# Patient Record
Sex: MALE | Race: WHITE | NOT HISPANIC OR LATINO
[De-identification: names, ages, dates, MRNs, and addresses within clinical notes are randomized per-mention and may not be internally consistent; named-entity substitution may affect disease eponyms.]

---

## 2020-01-01 ENCOUNTER — RESULT REVIEW (OUTPATIENT)
Age: 85
End: 2020-01-01

## 2020-01-01 ENCOUNTER — TRANSCRIPTION ENCOUNTER (OUTPATIENT)
Age: 85
End: 2020-01-01

## 2020-01-01 ENCOUNTER — APPOINTMENT (OUTPATIENT)
Dept: HEMATOLOGY ONCOLOGY | Facility: CLINIC | Age: 85
End: 2020-01-01

## 2020-01-01 ENCOUNTER — EMERGENCY (EMERGENCY)
Facility: HOSPITAL | Age: 85
LOS: 1 days | Discharge: HOME | End: 2020-01-01
Attending: EMERGENCY MEDICINE
Payer: OTHER GOVERNMENT

## 2020-01-01 ENCOUNTER — OUTPATIENT (OUTPATIENT)
Dept: OUTPATIENT SERVICES | Facility: HOSPITAL | Age: 85
LOS: 1 days | Discharge: HOME | End: 2020-01-01

## 2020-01-01 ENCOUNTER — INPATIENT (INPATIENT)
Facility: HOSPITAL | Age: 85
LOS: 9 days | DRG: 871 | End: 2020-11-27
Attending: INTERNAL MEDICINE | Admitting: STUDENT IN AN ORGANIZED HEALTH CARE EDUCATION/TRAINING PROGRAM
Payer: OTHER GOVERNMENT

## 2020-01-01 ENCOUNTER — INPATIENT (INPATIENT)
Facility: HOSPITAL | Age: 85
LOS: 19 days | Discharge: HOME | DRG: 840 | End: 2020-11-06
Attending: HOSPITALIST | Admitting: HOSPITALIST
Payer: OTHER GOVERNMENT

## 2020-01-01 VITALS — HEART RATE: 87 BPM | DIASTOLIC BLOOD PRESSURE: 57 MMHG | RESPIRATION RATE: 20 BRPM | SYSTOLIC BLOOD PRESSURE: 81 MMHG

## 2020-01-01 VITALS
RESPIRATION RATE: 18 BRPM | HEART RATE: 100 BPM | OXYGEN SATURATION: 98 % | DIASTOLIC BLOOD PRESSURE: 68 MMHG | SYSTOLIC BLOOD PRESSURE: 143 MMHG | TEMPERATURE: 98 F

## 2020-01-01 VITALS
TEMPERATURE: 98 F | DIASTOLIC BLOOD PRESSURE: 76 MMHG | OXYGEN SATURATION: 98 % | WEIGHT: 169.98 LBS | HEART RATE: 52 BPM | RESPIRATION RATE: 18 BRPM | SYSTOLIC BLOOD PRESSURE: 118 MMHG

## 2020-01-01 VITALS
HEART RATE: 96 BPM | SYSTOLIC BLOOD PRESSURE: 107 MMHG | RESPIRATION RATE: 20 BRPM | WEIGHT: 164.91 LBS | HEIGHT: 69 IN | OXYGEN SATURATION: 96 % | DIASTOLIC BLOOD PRESSURE: 56 MMHG | TEMPERATURE: 97 F

## 2020-01-01 VITALS
RESPIRATION RATE: 20 BRPM | HEART RATE: 112 BPM | OXYGEN SATURATION: 98 % | DIASTOLIC BLOOD PRESSURE: 83 MMHG | SYSTOLIC BLOOD PRESSURE: 130 MMHG | TEMPERATURE: 98 F | WEIGHT: 144.4 LBS

## 2020-01-01 DIAGNOSIS — E88.3 TUMOR LYSIS SYNDROME: ICD-10-CM

## 2020-01-01 DIAGNOSIS — Z87.81 PERSONAL HISTORY OF (HEALED) TRAUMATIC FRACTURE: Chronic | ICD-10-CM

## 2020-01-01 DIAGNOSIS — N40.0 BENIGN PROSTATIC HYPERPLASIA WITHOUT LOWER URINARY TRACT SYMPTOMS: ICD-10-CM

## 2020-01-01 DIAGNOSIS — L03.90 CELLULITIS, UNSPECIFIED: ICD-10-CM

## 2020-01-01 DIAGNOSIS — N17.9 ACUTE KIDNEY FAILURE, UNSPECIFIED: ICD-10-CM

## 2020-01-01 DIAGNOSIS — D70.9 NEUTROPENIA, UNSPECIFIED: ICD-10-CM

## 2020-01-01 DIAGNOSIS — M86.9 OSTEOMYELITIS, UNSPECIFIED: ICD-10-CM

## 2020-01-01 DIAGNOSIS — R53.2 FUNCTIONAL QUADRIPLEGIA: ICD-10-CM

## 2020-01-01 DIAGNOSIS — Z71.89 OTHER SPECIFIED COUNSELING: ICD-10-CM

## 2020-01-01 DIAGNOSIS — M25.512 PAIN IN LEFT SHOULDER: ICD-10-CM

## 2020-01-01 DIAGNOSIS — E87.5 HYPERKALEMIA: ICD-10-CM

## 2020-01-01 DIAGNOSIS — Z51.5 ENCOUNTER FOR PALLIATIVE CARE: ICD-10-CM

## 2020-01-01 DIAGNOSIS — R06.00 DYSPNEA, UNSPECIFIED: ICD-10-CM

## 2020-01-01 DIAGNOSIS — D64.9 ANEMIA, UNSPECIFIED: ICD-10-CM

## 2020-01-01 DIAGNOSIS — C85.10 UNSPECIFIED B-CELL LYMPHOMA, UNSPECIFIED SITE: ICD-10-CM

## 2020-01-01 DIAGNOSIS — C91.00 ACUTE LYMPHOBLASTIC LEUKEMIA NOT HAVING ACHIEVED REMISSION: ICD-10-CM

## 2020-01-01 DIAGNOSIS — C83.30 DIFFUSE LARGE B-CELL LYMPHOMA, UNSPECIFIED SITE: ICD-10-CM

## 2020-01-01 DIAGNOSIS — M86.9 OSTEOMYELITIS, UNSPECIFIED: Chronic | ICD-10-CM

## 2020-01-01 DIAGNOSIS — I82.629 ACUTE EMBOLISM AND THROMBOSIS OF DEEP VEINS OF UNSPECIFIED UPPER EXTREMITY: ICD-10-CM

## 2020-01-01 DIAGNOSIS — Z29.9 ENCOUNTER FOR PROPHYLACTIC MEASURES, UNSPECIFIED: ICD-10-CM

## 2020-01-01 DIAGNOSIS — I10 ESSENTIAL (PRIMARY) HYPERTENSION: ICD-10-CM

## 2020-01-01 DIAGNOSIS — E87.1 HYPO-OSMOLALITY AND HYPONATREMIA: ICD-10-CM

## 2020-01-01 DIAGNOSIS — R41.89 OTHER SYMPTOMS AND SIGNS INVOLVING COGNITIVE FUNCTIONS AND AWARENESS: ICD-10-CM

## 2020-01-01 DIAGNOSIS — E78.5 HYPERLIPIDEMIA, UNSPECIFIED: ICD-10-CM

## 2020-01-01 DIAGNOSIS — H04.123 DRY EYE SYNDROME OF BILATERAL LACRIMAL GLANDS: ICD-10-CM

## 2020-01-01 DIAGNOSIS — N18.9 CHRONIC KIDNEY DISEASE, UNSPECIFIED: ICD-10-CM

## 2020-01-01 DIAGNOSIS — F41.9 ANXIETY DISORDER, UNSPECIFIED: ICD-10-CM

## 2020-01-01 DIAGNOSIS — F32.9 MAJOR DEPRESSIVE DISORDER, SINGLE EPISODE, UNSPECIFIED: ICD-10-CM

## 2020-01-01 DIAGNOSIS — R63.0 ANOREXIA: ICD-10-CM

## 2020-01-01 LAB
% ALBUMIN: 39.4 % — SIGNIFICANT CHANGE UP
% ALPHA 1: 13.3 % — SIGNIFICANT CHANGE UP
% ALPHA 2: 22.5 % — SIGNIFICANT CHANGE UP
% BETA: 11.6 % — SIGNIFICANT CHANGE UP
% GAMMA: 13.2 % — SIGNIFICANT CHANGE UP
-  AMIKACIN: SIGNIFICANT CHANGE UP
-  AZTREONAM: SIGNIFICANT CHANGE UP
-  CEFEPIME: SIGNIFICANT CHANGE UP
-  CEFTAZIDIME: SIGNIFICANT CHANGE UP
-  CIPROFLOXACIN: SIGNIFICANT CHANGE UP
-  GENTAMICIN: SIGNIFICANT CHANGE UP
-  IMIPENEM: SIGNIFICANT CHANGE UP
-  LEVOFLOXACIN: SIGNIFICANT CHANGE UP
-  MEROPENEM: SIGNIFICANT CHANGE UP
-  PIPERACILLIN/TAZOBACTAM: SIGNIFICANT CHANGE UP
-  TOBRAMYCIN: SIGNIFICANT CHANGE UP
ALBUMIN SERPL ELPH-MCNC: 1.9 G/DL — LOW (ref 3.3–5)
ALBUMIN SERPL ELPH-MCNC: 1.9 G/DL — LOW (ref 3.6–5.5)
ALBUMIN SERPL ELPH-MCNC: 2.3 G/DL — LOW (ref 3.3–5)
ALBUMIN SERPL ELPH-MCNC: 2.4 G/DL — LOW (ref 3.3–5)
ALBUMIN SERPL ELPH-MCNC: 2.5 G/DL — LOW (ref 3.3–5)
ALBUMIN SERPL ELPH-MCNC: 2.6 G/DL — LOW (ref 3.3–5)
ALBUMIN SERPL ELPH-MCNC: 2.6 G/DL — LOW (ref 3.3–5)
ALBUMIN SERPL ELPH-MCNC: 2.8 G/DL — LOW (ref 3.3–5)
ALBUMIN SERPL ELPH-MCNC: 3.6 G/DL — SIGNIFICANT CHANGE UP (ref 3.3–5)
ALBUMIN/GLOB SERPL ELPH: 0.7 RATIO — SIGNIFICANT CHANGE UP
ALP SERPL-CCNC: 116 U/L — SIGNIFICANT CHANGE UP (ref 40–120)
ALP SERPL-CCNC: 67 U/L — SIGNIFICANT CHANGE UP (ref 40–120)
ALP SERPL-CCNC: 76 U/L — SIGNIFICANT CHANGE UP (ref 40–120)
ALP SERPL-CCNC: 78 U/L — SIGNIFICANT CHANGE UP (ref 40–120)
ALP SERPL-CCNC: 79 U/L — SIGNIFICANT CHANGE UP (ref 40–120)
ALP SERPL-CCNC: 81 U/L — SIGNIFICANT CHANGE UP (ref 40–120)
ALP SERPL-CCNC: 83 U/L — SIGNIFICANT CHANGE UP (ref 40–120)
ALP SERPL-CCNC: 85 U/L — SIGNIFICANT CHANGE UP (ref 40–120)
ALP SERPL-CCNC: 86 U/L — SIGNIFICANT CHANGE UP (ref 40–120)
ALP SERPL-CCNC: 87 U/L — SIGNIFICANT CHANGE UP (ref 40–120)
ALP SERPL-CCNC: 91 U/L — SIGNIFICANT CHANGE UP (ref 40–120)
ALP SERPL-CCNC: 91 U/L — SIGNIFICANT CHANGE UP (ref 40–120)
ALPHA1 GLOB SERPL ELPH-MCNC: 0.6 G/DL — HIGH (ref 0.1–0.4)
ALPHA2 GLOB SERPL ELPH-MCNC: 1.1 G/DL — HIGH (ref 0.5–1)
ALT FLD-CCNC: 10 U/L — SIGNIFICANT CHANGE UP (ref 10–45)
ALT FLD-CCNC: 10 U/L — SIGNIFICANT CHANGE UP (ref 10–45)
ALT FLD-CCNC: 11 U/L — SIGNIFICANT CHANGE UP (ref 10–45)
ALT FLD-CCNC: 12 U/L — SIGNIFICANT CHANGE UP (ref 10–45)
ALT FLD-CCNC: 15 U/L — SIGNIFICANT CHANGE UP (ref 10–45)
ALT FLD-CCNC: 30 U/L — SIGNIFICANT CHANGE UP (ref 10–45)
ALT FLD-CCNC: 32 U/L — SIGNIFICANT CHANGE UP (ref 10–45)
ALT FLD-CCNC: 40 U/L — SIGNIFICANT CHANGE UP (ref 10–45)
ALT FLD-CCNC: 44 U/L — SIGNIFICANT CHANGE UP (ref 10–45)
ALT FLD-CCNC: 47 U/L — HIGH (ref 10–45)
ALT FLD-CCNC: 8 U/L — LOW (ref 10–45)
ALT FLD-CCNC: 9 U/L — LOW (ref 10–45)
ANION GAP SERPL CALC-SCNC: 10 MMOL/L — SIGNIFICANT CHANGE UP (ref 5–17)
ANION GAP SERPL CALC-SCNC: 11 MMOL/L — SIGNIFICANT CHANGE UP (ref 5–17)
ANION GAP SERPL CALC-SCNC: 12 MMOL/L — SIGNIFICANT CHANGE UP (ref 5–17)
ANION GAP SERPL CALC-SCNC: 13 MMOL/L — SIGNIFICANT CHANGE UP (ref 5–17)
ANION GAP SERPL CALC-SCNC: 14 MMOL/L — SIGNIFICANT CHANGE UP (ref 5–17)
ANION GAP SERPL CALC-SCNC: 15 MMOL/L — SIGNIFICANT CHANGE UP (ref 5–17)
ANION GAP SERPL CALC-SCNC: 16 MMOL/L — SIGNIFICANT CHANGE UP (ref 5–17)
ANION GAP SERPL CALC-SCNC: 17 MMOL/L — SIGNIFICANT CHANGE UP (ref 5–17)
ANION GAP SERPL CALC-SCNC: 17 MMOL/L — SIGNIFICANT CHANGE UP (ref 5–17)
ANION GAP SERPL CALC-SCNC: 18 MMOL/L — HIGH (ref 5–17)
ANION GAP SERPL CALC-SCNC: 18 MMOL/L — HIGH (ref 5–17)
ANION GAP SERPL CALC-SCNC: 24 MMOL/L — HIGH (ref 5–17)
ANISOCYTOSIS BLD QL: SLIGHT — SIGNIFICANT CHANGE UP
APPEARANCE UR: ABNORMAL
APPEARANCE UR: ABNORMAL
APTT BLD: 105.8 SEC — HIGH (ref 27.5–35.5)
APTT BLD: 108.5 SEC — HIGH (ref 27.5–35.5)
APTT BLD: 127.2 SEC — CRITICAL HIGH (ref 27.5–35.5)
APTT BLD: 182.5 SEC — CRITICAL HIGH (ref 27.5–35.5)
APTT BLD: 27.7 SEC — SIGNIFICANT CHANGE UP (ref 27.5–36.3)
APTT BLD: 32 SEC — SIGNIFICANT CHANGE UP (ref 27.5–35.5)
APTT BLD: 33.2 SEC — SIGNIFICANT CHANGE UP (ref 27.5–35.5)
APTT BLD: 47.5 SEC — HIGH (ref 27.5–35.5)
APTT BLD: 48.8 SEC — HIGH (ref 27.5–35.5)
APTT BLD: 49.9 SEC — HIGH (ref 27.5–35.5)
APTT BLD: 51.6 SEC — HIGH (ref 27.5–35.5)
APTT BLD: 61.1 SEC — HIGH (ref 27.5–35.5)
APTT BLD: 61.4 SEC — HIGH (ref 27.5–35.5)
APTT BLD: 62.3 SEC — HIGH (ref 27.5–35.5)
APTT BLD: 63 SEC — HIGH (ref 27.5–35.5)
APTT BLD: 64.2 SEC — HIGH (ref 27.5–35.5)
APTT BLD: 67.1 SEC — HIGH (ref 27.5–35.5)
APTT BLD: 69.7 SEC — HIGH (ref 27.5–35.5)
APTT BLD: 69.9 SEC — HIGH (ref 27.5–35.5)
APTT BLD: 70.6 SEC — HIGH (ref 27.5–35.5)
APTT BLD: 71.9 SEC — HIGH (ref 27.5–35.5)
APTT BLD: 72.9 SEC — HIGH (ref 27.5–35.5)
APTT BLD: 79.7 SEC — HIGH (ref 27.5–35.5)
APTT BLD: 91.6 SEC — HIGH (ref 27.5–35.5)
APTT BLD: >200 SEC (ref 27.5–35.5)
AST SERPL-CCNC: 22 U/L — SIGNIFICANT CHANGE UP (ref 10–40)
AST SERPL-CCNC: 22 U/L — SIGNIFICANT CHANGE UP (ref 10–40)
AST SERPL-CCNC: 23 U/L — SIGNIFICANT CHANGE UP (ref 10–40)
AST SERPL-CCNC: 28 U/L — SIGNIFICANT CHANGE UP (ref 10–40)
AST SERPL-CCNC: 32 U/L — SIGNIFICANT CHANGE UP (ref 10–40)
AST SERPL-CCNC: 34 U/L — SIGNIFICANT CHANGE UP (ref 10–40)
AST SERPL-CCNC: 41 U/L — HIGH (ref 10–40)
AST SERPL-CCNC: 46 U/L — HIGH (ref 10–40)
AST SERPL-CCNC: 46 U/L — HIGH (ref 10–40)
AST SERPL-CCNC: 56 U/L — HIGH (ref 10–40)
AST SERPL-CCNC: 65 U/L — HIGH (ref 10–40)
AST SERPL-CCNC: 72 U/L — HIGH (ref 10–40)
B-GLOBULIN SERPL ELPH-MCNC: 0.5 G/DL — SIGNIFICANT CHANGE UP (ref 0.5–1)
BACTERIA # UR AUTO: ABNORMAL
BACTERIA # UR AUTO: NEGATIVE — SIGNIFICANT CHANGE UP
BASE EXCESS BLDV CALC-SCNC: -0.6 MMOL/L — SIGNIFICANT CHANGE UP (ref -2–2)
BASE EXCESS BLDV CALC-SCNC: -2.1 MMOL/L — LOW (ref -2–2)
BASE EXCESS BLDV CALC-SCNC: -2.1 MMOL/L — LOW (ref -2–2)
BASE EXCESS BLDV CALC-SCNC: -2.7 MMOL/L — LOW (ref -2–2)
BASE EXCESS BLDV CALC-SCNC: -2.7 MMOL/L — LOW (ref -2–2)
BASE EXCESS BLDV CALC-SCNC: -3.5 MMOL/L — LOW (ref -2–2)
BASOPHILS # BLD AUTO: 0 K/UL — SIGNIFICANT CHANGE UP (ref 0–0.2)
BASOPHILS # BLD AUTO: 0 K/UL — SIGNIFICANT CHANGE UP (ref 0–0.2)
BASOPHILS # BLD AUTO: 0.01 K/UL — SIGNIFICANT CHANGE UP (ref 0–0.2)
BASOPHILS # BLD AUTO: 0.02 K/UL — SIGNIFICANT CHANGE UP (ref 0–0.2)
BASOPHILS # BLD AUTO: 0.02 K/UL — SIGNIFICANT CHANGE UP (ref 0–0.2)
BASOPHILS # BLD AUTO: 0.03 K/UL — SIGNIFICANT CHANGE UP (ref 0–0.2)
BASOPHILS # BLD AUTO: 0.05 K/UL — SIGNIFICANT CHANGE UP (ref 0–0.2)
BASOPHILS # BLD AUTO: 0.06 K/UL — SIGNIFICANT CHANGE UP (ref 0–0.2)
BASOPHILS # BLD AUTO: 0.06 K/UL — SIGNIFICANT CHANGE UP (ref 0–0.2)
BASOPHILS NFR BLD AUTO: 0 % — SIGNIFICANT CHANGE UP (ref 0–2)
BASOPHILS NFR BLD AUTO: 0 % — SIGNIFICANT CHANGE UP (ref 0–2)
BASOPHILS NFR BLD AUTO: 0.1 % — SIGNIFICANT CHANGE UP (ref 0–2)
BASOPHILS NFR BLD AUTO: 0.2 % — SIGNIFICANT CHANGE UP (ref 0–2)
BASOPHILS NFR BLD AUTO: 0.2 % — SIGNIFICANT CHANGE UP (ref 0–2)
BASOPHILS NFR BLD AUTO: 0.3 % — SIGNIFICANT CHANGE UP (ref 0–2)
BASOPHILS NFR BLD AUTO: 0.3 % — SIGNIFICANT CHANGE UP (ref 0–2)
BASOPHILS NFR BLD AUTO: 0.4 % — SIGNIFICANT CHANGE UP (ref 0–2)
BASOPHILS NFR BLD AUTO: 0.5 % — SIGNIFICANT CHANGE UP (ref 0–2)
BASOPHILS NFR BLD AUTO: 0.6 % — SIGNIFICANT CHANGE UP (ref 0–2)
BASOPHILS NFR BLD AUTO: 0.9 % — SIGNIFICANT CHANGE UP (ref 0–2)
BASOPHILS NFR BLD AUTO: 0.9 % — SIGNIFICANT CHANGE UP (ref 0–2)
BILIRUB DIRECT SERPL-MCNC: 0.1 MG/DL — SIGNIFICANT CHANGE UP (ref 0–0.2)
BILIRUB DIRECT SERPL-MCNC: 0.2 MG/DL — SIGNIFICANT CHANGE UP (ref 0–0.2)
BILIRUB INDIRECT FLD-MCNC: 0.2 MG/DL — SIGNIFICANT CHANGE UP (ref 0.2–1)
BILIRUB INDIRECT FLD-MCNC: 0.2 MG/DL — SIGNIFICANT CHANGE UP (ref 0.2–1)
BILIRUB SERPL-MCNC: 0.2 MG/DL — SIGNIFICANT CHANGE UP (ref 0.2–1.2)
BILIRUB SERPL-MCNC: 0.3 MG/DL — SIGNIFICANT CHANGE UP (ref 0.2–1.2)
BILIRUB SERPL-MCNC: 0.4 MG/DL — SIGNIFICANT CHANGE UP (ref 0.2–1.2)
BILIRUB SERPL-MCNC: 0.5 MG/DL — SIGNIFICANT CHANGE UP (ref 0.2–1.2)
BILIRUB SERPL-MCNC: 0.5 MG/DL — SIGNIFICANT CHANGE UP (ref 0.2–1.2)
BILIRUB SERPL-MCNC: 0.6 MG/DL — SIGNIFICANT CHANGE UP (ref 0.2–1.2)
BILIRUB SERPL-MCNC: 0.6 MG/DL — SIGNIFICANT CHANGE UP (ref 0.2–1.2)
BILIRUB SERPL-MCNC: 0.7 MG/DL — SIGNIFICANT CHANGE UP (ref 0.2–1.2)
BILIRUB UR-MCNC: NEGATIVE — SIGNIFICANT CHANGE UP
BILIRUB UR-MCNC: NEGATIVE — SIGNIFICANT CHANGE UP
BLD GP AB SCN SERPL QL: NEGATIVE — SIGNIFICANT CHANGE UP
BUN SERPL-MCNC: 21 MG/DL — SIGNIFICANT CHANGE UP (ref 7–23)
BUN SERPL-MCNC: 25 MG/DL — HIGH (ref 7–23)
BUN SERPL-MCNC: 27 MG/DL — HIGH (ref 7–23)
BUN SERPL-MCNC: 28 MG/DL — HIGH (ref 7–23)
BUN SERPL-MCNC: 32 MG/DL — HIGH (ref 7–23)
BUN SERPL-MCNC: 32 MG/DL — HIGH (ref 7–23)
BUN SERPL-MCNC: 37 MG/DL — HIGH (ref 7–23)
BUN SERPL-MCNC: 39 MG/DL — HIGH (ref 7–23)
BUN SERPL-MCNC: 39 MG/DL — HIGH (ref 7–23)
BUN SERPL-MCNC: 43 MG/DL — HIGH (ref 7–23)
BUN SERPL-MCNC: 47 MG/DL — HIGH (ref 7–23)
BUN SERPL-MCNC: 47 MG/DL — HIGH (ref 7–23)
BUN SERPL-MCNC: 50 MG/DL — HIGH (ref 7–23)
BUN SERPL-MCNC: 56 MG/DL — HIGH (ref 7–23)
BUN SERPL-MCNC: 57 MG/DL — HIGH (ref 7–23)
BUN SERPL-MCNC: 59 MG/DL — HIGH (ref 7–23)
BUN SERPL-MCNC: 60 MG/DL — HIGH (ref 7–23)
BUN SERPL-MCNC: 63 MG/DL — HIGH (ref 7–23)
BUN SERPL-MCNC: 65 MG/DL — HIGH (ref 7–23)
BUN SERPL-MCNC: 67 MG/DL — HIGH (ref 7–23)
BUN SERPL-MCNC: 71 MG/DL — HIGH (ref 7–23)
BUN SERPL-MCNC: 73 MG/DL — HIGH (ref 7–23)
BUN SERPL-MCNC: 78 MG/DL — HIGH (ref 7–23)
BUN SERPL-MCNC: 80 MG/DL — HIGH (ref 7–23)
BUN SERPL-MCNC: 82 MG/DL — HIGH (ref 7–23)
BUN SERPL-MCNC: 84 MG/DL — HIGH (ref 7–23)
BUN SERPL-MCNC: 85 MG/DL — HIGH (ref 7–23)
BUN SERPL-MCNC: 86 MG/DL — HIGH (ref 7–23)
BUN SERPL-MCNC: 88 MG/DL — HIGH (ref 7–23)
BUN SERPL-MCNC: 90 MG/DL — HIGH (ref 7–23)
BUN SERPL-MCNC: 90 MG/DL — HIGH (ref 7–23)
BUN SERPL-MCNC: 91 MG/DL — HIGH (ref 7–23)
CA-I SERPL-SCNC: 1.02 MMOL/L — LOW (ref 1.12–1.3)
CA-I SERPL-SCNC: 1.06 MMOL/L — LOW (ref 1.12–1.3)
CA-I SERPL-SCNC: 1.12 MMOL/L — SIGNIFICANT CHANGE UP (ref 1.12–1.3)
CA-I SERPL-SCNC: 1.13 MMOL/L — SIGNIFICANT CHANGE UP (ref 1.12–1.3)
CA-I SERPL-SCNC: 1.15 MMOL/L — SIGNIFICANT CHANGE UP (ref 1.12–1.3)
CA-I SERPL-SCNC: 1.17 MMOL/L — SIGNIFICANT CHANGE UP (ref 1.12–1.3)
CALCIUM SERPL-MCNC: 8 MG/DL — LOW (ref 8.4–10.5)
CALCIUM SERPL-MCNC: 8.1 MG/DL — LOW (ref 8.4–10.5)
CALCIUM SERPL-MCNC: 8.2 MG/DL — LOW (ref 8.4–10.5)
CALCIUM SERPL-MCNC: 8.3 MG/DL — LOW (ref 8.4–10.5)
CALCIUM SERPL-MCNC: 8.4 MG/DL — SIGNIFICANT CHANGE UP (ref 8.4–10.5)
CALCIUM SERPL-MCNC: 8.5 MG/DL — SIGNIFICANT CHANGE UP (ref 8.4–10.5)
CALCIUM SERPL-MCNC: 8.6 MG/DL — SIGNIFICANT CHANGE UP (ref 8.4–10.5)
CALCIUM SERPL-MCNC: 8.7 MG/DL — SIGNIFICANT CHANGE UP (ref 8.4–10.5)
CALCIUM SERPL-MCNC: 8.8 MG/DL — SIGNIFICANT CHANGE UP (ref 8.4–10.5)
CALCIUM SERPL-MCNC: 8.8 MG/DL — SIGNIFICANT CHANGE UP (ref 8.4–10.5)
CALCIUM SERPL-MCNC: 8.9 MG/DL — SIGNIFICANT CHANGE UP (ref 8.4–10.5)
CALCIUM SERPL-MCNC: 9.3 MG/DL — SIGNIFICANT CHANGE UP (ref 8.4–10.5)
CD3-/CD16+CD56+(%): 12 % — SIGNIFICANT CHANGE UP (ref 4–25)
CD3-CD16+CD56+(ABS): 104 CELLS/UL — SIGNIFICANT CHANGE UP (ref 70–760)
CHLORIDE BLDV-SCNC: 100 MMOL/L — SIGNIFICANT CHANGE UP (ref 96–108)
CHLORIDE BLDV-SCNC: 104 MMOL/L — SIGNIFICANT CHANGE UP (ref 96–108)
CHLORIDE BLDV-SCNC: 104 MMOL/L — SIGNIFICANT CHANGE UP (ref 96–108)
CHLORIDE BLDV-SCNC: 107 MMOL/L — SIGNIFICANT CHANGE UP (ref 96–108)
CHLORIDE SERPL-SCNC: 100 MMOL/L — SIGNIFICANT CHANGE UP (ref 96–108)
CHLORIDE SERPL-SCNC: 101 MMOL/L — SIGNIFICANT CHANGE UP (ref 96–108)
CHLORIDE SERPL-SCNC: 102 MMOL/L — SIGNIFICANT CHANGE UP (ref 96–108)
CHLORIDE SERPL-SCNC: 102 MMOL/L — SIGNIFICANT CHANGE UP (ref 96–108)
CHLORIDE SERPL-SCNC: 103 MMOL/L — SIGNIFICANT CHANGE UP (ref 96–108)
CHLORIDE SERPL-SCNC: 104 MMOL/L — SIGNIFICANT CHANGE UP (ref 96–108)
CHLORIDE SERPL-SCNC: 105 MMOL/L — SIGNIFICANT CHANGE UP (ref 96–108)
CHLORIDE SERPL-SCNC: 106 MMOL/L — SIGNIFICANT CHANGE UP (ref 96–108)
CHLORIDE SERPL-SCNC: 107 MMOL/L — SIGNIFICANT CHANGE UP (ref 96–108)
CHLORIDE SERPL-SCNC: 107 MMOL/L — SIGNIFICANT CHANGE UP (ref 96–108)
CHLORIDE SERPL-SCNC: 108 MMOL/L — SIGNIFICANT CHANGE UP (ref 96–108)
CHLORIDE SERPL-SCNC: 108 MMOL/L — SIGNIFICANT CHANGE UP (ref 96–108)
CHLORIDE SERPL-SCNC: 92 MMOL/L — LOW (ref 96–108)
CHLORIDE SERPL-SCNC: 92 MMOL/L — LOW (ref 96–108)
CHLORIDE SERPL-SCNC: 94 MMOL/L — LOW (ref 96–108)
CHLORIDE SERPL-SCNC: 95 MMOL/L — LOW (ref 96–108)
CHLORIDE SERPL-SCNC: 95 MMOL/L — LOW (ref 96–108)
CHLORIDE SERPL-SCNC: 96 MMOL/L — SIGNIFICANT CHANGE UP (ref 96–108)
CHLORIDE SERPL-SCNC: 97 MMOL/L — SIGNIFICANT CHANGE UP (ref 96–108)
CHLORIDE SERPL-SCNC: 98 MMOL/L — SIGNIFICANT CHANGE UP (ref 96–108)
CHLORIDE SERPL-SCNC: 99 MMOL/L — SIGNIFICANT CHANGE UP (ref 96–108)
CHLORIDE UR-SCNC: <35 MMOL/L — SIGNIFICANT CHANGE UP
CHROM ANALY OVERALL INTERP SPEC-IMP: SIGNIFICANT CHANGE UP
CK MB CFR SERPL CALC: 2.1 NG/ML — SIGNIFICANT CHANGE UP (ref 0–6.7)
CO2 BLDV-SCNC: 22 MMOL/L — SIGNIFICANT CHANGE UP (ref 22–30)
CO2 BLDV-SCNC: 23 MMOL/L — SIGNIFICANT CHANGE UP (ref 22–30)
CO2 BLDV-SCNC: 24 MMOL/L — SIGNIFICANT CHANGE UP (ref 22–30)
CO2 SERPL-SCNC: 18 MMOL/L — LOW (ref 22–31)
CO2 SERPL-SCNC: 18 MMOL/L — LOW (ref 22–31)
CO2 SERPL-SCNC: 19 MMOL/L — LOW (ref 22–31)
CO2 SERPL-SCNC: 20 MMOL/L — LOW (ref 22–31)
CO2 SERPL-SCNC: 21 MMOL/L — LOW (ref 22–31)
CO2 SERPL-SCNC: 22 MMOL/L — SIGNIFICANT CHANGE UP (ref 22–31)
CO2 SERPL-SCNC: 23 MMOL/L — SIGNIFICANT CHANGE UP (ref 22–31)
CO2 SERPL-SCNC: 24 MMOL/L — SIGNIFICANT CHANGE UP (ref 22–31)
CO2 SERPL-SCNC: 25 MMOL/L — SIGNIFICANT CHANGE UP (ref 22–31)
CO2 SERPL-SCNC: 26 MMOL/L — SIGNIFICANT CHANGE UP (ref 22–31)
CO2 SERPL-SCNC: 26 MMOL/L — SIGNIFICANT CHANGE UP (ref 22–31)
COLOR SPEC: YELLOW — SIGNIFICANT CHANGE UP
COLOR SPEC: YELLOW — SIGNIFICANT CHANGE UP
CREAT ?TM UR-MCNC: 43 MG/DL — SIGNIFICANT CHANGE UP
CREAT SERPL-MCNC: 1.52 MG/DL — HIGH (ref 0.5–1.3)
CREAT SERPL-MCNC: 1.52 MG/DL — HIGH (ref 0.5–1.3)
CREAT SERPL-MCNC: 1.53 MG/DL — HIGH (ref 0.5–1.3)
CREAT SERPL-MCNC: 1.54 MG/DL — HIGH (ref 0.5–1.3)
CREAT SERPL-MCNC: 1.57 MG/DL — HIGH (ref 0.5–1.3)
CREAT SERPL-MCNC: 1.58 MG/DL — HIGH (ref 0.5–1.3)
CREAT SERPL-MCNC: 1.61 MG/DL — HIGH (ref 0.5–1.3)
CREAT SERPL-MCNC: 1.63 MG/DL — HIGH (ref 0.5–1.3)
CREAT SERPL-MCNC: 1.64 MG/DL — HIGH (ref 0.5–1.3)
CREAT SERPL-MCNC: 1.66 MG/DL — HIGH (ref 0.5–1.3)
CREAT SERPL-MCNC: 1.71 MG/DL — HIGH (ref 0.5–1.3)
CREAT SERPL-MCNC: 1.73 MG/DL — HIGH (ref 0.5–1.3)
CREAT SERPL-MCNC: 1.73 MG/DL — HIGH (ref 0.5–1.3)
CREAT SERPL-MCNC: 1.74 MG/DL — HIGH (ref 0.5–1.3)
CREAT SERPL-MCNC: 1.75 MG/DL — HIGH (ref 0.5–1.3)
CREAT SERPL-MCNC: 1.76 MG/DL — HIGH (ref 0.5–1.3)
CREAT SERPL-MCNC: 1.78 MG/DL — HIGH (ref 0.5–1.3)
CREAT SERPL-MCNC: 1.8 MG/DL — HIGH (ref 0.5–1.3)
CREAT SERPL-MCNC: 1.94 MG/DL — HIGH (ref 0.5–1.3)
CREAT SERPL-MCNC: 1.95 MG/DL — HIGH (ref 0.5–1.3)
CREAT SERPL-MCNC: 2.03 MG/DL — HIGH (ref 0.5–1.3)
CREAT SERPL-MCNC: 2.1 MG/DL — HIGH (ref 0.5–1.3)
CREAT SERPL-MCNC: 2.19 MG/DL — HIGH (ref 0.5–1.3)
CREAT SERPL-MCNC: 2.26 MG/DL — HIGH (ref 0.5–1.3)
CREAT SERPL-MCNC: 2.3 MG/DL — HIGH (ref 0.5–1.3)
CREAT SERPL-MCNC: 2.33 MG/DL — HIGH (ref 0.5–1.3)
CREAT SERPL-MCNC: 2.33 MG/DL — HIGH (ref 0.5–1.3)
CREAT SERPL-MCNC: 2.42 MG/DL — HIGH (ref 0.5–1.3)
CREAT SERPL-MCNC: 2.48 MG/DL — HIGH (ref 0.5–1.3)
CREAT SERPL-MCNC: 2.5 MG/DL — HIGH (ref 0.5–1.3)
CREAT SERPL-MCNC: 2.53 MG/DL — HIGH (ref 0.5–1.3)
CREAT SERPL-MCNC: 2.58 MG/DL — HIGH (ref 0.5–1.3)
CREAT SERPL-MCNC: 2.6 MG/DL — HIGH (ref 0.5–1.3)
CREAT SERPL-MCNC: 2.68 MG/DL — HIGH (ref 0.5–1.3)
CREAT SERPL-MCNC: 2.69 MG/DL — HIGH (ref 0.5–1.3)
CREAT SERPL-MCNC: 2.76 MG/DL — HIGH (ref 0.5–1.3)
CRP SERPL-MCNC: 28.06 MG/DL — HIGH (ref 0–0.4)
CRP SERPL-MCNC: 30.39 MG/DL — HIGH (ref 0–0.4)
CULTURE RESULTS: SIGNIFICANT CHANGE UP
DIFF PNL FLD: ABNORMAL
DIFF PNL FLD: ABNORMAL
EOSINOPHIL # BLD AUTO: 0 K/UL — SIGNIFICANT CHANGE UP (ref 0–0.5)
EOSINOPHIL # BLD AUTO: 0 K/UL — SIGNIFICANT CHANGE UP (ref 0–0.5)
EOSINOPHIL # BLD AUTO: 0.02 K/UL — SIGNIFICANT CHANGE UP (ref 0–0.5)
EOSINOPHIL # BLD AUTO: 0.04 K/UL — SIGNIFICANT CHANGE UP (ref 0–0.5)
EOSINOPHIL # BLD AUTO: 0.05 K/UL — SIGNIFICANT CHANGE UP (ref 0–0.5)
EOSINOPHIL # BLD AUTO: 0.05 K/UL — SIGNIFICANT CHANGE UP (ref 0–0.5)
EOSINOPHIL # BLD AUTO: 0.09 K/UL — SIGNIFICANT CHANGE UP (ref 0–0.5)
EOSINOPHIL # BLD AUTO: 0.11 K/UL — SIGNIFICANT CHANGE UP (ref 0–0.5)
EOSINOPHIL # BLD AUTO: 0.11 K/UL — SIGNIFICANT CHANGE UP (ref 0–0.5)
EOSINOPHIL # BLD AUTO: 0.12 K/UL — SIGNIFICANT CHANGE UP (ref 0–0.5)
EOSINOPHIL # BLD AUTO: 0.13 K/UL — SIGNIFICANT CHANGE UP (ref 0–0.5)
EOSINOPHIL # BLD AUTO: 0.13 K/UL — SIGNIFICANT CHANGE UP (ref 0–0.5)
EOSINOPHIL # BLD AUTO: 0.14 K/UL — SIGNIFICANT CHANGE UP (ref 0–0.5)
EOSINOPHIL # BLD AUTO: 0.18 K/UL — SIGNIFICANT CHANGE UP (ref 0–0.5)
EOSINOPHIL # BLD AUTO: 0.19 K/UL — SIGNIFICANT CHANGE UP (ref 0–0.5)
EOSINOPHIL NFR BLD AUTO: 0 % — SIGNIFICANT CHANGE UP (ref 0–6)
EOSINOPHIL NFR BLD AUTO: 0 % — SIGNIFICANT CHANGE UP (ref 0–6)
EOSINOPHIL NFR BLD AUTO: 0.3 % — SIGNIFICANT CHANGE UP (ref 0–6)
EOSINOPHIL NFR BLD AUTO: 0.6 % — SIGNIFICANT CHANGE UP (ref 0–6)
EOSINOPHIL NFR BLD AUTO: 0.9 % — SIGNIFICANT CHANGE UP (ref 0–6)
EOSINOPHIL NFR BLD AUTO: 1 % — SIGNIFICANT CHANGE UP (ref 0–6)
EOSINOPHIL NFR BLD AUTO: 1.2 % — SIGNIFICANT CHANGE UP (ref 0–6)
EOSINOPHIL NFR BLD AUTO: 1.2 % — SIGNIFICANT CHANGE UP (ref 0–6)
EOSINOPHIL NFR BLD AUTO: 1.3 % — SIGNIFICANT CHANGE UP (ref 0–6)
EOSINOPHIL NFR BLD AUTO: 1.7 % — SIGNIFICANT CHANGE UP (ref 0–6)
EOSINOPHIL NFR BLD AUTO: 1.9 % — SIGNIFICANT CHANGE UP (ref 0–6)
EOSINOPHIL NFR BLD AUTO: 13.9 % — HIGH (ref 0–6)
EOSINOPHIL NFR BLD AUTO: 3.6 % — SIGNIFICANT CHANGE UP (ref 0–6)
EPI CELLS # UR: 1 /HPF — SIGNIFICANT CHANGE UP
EPI CELLS # UR: 2 — SIGNIFICANT CHANGE UP
ERYTHROCYTE [SEDIMENTATION RATE] IN BLOOD: 120 MM/HR — HIGH (ref 0–20)
FERRITIN SERPL-MCNC: 9219 NG/ML — HIGH (ref 30–400)
FERRITIN SERPL-MCNC: HIGH NG/ML (ref 30–400)
FIBRINOGEN AG PPP IA-MCNC: 430 MG/DL — HIGH
FIBRINOGEN PPP-MCNC: 1190 MG/DL — HIGH (ref 290–520)
GAMMA GLOBULIN: 0.6 G/DL — SIGNIFICANT CHANGE UP (ref 0.6–1.6)
GAS PNL BLDV: 130 MMOL/L — LOW (ref 135–145)
GAS PNL BLDV: 130 MMOL/L — LOW (ref 135–145)
GAS PNL BLDV: 134 MMOL/L — LOW (ref 135–145)
GAS PNL BLDV: 134 MMOL/L — LOW (ref 135–145)
GAS PNL BLDV: 139 MMOL/L — SIGNIFICANT CHANGE UP (ref 135–145)
GAS PNL BLDV: 141 MMOL/L — SIGNIFICANT CHANGE UP (ref 135–145)
GAS PNL BLDV: SIGNIFICANT CHANGE UP
GLUCOSE BLDC GLUCOMTR-MCNC: 101 MG/DL — HIGH (ref 70–99)
GLUCOSE BLDC GLUCOMTR-MCNC: 165 MG/DL — HIGH (ref 70–99)
GLUCOSE BLDC GLUCOMTR-MCNC: 208 MG/DL — HIGH (ref 70–99)
GLUCOSE BLDC GLUCOMTR-MCNC: 243 MG/DL — HIGH (ref 70–99)
GLUCOSE BLDV-MCNC: 124 MG/DL — HIGH (ref 70–99)
GLUCOSE BLDV-MCNC: 134 MG/DL — HIGH (ref 70–99)
GLUCOSE BLDV-MCNC: 135 MG/DL — HIGH (ref 70–99)
GLUCOSE BLDV-MCNC: 137 MG/DL — HIGH (ref 70–99)
GLUCOSE BLDV-MCNC: 162 MG/DL — HIGH (ref 70–99)
GLUCOSE BLDV-MCNC: 75 MG/DL — SIGNIFICANT CHANGE UP (ref 70–99)
GLUCOSE SERPL-MCNC: 100 MG/DL — HIGH (ref 70–99)
GLUCOSE SERPL-MCNC: 100 MG/DL — HIGH (ref 70–99)
GLUCOSE SERPL-MCNC: 101 MG/DL — HIGH (ref 70–99)
GLUCOSE SERPL-MCNC: 102 MG/DL — HIGH (ref 70–99)
GLUCOSE SERPL-MCNC: 106 MG/DL — HIGH (ref 70–99)
GLUCOSE SERPL-MCNC: 112 MG/DL — HIGH (ref 70–99)
GLUCOSE SERPL-MCNC: 115 MG/DL — HIGH (ref 70–99)
GLUCOSE SERPL-MCNC: 115 MG/DL — HIGH (ref 70–99)
GLUCOSE SERPL-MCNC: 117 MG/DL — HIGH (ref 70–99)
GLUCOSE SERPL-MCNC: 118 MG/DL — HIGH (ref 70–99)
GLUCOSE SERPL-MCNC: 121 MG/DL — HIGH (ref 70–99)
GLUCOSE SERPL-MCNC: 124 MG/DL — HIGH (ref 70–99)
GLUCOSE SERPL-MCNC: 127 MG/DL — HIGH (ref 70–99)
GLUCOSE SERPL-MCNC: 128 MG/DL — HIGH (ref 70–99)
GLUCOSE SERPL-MCNC: 128 MG/DL — HIGH (ref 70–99)
GLUCOSE SERPL-MCNC: 134 MG/DL — HIGH (ref 70–99)
GLUCOSE SERPL-MCNC: 137 MG/DL — HIGH (ref 70–99)
GLUCOSE SERPL-MCNC: 154 MG/DL — HIGH (ref 70–99)
GLUCOSE SERPL-MCNC: 170 MG/DL — HIGH (ref 70–99)
GLUCOSE SERPL-MCNC: 177 MG/DL — HIGH (ref 70–99)
GLUCOSE SERPL-MCNC: 179 MG/DL — HIGH (ref 70–99)
GLUCOSE SERPL-MCNC: 183 MG/DL — HIGH (ref 70–99)
GLUCOSE SERPL-MCNC: 193 MG/DL — HIGH (ref 70–99)
GLUCOSE SERPL-MCNC: 198 MG/DL — HIGH (ref 70–99)
GLUCOSE SERPL-MCNC: 230 MG/DL — HIGH (ref 70–99)
GLUCOSE SERPL-MCNC: 82 MG/DL — SIGNIFICANT CHANGE UP (ref 70–99)
GLUCOSE SERPL-MCNC: 82 MG/DL — SIGNIFICANT CHANGE UP (ref 70–99)
GLUCOSE SERPL-MCNC: 83 MG/DL — SIGNIFICANT CHANGE UP (ref 70–99)
GLUCOSE SERPL-MCNC: 86 MG/DL — SIGNIFICANT CHANGE UP (ref 70–99)
GLUCOSE SERPL-MCNC: 88 MG/DL — SIGNIFICANT CHANGE UP (ref 70–99)
GLUCOSE SERPL-MCNC: 90 MG/DL — SIGNIFICANT CHANGE UP (ref 70–99)
GLUCOSE SERPL-MCNC: 90 MG/DL — SIGNIFICANT CHANGE UP (ref 70–99)
GLUCOSE SERPL-MCNC: 92 MG/DL — SIGNIFICANT CHANGE UP (ref 70–99)
GLUCOSE SERPL-MCNC: 93 MG/DL — SIGNIFICANT CHANGE UP (ref 70–99)
GLUCOSE SERPL-MCNC: 94 MG/DL — SIGNIFICANT CHANGE UP (ref 70–99)
GLUCOSE SERPL-MCNC: 99 MG/DL — SIGNIFICANT CHANGE UP (ref 70–99)
GLUCOSE UR QL: ABNORMAL
GLUCOSE UR QL: NEGATIVE — SIGNIFICANT CHANGE UP
GRAM STN FLD: SIGNIFICANT CHANGE UP
HAPTOGLOB SERPL-MCNC: 277 MG/DL — HIGH (ref 34–200)
HAPTOGLOB SERPL-MCNC: 364 MG/DL — HIGH (ref 34–200)
HAV IGM SER-ACNC: SIGNIFICANT CHANGE UP
HBV CORE AB SER-ACNC: SIGNIFICANT CHANGE UP
HBV CORE IGM SER-ACNC: SIGNIFICANT CHANGE UP
HBV SURFACE AG SER-ACNC: SIGNIFICANT CHANGE UP
HCO3 BLDV-SCNC: 21 MMOL/L — SIGNIFICANT CHANGE UP (ref 21–29)
HCO3 BLDV-SCNC: 22 MMOL/L — SIGNIFICANT CHANGE UP (ref 21–29)
HCO3 BLDV-SCNC: 23 MMOL/L — SIGNIFICANT CHANGE UP (ref 21–29)
HCT VFR BLD CALC: 20.4 % — CRITICAL LOW (ref 39–50)
HCT VFR BLD CALC: 21.7 % — LOW (ref 39–50)
HCT VFR BLD CALC: 21.9 % — LOW (ref 39–50)
HCT VFR BLD CALC: 22 % — LOW (ref 39–50)
HCT VFR BLD CALC: 22.3 % — LOW (ref 39–50)
HCT VFR BLD CALC: 22.4 % — LOW (ref 39–50)
HCT VFR BLD CALC: 22.6 % — LOW (ref 39–50)
HCT VFR BLD CALC: 22.9 % — LOW (ref 39–50)
HCT VFR BLD CALC: 23.3 % — LOW (ref 39–50)
HCT VFR BLD CALC: 23.4 % — LOW (ref 39–50)
HCT VFR BLD CALC: 23.6 % — LOW (ref 39–50)
HCT VFR BLD CALC: 23.7 % — LOW (ref 39–50)
HCT VFR BLD CALC: 23.8 % — LOW (ref 39–50)
HCT VFR BLD CALC: 23.9 % — LOW (ref 39–50)
HCT VFR BLD CALC: 24.1 % — LOW (ref 39–50)
HCT VFR BLD CALC: 24.2 % — LOW (ref 39–50)
HCT VFR BLD CALC: 24.3 % — LOW (ref 39–50)
HCT VFR BLD CALC: 24.3 % — LOW (ref 39–50)
HCT VFR BLD CALC: 24.5 % — LOW (ref 39–50)
HCT VFR BLD CALC: 24.9 % — LOW (ref 39–50)
HCT VFR BLD CALC: 25.2 % — LOW (ref 39–50)
HCT VFR BLD CALC: 25.3 % — LOW (ref 39–50)
HCT VFR BLD CALC: 25.7 % — LOW (ref 39–50)
HCT VFR BLD CALC: 26.3 % — LOW (ref 39–50)
HCT VFR BLD CALC: 27.8 % — LOW (ref 39–50)
HCT VFR BLD CALC: 28 % — LOW (ref 39–50)
HCT VFR BLD CALC: 28.2 % — LOW (ref 39–50)
HCT VFR BLD CALC: 28.2 % — LOW (ref 39–50)
HCT VFR BLD CALC: 28.4 % — LOW (ref 39–50)
HCT VFR BLD CALC: 28.8 % — LOW (ref 39–50)
HCT VFR BLD CALC: 28.9 % — LOW (ref 39–50)
HCT VFR BLD CALC: 29.1 % — LOW (ref 39–50)
HCT VFR BLD CALC: 29.4 % — LOW (ref 39–50)
HCT VFR BLD CALC: 40.7 % — SIGNIFICANT CHANGE UP (ref 39–50)
HCT VFR BLDA CALC: 20 % — CRITICAL LOW (ref 39–50)
HCT VFR BLDA CALC: 21 % — CRITICAL LOW (ref 39–50)
HCT VFR BLDA CALC: 23 % — LOW (ref 39–50)
HCT VFR BLDA CALC: 23 % — LOW (ref 39–50)
HCT VFR BLDA CALC: 25 % — LOW (ref 39–50)
HCT VFR BLDA CALC: 25 % — LOW (ref 39–50)
HCV AB S/CO SERPL IA: 0.09 S/CO — SIGNIFICANT CHANGE UP (ref 0–0.99)
HCV AB SERPL-IMP: SIGNIFICANT CHANGE UP
HEMATOPATHOLOGY REPORT: SIGNIFICANT CHANGE UP
HGB BLD CALC-MCNC: 6.2 G/DL — CRITICAL LOW (ref 13–17)
HGB BLD CALC-MCNC: 6.7 G/DL — CRITICAL LOW (ref 13–17)
HGB BLD CALC-MCNC: 7.4 G/DL — LOW (ref 13–17)
HGB BLD CALC-MCNC: 7.5 G/DL — LOW (ref 13–17)
HGB BLD CALC-MCNC: 8 G/DL — LOW (ref 13–17)
HGB BLD CALC-MCNC: 8 G/DL — LOW (ref 13–17)
HGB BLD-MCNC: 13.3 G/DL — SIGNIFICANT CHANGE UP (ref 13–17)
HGB BLD-MCNC: 6.4 G/DL — CRITICAL LOW (ref 13–17)
HGB BLD-MCNC: 6.9 G/DL — CRITICAL LOW (ref 13–17)
HGB BLD-MCNC: 7 G/DL — CRITICAL LOW (ref 13–17)
HGB BLD-MCNC: 7 G/DL — CRITICAL LOW (ref 13–17)
HGB BLD-MCNC: 7.1 G/DL — LOW (ref 13–17)
HGB BLD-MCNC: 7.3 G/DL — LOW (ref 13–17)
HGB BLD-MCNC: 7.3 G/DL — LOW (ref 13–17)
HGB BLD-MCNC: 7.5 G/DL — LOW (ref 13–17)
HGB BLD-MCNC: 7.6 G/DL — LOW (ref 13–17)
HGB BLD-MCNC: 7.7 G/DL — LOW (ref 13–17)
HGB BLD-MCNC: 7.8 G/DL — LOW (ref 13–17)
HGB BLD-MCNC: 7.8 G/DL — LOW (ref 13–17)
HGB BLD-MCNC: 7.9 G/DL — LOW (ref 13–17)
HGB BLD-MCNC: 7.9 G/DL — LOW (ref 13–17)
HGB BLD-MCNC: 8 G/DL — LOW (ref 13–17)
HGB BLD-MCNC: 8 G/DL — LOW (ref 13–17)
HGB BLD-MCNC: 8.3 G/DL — LOW (ref 13–17)
HGB BLD-MCNC: 9 G/DL — LOW (ref 13–17)
HGB BLD-MCNC: 9.1 G/DL — LOW (ref 13–17)
HGB BLD-MCNC: 9.1 G/DL — LOW (ref 13–17)
HGB BLD-MCNC: 9.2 G/DL — LOW (ref 13–17)
HGB BLD-MCNC: 9.2 G/DL — LOW (ref 13–17)
HGB BLD-MCNC: 9.3 G/DL — LOW (ref 13–17)
HGB BLD-MCNC: 9.6 G/DL — LOW (ref 13–17)
HGB BLD-MCNC: 9.7 G/DL — LOW (ref 13–17)
HGB BLD-MCNC: 9.7 G/DL — LOW (ref 13–17)
HIV 1+2 AB+HIV1 P24 AG SERPL QL IA: SIGNIFICANT CHANGE UP
HYALINE CASTS # UR AUTO: 0 /LPF — SIGNIFICANT CHANGE UP (ref 0–7)
HYALINE CASTS # UR AUTO: 1 /LPF — SIGNIFICANT CHANGE UP (ref 0–2)
IL2 SERPL-MCNC: 5032.2 PG/ML — HIGH (ref 175.3–858.2)
IMM GRANULOCYTES NFR BLD AUTO: 0.3 % — SIGNIFICANT CHANGE UP (ref 0–1.5)
IMM GRANULOCYTES NFR BLD AUTO: 0.6 % — SIGNIFICANT CHANGE UP (ref 0–1.5)
IMM GRANULOCYTES NFR BLD AUTO: 0.7 % — SIGNIFICANT CHANGE UP (ref 0–1.5)
IMM GRANULOCYTES NFR BLD AUTO: 0.8 % — SIGNIFICANT CHANGE UP (ref 0–1.5)
IMM GRANULOCYTES NFR BLD AUTO: 1.1 % — SIGNIFICANT CHANGE UP (ref 0–1.5)
IMM GRANULOCYTES NFR BLD AUTO: 1.1 % — SIGNIFICANT CHANGE UP (ref 0–1.5)
IMM GRANULOCYTES NFR BLD AUTO: 1.2 % — SIGNIFICANT CHANGE UP (ref 0–1.5)
IMM GRANULOCYTES NFR BLD AUTO: 1.3 % — SIGNIFICANT CHANGE UP (ref 0–1.5)
IMM GRANULOCYTES NFR BLD AUTO: 1.3 % — SIGNIFICANT CHANGE UP (ref 0–1.5)
IMM GRANULOCYTES NFR BLD AUTO: 1.8 % — HIGH (ref 0–1.5)
IMM GRANULOCYTES NFR BLD AUTO: 2.4 % — HIGH (ref 0–1.5)
IMM GRANULOCYTES NFR BLD AUTO: 2.6 % — HIGH (ref 0–1.5)
IMM GRANULOCYTES NFR BLD AUTO: 2.9 % — HIGH (ref 0–1.5)
IMM GRANULOCYTES NFR BLD AUTO: 3.6 % — HIGH (ref 0–1.5)
INR BLD: 0.93 RATIO — SIGNIFICANT CHANGE UP (ref 0.88–1.16)
INR BLD: 1.16 RATIO — SIGNIFICANT CHANGE UP (ref 0.88–1.16)
INR BLD: 1.17 RATIO — HIGH (ref 0.88–1.16)
INR BLD: 1.32 RATIO — HIGH (ref 0.88–1.16)
INTERPRETATION SERPL IFE-IMP: SIGNIFICANT CHANGE UP
KETONES UR-MCNC: NEGATIVE — SIGNIFICANT CHANGE UP
KETONES UR-MCNC: NEGATIVE — SIGNIFICANT CHANGE UP
LACTATE BLDV-MCNC: 3.2 MMOL/L — HIGH (ref 0.7–2)
LACTATE BLDV-MCNC: 3.3 MMOL/L — HIGH (ref 0.7–2)
LACTATE BLDV-MCNC: 3.3 MMOL/L — HIGH (ref 0.7–2)
LACTATE BLDV-MCNC: 4 MMOL/L — CRITICAL HIGH (ref 0.7–2)
LACTATE BLDV-MCNC: 6.4 MMOL/L — CRITICAL HIGH (ref 0.7–2)
LACTATE BLDV-MCNC: 7.9 MMOL/L — CRITICAL HIGH (ref 0.7–2)
LACTATE SERPL-SCNC: 2.2 MMOL/L — HIGH (ref 0.7–2)
LACTATE SERPL-SCNC: 3.6 MMOL/L — HIGH (ref 0.7–2)
LDH SERPL L TO P-CCNC: 1061 U/L — HIGH (ref 50–242)
LDH SERPL L TO P-CCNC: 1069 U/L — HIGH (ref 50–242)
LDH SERPL L TO P-CCNC: 1083 U/L — HIGH (ref 50–242)
LDH SERPL L TO P-CCNC: 1125 U/L — HIGH (ref 50–242)
LDH SERPL L TO P-CCNC: 1138 U/L — HIGH (ref 50–242)
LDH SERPL L TO P-CCNC: 1222 U/L — HIGH (ref 50–242)
LDH SERPL L TO P-CCNC: 1285 U/L — HIGH (ref 50–242)
LDH SERPL L TO P-CCNC: 1324 U/L — HIGH (ref 50–242)
LDH SERPL L TO P-CCNC: 1345 U/L — HIGH (ref 50–242)
LDH SERPL L TO P-CCNC: 1439 U/L — HIGH (ref 50–242)
LDH SERPL L TO P-CCNC: 1455 U/L — HIGH (ref 50–242)
LDH SERPL L TO P-CCNC: 1475 U/L — HIGH (ref 50–242)
LDH SERPL L TO P-CCNC: 1628 U/L — HIGH (ref 50–242)
LDH SERPL L TO P-CCNC: 1662 U/L — HIGH (ref 50–242)
LDH SERPL L TO P-CCNC: 1687 U/L — HIGH (ref 50–242)
LDH SERPL L TO P-CCNC: 1687 U/L — HIGH (ref 50–242)
LDH SERPL L TO P-CCNC: 1725 U/L — HIGH (ref 50–242)
LDH SERPL L TO P-CCNC: 1774 U/L — HIGH (ref 50–242)
LDH SERPL L TO P-CCNC: 2014 U/L — HIGH (ref 50–242)
LDH SERPL L TO P-CCNC: 2627 U/L — HIGH (ref 50–242)
LDH SERPL L TO P-CCNC: 4911 U/L — HIGH (ref 50–242)
LDH SERPL L TO P-CCNC: 805 U/L — HIGH (ref 50–242)
LDH SERPL L TO P-CCNC: 817 U/L — HIGH (ref 50–242)
LDH SERPL L TO P-CCNC: 824 U/L — HIGH (ref 50–242)
LDH SERPL L TO P-CCNC: 846 U/L — HIGH (ref 50–242)
LDH SERPL L TO P-CCNC: 863 U/L — HIGH (ref 50–242)
LDH SERPL L TO P-CCNC: 887 U/L — HIGH (ref 50–242)
LDH SERPL L TO P-CCNC: 893 U/L — HIGH (ref 50–242)
LDH SERPL L TO P-CCNC: 910 U/L — HIGH (ref 50–242)
LDH SERPL L TO P-CCNC: 946 U/L — HIGH (ref 50–242)
LDH SERPL L TO P-CCNC: 991 U/L — HIGH (ref 50–242)
LEUKOCYTE ESTERASE UR-ACNC: ABNORMAL
LEUKOCYTE ESTERASE UR-ACNC: NEGATIVE — SIGNIFICANT CHANGE UP
LYMPHOCYTES # BLD AUTO: 0.17 K/UL — LOW (ref 1–3.3)
LYMPHOCYTES # BLD AUTO: 0.19 K/UL — LOW (ref 1–3.3)
LYMPHOCYTES # BLD AUTO: 0.2 K/UL — LOW (ref 1–3.3)
LYMPHOCYTES # BLD AUTO: 0.21 K/UL — LOW (ref 1–3.3)
LYMPHOCYTES # BLD AUTO: 0.22 K/UL — LOW (ref 1–3.3)
LYMPHOCYTES # BLD AUTO: 0.24 K/UL — LOW (ref 1–3.3)
LYMPHOCYTES # BLD AUTO: 0.25 K/UL — LOW (ref 1–3.3)
LYMPHOCYTES # BLD AUTO: 0.3 K/UL — LOW (ref 1–3.3)
LYMPHOCYTES # BLD AUTO: 0.3 K/UL — LOW (ref 1–3.3)
LYMPHOCYTES # BLD AUTO: 0.49 K/UL — LOW (ref 1–3.3)
LYMPHOCYTES # BLD AUTO: 0.73 K/UL — LOW (ref 1–3.3)
LYMPHOCYTES # BLD AUTO: 0.85 K/UL — LOW (ref 1–3.3)
LYMPHOCYTES # BLD AUTO: 0.92 K/UL — LOW (ref 1–3.3)
LYMPHOCYTES # BLD AUTO: 1.03 K/UL — SIGNIFICANT CHANGE UP (ref 1–3.3)
LYMPHOCYTES # BLD AUTO: 1.1 K/UL — SIGNIFICANT CHANGE UP (ref 1–3.3)
LYMPHOCYTES # BLD AUTO: 1.13 K/UL — SIGNIFICANT CHANGE UP (ref 1–3.3)
LYMPHOCYTES # BLD AUTO: 1.25 K/UL — SIGNIFICANT CHANGE UP (ref 1–3.3)
LYMPHOCYTES # BLD AUTO: 1.3 % — LOW (ref 13–44)
LYMPHOCYTES # BLD AUTO: 1.3 % — LOW (ref 13–44)
LYMPHOCYTES # BLD AUTO: 1.7 % — LOW (ref 13–44)
LYMPHOCYTES # BLD AUTO: 1.9 % — LOW (ref 13–44)
LYMPHOCYTES # BLD AUTO: 11.4 % — LOW (ref 13–44)
LYMPHOCYTES # BLD AUTO: 17.3 % — SIGNIFICANT CHANGE UP (ref 13–44)
LYMPHOCYTES # BLD AUTO: 19.8 % — SIGNIFICANT CHANGE UP (ref 13–44)
LYMPHOCYTES # BLD AUTO: 2 % — LOW (ref 13–44)
LYMPHOCYTES # BLD AUTO: 2.4 % — LOW (ref 13–44)
LYMPHOCYTES # BLD AUTO: 2.5 % — LOW (ref 13–44)
LYMPHOCYTES # BLD AUTO: 2.7 % — LOW (ref 13–44)
LYMPHOCYTES # BLD AUTO: 23.5 % — SIGNIFICANT CHANGE UP (ref 13–44)
LYMPHOCYTES # BLD AUTO: 6 % — LOW (ref 13–44)
LYMPHOCYTES # BLD AUTO: 6.8 % — LOW (ref 13–44)
LYMPHOCYTES # BLD AUTO: 8.1 % — LOW (ref 13–44)
LYMPHOCYTES # BLD AUTO: 8.7 % — LOW (ref 13–44)
LYMPHOCYTES # BLD AUTO: 9.6 % — LOW (ref 13–44)
LYMPHOCYTES, ABSOLUTE: 843 CELLS/UL — LOW (ref 850–3900)
MAGNESIUM SERPL-MCNC: 2 MG/DL — SIGNIFICANT CHANGE UP (ref 1.6–2.6)
MAGNESIUM SERPL-MCNC: 2 MG/DL — SIGNIFICANT CHANGE UP (ref 1.6–2.6)
MAGNESIUM SERPL-MCNC: 2.1 MG/DL — SIGNIFICANT CHANGE UP (ref 1.6–2.6)
MAGNESIUM SERPL-MCNC: 2.2 MG/DL — SIGNIFICANT CHANGE UP (ref 1.6–2.6)
MAGNESIUM SERPL-MCNC: 2.3 MG/DL — SIGNIFICANT CHANGE UP (ref 1.6–2.6)
MAGNESIUM SERPL-MCNC: 2.3 MG/DL — SIGNIFICANT CHANGE UP (ref 1.6–2.6)
MAGNESIUM UR-MCNC: 3.1 MG/DL — SIGNIFICANT CHANGE UP
MANUAL SMEAR VERIFICATION: SIGNIFICANT CHANGE UP
MANUAL SMEAR VERIFICATION: SIGNIFICANT CHANGE UP
MCHC RBC-ENTMCNC: 26.9 PG — LOW (ref 27–34)
MCHC RBC-ENTMCNC: 27 PG — SIGNIFICANT CHANGE UP (ref 27–34)
MCHC RBC-ENTMCNC: 27.1 PG — SIGNIFICANT CHANGE UP (ref 27–34)
MCHC RBC-ENTMCNC: 27.1 PG — SIGNIFICANT CHANGE UP (ref 27–34)
MCHC RBC-ENTMCNC: 27.2 PG — SIGNIFICANT CHANGE UP (ref 27–34)
MCHC RBC-ENTMCNC: 27.3 PG — SIGNIFICANT CHANGE UP (ref 27–34)
MCHC RBC-ENTMCNC: 27.4 PG — SIGNIFICANT CHANGE UP (ref 27–34)
MCHC RBC-ENTMCNC: 27.5 PG — SIGNIFICANT CHANGE UP (ref 27–34)
MCHC RBC-ENTMCNC: 27.6 PG — SIGNIFICANT CHANGE UP (ref 27–34)
MCHC RBC-ENTMCNC: 27.7 PG — SIGNIFICANT CHANGE UP (ref 27–34)
MCHC RBC-ENTMCNC: 27.8 PG — SIGNIFICANT CHANGE UP (ref 27–34)
MCHC RBC-ENTMCNC: 27.9 PG — SIGNIFICANT CHANGE UP (ref 27–34)
MCHC RBC-ENTMCNC: 28 PG — SIGNIFICANT CHANGE UP (ref 27–34)
MCHC RBC-ENTMCNC: 28 PG — SIGNIFICANT CHANGE UP (ref 27–34)
MCHC RBC-ENTMCNC: 28.1 PG — SIGNIFICANT CHANGE UP (ref 27–34)
MCHC RBC-ENTMCNC: 28.2 PG — SIGNIFICANT CHANGE UP (ref 27–34)
MCHC RBC-ENTMCNC: 28.3 PG — SIGNIFICANT CHANGE UP (ref 27–34)
MCHC RBC-ENTMCNC: 28.9 PG — SIGNIFICANT CHANGE UP (ref 27–34)
MCHC RBC-ENTMCNC: 31.2 GM/DL — LOW (ref 32–36)
MCHC RBC-ENTMCNC: 31.3 GM/DL — LOW (ref 32–36)
MCHC RBC-ENTMCNC: 31.3 GM/DL — LOW (ref 32–36)
MCHC RBC-ENTMCNC: 31.4 GM/DL — LOW (ref 32–36)
MCHC RBC-ENTMCNC: 31.4 GM/DL — LOW (ref 32–36)
MCHC RBC-ENTMCNC: 31.6 GM/DL — LOW (ref 32–36)
MCHC RBC-ENTMCNC: 31.7 GM/DL — LOW (ref 32–36)
MCHC RBC-ENTMCNC: 31.7 GM/DL — LOW (ref 32–36)
MCHC RBC-ENTMCNC: 31.8 GM/DL — LOW (ref 32–36)
MCHC RBC-ENTMCNC: 32 GM/DL — SIGNIFICANT CHANGE UP (ref 32–36)
MCHC RBC-ENTMCNC: 32.1 GM/DL — SIGNIFICANT CHANGE UP (ref 32–36)
MCHC RBC-ENTMCNC: 32.1 GM/DL — SIGNIFICANT CHANGE UP (ref 32–36)
MCHC RBC-ENTMCNC: 32.2 GM/DL — SIGNIFICANT CHANGE UP (ref 32–36)
MCHC RBC-ENTMCNC: 32.3 GM/DL — SIGNIFICANT CHANGE UP (ref 32–36)
MCHC RBC-ENTMCNC: 32.3 GM/DL — SIGNIFICANT CHANGE UP (ref 32–36)
MCHC RBC-ENTMCNC: 32.4 GM/DL — SIGNIFICANT CHANGE UP (ref 32–36)
MCHC RBC-ENTMCNC: 32.5 GM/DL — SIGNIFICANT CHANGE UP (ref 32–36)
MCHC RBC-ENTMCNC: 32.6 GM/DL — SIGNIFICANT CHANGE UP (ref 32–36)
MCHC RBC-ENTMCNC: 32.6 GM/DL — SIGNIFICANT CHANGE UP (ref 32–36)
MCHC RBC-ENTMCNC: 32.7 GM/DL — SIGNIFICANT CHANGE UP (ref 32–36)
MCHC RBC-ENTMCNC: 32.8 GM/DL — SIGNIFICANT CHANGE UP (ref 32–36)
MCHC RBC-ENTMCNC: 32.9 GM/DL — SIGNIFICANT CHANGE UP (ref 32–36)
MCHC RBC-ENTMCNC: 32.9 GM/DL — SIGNIFICANT CHANGE UP (ref 32–36)
MCHC RBC-ENTMCNC: 33 GM/DL — SIGNIFICANT CHANGE UP (ref 32–36)
MCHC RBC-ENTMCNC: 33.2 GM/DL — SIGNIFICANT CHANGE UP (ref 32–36)
MCHC RBC-ENTMCNC: 33.2 GM/DL — SIGNIFICANT CHANGE UP (ref 32–36)
MCHC RBC-ENTMCNC: 33.3 GM/DL — SIGNIFICANT CHANGE UP (ref 32–36)
MCV RBC AUTO: 83.4 FL — SIGNIFICANT CHANGE UP (ref 80–100)
MCV RBC AUTO: 83.5 FL — SIGNIFICANT CHANGE UP (ref 80–100)
MCV RBC AUTO: 84 FL — SIGNIFICANT CHANGE UP (ref 80–100)
MCV RBC AUTO: 84 FL — SIGNIFICANT CHANGE UP (ref 80–100)
MCV RBC AUTO: 84.3 FL — SIGNIFICANT CHANGE UP (ref 80–100)
MCV RBC AUTO: 84.5 FL — SIGNIFICANT CHANGE UP (ref 80–100)
MCV RBC AUTO: 84.7 FL — SIGNIFICANT CHANGE UP (ref 80–100)
MCV RBC AUTO: 84.7 FL — SIGNIFICANT CHANGE UP (ref 80–100)
MCV RBC AUTO: 84.8 FL — SIGNIFICANT CHANGE UP (ref 80–100)
MCV RBC AUTO: 84.9 FL — SIGNIFICANT CHANGE UP (ref 80–100)
MCV RBC AUTO: 85.3 FL — SIGNIFICANT CHANGE UP (ref 80–100)
MCV RBC AUTO: 85.4 FL — SIGNIFICANT CHANGE UP (ref 80–100)
MCV RBC AUTO: 85.4 FL — SIGNIFICANT CHANGE UP (ref 80–100)
MCV RBC AUTO: 85.5 FL — SIGNIFICANT CHANGE UP (ref 80–100)
MCV RBC AUTO: 85.8 FL — SIGNIFICANT CHANGE UP (ref 80–100)
MCV RBC AUTO: 86.2 FL — SIGNIFICANT CHANGE UP (ref 80–100)
MCV RBC AUTO: 86.3 FL — SIGNIFICANT CHANGE UP (ref 80–100)
MCV RBC AUTO: 86.6 FL — SIGNIFICANT CHANGE UP (ref 80–100)
MCV RBC AUTO: 86.6 FL — SIGNIFICANT CHANGE UP (ref 80–100)
MCV RBC AUTO: 86.8 FL — SIGNIFICANT CHANGE UP (ref 80–100)
MCV RBC AUTO: 86.9 FL — SIGNIFICANT CHANGE UP (ref 80–100)
MCV RBC AUTO: 86.9 FL — SIGNIFICANT CHANGE UP (ref 80–100)
MCV RBC AUTO: 87.2 FL — SIGNIFICANT CHANGE UP (ref 80–100)
MCV RBC AUTO: 87.3 FL — SIGNIFICANT CHANGE UP (ref 80–100)
MCV RBC AUTO: 87.7 FL — SIGNIFICANT CHANGE UP (ref 80–100)
MCV RBC AUTO: 87.8 FL — SIGNIFICANT CHANGE UP (ref 80–100)
MCV RBC AUTO: 88 FL — SIGNIFICANT CHANGE UP (ref 80–100)
MCV RBC AUTO: 88.4 FL — SIGNIFICANT CHANGE UP (ref 80–100)
MCV RBC AUTO: 88.5 FL — SIGNIFICANT CHANGE UP (ref 80–100)
MCV RBC AUTO: 88.8 FL — SIGNIFICANT CHANGE UP (ref 80–100)
METHOD TYPE: SIGNIFICANT CHANGE UP
MISCELLANEOUS TEST NAME: SIGNIFICANT CHANGE UP
MONOCYTES # BLD AUTO: 0.2 K/UL — SIGNIFICANT CHANGE UP (ref 0–0.9)
MONOCYTES # BLD AUTO: 0.36 K/UL — SIGNIFICANT CHANGE UP (ref 0–0.9)
MONOCYTES # BLD AUTO: 0.59 K/UL — SIGNIFICANT CHANGE UP (ref 0–0.9)
MONOCYTES # BLD AUTO: 0.67 K/UL — SIGNIFICANT CHANGE UP (ref 0–0.9)
MONOCYTES # BLD AUTO: 0.72 K/UL — SIGNIFICANT CHANGE UP (ref 0–0.9)
MONOCYTES # BLD AUTO: 0.8 K/UL — SIGNIFICANT CHANGE UP (ref 0–0.9)
MONOCYTES # BLD AUTO: 0.81 K/UL — SIGNIFICANT CHANGE UP (ref 0–0.9)
MONOCYTES # BLD AUTO: 0.82 K/UL — SIGNIFICANT CHANGE UP (ref 0–0.9)
MONOCYTES # BLD AUTO: 0.86 K/UL — SIGNIFICANT CHANGE UP (ref 0–0.9)
MONOCYTES # BLD AUTO: 0.93 K/UL — HIGH (ref 0–0.9)
MONOCYTES # BLD AUTO: 1.13 K/UL — HIGH (ref 0–0.9)
MONOCYTES # BLD AUTO: 1.14 K/UL — HIGH (ref 0–0.9)
MONOCYTES # BLD AUTO: 1.2 K/UL — HIGH (ref 0–0.9)
MONOCYTES # BLD AUTO: 1.28 K/UL — HIGH (ref 0–0.9)
MONOCYTES # BLD AUTO: 1.4 K/UL — HIGH (ref 0–0.9)
MONOCYTES # BLD AUTO: 1.58 K/UL — HIGH (ref 0–0.9)
MONOCYTES # BLD AUTO: 2.08 K/UL — HIGH (ref 0–0.9)
MONOCYTES NFR BLD AUTO: 10.9 % — SIGNIFICANT CHANGE UP (ref 2–14)
MONOCYTES NFR BLD AUTO: 11 % — SIGNIFICANT CHANGE UP (ref 2–14)
MONOCYTES NFR BLD AUTO: 11.3 % — SIGNIFICANT CHANGE UP (ref 2–14)
MONOCYTES NFR BLD AUTO: 12.6 % — SIGNIFICANT CHANGE UP (ref 2–14)
MONOCYTES NFR BLD AUTO: 13.1 % — SIGNIFICANT CHANGE UP (ref 2–14)
MONOCYTES NFR BLD AUTO: 17.5 % — HIGH (ref 2–14)
MONOCYTES NFR BLD AUTO: 20.9 % — HIGH (ref 2–14)
MONOCYTES NFR BLD AUTO: 24.3 % — HIGH (ref 2–14)
MONOCYTES NFR BLD AUTO: 4.5 % — SIGNIFICANT CHANGE UP (ref 2–14)
MONOCYTES NFR BLD AUTO: 4.5 % — SIGNIFICANT CHANGE UP (ref 2–14)
MONOCYTES NFR BLD AUTO: 5.7 % — SIGNIFICANT CHANGE UP (ref 2–14)
MONOCYTES NFR BLD AUTO: 5.8 % — SIGNIFICANT CHANGE UP (ref 2–14)
MONOCYTES NFR BLD AUTO: 6.5 % — SIGNIFICANT CHANGE UP (ref 2–14)
MONOCYTES NFR BLD AUTO: 8.3 % — SIGNIFICANT CHANGE UP (ref 2–14)
MONOCYTES NFR BLD AUTO: 8.5 % — SIGNIFICANT CHANGE UP (ref 2–14)
MONOCYTES NFR BLD AUTO: 8.7 % — SIGNIFICANT CHANGE UP (ref 2–14)
MONOCYTES NFR BLD AUTO: 9.2 % — SIGNIFICANT CHANGE UP (ref 2–14)
MRSA PCR RESULT.: SIGNIFICANT CHANGE UP
MYELOCYTES NFR BLD: 0.9 % — HIGH (ref 0–0)
NEUTROPHILS # BLD AUTO: 0.39 K/UL — LOW (ref 1.8–7.4)
NEUTROPHILS # BLD AUTO: 0.54 K/UL — LOW (ref 1.8–7.4)
NEUTROPHILS # BLD AUTO: 10.6 K/UL — HIGH (ref 1.8–7.4)
NEUTROPHILS # BLD AUTO: 11.16 K/UL — HIGH (ref 1.8–7.4)
NEUTROPHILS # BLD AUTO: 11.95 K/UL — HIGH (ref 1.8–7.4)
NEUTROPHILS # BLD AUTO: 13.8 K/UL — HIGH (ref 1.8–7.4)
NEUTROPHILS # BLD AUTO: 14.54 K/UL — HIGH (ref 1.8–7.4)
NEUTROPHILS # BLD AUTO: 14.92 K/UL — HIGH (ref 1.8–7.4)
NEUTROPHILS # BLD AUTO: 17.66 K/UL — HIGH (ref 1.8–7.4)
NEUTROPHILS # BLD AUTO: 5.09 K/UL — SIGNIFICANT CHANGE UP (ref 1.8–7.4)
NEUTROPHILS # BLD AUTO: 6.28 K/UL — SIGNIFICANT CHANGE UP (ref 1.8–7.4)
NEUTROPHILS # BLD AUTO: 7.2 K/UL — SIGNIFICANT CHANGE UP (ref 1.8–7.4)
NEUTROPHILS # BLD AUTO: 7.98 K/UL — HIGH (ref 1.8–7.4)
NEUTROPHILS # BLD AUTO: 8.11 K/UL — HIGH (ref 1.8–7.4)
NEUTROPHILS # BLD AUTO: 8.15 K/UL — HIGH (ref 1.8–7.4)
NEUTROPHILS # BLD AUTO: 8.27 K/UL — HIGH (ref 1.8–7.4)
NEUTROPHILS # BLD AUTO: 8.34 K/UL — HIGH (ref 1.8–7.4)
NEUTROPHILS NFR BLD AUTO: 29.8 % — LOW (ref 43–77)
NEUTROPHILS NFR BLD AUTO: 39.1 % — LOW (ref 43–77)
NEUTROPHILS NFR BLD AUTO: 69.4 % — SIGNIFICANT CHANGE UP (ref 43–77)
NEUTROPHILS NFR BLD AUTO: 70.3 % — SIGNIFICANT CHANGE UP (ref 43–77)
NEUTROPHILS NFR BLD AUTO: 75.4 % — SIGNIFICANT CHANGE UP (ref 43–77)
NEUTROPHILS NFR BLD AUTO: 77.6 % — HIGH (ref 43–77)
NEUTROPHILS NFR BLD AUTO: 77.7 % — HIGH (ref 43–77)
NEUTROPHILS NFR BLD AUTO: 80.8 % — HIGH (ref 43–77)
NEUTROPHILS NFR BLD AUTO: 81.7 % — HIGH (ref 43–77)
NEUTROPHILS NFR BLD AUTO: 83.7 % — HIGH (ref 43–77)
NEUTROPHILS NFR BLD AUTO: 85 % — HIGH (ref 43–77)
NEUTROPHILS NFR BLD AUTO: 85.7 % — HIGH (ref 43–77)
NEUTROPHILS NFR BLD AUTO: 88.7 % — HIGH (ref 43–77)
NEUTROPHILS NFR BLD AUTO: 89.7 % — HIGH (ref 43–77)
NEUTROPHILS NFR BLD AUTO: 90.9 % — HIGH (ref 43–77)
NEUTROPHILS NFR BLD AUTO: 91.2 % — HIGH (ref 43–77)
NEUTROPHILS NFR BLD AUTO: 92.3 % — HIGH (ref 43–77)
NEUTS BAND # BLD: 1.7 % — SIGNIFICANT CHANGE UP (ref 0–8)
NEUTS BAND # BLD: 6.3 % — SIGNIFICANT CHANGE UP (ref 0–8)
NITRITE UR-MCNC: NEGATIVE — SIGNIFICANT CHANGE UP
NITRITE UR-MCNC: NEGATIVE — SIGNIFICANT CHANGE UP
NRBC # BLD: 0 /100 WBCS — SIGNIFICANT CHANGE UP (ref 0–0)
NRBC # BLD: 2 /100 WBCS — HIGH (ref 0–0)
NRBC # BLD: 2 /100 — HIGH (ref 0–0)
NRBC # BLD: 5 /100 — HIGH (ref 0–0)
ORGANISM # SPEC MICROSCOPIC CNT: SIGNIFICANT CHANGE UP
ORGANISM # SPEC MICROSCOPIC CNT: SIGNIFICANT CHANGE UP
OSMOLALITY UR: 395 MOSM/KG — SIGNIFICANT CHANGE UP (ref 50–1200)
OTHER CELLS CSF MANUAL: 2 ML/DL — LOW (ref 18–22)
OTHER CELLS CSF MANUAL: 2 ML/DL — LOW (ref 18–22)
OTHER CELLS CSF MANUAL: 6 ML/DL — LOW (ref 18–22)
OTHER CELLS CSF MANUAL: 7 ML/DL — LOW (ref 18–22)
OTHER CELLS CSF MANUAL: 7 ML/DL — LOW (ref 18–22)
OTHER CELLS CSF MANUAL: 9 ML/DL — LOW (ref 18–22)
PCO2 BLDV: 35 MMHG — SIGNIFICANT CHANGE UP (ref 35–50)
PCO2 BLDV: 38 MMHG — SIGNIFICANT CHANGE UP (ref 35–50)
PCO2 BLDV: 38 MMHG — SIGNIFICANT CHANGE UP (ref 35–50)
PCO2 BLDV: 39 MMHG — SIGNIFICANT CHANGE UP (ref 35–50)
PCO2 BLDV: 41 MMHG — SIGNIFICANT CHANGE UP (ref 35–50)
PCO2 BLDV: 41 MMHG — SIGNIFICANT CHANGE UP (ref 35–50)
PH BLDV: 7.35 — SIGNIFICANT CHANGE UP (ref 7.35–7.45)
PH BLDV: 7.35 — SIGNIFICANT CHANGE UP (ref 7.35–7.45)
PH BLDV: 7.36 — SIGNIFICANT CHANGE UP (ref 7.35–7.45)
PH BLDV: 7.38 — SIGNIFICANT CHANGE UP (ref 7.35–7.45)
PH BLDV: 7.39 — SIGNIFICANT CHANGE UP (ref 7.35–7.45)
PH BLDV: 7.43 — SIGNIFICANT CHANGE UP (ref 7.35–7.45)
PH UR: 6 — SIGNIFICANT CHANGE UP (ref 5–8)
PH UR: 6 — SIGNIFICANT CHANGE UP (ref 5–8)
PHOSPHATE SERPL-MCNC: 1.7 MG/DL — LOW (ref 2.5–4.5)
PHOSPHATE SERPL-MCNC: 2.5 MG/DL — SIGNIFICANT CHANGE UP (ref 2.5–4.5)
PHOSPHATE SERPL-MCNC: 2.5 MG/DL — SIGNIFICANT CHANGE UP (ref 2.5–4.5)
PHOSPHATE SERPL-MCNC: 2.6 MG/DL — SIGNIFICANT CHANGE UP (ref 2.5–4.5)
PHOSPHATE SERPL-MCNC: 2.7 MG/DL — SIGNIFICANT CHANGE UP (ref 2.5–4.5)
PHOSPHATE SERPL-MCNC: 2.7 MG/DL — SIGNIFICANT CHANGE UP (ref 2.5–4.5)
PHOSPHATE SERPL-MCNC: 2.8 MG/DL — SIGNIFICANT CHANGE UP (ref 2.5–4.5)
PHOSPHATE SERPL-MCNC: 3 MG/DL — SIGNIFICANT CHANGE UP (ref 2.5–4.5)
PHOSPHATE SERPL-MCNC: 3 MG/DL — SIGNIFICANT CHANGE UP (ref 2.5–4.5)
PHOSPHATE SERPL-MCNC: 3.1 MG/DL — SIGNIFICANT CHANGE UP (ref 2.5–4.5)
PHOSPHATE SERPL-MCNC: 3.3 MG/DL — SIGNIFICANT CHANGE UP (ref 2.5–4.5)
PHOSPHATE SERPL-MCNC: 3.4 MG/DL — SIGNIFICANT CHANGE UP (ref 2.5–4.5)
PHOSPHATE SERPL-MCNC: 3.4 MG/DL — SIGNIFICANT CHANGE UP (ref 2.5–4.5)
PHOSPHATE SERPL-MCNC: 3.5 MG/DL — SIGNIFICANT CHANGE UP (ref 2.5–4.5)
PHOSPHATE SERPL-MCNC: 3.5 MG/DL — SIGNIFICANT CHANGE UP (ref 2.5–4.5)
PHOSPHATE SERPL-MCNC: 3.7 MG/DL — SIGNIFICANT CHANGE UP (ref 2.5–4.5)
PHOSPHATE SERPL-MCNC: 3.9 MG/DL — SIGNIFICANT CHANGE UP (ref 2.5–4.5)
PHOSPHATE SERPL-MCNC: 4.3 MG/DL — SIGNIFICANT CHANGE UP (ref 2.5–4.5)
PHOSPHATE SERPL-MCNC: 4.4 MG/DL — SIGNIFICANT CHANGE UP (ref 2.5–4.5)
PHOSPHATE SERPL-MCNC: 4.4 MG/DL — SIGNIFICANT CHANGE UP (ref 2.5–4.5)
PHOSPHATE SERPL-MCNC: 4.5 MG/DL — SIGNIFICANT CHANGE UP (ref 2.5–4.5)
PHOSPHATE SERPL-MCNC: 4.5 MG/DL — SIGNIFICANT CHANGE UP (ref 2.5–4.5)
PHOSPHATE SERPL-MCNC: 4.6 MG/DL — HIGH (ref 2.5–4.5)
PHOSPHATE SERPL-MCNC: 4.9 MG/DL — HIGH (ref 2.5–4.5)
PLAT MORPH BLD: NORMAL — SIGNIFICANT CHANGE UP
PLAT MORPH BLD: NORMAL — SIGNIFICANT CHANGE UP
PLATELET # BLD AUTO: 114 K/UL — LOW (ref 150–400)
PLATELET # BLD AUTO: 124 K/UL — LOW (ref 150–400)
PLATELET # BLD AUTO: 173 K/UL — SIGNIFICANT CHANGE UP (ref 150–400)
PLATELET # BLD AUTO: 193 K/UL — SIGNIFICANT CHANGE UP (ref 150–400)
PLATELET # BLD AUTO: 242 K/UL — SIGNIFICANT CHANGE UP (ref 150–400)
PLATELET # BLD AUTO: 251 K/UL — SIGNIFICANT CHANGE UP (ref 150–400)
PLATELET # BLD AUTO: 253 K/UL — SIGNIFICANT CHANGE UP (ref 150–400)
PLATELET # BLD AUTO: 260 K/UL — SIGNIFICANT CHANGE UP (ref 150–400)
PLATELET # BLD AUTO: 270 K/UL — SIGNIFICANT CHANGE UP (ref 150–400)
PLATELET # BLD AUTO: 270 K/UL — SIGNIFICANT CHANGE UP (ref 150–400)
PLATELET # BLD AUTO: 277 K/UL — SIGNIFICANT CHANGE UP (ref 150–400)
PLATELET # BLD AUTO: 293 K/UL — SIGNIFICANT CHANGE UP (ref 150–400)
PLATELET # BLD AUTO: 298 K/UL — SIGNIFICANT CHANGE UP (ref 150–400)
PLATELET # BLD AUTO: 303 K/UL — SIGNIFICANT CHANGE UP (ref 150–400)
PLATELET # BLD AUTO: 306 K/UL — SIGNIFICANT CHANGE UP (ref 150–400)
PLATELET # BLD AUTO: 310 K/UL — SIGNIFICANT CHANGE UP (ref 150–400)
PLATELET # BLD AUTO: 325 K/UL — SIGNIFICANT CHANGE UP (ref 150–400)
PLATELET # BLD AUTO: 329 K/UL — SIGNIFICANT CHANGE UP (ref 150–400)
PLATELET # BLD AUTO: 334 K/UL — SIGNIFICANT CHANGE UP (ref 150–400)
PLATELET # BLD AUTO: 335 K/UL — SIGNIFICANT CHANGE UP (ref 150–400)
PLATELET # BLD AUTO: 337 K/UL — SIGNIFICANT CHANGE UP (ref 150–400)
PLATELET # BLD AUTO: 339 K/UL — SIGNIFICANT CHANGE UP (ref 150–400)
PLATELET # BLD AUTO: 343 K/UL — SIGNIFICANT CHANGE UP (ref 150–400)
PLATELET # BLD AUTO: 363 K/UL — SIGNIFICANT CHANGE UP (ref 150–400)
PLATELET # BLD AUTO: 366 K/UL — SIGNIFICANT CHANGE UP (ref 150–400)
PLATELET # BLD AUTO: 374 K/UL — SIGNIFICANT CHANGE UP (ref 150–400)
PLATELET # BLD AUTO: 376 K/UL — SIGNIFICANT CHANGE UP (ref 150–400)
PLATELET # BLD AUTO: 377 K/UL — SIGNIFICANT CHANGE UP (ref 150–400)
PLATELET # BLD AUTO: 381 K/UL — SIGNIFICANT CHANGE UP (ref 150–400)
PLATELET # BLD AUTO: 384 K/UL — SIGNIFICANT CHANGE UP (ref 150–400)
PLATELET # BLD AUTO: 391 K/UL — SIGNIFICANT CHANGE UP (ref 150–400)
PLATELET # BLD AUTO: 392 K/UL — SIGNIFICANT CHANGE UP (ref 150–400)
PLATELET # BLD AUTO: 392 K/UL — SIGNIFICANT CHANGE UP (ref 150–400)
PLATELET # BLD AUTO: 395 K/UL — SIGNIFICANT CHANGE UP (ref 150–400)
PLATELET # BLD AUTO: 407 K/UL — HIGH (ref 150–400)
PLATELET # BLD AUTO: 427 K/UL — HIGH (ref 150–400)
PO2 BLDV: 22 MMHG — LOW (ref 25–45)
PO2 BLDV: 36 MMHG — SIGNIFICANT CHANGE UP (ref 25–45)
PO2 BLDV: 37 MMHG — SIGNIFICANT CHANGE UP (ref 25–45)
PO2 BLDV: 40 MMHG — SIGNIFICANT CHANGE UP (ref 25–45)
PO2 BLDV: 74 MMHG — HIGH (ref 25–45)
PO2 BLDV: <20 MMHG — LOW (ref 25–45)
POIKILOCYTOSIS BLD QL AUTO: SLIGHT — SIGNIFICANT CHANGE UP
POTASSIUM BLDV-SCNC: 3.9 MMOL/L — SIGNIFICANT CHANGE UP (ref 3.5–5.3)
POTASSIUM BLDV-SCNC: 4.7 MMOL/L — SIGNIFICANT CHANGE UP (ref 3.5–5.3)
POTASSIUM BLDV-SCNC: 4.9 MMOL/L — SIGNIFICANT CHANGE UP (ref 3.5–5.3)
POTASSIUM BLDV-SCNC: 5.1 MMOL/L — SIGNIFICANT CHANGE UP (ref 3.5–5.3)
POTASSIUM BLDV-SCNC: 5.6 MMOL/L — HIGH (ref 3.5–5.3)
POTASSIUM BLDV-SCNC: 5.8 MMOL/L — HIGH (ref 3.5–5.3)
POTASSIUM SERPL-MCNC: 3.6 MMOL/L — SIGNIFICANT CHANGE UP (ref 3.5–5.3)
POTASSIUM SERPL-MCNC: 3.6 MMOL/L — SIGNIFICANT CHANGE UP (ref 3.5–5.3)
POTASSIUM SERPL-MCNC: 3.7 MMOL/L — SIGNIFICANT CHANGE UP (ref 3.5–5.3)
POTASSIUM SERPL-MCNC: 3.7 MMOL/L — SIGNIFICANT CHANGE UP (ref 3.5–5.3)
POTASSIUM SERPL-MCNC: 3.8 MMOL/L — SIGNIFICANT CHANGE UP (ref 3.5–5.3)
POTASSIUM SERPL-MCNC: 3.9 MMOL/L — SIGNIFICANT CHANGE UP (ref 3.5–5.3)
POTASSIUM SERPL-MCNC: 3.9 MMOL/L — SIGNIFICANT CHANGE UP (ref 3.5–5.3)
POTASSIUM SERPL-MCNC: 4 MMOL/L — SIGNIFICANT CHANGE UP (ref 3.5–5.3)
POTASSIUM SERPL-MCNC: 4.1 MMOL/L — SIGNIFICANT CHANGE UP (ref 3.5–5.3)
POTASSIUM SERPL-MCNC: 4.2 MMOL/L — SIGNIFICANT CHANGE UP (ref 3.5–5.3)
POTASSIUM SERPL-MCNC: 4.2 MMOL/L — SIGNIFICANT CHANGE UP (ref 3.5–5.3)
POTASSIUM SERPL-MCNC: 4.3 MMOL/L — SIGNIFICANT CHANGE UP (ref 3.5–5.3)
POTASSIUM SERPL-MCNC: 4.4 MMOL/L — SIGNIFICANT CHANGE UP (ref 3.5–5.3)
POTASSIUM SERPL-MCNC: 4.5 MMOL/L — SIGNIFICANT CHANGE UP (ref 3.5–5.3)
POTASSIUM SERPL-MCNC: 4.9 MMOL/L — SIGNIFICANT CHANGE UP (ref 3.5–5.3)
POTASSIUM SERPL-MCNC: 4.9 MMOL/L — SIGNIFICANT CHANGE UP (ref 3.5–5.3)
POTASSIUM SERPL-MCNC: 5 MMOL/L — SIGNIFICANT CHANGE UP (ref 3.5–5.3)
POTASSIUM SERPL-MCNC: 5 MMOL/L — SIGNIFICANT CHANGE UP (ref 3.5–5.3)
POTASSIUM SERPL-MCNC: 5.1 MMOL/L — SIGNIFICANT CHANGE UP (ref 3.5–5.3)
POTASSIUM SERPL-MCNC: 5.5 MMOL/L — HIGH (ref 3.5–5.3)
POTASSIUM SERPL-MCNC: 5.7 MMOL/L — HIGH (ref 3.5–5.3)
POTASSIUM SERPL-MCNC: 5.8 MMOL/L — HIGH (ref 3.5–5.3)
POTASSIUM SERPL-MCNC: 5.9 MMOL/L — HIGH (ref 3.5–5.3)
POTASSIUM SERPL-MCNC: 5.9 MMOL/L — HIGH (ref 3.5–5.3)
POTASSIUM SERPL-MCNC: 6 MMOL/L — HIGH (ref 3.5–5.3)
POTASSIUM SERPL-MCNC: 6 MMOL/L — HIGH (ref 3.5–5.3)
POTASSIUM SERPL-MCNC: 6.2 MMOL/L — CRITICAL HIGH (ref 3.5–5.3)
POTASSIUM SERPL-MCNC: 6.4 MMOL/L — CRITICAL HIGH (ref 3.5–5.3)
POTASSIUM SERPL-SCNC: 3.6 MMOL/L — SIGNIFICANT CHANGE UP (ref 3.5–5.3)
POTASSIUM SERPL-SCNC: 3.6 MMOL/L — SIGNIFICANT CHANGE UP (ref 3.5–5.3)
POTASSIUM SERPL-SCNC: 3.7 MMOL/L — SIGNIFICANT CHANGE UP (ref 3.5–5.3)
POTASSIUM SERPL-SCNC: 3.7 MMOL/L — SIGNIFICANT CHANGE UP (ref 3.5–5.3)
POTASSIUM SERPL-SCNC: 3.8 MMOL/L — SIGNIFICANT CHANGE UP (ref 3.5–5.3)
POTASSIUM SERPL-SCNC: 3.9 MMOL/L — SIGNIFICANT CHANGE UP (ref 3.5–5.3)
POTASSIUM SERPL-SCNC: 3.9 MMOL/L — SIGNIFICANT CHANGE UP (ref 3.5–5.3)
POTASSIUM SERPL-SCNC: 4 MMOL/L — SIGNIFICANT CHANGE UP (ref 3.5–5.3)
POTASSIUM SERPL-SCNC: 4.1 MMOL/L — SIGNIFICANT CHANGE UP (ref 3.5–5.3)
POTASSIUM SERPL-SCNC: 4.2 MMOL/L — SIGNIFICANT CHANGE UP (ref 3.5–5.3)
POTASSIUM SERPL-SCNC: 4.2 MMOL/L — SIGNIFICANT CHANGE UP (ref 3.5–5.3)
POTASSIUM SERPL-SCNC: 4.3 MMOL/L — SIGNIFICANT CHANGE UP (ref 3.5–5.3)
POTASSIUM SERPL-SCNC: 4.4 MMOL/L — SIGNIFICANT CHANGE UP (ref 3.5–5.3)
POTASSIUM SERPL-SCNC: 4.5 MMOL/L — SIGNIFICANT CHANGE UP (ref 3.5–5.3)
POTASSIUM SERPL-SCNC: 4.9 MMOL/L — SIGNIFICANT CHANGE UP (ref 3.5–5.3)
POTASSIUM SERPL-SCNC: 4.9 MMOL/L — SIGNIFICANT CHANGE UP (ref 3.5–5.3)
POTASSIUM SERPL-SCNC: 5 MMOL/L — SIGNIFICANT CHANGE UP (ref 3.5–5.3)
POTASSIUM SERPL-SCNC: 5 MMOL/L — SIGNIFICANT CHANGE UP (ref 3.5–5.3)
POTASSIUM SERPL-SCNC: 5.1 MMOL/L — SIGNIFICANT CHANGE UP (ref 3.5–5.3)
POTASSIUM SERPL-SCNC: 5.5 MMOL/L — HIGH (ref 3.5–5.3)
POTASSIUM SERPL-SCNC: 5.7 MMOL/L — HIGH (ref 3.5–5.3)
POTASSIUM SERPL-SCNC: 5.8 MMOL/L — HIGH (ref 3.5–5.3)
POTASSIUM SERPL-SCNC: 5.9 MMOL/L — HIGH (ref 3.5–5.3)
POTASSIUM SERPL-SCNC: 5.9 MMOL/L — HIGH (ref 3.5–5.3)
POTASSIUM SERPL-SCNC: 6 MMOL/L — HIGH (ref 3.5–5.3)
POTASSIUM SERPL-SCNC: 6 MMOL/L — HIGH (ref 3.5–5.3)
POTASSIUM SERPL-SCNC: 6.2 MMOL/L — CRITICAL HIGH (ref 3.5–5.3)
POTASSIUM SERPL-SCNC: 6.4 MMOL/L — CRITICAL HIGH (ref 3.5–5.3)
POTASSIUM UR-SCNC: 63 MMOL/L — SIGNIFICANT CHANGE UP
PROT PATTERN SERPL ELPH-IMP: SIGNIFICANT CHANGE UP
PROT SERPL-MCNC: 4.7 G/DL — LOW (ref 6–8.3)
PROT SERPL-MCNC: 4.7 G/DL — LOW (ref 6–8.3)
PROT SERPL-MCNC: 4.9 G/DL — LOW (ref 6–8.3)
PROT SERPL-MCNC: 5 G/DL — LOW (ref 6–8.3)
PROT SERPL-MCNC: 5.5 G/DL — LOW (ref 6–8.3)
PROT SERPL-MCNC: 5.8 G/DL — LOW (ref 6–8.3)
PROT SERPL-MCNC: 5.9 G/DL — LOW (ref 6–8.3)
PROT SERPL-MCNC: 6 G/DL — SIGNIFICANT CHANGE UP (ref 6–8.3)
PROT SERPL-MCNC: 6.1 G/DL — SIGNIFICANT CHANGE UP (ref 6–8.3)
PROT SERPL-MCNC: 6.1 G/DL — SIGNIFICANT CHANGE UP (ref 6–8.3)
PROT SERPL-MCNC: 6.3 G/DL — SIGNIFICANT CHANGE UP (ref 6–8.3)
PROT SERPL-MCNC: 7.3 G/DL — SIGNIFICANT CHANGE UP (ref 6–8.3)
PROT UR-MCNC: ABNORMAL
PROT UR-MCNC: ABNORMAL
PROTHROM AB SERPL-ACNC: 11.2 SEC — SIGNIFICANT CHANGE UP (ref 10.6–13.6)
PROTHROM AB SERPL-ACNC: 13.5 SEC — HIGH (ref 10–12.9)
PROTHROM AB SERPL-ACNC: 13.7 SEC — HIGH (ref 10.6–13.6)
PROTHROM AB SERPL-ACNC: 15.4 SEC — HIGH (ref 10.6–13.6)
RAPID RVP RESULT: SIGNIFICANT CHANGE UP
RBC # BLD: 2.34 M/UL — LOW (ref 4.2–5.8)
RBC # BLD: 2.54 M/UL — LOW (ref 4.2–5.8)
RBC # BLD: 2.54 M/UL — LOW (ref 4.2–5.8)
RBC # BLD: 2.58 M/UL — LOW (ref 4.2–5.8)
RBC # BLD: 2.58 M/UL — LOW (ref 4.2–5.8)
RBC # BLD: 2.65 M/UL — LOW (ref 4.2–5.8)
RBC # BLD: 2.66 M/UL — LOW (ref 4.2–5.8)
RBC # BLD: 2.67 M/UL — LOW (ref 4.2–5.8)
RBC # BLD: 2.69 M/UL — LOW (ref 4.2–5.8)
RBC # BLD: 2.7 M/UL — LOW (ref 4.2–5.8)
RBC # BLD: 2.7 M/UL — LOW (ref 4.2–5.8)
RBC # BLD: 2.75 M/UL — LOW (ref 4.2–5.8)
RBC # BLD: 2.76 M/UL — LOW (ref 4.2–5.8)
RBC # BLD: 2.76 M/UL — LOW (ref 4.2–5.8)
RBC # BLD: 2.82 M/UL — LOW (ref 4.2–5.8)
RBC # BLD: 2.82 M/UL — LOW (ref 4.2–5.8)
RBC # BLD: 2.84 M/UL — LOW (ref 4.2–5.8)
RBC # BLD: 2.85 M/UL — LOW (ref 4.2–5.8)
RBC # BLD: 2.87 M/UL — LOW (ref 4.2–5.8)
RBC # BLD: 2.93 M/UL — LOW (ref 4.2–5.8)
RBC # BLD: 2.94 M/UL — LOW (ref 4.2–5.8)
RBC # BLD: 2.99 M/UL — LOW (ref 4.2–5.8)
RBC # BLD: 3.03 M/UL — LOW (ref 4.2–5.8)
RBC # BLD: 3.08 M/UL — LOW (ref 4.2–5.8)
RBC # BLD: 3.27 M/UL — LOW (ref 4.2–5.8)
RBC # BLD: 3.3 M/UL — LOW (ref 4.2–5.8)
RBC # BLD: 3.31 M/UL — LOW (ref 4.2–5.8)
RBC # BLD: 3.32 M/UL — LOW (ref 4.2–5.8)
RBC # BLD: 3.32 M/UL — LOW (ref 4.2–5.8)
RBC # BLD: 3.37 M/UL — LOW (ref 4.2–5.8)
RBC # BLD: 3.41 M/UL — LOW (ref 4.2–5.8)
RBC # BLD: 3.49 M/UL — LOW (ref 4.2–5.8)
RBC # BLD: 3.5 M/UL — LOW (ref 4.2–5.8)
RBC # BLD: 4.6 M/UL — SIGNIFICANT CHANGE UP (ref 4.2–5.8)
RBC # FLD: 13.8 % — SIGNIFICANT CHANGE UP (ref 10.3–14.5)
RBC # FLD: 15.8 % — HIGH (ref 10.3–14.5)
RBC # FLD: 15.9 % — HIGH (ref 10.3–14.5)
RBC # FLD: 16 % — HIGH (ref 10.3–14.5)
RBC # FLD: 16 % — HIGH (ref 10.3–14.5)
RBC # FLD: 16.3 % — HIGH (ref 10.3–14.5)
RBC # FLD: 16.4 % — HIGH (ref 10.3–14.5)
RBC # FLD: 16.6 % — HIGH (ref 10.3–14.5)
RBC # FLD: 16.6 % — HIGH (ref 10.3–14.5)
RBC # FLD: 16.7 % — HIGH (ref 10.3–14.5)
RBC # FLD: 16.8 % — HIGH (ref 10.3–14.5)
RBC # FLD: 16.9 % — HIGH (ref 10.3–14.5)
RBC # FLD: 16.9 % — HIGH (ref 10.3–14.5)
RBC # FLD: 17 % — HIGH (ref 10.3–14.5)
RBC # FLD: 17.1 % — HIGH (ref 10.3–14.5)
RBC # FLD: 17.2 % — HIGH (ref 10.3–14.5)
RBC # FLD: 17.4 % — HIGH (ref 10.3–14.5)
RBC # FLD: 17.4 % — HIGH (ref 10.3–14.5)
RBC # FLD: 17.8 % — HIGH (ref 10.3–14.5)
RBC # FLD: 18.1 % — HIGH (ref 10.3–14.5)
RBC BLD AUTO: ABNORMAL
RBC BLD AUTO: SIGNIFICANT CHANGE UP
RBC CASTS # UR COMP ASSIST: 1 /HPF — SIGNIFICANT CHANGE UP (ref 0–4)
RBC CASTS # UR COMP ASSIST: 5 /HPF — HIGH (ref 0–4)
RH IG SCN BLD-IMP: POSITIVE — SIGNIFICANT CHANGE UP
S AUREUS DNA NOSE QL NAA+PROBE: SIGNIFICANT CHANGE UP
SAO2 % BLDV: 21 % — LOW (ref 67–88)
SAO2 % BLDV: 26 % — LOW (ref 67–88)
SAO2 % BLDV: 59 % — LOW (ref 67–88)
SAO2 % BLDV: 62 % — LOW (ref 67–88)
SAO2 % BLDV: 65 % — LOW (ref 67–88)
SAO2 % BLDV: 94 % — HIGH (ref 67–88)
SARS-COV-2 IGG SERPL QL IA: NEGATIVE — SIGNIFICANT CHANGE UP
SARS-COV-2 IGM SERPL IA-ACNC: 0.09 INDEX — SIGNIFICANT CHANGE UP
SARS-COV-2 RNA SPEC QL NAA+PROBE: SIGNIFICANT CHANGE UP
SODIUM SERPL-SCNC: 127 MMOL/L — LOW (ref 135–145)
SODIUM SERPL-SCNC: 128 MMOL/L — LOW (ref 135–145)
SODIUM SERPL-SCNC: 129 MMOL/L — LOW (ref 135–145)
SODIUM SERPL-SCNC: 130 MMOL/L — LOW (ref 135–145)
SODIUM SERPL-SCNC: 131 MMOL/L — LOW (ref 135–145)
SODIUM SERPL-SCNC: 132 MMOL/L — LOW (ref 135–145)
SODIUM SERPL-SCNC: 133 MMOL/L — LOW (ref 135–145)
SODIUM SERPL-SCNC: 134 MMOL/L — LOW (ref 135–145)
SODIUM SERPL-SCNC: 136 MMOL/L — SIGNIFICANT CHANGE UP (ref 135–145)
SODIUM SERPL-SCNC: 137 MMOL/L — SIGNIFICANT CHANGE UP (ref 135–145)
SODIUM SERPL-SCNC: 137 MMOL/L — SIGNIFICANT CHANGE UP (ref 135–145)
SODIUM SERPL-SCNC: 139 MMOL/L — SIGNIFICANT CHANGE UP (ref 135–145)
SODIUM SERPL-SCNC: 140 MMOL/L — SIGNIFICANT CHANGE UP (ref 135–145)
SODIUM SERPL-SCNC: 141 MMOL/L — SIGNIFICANT CHANGE UP (ref 135–145)
SODIUM SERPL-SCNC: 143 MMOL/L — SIGNIFICANT CHANGE UP (ref 135–145)
SODIUM SERPL-SCNC: 144 MMOL/L — SIGNIFICANT CHANGE UP (ref 135–145)
SODIUM SERPL-SCNC: 144 MMOL/L — SIGNIFICANT CHANGE UP (ref 135–145)
SODIUM SERPL-SCNC: 145 MMOL/L — SIGNIFICANT CHANGE UP (ref 135–145)
SODIUM SERPL-SCNC: 146 MMOL/L — HIGH (ref 135–145)
SODIUM SERPL-SCNC: 147 MMOL/L — HIGH (ref 135–145)
SODIUM UR-SCNC: <35 MMOL/L — SIGNIFICANT CHANGE UP
SP GR SPEC: 1.02 — SIGNIFICANT CHANGE UP (ref 1.01–1.02)
SP GR SPEC: 1.03 — HIGH (ref 1.01–1.02)
SPECIMEN SOURCE: SIGNIFICANT CHANGE UP
TARGETS BLD QL SMEAR: SLIGHT — SIGNIFICANT CHANGE UP
TM INTERPRETATION: SIGNIFICANT CHANGE UP
TRIGL SERPL-MCNC: 169 MG/DL — HIGH
TROPONIN T, HIGH SENSITIVITY RESULT: 119 NG/L — HIGH (ref 0–51)
TROPONIN T, HIGH SENSITIVITY RESULT: 31 NG/L — SIGNIFICANT CHANGE UP (ref 0–51)
TROPONIN T, HIGH SENSITIVITY RESULT: 33 NG/L — SIGNIFICANT CHANGE UP (ref 0–51)
URATE SERPL-MCNC: 4.1 MG/DL — SIGNIFICANT CHANGE UP (ref 3.4–8.8)
URATE SERPL-MCNC: 4.5 MG/DL — SIGNIFICANT CHANGE UP (ref 3.4–8.8)
URATE SERPL-MCNC: 4.8 MG/DL — SIGNIFICANT CHANGE UP (ref 3.4–8.8)
URATE SERPL-MCNC: 4.8 MG/DL — SIGNIFICANT CHANGE UP (ref 3.4–8.8)
URATE SERPL-MCNC: 5.1 MG/DL — SIGNIFICANT CHANGE UP (ref 3.4–8.8)
URATE SERPL-MCNC: 5.4 MG/DL — SIGNIFICANT CHANGE UP (ref 3.4–8.8)
URATE SERPL-MCNC: 5.5 MG/DL — SIGNIFICANT CHANGE UP (ref 3.4–8.8)
URATE SERPL-MCNC: 5.5 MG/DL — SIGNIFICANT CHANGE UP (ref 3.4–8.8)
URATE SERPL-MCNC: 5.6 MG/DL — SIGNIFICANT CHANGE UP (ref 3.4–8.8)
URATE SERPL-MCNC: 5.6 MG/DL — SIGNIFICANT CHANGE UP (ref 3.4–8.8)
URATE SERPL-MCNC: 5.7 MG/DL — SIGNIFICANT CHANGE UP (ref 3.4–8.8)
URATE SERPL-MCNC: 5.8 MG/DL — SIGNIFICANT CHANGE UP (ref 3.4–8.8)
URATE SERPL-MCNC: 5.9 MG/DL — SIGNIFICANT CHANGE UP (ref 3.4–8.8)
URATE SERPL-MCNC: 5.9 MG/DL — SIGNIFICANT CHANGE UP (ref 3.4–8.8)
URATE SERPL-MCNC: 6 MG/DL — SIGNIFICANT CHANGE UP (ref 3.4–8.8)
URATE SERPL-MCNC: 6 MG/DL — SIGNIFICANT CHANGE UP (ref 3.4–8.8)
URATE SERPL-MCNC: 6.1 MG/DL — SIGNIFICANT CHANGE UP (ref 3.4–8.8)
URATE SERPL-MCNC: 6.5 MG/DL — SIGNIFICANT CHANGE UP (ref 3.4–8.8)
URATE SERPL-MCNC: 6.6 MG/DL — SIGNIFICANT CHANGE UP (ref 3.4–8.8)
URATE SERPL-MCNC: 6.6 MG/DL — SIGNIFICANT CHANGE UP (ref 3.4–8.8)
URATE SERPL-MCNC: 6.8 MG/DL — SIGNIFICANT CHANGE UP (ref 3.4–8.8)
UROBILINOGEN FLD QL: NEGATIVE — SIGNIFICANT CHANGE UP
UROBILINOGEN FLD QL: NEGATIVE — SIGNIFICANT CHANGE UP
VANCOMYCIN TROUGH SERPL-MCNC: 12.1 UG/ML — SIGNIFICANT CHANGE UP (ref 10–20)
VANCOMYCIN TROUGH SERPL-MCNC: 15.5 UG/ML — SIGNIFICANT CHANGE UP (ref 10–20)
VANCOMYCIN TROUGH SERPL-MCNC: 7.7 UG/ML — LOW (ref 10–20)
VANCOMYCIN TROUGH SERPL-MCNC: 8.2 UG/ML — LOW (ref 10–20)
VANCOMYCIN TROUGH SERPL-MCNC: 8.3 UG/ML — LOW (ref 10–20)
VARIANT LYMPHS # BLD: 14.4 % — HIGH (ref 0–6)
VIT B12 SERPL-MCNC: 877 PG/ML — SIGNIFICANT CHANGE UP (ref 232–1245)
WBC # BLD: 0.95 K/UL — CRITICAL LOW (ref 3.8–10.5)
WBC # BLD: 1.49 K/UL — LOW (ref 3.8–10.5)
WBC # BLD: 1.84 K/UL — LOW (ref 3.8–10.5)
WBC # BLD: 10.02 K/UL — SIGNIFICANT CHANGE UP (ref 3.8–10.5)
WBC # BLD: 10.45 K/UL — SIGNIFICANT CHANGE UP (ref 3.8–10.5)
WBC # BLD: 10.53 K/UL — HIGH (ref 3.8–10.5)
WBC # BLD: 10.72 K/UL — HIGH (ref 3.8–10.5)
WBC # BLD: 11.33 K/UL — HIGH (ref 3.8–10.5)
WBC # BLD: 11.81 K/UL — HIGH (ref 3.8–10.5)
WBC # BLD: 12.3 K/UL — HIGH (ref 3.8–10.5)
WBC # BLD: 12.34 K/UL — HIGH (ref 3.8–10.5)
WBC # BLD: 12.59 K/UL — HIGH (ref 3.8–10.5)
WBC # BLD: 12.79 K/UL — HIGH (ref 3.8–10.5)
WBC # BLD: 12.98 K/UL — HIGH (ref 3.8–10.5)
WBC # BLD: 13.1 K/UL — HIGH (ref 3.8–10.5)
WBC # BLD: 13.16 K/UL — HIGH (ref 3.8–10.5)
WBC # BLD: 14.85 K/UL — HIGH (ref 3.8–10.5)
WBC # BLD: 14.95 K/UL — HIGH (ref 3.8–10.5)
WBC # BLD: 15.24 K/UL — HIGH (ref 3.8–10.5)
WBC # BLD: 15.55 K/UL — HIGH (ref 3.8–10.5)
WBC # BLD: 15.82 K/UL — HIGH (ref 3.8–10.5)
WBC # BLD: 15.99 K/UL — HIGH (ref 3.8–10.5)
WBC # BLD: 16.24 K/UL — HIGH (ref 3.8–10.5)
WBC # BLD: 17.54 K/UL — HIGH (ref 3.8–10.5)
WBC # BLD: 17.94 K/UL — HIGH (ref 3.8–10.5)
WBC # BLD: 18.45 K/UL — HIGH (ref 3.8–10.5)
WBC # BLD: 18.6 K/UL — HIGH (ref 3.8–10.5)
WBC # BLD: 19.68 K/UL — HIGH (ref 3.8–10.5)
WBC # BLD: 7.24 K/UL — SIGNIFICANT CHANGE UP (ref 3.8–10.5)
WBC # BLD: 8.59 K/UL — SIGNIFICANT CHANGE UP (ref 3.8–10.5)
WBC # BLD: 9.05 K/UL — SIGNIFICANT CHANGE UP (ref 3.8–10.5)
WBC # BLD: 9.38 K/UL — SIGNIFICANT CHANGE UP (ref 3.8–10.5)
WBC # BLD: 9.51 K/UL — SIGNIFICANT CHANGE UP (ref 3.8–10.5)
WBC # BLD: 9.55 K/UL — SIGNIFICANT CHANGE UP (ref 3.8–10.5)
WBC # BLD: 9.65 K/UL — SIGNIFICANT CHANGE UP (ref 3.8–10.5)
WBC # BLD: 9.89 K/UL — SIGNIFICANT CHANGE UP (ref 3.8–10.5)
WBC # FLD AUTO: 0.95 K/UL — CRITICAL LOW (ref 3.8–10.5)
WBC # FLD AUTO: 1.49 K/UL — LOW (ref 3.8–10.5)
WBC # FLD AUTO: 1.84 K/UL — LOW (ref 3.8–10.5)
WBC # FLD AUTO: 10.02 K/UL — SIGNIFICANT CHANGE UP (ref 3.8–10.5)
WBC # FLD AUTO: 10.45 K/UL — SIGNIFICANT CHANGE UP (ref 3.8–10.5)
WBC # FLD AUTO: 10.53 K/UL — HIGH (ref 3.8–10.5)
WBC # FLD AUTO: 10.72 K/UL — HIGH (ref 3.8–10.5)
WBC # FLD AUTO: 11.33 K/UL — HIGH (ref 3.8–10.5)
WBC # FLD AUTO: 11.81 K/UL — HIGH (ref 3.8–10.5)
WBC # FLD AUTO: 12.3 K/UL — HIGH (ref 3.8–10.5)
WBC # FLD AUTO: 12.34 K/UL — HIGH (ref 3.8–10.5)
WBC # FLD AUTO: 12.59 K/UL — HIGH (ref 3.8–10.5)
WBC # FLD AUTO: 12.79 K/UL — HIGH (ref 3.8–10.5)
WBC # FLD AUTO: 12.98 K/UL — HIGH (ref 3.8–10.5)
WBC # FLD AUTO: 13.1 K/UL — HIGH (ref 3.8–10.5)
WBC # FLD AUTO: 13.16 K/UL — HIGH (ref 3.8–10.5)
WBC # FLD AUTO: 14.85 K/UL — HIGH (ref 3.8–10.5)
WBC # FLD AUTO: 14.95 K/UL — HIGH (ref 3.8–10.5)
WBC # FLD AUTO: 15.24 K/UL — HIGH (ref 3.8–10.5)
WBC # FLD AUTO: 15.55 K/UL — HIGH (ref 3.8–10.5)
WBC # FLD AUTO: 15.82 K/UL — HIGH (ref 3.8–10.5)
WBC # FLD AUTO: 15.99 K/UL — HIGH (ref 3.8–10.5)
WBC # FLD AUTO: 16.24 K/UL — HIGH (ref 3.8–10.5)
WBC # FLD AUTO: 17.54 K/UL — HIGH (ref 3.8–10.5)
WBC # FLD AUTO: 17.94 K/UL — HIGH (ref 3.8–10.5)
WBC # FLD AUTO: 18.45 K/UL — HIGH (ref 3.8–10.5)
WBC # FLD AUTO: 18.6 K/UL — HIGH (ref 3.8–10.5)
WBC # FLD AUTO: 19.68 K/UL — HIGH (ref 3.8–10.5)
WBC # FLD AUTO: 7.24 K/UL — SIGNIFICANT CHANGE UP (ref 3.8–10.5)
WBC # FLD AUTO: 8.59 K/UL — SIGNIFICANT CHANGE UP (ref 3.8–10.5)
WBC # FLD AUTO: 9.05 K/UL — SIGNIFICANT CHANGE UP (ref 3.8–10.5)
WBC # FLD AUTO: 9.38 K/UL — SIGNIFICANT CHANGE UP (ref 3.8–10.5)
WBC # FLD AUTO: 9.51 K/UL — SIGNIFICANT CHANGE UP (ref 3.8–10.5)
WBC # FLD AUTO: 9.55 K/UL — SIGNIFICANT CHANGE UP (ref 3.8–10.5)
WBC # FLD AUTO: 9.65 K/UL — SIGNIFICANT CHANGE UP (ref 3.8–10.5)
WBC # FLD AUTO: 9.89 K/UL — SIGNIFICANT CHANGE UP (ref 3.8–10.5)
WBC UR QL: 3 /HPF — SIGNIFICANT CHANGE UP (ref 0–5)
WBC UR QL: 8 /HPF — HIGH (ref 0–5)

## 2020-01-01 PROCEDURE — 99284 EMERGENCY DEPT VISIT MOD MDM: CPT | Mod: 25

## 2020-01-01 PROCEDURE — 99232 SBSQ HOSP IP/OBS MODERATE 35: CPT

## 2020-01-01 PROCEDURE — 82607 VITAMIN B-12: CPT

## 2020-01-01 PROCEDURE — 99233 SBSQ HOSP IP/OBS HIGH 50: CPT

## 2020-01-01 PROCEDURE — 99223 1ST HOSP IP/OBS HIGH 75: CPT | Mod: GC

## 2020-01-01 PROCEDURE — 93010 ELECTROCARDIOGRAM REPORT: CPT

## 2020-01-01 PROCEDURE — 99232 SBSQ HOSP IP/OBS MODERATE 35: CPT | Mod: GC

## 2020-01-01 PROCEDURE — 85730 THROMBOPLASTIN TIME PARTIAL: CPT

## 2020-01-01 PROCEDURE — 86850 RBC ANTIBODY SCREEN: CPT

## 2020-01-01 PROCEDURE — 99223 1ST HOSP IP/OBS HIGH 75: CPT

## 2020-01-01 PROCEDURE — 78472 GATED HEART PLANAR SINGLE: CPT | Mod: 26

## 2020-01-01 PROCEDURE — 88264 CHROMOSOME ANALYSIS 20-25: CPT

## 2020-01-01 PROCEDURE — 99233 SBSQ HOSP IP/OBS HIGH 50: CPT | Mod: GC

## 2020-01-01 PROCEDURE — 71045 X-RAY EXAM CHEST 1 VIEW: CPT | Mod: 26,77

## 2020-01-01 PROCEDURE — 82803 BLOOD GASES ANY COMBINATION: CPT

## 2020-01-01 PROCEDURE — 76770 US EXAM ABDO BACK WALL COMP: CPT | Mod: 26

## 2020-01-01 PROCEDURE — 87070 CULTURE OTHR SPECIMN AEROBIC: CPT

## 2020-01-01 PROCEDURE — 82728 ASSAY OF FERRITIN: CPT

## 2020-01-01 PROCEDURE — 76942 ECHO GUIDE FOR BIOPSY: CPT | Mod: 26

## 2020-01-01 PROCEDURE — 84100 ASSAY OF PHOSPHORUS: CPT

## 2020-01-01 PROCEDURE — 99254 IP/OBS CNSLTJ NEW/EST MOD 60: CPT | Mod: GC

## 2020-01-01 PROCEDURE — 82436 ASSAY OF URINE CHLORIDE: CPT

## 2020-01-01 PROCEDURE — 71045 X-RAY EXAM CHEST 1 VIEW: CPT | Mod: 26

## 2020-01-01 PROCEDURE — 99223 1ST HOSP IP/OBS HIGH 75: CPT | Mod: AI,GC

## 2020-01-01 PROCEDURE — 88341 IMHCHEM/IMCYTCHM EA ADD ANTB: CPT | Mod: 26,59

## 2020-01-01 PROCEDURE — 85014 HEMATOCRIT: CPT

## 2020-01-01 PROCEDURE — 73201 CT UPPER EXTREMITY W/DYE: CPT

## 2020-01-01 PROCEDURE — 88237 TISSUE CULTURE BONE MARROW: CPT

## 2020-01-01 PROCEDURE — 96375 TX/PRO/DX INJ NEW DRUG ADDON: CPT | Mod: XU

## 2020-01-01 PROCEDURE — 82947 ASSAY GLUCOSE BLOOD QUANT: CPT

## 2020-01-01 PROCEDURE — 84550 ASSAY OF BLOOD/URIC ACID: CPT

## 2020-01-01 PROCEDURE — 88342 IMHCHEM/IMCYTCHM 1ST ANTB: CPT | Mod: 26,59

## 2020-01-01 PROCEDURE — 0225U NFCT DS DNA&RNA 21 SARSCOV2: CPT

## 2020-01-01 PROCEDURE — 88305 TISSUE EXAM BY PATHOLOGIST: CPT | Mod: 26

## 2020-01-01 PROCEDURE — A9560: CPT

## 2020-01-01 PROCEDURE — 70553 MRI BRAIN STEM W/O & W/DYE: CPT

## 2020-01-01 PROCEDURE — 87641 MR-STAPH DNA AMP PROBE: CPT

## 2020-01-01 PROCEDURE — 73030 X-RAY EXAM OF SHOULDER: CPT | Mod: 26,LT

## 2020-01-01 PROCEDURE — 85097 BONE MARROW INTERPRETATION: CPT

## 2020-01-01 PROCEDURE — 86704 HEP B CORE ANTIBODY TOTAL: CPT

## 2020-01-01 PROCEDURE — 83615 LACTATE (LD) (LDH) ENZYME: CPT

## 2020-01-01 PROCEDURE — 85610 PROTHROMBIN TIME: CPT

## 2020-01-01 PROCEDURE — U0003: CPT

## 2020-01-01 PROCEDURE — 86140 C-REACTIVE PROTEIN: CPT

## 2020-01-01 PROCEDURE — 85027 COMPLETE CBC AUTOMATED: CPT

## 2020-01-01 PROCEDURE — 88341 IMHCHEM/IMCYTCHM EA ADD ANTB: CPT

## 2020-01-01 PROCEDURE — 73200 CT UPPER EXTREMITY W/O DYE: CPT

## 2020-01-01 PROCEDURE — 93010 ELECTROCARDIOGRAM REPORT: CPT | Mod: 76

## 2020-01-01 PROCEDURE — 88189 FLOWCYTOMETRY/READ 16 & >: CPT

## 2020-01-01 PROCEDURE — 93971 EXTREMITY STUDY: CPT | Mod: 26

## 2020-01-01 PROCEDURE — 80053 COMPREHEN METABOLIC PANEL: CPT

## 2020-01-01 PROCEDURE — 97165 OT EVAL LOW COMPLEX 30 MIN: CPT

## 2020-01-01 PROCEDURE — 74176 CT ABD & PELVIS W/O CONTRAST: CPT

## 2020-01-01 PROCEDURE — 78472 GATED HEART PLANAR SINGLE: CPT

## 2020-01-01 PROCEDURE — 76942 ECHO GUIDE FOR BIOPSY: CPT

## 2020-01-01 PROCEDURE — 88184 FLOWCYTOMETRY/ TC 1 MARKER: CPT

## 2020-01-01 PROCEDURE — 86923 COMPATIBILITY TEST ELECTRIC: CPT

## 2020-01-01 PROCEDURE — 20206 BIOPSY MUSCLE PERQ NEEDLE: CPT

## 2020-01-01 PROCEDURE — 87640 STAPH A DNA AMP PROBE: CPT

## 2020-01-01 PROCEDURE — 88342 IMHCHEM/IMCYTCHM 1ST ANTB: CPT | Mod: 26

## 2020-01-01 PROCEDURE — 83605 ASSAY OF LACTIC ACID: CPT

## 2020-01-01 PROCEDURE — C1769: CPT

## 2020-01-01 PROCEDURE — 82435 ASSAY OF BLOOD CHLORIDE: CPT

## 2020-01-01 PROCEDURE — 71275 CT ANGIOGRAPHY CHEST: CPT

## 2020-01-01 PROCEDURE — 93005 ELECTROCARDIOGRAM TRACING: CPT

## 2020-01-01 PROCEDURE — 73060 X-RAY EXAM OF HUMERUS: CPT | Mod: 26,LT

## 2020-01-01 PROCEDURE — 80076 HEPATIC FUNCTION PANEL: CPT

## 2020-01-01 PROCEDURE — 87040 BLOOD CULTURE FOR BACTERIA: CPT

## 2020-01-01 PROCEDURE — A9585: CPT

## 2020-01-01 PROCEDURE — 82330 ASSAY OF CALCIUM: CPT

## 2020-01-01 PROCEDURE — 86901 BLOOD TYPING SEROLOGIC RH(D): CPT

## 2020-01-01 PROCEDURE — 87075 CULTR BACTERIA EXCEPT BLOOD: CPT

## 2020-01-01 PROCEDURE — 71275 CT ANGIOGRAPHY CHEST: CPT | Mod: 26

## 2020-01-01 PROCEDURE — 73060 X-RAY EXAM OF HUMERUS: CPT

## 2020-01-01 PROCEDURE — 96367 TX/PROPH/DG ADDL SEQ IV INF: CPT | Mod: XU

## 2020-01-01 PROCEDURE — 85652 RBC SED RATE AUTOMATED: CPT

## 2020-01-01 PROCEDURE — 85018 HEMOGLOBIN: CPT

## 2020-01-01 PROCEDURE — 97535 SELF CARE MNGMENT TRAINING: CPT

## 2020-01-01 PROCEDURE — 83735 ASSAY OF MAGNESIUM: CPT

## 2020-01-01 PROCEDURE — 96365 THER/PROPH/DIAG IV INF INIT: CPT | Mod: XU

## 2020-01-01 PROCEDURE — P9040: CPT

## 2020-01-01 PROCEDURE — 99255 IP/OBS CONSLTJ NEW/EST HI 80: CPT

## 2020-01-01 PROCEDURE — 80202 ASSAY OF VANCOMYCIN: CPT

## 2020-01-01 PROCEDURE — 36430 TRANSFUSION BLD/BLD COMPNT: CPT

## 2020-01-01 PROCEDURE — 88342 IMHCHEM/IMCYTCHM 1ST ANTB: CPT

## 2020-01-01 PROCEDURE — A9561: CPT

## 2020-01-01 PROCEDURE — 84295 ASSAY OF SERUM SODIUM: CPT

## 2020-01-01 PROCEDURE — 36569 INSJ PICC 5 YR+ W/O IMAGING: CPT

## 2020-01-01 PROCEDURE — 82962 GLUCOSE BLOOD TEST: CPT

## 2020-01-01 PROCEDURE — 85025 COMPLETE CBC W/AUTO DIFF WBC: CPT

## 2020-01-01 PROCEDURE — 74177 CT ABD & PELVIS W/CONTRAST: CPT

## 2020-01-01 PROCEDURE — 84300 ASSAY OF URINE SODIUM: CPT

## 2020-01-01 PROCEDURE — 84484 ASSAY OF TROPONIN QUANT: CPT

## 2020-01-01 PROCEDURE — 88313 SPECIAL STAINS GROUP 2: CPT

## 2020-01-01 PROCEDURE — 74177 CT ABD & PELVIS W/CONTRAST: CPT | Mod: 26

## 2020-01-01 PROCEDURE — 97530 THERAPEUTIC ACTIVITIES: CPT

## 2020-01-01 PROCEDURE — 84133 ASSAY OF URINE POTASSIUM: CPT

## 2020-01-01 PROCEDURE — 85384 FIBRINOGEN ACTIVITY: CPT

## 2020-01-01 PROCEDURE — C1751: CPT

## 2020-01-01 PROCEDURE — 76770 US EXAM ABDO BACK WALL COMP: CPT

## 2020-01-01 PROCEDURE — 86769 SARS-COV-2 COVID-19 ANTIBODY: CPT

## 2020-01-01 PROCEDURE — 73200 CT UPPER EXTREMITY W/O DYE: CPT | Mod: 26,LT

## 2020-01-01 PROCEDURE — 97110 THERAPEUTIC EXERCISES: CPT

## 2020-01-01 PROCEDURE — 99291 CRITICAL CARE FIRST HOUR: CPT

## 2020-01-01 PROCEDURE — 87205 SMEAR GRAM STAIN: CPT

## 2020-01-01 PROCEDURE — 36584 COMPL RPLCMT PICC RS&I: CPT

## 2020-01-01 PROCEDURE — 73030 X-RAY EXAM OF SHOULDER: CPT

## 2020-01-01 PROCEDURE — 87389 HIV-1 AG W/HIV-1&-2 AB AG IA: CPT

## 2020-01-01 PROCEDURE — 88185 FLOWCYTOMETRY/TC ADD-ON: CPT

## 2020-01-01 PROCEDURE — 84478 ASSAY OF TRIGLYCERIDES: CPT

## 2020-01-01 PROCEDURE — 99497 ADVNCD CARE PLAN 30 MIN: CPT | Mod: 25

## 2020-01-01 PROCEDURE — 99285 EMERGENCY DEPT VISIT HI MDM: CPT | Mod: 25

## 2020-01-01 PROCEDURE — 83520 IMMUNOASSAY QUANT NOS NONAB: CPT

## 2020-01-01 PROCEDURE — 93971 EXTREMITY STUDY: CPT

## 2020-01-01 PROCEDURE — 87086 URINE CULTURE/COLONY COUNT: CPT

## 2020-01-01 PROCEDURE — 88313 SPECIAL STAINS GROUP 2: CPT | Mod: 26

## 2020-01-01 PROCEDURE — 88280 CHROMOSOME KARYOTYPE STUDY: CPT

## 2020-01-01 PROCEDURE — 99285 EMERGENCY DEPT VISIT HI MDM: CPT

## 2020-01-01 PROCEDURE — 97116 GAIT TRAINING THERAPY: CPT

## 2020-01-01 PROCEDURE — 82570 ASSAY OF URINE CREATININE: CPT

## 2020-01-01 PROCEDURE — 80074 ACUTE HEPATITIS PANEL: CPT

## 2020-01-01 PROCEDURE — 83935 ASSAY OF URINE OSMOLALITY: CPT

## 2020-01-01 PROCEDURE — 74176 CT ABD & PELVIS W/O CONTRAST: CPT | Mod: 26

## 2020-01-01 PROCEDURE — 71045 X-RAY EXAM CHEST 1 VIEW: CPT

## 2020-01-01 PROCEDURE — 83010 ASSAY OF HAPTOGLOBIN QUANT: CPT

## 2020-01-01 PROCEDURE — 84165 PROTEIN E-PHORESIS SERUM: CPT

## 2020-01-01 PROCEDURE — 78306 BONE IMAGING WHOLE BODY: CPT

## 2020-01-01 PROCEDURE — 81001 URINALYSIS AUTO W/SCOPE: CPT

## 2020-01-01 PROCEDURE — 70553 MRI BRAIN STEM W/O & W/DYE: CPT | Mod: 26

## 2020-01-01 PROCEDURE — 94640 AIRWAY INHALATION TREATMENT: CPT

## 2020-01-01 PROCEDURE — 83690 ASSAY OF LIPASE: CPT

## 2020-01-01 PROCEDURE — 99221 1ST HOSP IP/OBS SF/LOW 40: CPT

## 2020-01-01 PROCEDURE — 88305 TISSUE EXAM BY PATHOLOGIST: CPT

## 2020-01-01 PROCEDURE — 78306 BONE IMAGING WHOLE BODY: CPT | Mod: 26

## 2020-01-01 PROCEDURE — 73223 MRI JOINT UPR EXTR W/O&W/DYE: CPT | Mod: 26,LT

## 2020-01-01 PROCEDURE — 87186 SC STD MICRODIL/AGAR DIL: CPT

## 2020-01-01 PROCEDURE — 84155 ASSAY OF PROTEIN SERUM: CPT

## 2020-01-01 PROCEDURE — 73223 MRI JOINT UPR EXTR W/O&W/DYE: CPT

## 2020-01-01 PROCEDURE — 86334 IMMUNOFIX E-PHORESIS SERUM: CPT

## 2020-01-01 PROCEDURE — 86900 BLOOD TYPING SEROLOGIC ABO: CPT

## 2020-01-01 PROCEDURE — 97162 PT EVAL MOD COMPLEX 30 MIN: CPT

## 2020-01-01 PROCEDURE — 73201 CT UPPER EXTREMITY W/DYE: CPT | Mod: 26,LT

## 2020-01-01 PROCEDURE — 83880 ASSAY OF NATRIURETIC PEPTIDE: CPT

## 2020-01-01 PROCEDURE — 88341 IMHCHEM/IMCYTCHM EA ADD ANTB: CPT | Mod: 26

## 2020-01-01 PROCEDURE — 85385 FIBRINOGEN ANTIGEN: CPT

## 2020-01-01 PROCEDURE — 71260 CT THORAX DX C+: CPT | Mod: 26

## 2020-01-01 PROCEDURE — 80048 BASIC METABOLIC PNL TOTAL CA: CPT

## 2020-01-01 PROCEDURE — 71260 CT THORAX DX C+: CPT

## 2020-01-01 PROCEDURE — 86357 NK CELLS TOTAL COUNT: CPT

## 2020-01-01 PROCEDURE — 82553 CREATINE MB FRACTION: CPT

## 2020-01-01 PROCEDURE — 84132 ASSAY OF SERUM POTASSIUM: CPT

## 2020-01-01 PROCEDURE — 99358 PROLONG SERVICE W/O CONTACT: CPT

## 2020-01-01 RX ORDER — SODIUM CHLORIDE 9 MG/ML
1000 INJECTION INTRAMUSCULAR; INTRAVENOUS; SUBCUTANEOUS
Refills: 0 | Status: COMPLETED | OUTPATIENT
Start: 2020-01-01 | End: 2020-01-01

## 2020-01-01 RX ORDER — HEPARIN SODIUM 5000 [USP'U]/ML
5000 INJECTION INTRAVENOUS; SUBCUTANEOUS ONCE
Refills: 0 | Status: COMPLETED | OUTPATIENT
Start: 2020-01-01 | End: 2020-01-01

## 2020-01-01 RX ORDER — NYSTATIN 500MM UNIT
500000 POWDER (EA) MISCELLANEOUS EVERY 6 HOURS
Refills: 0 | Status: DISCONTINUED | OUTPATIENT
Start: 2020-01-01 | End: 2020-01-01

## 2020-01-01 RX ORDER — ALLOPURINOL 300 MG
100 TABLET ORAL DAILY
Refills: 0 | Status: DISCONTINUED | OUTPATIENT
Start: 2020-01-01 | End: 2020-01-01

## 2020-01-01 RX ORDER — HYDROMORPHONE HYDROCHLORIDE 2 MG/ML
0.5 INJECTION INTRAMUSCULAR; INTRAVENOUS; SUBCUTANEOUS EVERY 6 HOURS
Refills: 0 | Status: DISCONTINUED | OUTPATIENT
Start: 2020-01-01 | End: 2020-01-01

## 2020-01-01 RX ORDER — TIMOLOL 0.5 %
1 DROPS OPHTHALMIC (EYE)
Refills: 0 | Status: DISCONTINUED | OUTPATIENT
Start: 2020-01-01 | End: 2020-01-01

## 2020-01-01 RX ORDER — MORPHINE SULFATE 50 MG/1
2 CAPSULE, EXTENDED RELEASE ORAL EVERY 4 HOURS
Refills: 0 | Status: DISCONTINUED | OUTPATIENT
Start: 2020-01-01 | End: 2020-01-01

## 2020-01-01 RX ORDER — RITUXIMAB 10 MG/ML
686 INJECTION, SOLUTION INTRAVENOUS ONCE
Refills: 0 | Status: COMPLETED | OUTPATIENT
Start: 2020-01-01 | End: 2020-01-01

## 2020-01-01 RX ORDER — HEPARIN SODIUM 5000 [USP'U]/ML
INJECTION INTRAVENOUS; SUBCUTANEOUS
Qty: 25000 | Refills: 0 | Status: DISCONTINUED | OUTPATIENT
Start: 2020-01-01 | End: 2020-01-01

## 2020-01-01 RX ORDER — DRONABINOL 2.5 MG
1 CAPSULE ORAL
Qty: 0 | Refills: 0 | DISCHARGE
Start: 2020-01-01

## 2020-01-01 RX ORDER — HYDROMORPHONE HYDROCHLORIDE 2 MG/ML
0.2 INJECTION INTRAMUSCULAR; INTRAVENOUS; SUBCUTANEOUS ONCE
Refills: 0 | Status: DISCONTINUED | OUTPATIENT
Start: 2020-01-01 | End: 2020-01-01

## 2020-01-01 RX ORDER — TRAMADOL HYDROCHLORIDE 50 MG/1
1 TABLET ORAL
Qty: 0 | Refills: 0 | DISCHARGE

## 2020-01-01 RX ORDER — SODIUM ZIRCONIUM CYCLOSILICATE 10 G/10G
10 POWDER, FOR SUSPENSION ORAL EVERY OTHER DAY
Refills: 0 | Status: DISCONTINUED | OUTPATIENT
Start: 2020-01-01 | End: 2020-01-01

## 2020-01-01 RX ORDER — ACETAMINOPHEN 500 MG
650 TABLET ORAL ONCE
Refills: 0 | Status: COMPLETED | OUTPATIENT
Start: 2020-01-01 | End: 2020-01-01

## 2020-01-01 RX ORDER — ACETAMINOPHEN 500 MG
2 TABLET ORAL
Qty: 0 | Refills: 0 | DISCHARGE
Start: 2020-01-01

## 2020-01-01 RX ORDER — DEXTROSE 50 % IN WATER 50 %
50 SYRINGE (ML) INTRAVENOUS ONCE
Refills: 0 | Status: COMPLETED | OUTPATIENT
Start: 2020-01-01 | End: 2020-01-01

## 2020-01-01 RX ORDER — BIMATOPROST 0.3 MG/ML
1 SOLUTION/ DROPS OPHTHALMIC
Qty: 0 | Refills: 0 | DISCHARGE

## 2020-01-01 RX ORDER — ZINC SULFATE TAB 220 MG (50 MG ZINC EQUIVALENT) 220 (50 ZN) MG
1 TAB ORAL
Qty: 0 | Refills: 0 | DISCHARGE

## 2020-01-01 RX ORDER — NYSTATIN CREAM 100000 [USP'U]/G
1 CREAM TOPICAL
Qty: 0 | Refills: 0 | DISCHARGE
Start: 2020-01-01

## 2020-01-01 RX ORDER — LACTOBACILLUS ACIDOPHILUS 100MM CELL
1 CAPSULE ORAL DAILY
Refills: 0 | Status: DISCONTINUED | OUTPATIENT
Start: 2020-01-01 | End: 2020-01-01

## 2020-01-01 RX ORDER — TAMSULOSIN HYDROCHLORIDE 0.4 MG/1
0.4 CAPSULE ORAL AT BEDTIME
Refills: 0 | Status: DISCONTINUED | OUTPATIENT
Start: 2020-01-01 | End: 2020-01-01

## 2020-01-01 RX ORDER — LIDOCAINE HCL 20 MG/ML
20 VIAL (ML) INJECTION ONCE
Refills: 0 | Status: COMPLETED | OUTPATIENT
Start: 2020-01-01 | End: 2020-01-01

## 2020-01-01 RX ORDER — ONDANSETRON 8 MG/1
16 TABLET, FILM COATED ORAL EVERY 24 HOURS
Refills: 0 | Status: COMPLETED | OUTPATIENT
Start: 2020-01-01 | End: 2020-01-01

## 2020-01-01 RX ORDER — ACETAMINOPHEN 500 MG
2 TABLET ORAL
Qty: 0 | Refills: 0 | DISCHARGE

## 2020-01-01 RX ORDER — ATORVASTATIN CALCIUM 80 MG/1
1 TABLET, FILM COATED ORAL
Qty: 0 | Refills: 0 | DISCHARGE

## 2020-01-01 RX ORDER — MIRTAZAPINE 45 MG/1
15 TABLET, ORALLY DISINTEGRATING ORAL AT BEDTIME
Refills: 0 | Status: DISCONTINUED | OUTPATIENT
Start: 2020-01-01 | End: 2020-01-01

## 2020-01-01 RX ORDER — HEPARIN SODIUM 5000 [USP'U]/ML
5000 INJECTION INTRAVENOUS; SUBCUTANEOUS EVERY 6 HOURS
Refills: 0 | Status: DISCONTINUED | OUTPATIENT
Start: 2020-01-01 | End: 2020-01-01

## 2020-01-01 RX ORDER — HYDROMORPHONE HYDROCHLORIDE 2 MG/ML
0.4 INJECTION INTRAMUSCULAR; INTRAVENOUS; SUBCUTANEOUS EVERY 6 HOURS
Refills: 0 | Status: DISCONTINUED | OUTPATIENT
Start: 2020-01-01 | End: 2020-01-01

## 2020-01-01 RX ORDER — ALPRAZOLAM 0.25 MG
0.5 TABLET ORAL
Refills: 0 | Status: DISCONTINUED | OUTPATIENT
Start: 2020-01-01 | End: 2020-01-01

## 2020-01-01 RX ORDER — AMLODIPINE BESYLATE 2.5 MG/1
1 TABLET ORAL
Qty: 0 | Refills: 0 | DISCHARGE

## 2020-01-01 RX ORDER — VANCOMYCIN HCL 1 G
500 VIAL (EA) INTRAVENOUS DAILY
Refills: 0 | Status: DISCONTINUED | OUTPATIENT
Start: 2020-01-01 | End: 2020-01-01

## 2020-01-01 RX ORDER — ENOXAPARIN SODIUM 100 MG/ML
70 INJECTION SUBCUTANEOUS DAILY
Refills: 0 | Status: DISCONTINUED | OUTPATIENT
Start: 2020-01-01 | End: 2020-01-01

## 2020-01-01 RX ORDER — LATANOPROST 0.05 MG/ML
1 SOLUTION/ DROPS OPHTHALMIC; TOPICAL AT BEDTIME
Refills: 0 | Status: DISCONTINUED | OUTPATIENT
Start: 2020-01-01 | End: 2020-01-01

## 2020-01-01 RX ORDER — SODIUM BICARBONATE 1 MEQ/ML
650 SYRINGE (ML) INTRAVENOUS
Refills: 0 | Status: DISCONTINUED | OUTPATIENT
Start: 2020-01-01 | End: 2020-01-01

## 2020-01-01 RX ORDER — SENNA PLUS 8.6 MG/1
2 TABLET ORAL AT BEDTIME
Refills: 0 | Status: DISCONTINUED | OUTPATIENT
Start: 2020-01-01 | End: 2020-01-01

## 2020-01-01 RX ORDER — SODIUM,POTASSIUM PHOSPHATES 278-250MG
1 POWDER IN PACKET (EA) ORAL
Refills: 0 | Status: COMPLETED | OUTPATIENT
Start: 2020-01-01 | End: 2020-01-01

## 2020-01-01 RX ORDER — HEPARIN SODIUM 5000 [USP'U]/ML
2500 INJECTION INTRAVENOUS; SUBCUTANEOUS EVERY 6 HOURS
Refills: 0 | Status: DISCONTINUED | OUTPATIENT
Start: 2020-01-01 | End: 2020-01-01

## 2020-01-01 RX ORDER — INFLUENZA VIRUS VACCINE 15; 15; 15; 15 UG/.5ML; UG/.5ML; UG/.5ML; UG/.5ML
0.5 SUSPENSION INTRAMUSCULAR ONCE
Refills: 0 | Status: DISCONTINUED | OUTPATIENT
Start: 2020-01-01 | End: 2020-01-01

## 2020-01-01 RX ORDER — TRAMADOL HYDROCHLORIDE 50 MG/1
25 TABLET ORAL EVERY 6 HOURS
Refills: 0 | Status: DISCONTINUED | OUTPATIENT
Start: 2020-01-01 | End: 2020-01-01

## 2020-01-01 RX ORDER — SODIUM CHLORIDE 9 MG/ML
1000 INJECTION INTRAMUSCULAR; INTRAVENOUS; SUBCUTANEOUS
Refills: 0 | Status: DISCONTINUED | OUTPATIENT
Start: 2020-01-01 | End: 2020-01-01

## 2020-01-01 RX ORDER — OXYCODONE HYDROCHLORIDE 5 MG/1
2.5 TABLET ORAL
Refills: 0 | Status: DISCONTINUED | OUTPATIENT
Start: 2020-01-01 | End: 2020-01-01

## 2020-01-01 RX ORDER — HEPARIN SODIUM 5000 [USP'U]/ML
5500 INJECTION INTRAVENOUS; SUBCUTANEOUS ONCE
Refills: 0 | Status: COMPLETED | OUTPATIENT
Start: 2020-01-01 | End: 2020-01-01

## 2020-01-01 RX ORDER — ZINC OXIDE 200 MG/G
1 OINTMENT TOPICAL
Qty: 0 | Refills: 0 | DISCHARGE

## 2020-01-01 RX ORDER — DIPHENHYDRAMINE HYDROCHLORIDE AND LIDOCAINE HYDROCHLORIDE AND ALUMINUM HYDROXIDE AND MAGNESIUM HYDRO
10 KIT THREE TIMES A DAY
Refills: 0 | Status: DISCONTINUED | OUTPATIENT
Start: 2020-01-01 | End: 2020-01-01

## 2020-01-01 RX ORDER — DRONABINOL 2.5 MG
2.5 CAPSULE ORAL
Refills: 0 | Status: DISCONTINUED | OUTPATIENT
Start: 2020-01-01 | End: 2020-01-01

## 2020-01-01 RX ORDER — ATORVASTATIN CALCIUM 80 MG/1
80 TABLET, FILM COATED ORAL AT BEDTIME
Refills: 0 | Status: DISCONTINUED | OUTPATIENT
Start: 2020-01-01 | End: 2020-01-01

## 2020-01-01 RX ORDER — MULTIVIT-MIN/FERROUS GLUCONATE 9 MG/15 ML
1 LIQUID (ML) ORAL
Qty: 0 | Refills: 0 | DISCHARGE

## 2020-01-01 RX ORDER — FUROSEMIDE 40 MG
40 TABLET ORAL ONCE
Refills: 0 | Status: COMPLETED | OUTPATIENT
Start: 2020-01-01 | End: 2020-01-01

## 2020-01-01 RX ORDER — AMLODIPINE BESYLATE 2.5 MG/1
2.5 TABLET ORAL DAILY
Refills: 0 | Status: DISCONTINUED | OUTPATIENT
Start: 2020-01-01 | End: 2020-01-01

## 2020-01-01 RX ORDER — SODIUM ZIRCONIUM CYCLOSILICATE 10 G/10G
10 POWDER, FOR SUSPENSION ORAL ONCE
Refills: 0 | Status: DISCONTINUED | OUTPATIENT
Start: 2020-01-01 | End: 2020-01-01

## 2020-01-01 RX ORDER — TIMOLOL 0.5 %
1 DROPS OPHTHALMIC (EYE)
Qty: 0 | Refills: 0 | DISCHARGE

## 2020-01-01 RX ORDER — CHLORHEXIDINE GLUCONATE 213 G/1000ML
1 SOLUTION TOPICAL
Refills: 0 | Status: DISCONTINUED | OUTPATIENT
Start: 2020-01-01 | End: 2020-01-01

## 2020-01-01 RX ORDER — ALBUTEROL 90 UG/1
10 AEROSOL, METERED ORAL ONCE
Refills: 0 | Status: COMPLETED | OUTPATIENT
Start: 2020-01-01 | End: 2020-01-01

## 2020-01-01 RX ORDER — HYDROMORPHONE HYDROCHLORIDE 2 MG/ML
0.4 INJECTION INTRAMUSCULAR; INTRAVENOUS; SUBCUTANEOUS
Refills: 0 | Status: DISCONTINUED | OUTPATIENT
Start: 2020-01-01 | End: 2020-01-01

## 2020-01-01 RX ORDER — RASBURICASE 7.5 MG
3 KIT INTRAVENOUS ONCE
Refills: 0 | Status: DISCONTINUED | OUTPATIENT
Start: 2020-01-01 | End: 2020-01-01

## 2020-01-01 RX ORDER — LISINOPRIL 2.5 MG/1
1 TABLET ORAL
Qty: 0 | Refills: 0 | DISCHARGE

## 2020-01-01 RX ORDER — HYDROMORPHONE HYDROCHLORIDE 2 MG/ML
0.5 INJECTION INTRAMUSCULAR; INTRAVENOUS; SUBCUTANEOUS ONCE
Refills: 0 | Status: DISCONTINUED | OUTPATIENT
Start: 2020-01-01 | End: 2020-01-01

## 2020-01-01 RX ORDER — DIPHENHYDRAMINE HCL 50 MG
1 CAPSULE ORAL
Qty: 0 | Refills: 0 | DISCHARGE

## 2020-01-01 RX ORDER — POLYETHYLENE GLYCOL 3350 17 G/17G
17 POWDER, FOR SOLUTION ORAL DAILY
Refills: 0 | Status: DISCONTINUED | OUTPATIENT
Start: 2020-01-01 | End: 2020-01-01

## 2020-01-01 RX ORDER — OMEPRAZOLE 10 MG/1
1 CAPSULE, DELAYED RELEASE ORAL
Qty: 0 | Refills: 0 | DISCHARGE

## 2020-01-01 RX ORDER — FLUCONAZOLE 150 MG/1
1 TABLET ORAL
Qty: 0 | Refills: 0 | DISCHARGE
Start: 2020-01-01

## 2020-01-01 RX ORDER — SODIUM CHLORIDE 9 MG/ML
1000 INJECTION INTRAMUSCULAR; INTRAVENOUS; SUBCUTANEOUS ONCE
Refills: 0 | Status: COMPLETED | OUTPATIENT
Start: 2020-01-01 | End: 2020-01-01

## 2020-01-01 RX ORDER — FLUCONAZOLE 150 MG/1
200 TABLET ORAL DAILY
Refills: 0 | Status: DISCONTINUED | OUTPATIENT
Start: 2020-01-01 | End: 2020-01-01

## 2020-01-01 RX ORDER — HYDROMORPHONE HYDROCHLORIDE 2 MG/ML
0.2 INJECTION INTRAMUSCULAR; INTRAVENOUS; SUBCUTANEOUS
Refills: 0 | Status: DISCONTINUED | OUTPATIENT
Start: 2020-01-01 | End: 2020-01-01

## 2020-01-01 RX ORDER — METRONIDAZOLE 500 MG
500 TABLET ORAL EVERY 8 HOURS
Refills: 0 | Status: DISCONTINUED | OUTPATIENT
Start: 2020-01-01 | End: 2020-01-01

## 2020-01-01 RX ORDER — HYDROMORPHONE HYDROCHLORIDE 2 MG/ML
0.2 INJECTION INTRAMUSCULAR; INTRAVENOUS; SUBCUTANEOUS
Qty: 100 | Refills: 0 | Status: DISCONTINUED | OUTPATIENT
Start: 2020-01-01 | End: 2020-01-01

## 2020-01-01 RX ORDER — TRAMADOL HYDROCHLORIDE 50 MG/1
1 TABLET ORAL
Qty: 0 | Refills: 0 | DISCHARGE
Start: 2020-01-01

## 2020-01-01 RX ORDER — SODIUM CHLORIDE 9 MG/ML
500 INJECTION INTRAMUSCULAR; INTRAVENOUS; SUBCUTANEOUS ONCE
Refills: 0 | Status: COMPLETED | OUTPATIENT
Start: 2020-01-01 | End: 2020-01-01

## 2020-01-01 RX ORDER — CEFEPIME 1 G/1
2000 INJECTION, POWDER, FOR SOLUTION INTRAMUSCULAR; INTRAVENOUS EVERY 12 HOURS
Refills: 0 | Status: DISCONTINUED | OUTPATIENT
Start: 2020-01-01 | End: 2020-01-01

## 2020-01-01 RX ORDER — DEXAMETHASONE 0.5 MG/5ML
8 ELIXIR ORAL EVERY 24 HOURS
Refills: 0 | Status: COMPLETED | OUTPATIENT
Start: 2020-01-01 | End: 2020-01-01

## 2020-01-01 RX ORDER — VANCOMYCIN HCL 1 G
1000 VIAL (EA) INTRAVENOUS ONCE
Refills: 0 | Status: COMPLETED | OUTPATIENT
Start: 2020-01-01 | End: 2020-01-01

## 2020-01-01 RX ORDER — NYSTATIN CREAM 100000 [USP'U]/G
1 CREAM TOPICAL
Refills: 0 | Status: DISCONTINUED | OUTPATIENT
Start: 2020-01-01 | End: 2020-01-01

## 2020-01-01 RX ORDER — MIRTAZAPINE 45 MG/1
1 TABLET, ORALLY DISINTEGRATING ORAL
Qty: 0 | Refills: 0 | DISCHARGE
Start: 2020-01-01

## 2020-01-01 RX ORDER — SODIUM CHLORIDE 9 MG/ML
10 INJECTION INTRAMUSCULAR; INTRAVENOUS; SUBCUTANEOUS
Refills: 0 | Status: DISCONTINUED | OUTPATIENT
Start: 2020-01-01 | End: 2020-01-01

## 2020-01-01 RX ORDER — ROSUVASTATIN CALCIUM 5 MG/1
1 TABLET ORAL
Qty: 0 | Refills: 0 | DISCHARGE

## 2020-01-01 RX ORDER — HEPARIN SODIUM 5000 [USP'U]/ML
5000 INJECTION INTRAVENOUS; SUBCUTANEOUS EVERY 12 HOURS
Refills: 0 | Status: DISCONTINUED | OUTPATIENT
Start: 2020-01-01 | End: 2020-01-01

## 2020-01-01 RX ORDER — BENDAMUSTINE HYDROCHLORIDE 100 MG/20ML
165 INJECTION, POWDER, LYOPHILIZED, FOR SOLUTION INTRAVENOUS EVERY 24 HOURS
Refills: 0 | Status: COMPLETED | OUTPATIENT
Start: 2020-01-01 | End: 2020-01-01

## 2020-01-01 RX ORDER — CEFEPIME 1 G/1
2000 INJECTION, POWDER, FOR SOLUTION INTRAMUSCULAR; INTRAVENOUS DAILY
Refills: 0 | Status: DISCONTINUED | OUTPATIENT
Start: 2020-01-01 | End: 2020-01-01

## 2020-01-01 RX ORDER — LACTOBACILLUS ACIDOPHILUS 100MM CELL
1 CAPSULE ORAL
Qty: 0 | Refills: 0 | DISCHARGE

## 2020-01-01 RX ORDER — MIRTAZAPINE 45 MG/1
0.5 TABLET, ORALLY DISINTEGRATING ORAL
Qty: 0 | Refills: 0 | DISCHARGE

## 2020-01-01 RX ORDER — POLYETHYLENE GLYCOL 3350 17 G/17G
17 POWDER, FOR SOLUTION ORAL
Qty: 0 | Refills: 0 | DISCHARGE
Start: 2020-01-01

## 2020-01-01 RX ORDER — DIPHENHYDRAMINE HCL 50 MG
50 CAPSULE ORAL ONCE
Refills: 0 | Status: COMPLETED | OUTPATIENT
Start: 2020-01-01 | End: 2020-01-01

## 2020-01-01 RX ORDER — TRAMADOL HYDROCHLORIDE 50 MG/1
0.5 TABLET ORAL
Qty: 0 | Refills: 0 | DISCHARGE
Start: 2020-01-01

## 2020-01-01 RX ORDER — VANCOMYCIN HCL 1 G
1000 VIAL (EA) INTRAVENOUS EVERY 24 HOURS
Refills: 0 | Status: DISCONTINUED | OUTPATIENT
Start: 2020-01-01 | End: 2020-01-01

## 2020-01-01 RX ORDER — TIMOLOL 0.5 %
1 DROPS OPHTHALMIC (EYE)
Qty: 0 | Refills: 0 | DISCHARGE
Start: 2020-01-01

## 2020-01-01 RX ORDER — SODIUM BICARBONATE 1 MEQ/ML
1 SYRINGE (ML) INTRAVENOUS
Qty: 0 | Refills: 0 | DISCHARGE
Start: 2020-01-01

## 2020-01-01 RX ORDER — SODIUM CHLORIDE 9 MG/ML
500 INJECTION INTRAMUSCULAR; INTRAVENOUS; SUBCUTANEOUS ONCE
Refills: 0 | Status: DISCONTINUED | OUTPATIENT
Start: 2020-01-01 | End: 2020-01-01

## 2020-01-01 RX ORDER — HYDROMORPHONE HYDROCHLORIDE 2 MG/ML
0.5 INJECTION INTRAMUSCULAR; INTRAVENOUS; SUBCUTANEOUS
Refills: 0 | Status: DISCONTINUED | OUTPATIENT
Start: 2020-01-01 | End: 2020-01-01

## 2020-01-01 RX ORDER — INSULIN HUMAN 100 [IU]/ML
10 INJECTION, SOLUTION SUBCUTANEOUS ONCE
Refills: 0 | Status: COMPLETED | OUTPATIENT
Start: 2020-01-01 | End: 2020-01-01

## 2020-01-01 RX ORDER — MIRTAZAPINE 45 MG/1
7.5 TABLET, ORALLY DISINTEGRATING ORAL AT BEDTIME
Refills: 0 | Status: DISCONTINUED | OUTPATIENT
Start: 2020-01-01 | End: 2020-01-01

## 2020-01-01 RX ORDER — SODIUM ZIRCONIUM CYCLOSILICATE 10 G/10G
10 POWDER, FOR SUSPENSION ORAL THREE TIMES A DAY
Refills: 0 | Status: DISCONTINUED | OUTPATIENT
Start: 2020-01-01 | End: 2020-01-01

## 2020-01-01 RX ORDER — HEPARIN SODIUM 5000 [USP'U]/ML
5000 INJECTION INTRAVENOUS; SUBCUTANEOUS EVERY 8 HOURS
Refills: 0 | Status: DISCONTINUED | OUTPATIENT
Start: 2020-01-01 | End: 2020-01-01

## 2020-01-01 RX ORDER — PIPERACILLIN AND TAZOBACTAM 4; .5 G/20ML; G/20ML
3.38 INJECTION, POWDER, LYOPHILIZED, FOR SOLUTION INTRAVENOUS EVERY 8 HOURS
Refills: 0 | Status: DISCONTINUED | OUTPATIENT
Start: 2020-01-01 | End: 2020-01-01

## 2020-01-01 RX ORDER — SENNA PLUS 8.6 MG/1
2 TABLET ORAL
Qty: 0 | Refills: 0 | DISCHARGE
Start: 2020-01-01

## 2020-01-01 RX ORDER — CHLORHEXIDINE GLUCONATE 213 G/1000ML
1 SOLUTION TOPICAL DAILY
Refills: 0 | Status: DISCONTINUED | OUTPATIENT
Start: 2020-01-01 | End: 2020-01-01

## 2020-01-01 RX ORDER — VANCOMYCIN HCL 1 G
750 VIAL (EA) INTRAVENOUS DAILY
Refills: 0 | Status: DISCONTINUED | OUTPATIENT
Start: 2020-01-01 | End: 2020-01-01

## 2020-01-01 RX ORDER — ALLOPURINOL 300 MG
1 TABLET ORAL
Qty: 0 | Refills: 0 | DISCHARGE
Start: 2020-01-01

## 2020-01-01 RX ORDER — ACETAMINOPHEN 500 MG
650 TABLET ORAL EVERY 6 HOURS
Refills: 0 | Status: DISCONTINUED | OUTPATIENT
Start: 2020-01-01 | End: 2020-01-01

## 2020-01-01 RX ORDER — PIPERACILLIN AND TAZOBACTAM 4; .5 G/20ML; G/20ML
3.38 INJECTION, POWDER, LYOPHILIZED, FOR SOLUTION INTRAVENOUS ONCE
Refills: 0 | Status: COMPLETED | OUTPATIENT
Start: 2020-01-01 | End: 2020-01-01

## 2020-01-01 RX ORDER — ASCORBIC ACID 60 MG
1 TABLET,CHEWABLE ORAL
Qty: 0 | Refills: 0 | DISCHARGE

## 2020-01-01 RX ORDER — SODIUM ZIRCONIUM CYCLOSILICATE 10 G/10G
10 POWDER, FOR SUSPENSION ORAL ONCE
Refills: 0 | Status: COMPLETED | OUTPATIENT
Start: 2020-01-01 | End: 2020-01-01

## 2020-01-01 RX ORDER — HEPARIN SODIUM 5000 [USP'U]/ML
5500 INJECTION INTRAVENOUS; SUBCUTANEOUS EVERY 6 HOURS
Refills: 0 | Status: DISCONTINUED | OUTPATIENT
Start: 2020-01-01 | End: 2020-01-01

## 2020-01-01 RX ORDER — ENOXAPARIN SODIUM 100 MG/ML
70 INJECTION SUBCUTANEOUS
Qty: 0 | Refills: 0 | DISCHARGE
Start: 2020-01-01

## 2020-01-01 RX ORDER — MORPHINE SULFATE 50 MG/1
4 CAPSULE, EXTENDED RELEASE ORAL ONCE
Refills: 0 | Status: DISCONTINUED | OUTPATIENT
Start: 2020-01-01 | End: 2020-01-01

## 2020-01-01 RX ORDER — ALPRAZOLAM 0.25 MG
1 TABLET ORAL
Qty: 0 | Refills: 0 | DISCHARGE

## 2020-01-01 RX ORDER — HEPARIN SODIUM 5000 [USP'U]/ML
900 INJECTION INTRAVENOUS; SUBCUTANEOUS
Qty: 25000 | Refills: 0 | Status: DISCONTINUED | OUTPATIENT
Start: 2020-01-01 | End: 2020-01-01

## 2020-01-01 RX ORDER — SODIUM CHLORIDE 9 MG/ML
250 INJECTION INTRAMUSCULAR; INTRAVENOUS; SUBCUTANEOUS ONCE
Refills: 0 | Status: DISCONTINUED | OUTPATIENT
Start: 2020-01-01 | End: 2020-01-01

## 2020-01-01 RX ORDER — HYDROMORPHONE HYDROCHLORIDE 2 MG/ML
0.2 INJECTION INTRAMUSCULAR; INTRAVENOUS; SUBCUTANEOUS EVERY 8 HOURS
Refills: 0 | Status: DISCONTINUED | OUTPATIENT
Start: 2020-01-01 | End: 2020-01-01

## 2020-01-01 RX ORDER — TAMSULOSIN HYDROCHLORIDE 0.4 MG/1
1 CAPSULE ORAL
Qty: 0 | Refills: 0 | DISCHARGE
Start: 2020-01-01

## 2020-01-01 RX ADMIN — SODIUM CHLORIDE 500 MILLILITER(S): 9 INJECTION INTRAMUSCULAR; INTRAVENOUS; SUBCUTANEOUS at 17:01

## 2020-01-01 RX ADMIN — PIPERACILLIN AND TAZOBACTAM 200 GRAM(S): 4; .5 INJECTION, POWDER, LYOPHILIZED, FOR SOLUTION INTRAVENOUS at 11:58

## 2020-01-01 RX ADMIN — TAMSULOSIN HYDROCHLORIDE 0.4 MILLIGRAM(S): 0.4 CAPSULE ORAL at 22:50

## 2020-01-01 RX ADMIN — Medication 1 DROP(S): at 05:13

## 2020-01-01 RX ADMIN — HEPARIN SODIUM 2500 UNIT(S): 5000 INJECTION INTRAVENOUS; SUBCUTANEOUS at 18:47

## 2020-01-01 RX ADMIN — CHLORHEXIDINE GLUCONATE 1 APPLICATION(S): 213 SOLUTION TOPICAL at 12:12

## 2020-01-01 RX ADMIN — Medication 650 MILLIGRAM(S): at 05:56

## 2020-01-01 RX ADMIN — Medication 650 MILLIGRAM(S): at 05:18

## 2020-01-01 RX ADMIN — Medication 0.5 MILLIGRAM(S): at 21:18

## 2020-01-01 RX ADMIN — HEPARIN SODIUM 1000 UNIT(S)/HR: 5000 INJECTION INTRAVENOUS; SUBCUTANEOUS at 14:48

## 2020-01-01 RX ADMIN — Medication 650 MILLIGRAM(S): at 12:52

## 2020-01-01 RX ADMIN — Medication 250 MILLIGRAM(S): at 12:18

## 2020-01-01 RX ADMIN — MORPHINE SULFATE 2 MILLIGRAM(S): 50 CAPSULE, EXTENDED RELEASE ORAL at 13:48

## 2020-01-01 RX ADMIN — Medication 500 MILLIGRAM(S): at 05:16

## 2020-01-01 RX ADMIN — HYDROMORPHONE HYDROCHLORIDE 0.2 MILLIGRAM(S): 2 INJECTION INTRAMUSCULAR; INTRAVENOUS; SUBCUTANEOUS at 11:03

## 2020-01-01 RX ADMIN — Medication 1 TABLET(S): at 12:17

## 2020-01-01 RX ADMIN — Medication 1 DROP(S): at 05:44

## 2020-01-01 RX ADMIN — TRAMADOL HYDROCHLORIDE 25 MILLIGRAM(S): 50 TABLET ORAL at 13:42

## 2020-01-01 RX ADMIN — Medication 650 MILLIGRAM(S): at 18:21

## 2020-01-01 RX ADMIN — LATANOPROST 1 DROP(S): 0.05 SOLUTION/ DROPS OPHTHALMIC; TOPICAL at 00:31

## 2020-01-01 RX ADMIN — AMLODIPINE BESYLATE 2.5 MILLIGRAM(S): 2.5 TABLET ORAL at 06:03

## 2020-01-01 RX ADMIN — SENNA PLUS 2 TABLET(S): 8.6 TABLET ORAL at 21:19

## 2020-01-01 RX ADMIN — Medication 2.5 MILLIGRAM(S): at 18:25

## 2020-01-01 RX ADMIN — Medication 1 DROP(S): at 10:37

## 2020-01-01 RX ADMIN — HEPARIN SODIUM 5000 UNIT(S): 5000 INJECTION INTRAVENOUS; SUBCUTANEOUS at 05:23

## 2020-01-01 RX ADMIN — CEFEPIME 100 MILLIGRAM(S): 1 INJECTION, POWDER, FOR SOLUTION INTRAMUSCULAR; INTRAVENOUS at 07:05

## 2020-01-01 RX ADMIN — HEPARIN SODIUM 800 UNIT(S)/HR: 5000 INJECTION INTRAVENOUS; SUBCUTANEOUS at 11:58

## 2020-01-01 RX ADMIN — HYDROMORPHONE HYDROCHLORIDE 0.2 MG/HR: 2 INJECTION INTRAMUSCULAR; INTRAVENOUS; SUBCUTANEOUS at 19:54

## 2020-01-01 RX ADMIN — Medication 1 TABLET(S): at 09:14

## 2020-01-01 RX ADMIN — CEFEPIME 100 MILLIGRAM(S): 1 INJECTION, POWDER, FOR SOLUTION INTRAMUSCULAR; INTRAVENOUS at 11:17

## 2020-01-01 RX ADMIN — INSULIN HUMAN 10 UNIT(S): 100 INJECTION, SOLUTION SUBCUTANEOUS at 13:43

## 2020-01-01 RX ADMIN — HYDROMORPHONE HYDROCHLORIDE 0.5 MILLIGRAM(S): 2 INJECTION INTRAMUSCULAR; INTRAVENOUS; SUBCUTANEOUS at 17:17

## 2020-01-01 RX ADMIN — FLUCONAZOLE 200 MILLIGRAM(S): 150 TABLET ORAL at 13:05

## 2020-01-01 RX ADMIN — Medication 500 MILLIGRAM(S): at 21:42

## 2020-01-01 RX ADMIN — TAMSULOSIN HYDROCHLORIDE 0.4 MILLIGRAM(S): 0.4 CAPSULE ORAL at 21:48

## 2020-01-01 RX ADMIN — Medication 1 DROP(S): at 18:41

## 2020-01-01 RX ADMIN — ENOXAPARIN SODIUM 70 MILLIGRAM(S): 100 INJECTION SUBCUTANEOUS at 12:03

## 2020-01-01 RX ADMIN — HYDROMORPHONE HYDROCHLORIDE 0.5 MILLIGRAM(S): 2 INJECTION INTRAMUSCULAR; INTRAVENOUS; SUBCUTANEOUS at 17:08

## 2020-01-01 RX ADMIN — Medication 1 DROP(S): at 05:38

## 2020-01-01 RX ADMIN — MORPHINE SULFATE 2 MILLIGRAM(S): 50 CAPSULE, EXTENDED RELEASE ORAL at 00:44

## 2020-01-01 RX ADMIN — PIPERACILLIN AND TAZOBACTAM 3.38 GRAM(S): 4; .5 INJECTION, POWDER, LYOPHILIZED, FOR SOLUTION INTRAVENOUS at 12:30

## 2020-01-01 RX ADMIN — Medication 1 TABLET(S): at 12:06

## 2020-01-01 RX ADMIN — HEPARIN SODIUM 1100 UNIT(S)/HR: 5000 INJECTION INTRAVENOUS; SUBCUTANEOUS at 10:48

## 2020-01-01 RX ADMIN — MIRTAZAPINE 15 MILLIGRAM(S): 45 TABLET, ORALLY DISINTEGRATING ORAL at 20:57

## 2020-01-01 RX ADMIN — Medication 1 TABLET(S): at 15:13

## 2020-01-01 RX ADMIN — Medication 101.6 MILLIGRAM(S): at 15:35

## 2020-01-01 RX ADMIN — Medication 100 MILLIGRAM(S): at 11:46

## 2020-01-01 RX ADMIN — CHLORHEXIDINE GLUCONATE 1 APPLICATION(S): 213 SOLUTION TOPICAL at 05:06

## 2020-01-01 RX ADMIN — HYDROMORPHONE HYDROCHLORIDE 0.5 MILLIGRAM(S): 2 INJECTION INTRAMUSCULAR; INTRAVENOUS; SUBCUTANEOUS at 11:19

## 2020-01-01 RX ADMIN — SODIUM ZIRCONIUM CYCLOSILICATE 10 GRAM(S): 10 POWDER, FOR SUSPENSION ORAL at 05:46

## 2020-01-01 RX ADMIN — Medication 650 MILLIGRAM(S): at 00:32

## 2020-01-01 RX ADMIN — Medication 1 TABLET(S): at 11:47

## 2020-01-01 RX ADMIN — ATORVASTATIN CALCIUM 80 MILLIGRAM(S): 80 TABLET, FILM COATED ORAL at 21:47

## 2020-01-01 RX ADMIN — MIRTAZAPINE 15 MILLIGRAM(S): 45 TABLET, ORALLY DISINTEGRATING ORAL at 22:28

## 2020-01-01 RX ADMIN — MORPHINE SULFATE 2 MILLIGRAM(S): 50 CAPSULE, EXTENDED RELEASE ORAL at 14:13

## 2020-01-01 RX ADMIN — SODIUM CHLORIDE 75 MILLILITER(S): 9 INJECTION INTRAMUSCULAR; INTRAVENOUS; SUBCUTANEOUS at 09:38

## 2020-01-01 RX ADMIN — Medication 1 DROP(S): at 18:14

## 2020-01-01 RX ADMIN — Medication 1 TABLET(S): at 13:48

## 2020-01-01 RX ADMIN — Medication 1 TABLET(S): at 17:15

## 2020-01-01 RX ADMIN — HEPARIN SODIUM 1100 UNIT(S)/HR: 5000 INJECTION INTRAVENOUS; SUBCUTANEOUS at 10:39

## 2020-01-01 RX ADMIN — Medication 500 MILLIGRAM(S): at 12:17

## 2020-01-01 RX ADMIN — HYDROMORPHONE HYDROCHLORIDE 0.5 MILLIGRAM(S): 2 INJECTION INTRAMUSCULAR; INTRAVENOUS; SUBCUTANEOUS at 05:31

## 2020-01-01 RX ADMIN — SODIUM CHLORIDE 75 MILLILITER(S): 9 INJECTION INTRAMUSCULAR; INTRAVENOUS; SUBCUTANEOUS at 20:59

## 2020-01-01 RX ADMIN — CHLORHEXIDINE GLUCONATE 1 APPLICATION(S): 213 SOLUTION TOPICAL at 08:00

## 2020-01-01 RX ADMIN — HYDROMORPHONE HYDROCHLORIDE 0.2 MILLIGRAM(S): 2 INJECTION INTRAMUSCULAR; INTRAVENOUS; SUBCUTANEOUS at 19:34

## 2020-01-01 RX ADMIN — Medication 100 MILLIGRAM(S): at 14:11

## 2020-01-01 RX ADMIN — TAMSULOSIN HYDROCHLORIDE 0.4 MILLIGRAM(S): 0.4 CAPSULE ORAL at 21:05

## 2020-01-01 RX ADMIN — CEFEPIME 100 MILLIGRAM(S): 1 INJECTION, POWDER, FOR SOLUTION INTRAMUSCULAR; INTRAVENOUS at 12:12

## 2020-01-01 RX ADMIN — HEPARIN SODIUM 2500 UNIT(S): 5000 INJECTION INTRAVENOUS; SUBCUTANEOUS at 08:05

## 2020-01-01 RX ADMIN — Medication 1 DROP(S): at 06:00

## 2020-01-01 RX ADMIN — Medication 0.5 MILLIGRAM(S): at 14:43

## 2020-01-01 RX ADMIN — HEPARIN SODIUM 5000 UNIT(S): 5000 INJECTION INTRAVENOUS; SUBCUTANEOUS at 22:04

## 2020-01-01 RX ADMIN — MORPHINE SULFATE 2 MILLIGRAM(S): 50 CAPSULE, EXTENDED RELEASE ORAL at 01:03

## 2020-01-01 RX ADMIN — HEPARIN SODIUM 5000 UNIT(S): 5000 INJECTION INTRAVENOUS; SUBCUTANEOUS at 17:26

## 2020-01-01 RX ADMIN — AMLODIPINE BESYLATE 2.5 MILLIGRAM(S): 2.5 TABLET ORAL at 05:08

## 2020-01-01 RX ADMIN — HEPARIN SODIUM 5000 UNIT(S): 5000 INJECTION INTRAVENOUS; SUBCUTANEOUS at 11:00

## 2020-01-01 RX ADMIN — HYDROMORPHONE HYDROCHLORIDE 0.2 MILLIGRAM(S): 2 INJECTION INTRAMUSCULAR; INTRAVENOUS; SUBCUTANEOUS at 10:48

## 2020-01-01 RX ADMIN — TAMSULOSIN HYDROCHLORIDE 0.4 MILLIGRAM(S): 0.4 CAPSULE ORAL at 21:42

## 2020-01-01 RX ADMIN — ATORVASTATIN CALCIUM 80 MILLIGRAM(S): 80 TABLET, FILM COATED ORAL at 21:58

## 2020-01-01 RX ADMIN — FLUCONAZOLE 200 MILLIGRAM(S): 150 TABLET ORAL at 12:52

## 2020-01-01 RX ADMIN — HYDROMORPHONE HYDROCHLORIDE 0.2 MILLIGRAM(S): 2 INJECTION INTRAMUSCULAR; INTRAVENOUS; SUBCUTANEOUS at 13:12

## 2020-01-01 RX ADMIN — Medication 500 MILLIGRAM(S): at 05:27

## 2020-01-01 RX ADMIN — Medication 650 MILLIGRAM(S): at 12:29

## 2020-01-01 RX ADMIN — HEPARIN SODIUM 5000 UNIT(S): 5000 INJECTION INTRAVENOUS; SUBCUTANEOUS at 13:43

## 2020-01-01 RX ADMIN — Medication 500 MILLIGRAM(S): at 13:18

## 2020-01-01 RX ADMIN — TAMSULOSIN HYDROCHLORIDE 0.4 MILLIGRAM(S): 0.4 CAPSULE ORAL at 21:55

## 2020-01-01 RX ADMIN — Medication 50 MILLIGRAM(S): at 12:49

## 2020-01-01 RX ADMIN — MIRTAZAPINE 15 MILLIGRAM(S): 45 TABLET, ORALLY DISINTEGRATING ORAL at 21:55

## 2020-01-01 RX ADMIN — ATORVASTATIN CALCIUM 80 MILLIGRAM(S): 80 TABLET, FILM COATED ORAL at 21:33

## 2020-01-01 RX ADMIN — TAMSULOSIN HYDROCHLORIDE 0.4 MILLIGRAM(S): 0.4 CAPSULE ORAL at 21:18

## 2020-01-01 RX ADMIN — CHLORHEXIDINE GLUCONATE 1 APPLICATION(S): 213 SOLUTION TOPICAL at 06:26

## 2020-01-01 RX ADMIN — HYDROMORPHONE HYDROCHLORIDE 0.2 MILLIGRAM(S): 2 INJECTION INTRAMUSCULAR; INTRAVENOUS; SUBCUTANEOUS at 12:14

## 2020-01-01 RX ADMIN — POLYETHYLENE GLYCOL 3350 17 GRAM(S): 17 POWDER, FOR SOLUTION ORAL at 15:13

## 2020-01-01 RX ADMIN — TAMSULOSIN HYDROCHLORIDE 0.4 MILLIGRAM(S): 0.4 CAPSULE ORAL at 21:56

## 2020-01-01 RX ADMIN — HEPARIN SODIUM 5000 UNIT(S): 5000 INJECTION INTRAVENOUS; SUBCUTANEOUS at 13:06

## 2020-01-01 RX ADMIN — Medication 1 TABLET(S): at 12:15

## 2020-01-01 RX ADMIN — MIRTAZAPINE 15 MILLIGRAM(S): 45 TABLET, ORALLY DISINTEGRATING ORAL at 21:42

## 2020-01-01 RX ADMIN — TRAMADOL HYDROCHLORIDE 25 MILLIGRAM(S): 50 TABLET ORAL at 19:44

## 2020-01-01 RX ADMIN — MORPHINE SULFATE 2 MILLIGRAM(S): 50 CAPSULE, EXTENDED RELEASE ORAL at 12:50

## 2020-01-01 RX ADMIN — Medication 1 DROP(S): at 06:02

## 2020-01-01 RX ADMIN — Medication 1 TABLET(S): at 17:27

## 2020-01-01 RX ADMIN — Medication 500000 UNIT(S): at 12:43

## 2020-01-01 RX ADMIN — MIRTAZAPINE 15 MILLIGRAM(S): 45 TABLET, ORALLY DISINTEGRATING ORAL at 22:49

## 2020-01-01 RX ADMIN — SENNA PLUS 2 TABLET(S): 8.6 TABLET ORAL at 21:54

## 2020-01-01 RX ADMIN — SODIUM ZIRCONIUM CYCLOSILICATE 10 GRAM(S): 10 POWDER, FOR SUSPENSION ORAL at 13:38

## 2020-01-01 RX ADMIN — Medication 1 TABLET(S): at 12:34

## 2020-01-01 RX ADMIN — CEFEPIME 100 MILLIGRAM(S): 1 INJECTION, POWDER, FOR SOLUTION INTRAMUSCULAR; INTRAVENOUS at 11:28

## 2020-01-01 RX ADMIN — SODIUM CHLORIDE 500 MILLILITER(S): 9 INJECTION INTRAMUSCULAR; INTRAVENOUS; SUBCUTANEOUS at 12:28

## 2020-01-01 RX ADMIN — HYDROMORPHONE HYDROCHLORIDE 0.2 MG/HR: 2 INJECTION INTRAMUSCULAR; INTRAVENOUS; SUBCUTANEOUS at 21:41

## 2020-01-01 RX ADMIN — Medication 1 DROP(S): at 05:45

## 2020-01-01 RX ADMIN — Medication 100 MILLIGRAM(S): at 12:44

## 2020-01-01 RX ADMIN — Medication 1 DROP(S): at 05:30

## 2020-01-01 RX ADMIN — POLYETHYLENE GLYCOL 3350 17 GRAM(S): 17 POWDER, FOR SOLUTION ORAL at 11:31

## 2020-01-01 RX ADMIN — MORPHINE SULFATE 2 MILLIGRAM(S): 50 CAPSULE, EXTENDED RELEASE ORAL at 13:21

## 2020-01-01 RX ADMIN — HYDROMORPHONE HYDROCHLORIDE 0.2 MILLIGRAM(S): 2 INJECTION INTRAMUSCULAR; INTRAVENOUS; SUBCUTANEOUS at 20:42

## 2020-01-01 RX ADMIN — ATORVASTATIN CALCIUM 80 MILLIGRAM(S): 80 TABLET, FILM COATED ORAL at 21:07

## 2020-01-01 RX ADMIN — POLYETHYLENE GLYCOL 3350 17 GRAM(S): 17 POWDER, FOR SOLUTION ORAL at 13:06

## 2020-01-01 RX ADMIN — LATANOPROST 1 DROP(S): 0.05 SOLUTION/ DROPS OPHTHALMIC; TOPICAL at 21:18

## 2020-01-01 RX ADMIN — MORPHINE SULFATE 2 MILLIGRAM(S): 50 CAPSULE, EXTENDED RELEASE ORAL at 05:50

## 2020-01-01 RX ADMIN — Medication 1 TABLET(S): at 12:09

## 2020-01-01 RX ADMIN — HYDROMORPHONE HYDROCHLORIDE 0.5 MILLIGRAM(S): 2 INJECTION INTRAMUSCULAR; INTRAVENOUS; SUBCUTANEOUS at 17:02

## 2020-01-01 RX ADMIN — Medication 650 MILLIGRAM(S): at 23:26

## 2020-01-01 RX ADMIN — Medication 1 DROP(S): at 17:32

## 2020-01-01 RX ADMIN — Medication 2.5 MILLIGRAM(S): at 07:14

## 2020-01-01 RX ADMIN — HYDROMORPHONE HYDROCHLORIDE 0.5 MILLIGRAM(S): 2 INJECTION INTRAMUSCULAR; INTRAVENOUS; SUBCUTANEOUS at 23:40

## 2020-01-01 RX ADMIN — MIRTAZAPINE 15 MILLIGRAM(S): 45 TABLET, ORALLY DISINTEGRATING ORAL at 20:58

## 2020-01-01 RX ADMIN — Medication 1 DROP(S): at 06:04

## 2020-01-01 RX ADMIN — TRAMADOL HYDROCHLORIDE 25 MILLIGRAM(S): 50 TABLET ORAL at 19:32

## 2020-01-01 RX ADMIN — Medication 100 MILLIGRAM(S): at 12:06

## 2020-01-01 RX ADMIN — CHLORHEXIDINE GLUCONATE 1 APPLICATION(S): 213 SOLUTION TOPICAL at 12:25

## 2020-01-01 RX ADMIN — Medication 500 MILLIGRAM(S): at 22:20

## 2020-01-01 RX ADMIN — Medication 500 MILLIGRAM(S): at 13:43

## 2020-01-01 RX ADMIN — LATANOPROST 1 DROP(S): 0.05 SOLUTION/ DROPS OPHTHALMIC; TOPICAL at 22:49

## 2020-01-01 RX ADMIN — CEFEPIME 100 MILLIGRAM(S): 1 INJECTION, POWDER, FOR SOLUTION INTRAMUSCULAR; INTRAVENOUS at 12:26

## 2020-01-01 RX ADMIN — TAMSULOSIN HYDROCHLORIDE 0.4 MILLIGRAM(S): 0.4 CAPSULE ORAL at 22:29

## 2020-01-01 RX ADMIN — Medication 650 MILLIGRAM(S): at 05:17

## 2020-01-01 RX ADMIN — Medication 650 MILLIGRAM(S): at 05:03

## 2020-01-01 RX ADMIN — Medication 650 MILLIGRAM(S): at 18:28

## 2020-01-01 RX ADMIN — MORPHINE SULFATE 2 MILLIGRAM(S): 50 CAPSULE, EXTENDED RELEASE ORAL at 07:05

## 2020-01-01 RX ADMIN — ENOXAPARIN SODIUM 70 MILLIGRAM(S): 100 INJECTION SUBCUTANEOUS at 15:44

## 2020-01-01 RX ADMIN — Medication 1 TABLET(S): at 12:52

## 2020-01-01 RX ADMIN — MORPHINE SULFATE 4 MILLIGRAM(S): 50 CAPSULE, EXTENDED RELEASE ORAL at 12:31

## 2020-01-01 RX ADMIN — Medication 100 MILLIGRAM(S): at 12:24

## 2020-01-01 RX ADMIN — HEPARIN SODIUM 5000 UNIT(S): 5000 INJECTION INTRAVENOUS; SUBCUTANEOUS at 05:06

## 2020-01-01 RX ADMIN — HYDROMORPHONE HYDROCHLORIDE 0.2 MILLIGRAM(S): 2 INJECTION INTRAMUSCULAR; INTRAVENOUS; SUBCUTANEOUS at 16:32

## 2020-01-01 RX ADMIN — AMLODIPINE BESYLATE 2.5 MILLIGRAM(S): 2.5 TABLET ORAL at 05:56

## 2020-01-01 RX ADMIN — Medication 1 TABLET(S): at 11:29

## 2020-01-01 RX ADMIN — Medication 1 DROP(S): at 18:11

## 2020-01-01 RX ADMIN — TAMSULOSIN HYDROCHLORIDE 0.4 MILLIGRAM(S): 0.4 CAPSULE ORAL at 20:58

## 2020-01-01 RX ADMIN — SODIUM CHLORIDE 1000 MILLILITER(S): 9 INJECTION INTRAMUSCULAR; INTRAVENOUS; SUBCUTANEOUS at 12:00

## 2020-01-01 RX ADMIN — HEPARIN SODIUM 1100 UNIT(S)/HR: 5000 INJECTION INTRAVENOUS; SUBCUTANEOUS at 21:04

## 2020-01-01 RX ADMIN — TAMSULOSIN HYDROCHLORIDE 0.4 MILLIGRAM(S): 0.4 CAPSULE ORAL at 21:06

## 2020-01-01 RX ADMIN — TRAMADOL HYDROCHLORIDE 25 MILLIGRAM(S): 50 TABLET ORAL at 10:40

## 2020-01-01 RX ADMIN — Medication 500000 UNIT(S): at 12:23

## 2020-01-01 RX ADMIN — ONDANSETRON 116 MILLIGRAM(S): 8 TABLET, FILM COATED ORAL at 19:43

## 2020-01-01 RX ADMIN — MIRTAZAPINE 15 MILLIGRAM(S): 45 TABLET, ORALLY DISINTEGRATING ORAL at 21:18

## 2020-01-01 RX ADMIN — Medication 100 MILLIGRAM(S): at 13:06

## 2020-01-01 RX ADMIN — Medication 500 MILLIGRAM(S): at 21:41

## 2020-01-01 RX ADMIN — POLYETHYLENE GLYCOL 3350 17 GRAM(S): 17 POWDER, FOR SOLUTION ORAL at 18:42

## 2020-01-01 RX ADMIN — CEFEPIME 100 MILLIGRAM(S): 1 INJECTION, POWDER, FOR SOLUTION INTRAMUSCULAR; INTRAVENOUS at 11:45

## 2020-01-01 RX ADMIN — SODIUM CHLORIDE 500 MILLILITER(S): 9 INJECTION INTRAMUSCULAR; INTRAVENOUS; SUBCUTANEOUS at 11:50

## 2020-01-01 RX ADMIN — POLYETHYLENE GLYCOL 3350 17 GRAM(S): 17 POWDER, FOR SOLUTION ORAL at 14:11

## 2020-01-01 RX ADMIN — MIRTAZAPINE 15 MILLIGRAM(S): 45 TABLET, ORALLY DISINTEGRATING ORAL at 00:28

## 2020-01-01 RX ADMIN — Medication 1 TABLET(S): at 21:36

## 2020-01-01 RX ADMIN — Medication 1 DROP(S): at 17:47

## 2020-01-01 RX ADMIN — Medication 650 MILLIGRAM(S): at 16:15

## 2020-01-01 RX ADMIN — LATANOPROST 1 DROP(S): 0.05 SOLUTION/ DROPS OPHTHALMIC; TOPICAL at 22:05

## 2020-01-01 RX ADMIN — HYDROMORPHONE HYDROCHLORIDE 0.2 MILLIGRAM(S): 2 INJECTION INTRAMUSCULAR; INTRAVENOUS; SUBCUTANEOUS at 11:31

## 2020-01-01 RX ADMIN — HEPARIN SODIUM 0 UNIT(S)/HR: 5000 INJECTION INTRAVENOUS; SUBCUTANEOUS at 20:51

## 2020-01-01 RX ADMIN — Medication 650 MILLIGRAM(S): at 21:21

## 2020-01-01 RX ADMIN — HYDROMORPHONE HYDROCHLORIDE 0.5 MILLIGRAM(S): 2 INJECTION INTRAMUSCULAR; INTRAVENOUS; SUBCUTANEOUS at 13:04

## 2020-01-01 RX ADMIN — ATORVASTATIN CALCIUM 80 MILLIGRAM(S): 80 TABLET, FILM COATED ORAL at 22:04

## 2020-01-01 RX ADMIN — LATANOPROST 1 DROP(S): 0.05 SOLUTION/ DROPS OPHTHALMIC; TOPICAL at 21:33

## 2020-01-01 RX ADMIN — SENNA PLUS 2 TABLET(S): 8.6 TABLET ORAL at 22:21

## 2020-01-01 RX ADMIN — SENNA PLUS 2 TABLET(S): 8.6 TABLET ORAL at 22:20

## 2020-01-01 RX ADMIN — Medication 650 MILLIGRAM(S): at 17:27

## 2020-01-01 RX ADMIN — CEFEPIME 100 MILLIGRAM(S): 1 INJECTION, POWDER, FOR SOLUTION INTRAMUSCULAR; INTRAVENOUS at 14:12

## 2020-01-01 RX ADMIN — Medication 650 MILLIGRAM(S): at 18:41

## 2020-01-01 RX ADMIN — Medication 1 DROP(S): at 22:05

## 2020-01-01 RX ADMIN — SODIUM CHLORIDE 10 MILLILITER(S): 9 INJECTION INTRAMUSCULAR; INTRAVENOUS; SUBCUTANEOUS at 19:55

## 2020-01-01 RX ADMIN — Medication 500 MILLIGRAM(S): at 17:26

## 2020-01-01 RX ADMIN — HYDROMORPHONE HYDROCHLORIDE 0.2 MILLIGRAM(S): 2 INJECTION INTRAMUSCULAR; INTRAVENOUS; SUBCUTANEOUS at 13:56

## 2020-01-01 RX ADMIN — HEPARIN SODIUM 5000 UNIT(S): 5000 INJECTION INTRAVENOUS; SUBCUTANEOUS at 15:10

## 2020-01-01 RX ADMIN — Medication 250 MILLIGRAM(S): at 20:06

## 2020-01-01 RX ADMIN — Medication 1 DROP(S): at 18:01

## 2020-01-01 RX ADMIN — SODIUM CHLORIDE 40 MILLILITER(S): 9 INJECTION INTRAMUSCULAR; INTRAVENOUS; SUBCUTANEOUS at 12:24

## 2020-01-01 RX ADMIN — MORPHINE SULFATE 2 MILLIGRAM(S): 50 CAPSULE, EXTENDED RELEASE ORAL at 13:06

## 2020-01-01 RX ADMIN — LATANOPROST 1 DROP(S): 0.05 SOLUTION/ DROPS OPHTHALMIC; TOPICAL at 20:56

## 2020-01-01 RX ADMIN — SENNA PLUS 2 TABLET(S): 8.6 TABLET ORAL at 21:36

## 2020-01-01 RX ADMIN — Medication 100 MILLIGRAM(S): at 12:51

## 2020-01-01 RX ADMIN — Medication 1 DROP(S): at 06:22

## 2020-01-01 RX ADMIN — Medication 500000 UNIT(S): at 20:57

## 2020-01-01 RX ADMIN — Medication 1 DROP(S): at 22:30

## 2020-01-01 RX ADMIN — HEPARIN SODIUM 5000 UNIT(S): 5000 INJECTION INTRAVENOUS; SUBCUTANEOUS at 21:34

## 2020-01-01 RX ADMIN — ATORVASTATIN CALCIUM 80 MILLIGRAM(S): 80 TABLET, FILM COATED ORAL at 00:31

## 2020-01-01 RX ADMIN — Medication 1 TABLET(S): at 13:05

## 2020-01-01 RX ADMIN — Medication 101.6 MILLIGRAM(S): at 17:35

## 2020-01-01 RX ADMIN — MIRTAZAPINE 7.5 MILLIGRAM(S): 45 TABLET, ORALLY DISINTEGRATING ORAL at 21:40

## 2020-01-01 RX ADMIN — CEFEPIME 100 MILLIGRAM(S): 1 INJECTION, POWDER, FOR SOLUTION INTRAMUSCULAR; INTRAVENOUS at 07:31

## 2020-01-01 RX ADMIN — Medication 1 DROP(S): at 05:56

## 2020-01-01 RX ADMIN — ATORVASTATIN CALCIUM 80 MILLIGRAM(S): 80 TABLET, FILM COATED ORAL at 21:18

## 2020-01-01 RX ADMIN — LATANOPROST 1 DROP(S): 0.05 SOLUTION/ DROPS OPHTHALMIC; TOPICAL at 21:13

## 2020-01-01 RX ADMIN — POLYETHYLENE GLYCOL 3350 17 GRAM(S): 17 POWDER, FOR SOLUTION ORAL at 12:17

## 2020-01-01 RX ADMIN — CEFEPIME 100 MILLIGRAM(S): 1 INJECTION, POWDER, FOR SOLUTION INTRAMUSCULAR; INTRAVENOUS at 19:34

## 2020-01-01 RX ADMIN — Medication 1 TABLET(S): at 13:43

## 2020-01-01 RX ADMIN — Medication 1 DROP(S): at 17:16

## 2020-01-01 RX ADMIN — HEPARIN SODIUM 1100 UNIT(S)/HR: 5000 INJECTION INTRAVENOUS; SUBCUTANEOUS at 12:11

## 2020-01-01 RX ADMIN — HEPARIN SODIUM 5000 UNIT(S): 5000 INJECTION INTRAVENOUS; SUBCUTANEOUS at 21:41

## 2020-01-01 RX ADMIN — Medication 1 DROP(S): at 05:19

## 2020-01-01 RX ADMIN — AMLODIPINE BESYLATE 2.5 MILLIGRAM(S): 2.5 TABLET ORAL at 05:15

## 2020-01-01 RX ADMIN — HYDROMORPHONE HYDROCHLORIDE 0.2 MG/HR: 2 INJECTION INTRAMUSCULAR; INTRAVENOUS; SUBCUTANEOUS at 11:16

## 2020-01-01 RX ADMIN — CHLORHEXIDINE GLUCONATE 1 APPLICATION(S): 213 SOLUTION TOPICAL at 05:30

## 2020-01-01 RX ADMIN — CEFEPIME 100 MILLIGRAM(S): 1 INJECTION, POWDER, FOR SOLUTION INTRAMUSCULAR; INTRAVENOUS at 07:32

## 2020-01-01 RX ADMIN — POLYETHYLENE GLYCOL 3350 17 GRAM(S): 17 POWDER, FOR SOLUTION ORAL at 14:17

## 2020-01-01 RX ADMIN — Medication 1 DROP(S): at 17:07

## 2020-01-01 RX ADMIN — Medication 500 MILLIGRAM(S): at 13:05

## 2020-01-01 RX ADMIN — Medication 250 MILLIGRAM(S): at 20:51

## 2020-01-01 RX ADMIN — ATORVASTATIN CALCIUM 80 MILLIGRAM(S): 80 TABLET, FILM COATED ORAL at 21:05

## 2020-01-01 RX ADMIN — SENNA PLUS 2 TABLET(S): 8.6 TABLET ORAL at 21:41

## 2020-01-01 RX ADMIN — TAMSULOSIN HYDROCHLORIDE 0.4 MILLIGRAM(S): 0.4 CAPSULE ORAL at 20:57

## 2020-01-01 RX ADMIN — MIRTAZAPINE 15 MILLIGRAM(S): 45 TABLET, ORALLY DISINTEGRATING ORAL at 21:58

## 2020-01-01 RX ADMIN — HYDROMORPHONE HYDROCHLORIDE 0.2 MILLIGRAM(S): 2 INJECTION INTRAMUSCULAR; INTRAVENOUS; SUBCUTANEOUS at 20:24

## 2020-01-01 RX ADMIN — AMLODIPINE BESYLATE 2.5 MILLIGRAM(S): 2.5 TABLET ORAL at 06:00

## 2020-01-01 RX ADMIN — HYDROMORPHONE HYDROCHLORIDE 0.5 MILLIGRAM(S): 2 INJECTION INTRAMUSCULAR; INTRAVENOUS; SUBCUTANEOUS at 06:18

## 2020-01-01 RX ADMIN — SENNA PLUS 2 TABLET(S): 8.6 TABLET ORAL at 21:55

## 2020-01-01 RX ADMIN — POLYETHYLENE GLYCOL 3350 17 GRAM(S): 17 POWDER, FOR SOLUTION ORAL at 12:10

## 2020-01-01 RX ADMIN — LATANOPROST 1 DROP(S): 0.05 SOLUTION/ DROPS OPHTHALMIC; TOPICAL at 23:38

## 2020-01-01 RX ADMIN — Medication 1 DROP(S): at 17:14

## 2020-01-01 RX ADMIN — Medication 250 MILLIGRAM(S): at 15:13

## 2020-01-01 RX ADMIN — Medication 650 MILLIGRAM(S): at 06:04

## 2020-01-01 RX ADMIN — TAMSULOSIN HYDROCHLORIDE 0.4 MILLIGRAM(S): 0.4 CAPSULE ORAL at 21:54

## 2020-01-01 RX ADMIN — Medication 500 MILLIGRAM(S): at 21:13

## 2020-01-01 RX ADMIN — Medication 1 DROP(S): at 05:05

## 2020-01-01 RX ADMIN — LATANOPROST 1 DROP(S): 0.05 SOLUTION/ DROPS OPHTHALMIC; TOPICAL at 21:56

## 2020-01-01 RX ADMIN — HYDROMORPHONE HYDROCHLORIDE 0.2 MILLIGRAM(S): 2 INJECTION INTRAMUSCULAR; INTRAVENOUS; SUBCUTANEOUS at 05:26

## 2020-01-01 RX ADMIN — OXYCODONE HYDROCHLORIDE 2.5 MILLIGRAM(S): 5 TABLET ORAL at 12:52

## 2020-01-01 RX ADMIN — CEFEPIME 100 MILLIGRAM(S): 1 INJECTION, POWDER, FOR SOLUTION INTRAMUSCULAR; INTRAVENOUS at 20:16

## 2020-01-01 RX ADMIN — Medication 100 MILLIGRAM(S): at 13:48

## 2020-01-01 RX ADMIN — Medication 100 MILLIGRAM(S): at 15:12

## 2020-01-01 RX ADMIN — MORPHINE SULFATE 2 MILLIGRAM(S): 50 CAPSULE, EXTENDED RELEASE ORAL at 05:10

## 2020-01-01 RX ADMIN — Medication 100 MILLIGRAM(S): at 12:10

## 2020-01-01 RX ADMIN — SENNA PLUS 2 TABLET(S): 8.6 TABLET ORAL at 21:06

## 2020-01-01 RX ADMIN — TRAMADOL HYDROCHLORIDE 25 MILLIGRAM(S): 50 TABLET ORAL at 14:46

## 2020-01-01 RX ADMIN — Medication 500000 UNIT(S): at 17:52

## 2020-01-01 RX ADMIN — Medication 2.5 MILLIGRAM(S): at 15:44

## 2020-01-01 RX ADMIN — Medication 500 MILLIGRAM(S): at 14:18

## 2020-01-01 RX ADMIN — SODIUM CHLORIDE 75 MILLILITER(S): 9 INJECTION INTRAMUSCULAR; INTRAVENOUS; SUBCUTANEOUS at 23:09

## 2020-01-01 RX ADMIN — Medication 100 MILLIGRAM(S): at 12:15

## 2020-01-01 RX ADMIN — SENNA PLUS 2 TABLET(S): 8.6 TABLET ORAL at 22:49

## 2020-01-01 RX ADMIN — CEFEPIME 100 MILLIGRAM(S): 1 INJECTION, POWDER, FOR SOLUTION INTRAMUSCULAR; INTRAVENOUS at 08:08

## 2020-01-01 RX ADMIN — AMLODIPINE BESYLATE 2.5 MILLIGRAM(S): 2.5 TABLET ORAL at 05:03

## 2020-01-01 RX ADMIN — MIRTAZAPINE 7.5 MILLIGRAM(S): 45 TABLET, ORALLY DISINTEGRATING ORAL at 21:54

## 2020-01-01 RX ADMIN — HYDROMORPHONE HYDROCHLORIDE 0.2 MILLIGRAM(S): 2 INJECTION INTRAMUSCULAR; INTRAVENOUS; SUBCUTANEOUS at 19:52

## 2020-01-01 RX ADMIN — POLYETHYLENE GLYCOL 3350 17 GRAM(S): 17 POWDER, FOR SOLUTION ORAL at 12:04

## 2020-01-01 RX ADMIN — NYSTATIN CREAM 1 APPLICATION(S): 100000 CREAM TOPICAL at 21:05

## 2020-01-01 RX ADMIN — AMLODIPINE BESYLATE 2.5 MILLIGRAM(S): 2.5 TABLET ORAL at 05:23

## 2020-01-01 RX ADMIN — Medication 100 MILLIGRAM(S): at 11:17

## 2020-01-01 RX ADMIN — HEPARIN SODIUM 5000 UNIT(S): 5000 INJECTION INTRAVENOUS; SUBCUTANEOUS at 05:16

## 2020-01-01 RX ADMIN — AMLODIPINE BESYLATE 2.5 MILLIGRAM(S): 2.5 TABLET ORAL at 06:20

## 2020-01-01 RX ADMIN — Medication 1 TABLET(S): at 12:26

## 2020-01-01 RX ADMIN — Medication 1 DROP(S): at 00:00

## 2020-01-01 RX ADMIN — Medication 1 DROP(S): at 21:17

## 2020-01-01 RX ADMIN — LATANOPROST 1 DROP(S): 0.05 SOLUTION/ DROPS OPHTHALMIC; TOPICAL at 21:59

## 2020-01-01 RX ADMIN — Medication 1 DROP(S): at 17:08

## 2020-01-01 RX ADMIN — Medication 500000 UNIT(S): at 23:23

## 2020-01-01 RX ADMIN — Medication 650 MILLIGRAM(S): at 05:58

## 2020-01-01 RX ADMIN — ATORVASTATIN CALCIUM 80 MILLIGRAM(S): 80 TABLET, FILM COATED ORAL at 21:54

## 2020-01-01 RX ADMIN — HEPARIN SODIUM 1100 UNIT(S)/HR: 5000 INJECTION INTRAVENOUS; SUBCUTANEOUS at 04:03

## 2020-01-01 RX ADMIN — Medication 650 MILLIGRAM(S): at 11:59

## 2020-01-01 RX ADMIN — FLUCONAZOLE 200 MILLIGRAM(S): 150 TABLET ORAL at 12:24

## 2020-01-01 RX ADMIN — SENNA PLUS 2 TABLET(S): 8.6 TABLET ORAL at 22:29

## 2020-01-01 RX ADMIN — SODIUM CHLORIDE 75 MILLILITER(S): 9 INJECTION INTRAMUSCULAR; INTRAVENOUS; SUBCUTANEOUS at 05:46

## 2020-01-01 RX ADMIN — Medication 1 DROP(S): at 05:58

## 2020-01-01 RX ADMIN — Medication 1 DROP(S): at 17:27

## 2020-01-01 RX ADMIN — Medication 1 TABLET(S): at 13:06

## 2020-01-01 RX ADMIN — HYDROMORPHONE HYDROCHLORIDE 0.2 MILLIGRAM(S): 2 INJECTION INTRAMUSCULAR; INTRAVENOUS; SUBCUTANEOUS at 03:00

## 2020-01-01 RX ADMIN — SODIUM CHLORIDE 75 MILLILITER(S): 9 INJECTION INTRAMUSCULAR; INTRAVENOUS; SUBCUTANEOUS at 05:30

## 2020-01-01 RX ADMIN — FLUCONAZOLE 200 MILLIGRAM(S): 150 TABLET ORAL at 17:14

## 2020-01-01 RX ADMIN — HYDROMORPHONE HYDROCHLORIDE 0.5 MILLIGRAM(S): 2 INJECTION INTRAMUSCULAR; INTRAVENOUS; SUBCUTANEOUS at 10:13

## 2020-01-01 RX ADMIN — Medication 100 MILLIGRAM(S): at 14:06

## 2020-01-01 RX ADMIN — CHLORHEXIDINE GLUCONATE 1 APPLICATION(S): 213 SOLUTION TOPICAL at 13:07

## 2020-01-01 RX ADMIN — HEPARIN SODIUM 5000 UNIT(S): 5000 INJECTION INTRAVENOUS; SUBCUTANEOUS at 21:54

## 2020-01-01 RX ADMIN — LATANOPROST 1 DROP(S): 0.05 SOLUTION/ DROPS OPHTHALMIC; TOPICAL at 21:55

## 2020-01-01 RX ADMIN — Medication 1 DROP(S): at 05:27

## 2020-01-01 RX ADMIN — HEPARIN SODIUM 1100 UNIT(S)/HR: 5000 INJECTION INTRAVENOUS; SUBCUTANEOUS at 17:32

## 2020-01-01 RX ADMIN — TRAMADOL HYDROCHLORIDE 25 MILLIGRAM(S): 50 TABLET ORAL at 09:04

## 2020-01-01 RX ADMIN — ATORVASTATIN CALCIUM 80 MILLIGRAM(S): 80 TABLET, FILM COATED ORAL at 20:56

## 2020-01-01 RX ADMIN — POLYETHYLENE GLYCOL 3350 17 GRAM(S): 17 POWDER, FOR SOLUTION ORAL at 12:58

## 2020-01-01 RX ADMIN — Medication 1 DROP(S): at 06:21

## 2020-01-01 RX ADMIN — Medication 500 MILLIGRAM(S): at 06:00

## 2020-01-01 RX ADMIN — POLYETHYLENE GLYCOL 3350 17 GRAM(S): 17 POWDER, FOR SOLUTION ORAL at 12:05

## 2020-01-01 RX ADMIN — Medication 500 MILLIGRAM(S): at 21:54

## 2020-01-01 RX ADMIN — TAMSULOSIN HYDROCHLORIDE 0.4 MILLIGRAM(S): 0.4 CAPSULE ORAL at 00:31

## 2020-01-01 RX ADMIN — Medication 1 DROP(S): at 05:18

## 2020-01-01 RX ADMIN — MIRTAZAPINE 7.5 MILLIGRAM(S): 45 TABLET, ORALLY DISINTEGRATING ORAL at 22:28

## 2020-01-01 RX ADMIN — TAMSULOSIN HYDROCHLORIDE 0.4 MILLIGRAM(S): 0.4 CAPSULE ORAL at 21:34

## 2020-01-01 RX ADMIN — LATANOPROST 1 DROP(S): 0.05 SOLUTION/ DROPS OPHTHALMIC; TOPICAL at 21:54

## 2020-01-01 RX ADMIN — MIRTAZAPINE 15 MILLIGRAM(S): 45 TABLET, ORALLY DISINTEGRATING ORAL at 21:51

## 2020-01-01 RX ADMIN — MORPHINE SULFATE 2 MILLIGRAM(S): 50 CAPSULE, EXTENDED RELEASE ORAL at 13:18

## 2020-01-01 RX ADMIN — TRAMADOL HYDROCHLORIDE 25 MILLIGRAM(S): 50 TABLET ORAL at 18:47

## 2020-01-01 RX ADMIN — CEFEPIME 100 MILLIGRAM(S): 1 INJECTION, POWDER, FOR SOLUTION INTRAMUSCULAR; INTRAVENOUS at 12:13

## 2020-01-01 RX ADMIN — TRAMADOL HYDROCHLORIDE 25 MILLIGRAM(S): 50 TABLET ORAL at 13:52

## 2020-01-01 RX ADMIN — Medication 650 MILLIGRAM(S): at 12:06

## 2020-01-01 RX ADMIN — MIRTAZAPINE 15 MILLIGRAM(S): 45 TABLET, ORALLY DISINTEGRATING ORAL at 21:33

## 2020-01-01 RX ADMIN — Medication 1 TABLET(S): at 21:42

## 2020-01-01 RX ADMIN — Medication 1 DROP(S): at 17:02

## 2020-01-01 RX ADMIN — BENDAMUSTINE HYDROCHLORIDE 339.6 MILLIGRAM(S): 100 INJECTION, POWDER, LYOPHILIZED, FOR SOLUTION INTRAVENOUS at 16:44

## 2020-01-01 RX ADMIN — MIRTAZAPINE 7.5 MILLIGRAM(S): 45 TABLET, ORALLY DISINTEGRATING ORAL at 21:45

## 2020-01-01 RX ADMIN — HYDROMORPHONE HYDROCHLORIDE 0.5 MILLIGRAM(S): 2 INJECTION INTRAMUSCULAR; INTRAVENOUS; SUBCUTANEOUS at 05:45

## 2020-01-01 RX ADMIN — HEPARIN SODIUM 5000 UNIT(S): 5000 INJECTION INTRAVENOUS; SUBCUTANEOUS at 14:17

## 2020-01-01 RX ADMIN — Medication 650 MILLIGRAM(S): at 12:31

## 2020-01-01 RX ADMIN — Medication 500000 UNIT(S): at 05:02

## 2020-01-01 RX ADMIN — Medication 1 TABLET(S): at 14:17

## 2020-01-01 RX ADMIN — Medication 100 MILLIGRAM(S): at 11:30

## 2020-01-01 RX ADMIN — SODIUM CHLORIDE 1000 MILLILITER(S): 9 INJECTION INTRAMUSCULAR; INTRAVENOUS; SUBCUTANEOUS at 13:26

## 2020-01-01 RX ADMIN — NYSTATIN CREAM 1 APPLICATION(S): 100000 CREAM TOPICAL at 17:27

## 2020-01-01 RX ADMIN — CEFEPIME 100 MILLIGRAM(S): 1 INJECTION, POWDER, FOR SOLUTION INTRAMUSCULAR; INTRAVENOUS at 13:17

## 2020-01-01 RX ADMIN — AMLODIPINE BESYLATE 2.5 MILLIGRAM(S): 2.5 TABLET ORAL at 05:16

## 2020-01-01 RX ADMIN — Medication 1 TABLET(S): at 08:52

## 2020-01-01 RX ADMIN — HYDROMORPHONE HYDROCHLORIDE 0.2 MILLIGRAM(S): 2 INJECTION INTRAMUSCULAR; INTRAVENOUS; SUBCUTANEOUS at 01:51

## 2020-01-01 RX ADMIN — Medication 1 TABLET(S): at 12:27

## 2020-01-01 RX ADMIN — Medication 650 MILLIGRAM(S): at 06:02

## 2020-01-01 RX ADMIN — Medication 650 MILLIGRAM(S): at 06:01

## 2020-01-01 RX ADMIN — CHLORHEXIDINE GLUCONATE 1 APPLICATION(S): 213 SOLUTION TOPICAL at 12:10

## 2020-01-01 RX ADMIN — LATANOPROST 1 DROP(S): 0.05 SOLUTION/ DROPS OPHTHALMIC; TOPICAL at 21:05

## 2020-01-01 RX ADMIN — HYDROMORPHONE HYDROCHLORIDE 0.2 MILLIGRAM(S): 2 INJECTION INTRAMUSCULAR; INTRAVENOUS; SUBCUTANEOUS at 03:15

## 2020-01-01 RX ADMIN — Medication 1 DROP(S): at 05:15

## 2020-01-01 RX ADMIN — Medication 500 MILLIGRAM(S): at 15:21

## 2020-01-01 RX ADMIN — POLYETHYLENE GLYCOL 3350 17 GRAM(S): 17 POWDER, FOR SOLUTION ORAL at 12:26

## 2020-01-01 RX ADMIN — CEFEPIME 100 MILLIGRAM(S): 1 INJECTION, POWDER, FOR SOLUTION INTRAMUSCULAR; INTRAVENOUS at 21:45

## 2020-01-01 RX ADMIN — Medication 650 MILLIGRAM(S): at 18:44

## 2020-01-01 RX ADMIN — SODIUM ZIRCONIUM CYCLOSILICATE 10 GRAM(S): 10 POWDER, FOR SUSPENSION ORAL at 21:55

## 2020-01-01 RX ADMIN — SODIUM ZIRCONIUM CYCLOSILICATE 10 GRAM(S): 10 POWDER, FOR SUSPENSION ORAL at 13:30

## 2020-01-01 RX ADMIN — TRAMADOL HYDROCHLORIDE 25 MILLIGRAM(S): 50 TABLET ORAL at 11:10

## 2020-01-01 RX ADMIN — AMLODIPINE BESYLATE 2.5 MILLIGRAM(S): 2.5 TABLET ORAL at 05:06

## 2020-01-01 RX ADMIN — ATORVASTATIN CALCIUM 80 MILLIGRAM(S): 80 TABLET, FILM COATED ORAL at 21:51

## 2020-01-01 RX ADMIN — LATANOPROST 1 DROP(S): 0.05 SOLUTION/ DROPS OPHTHALMIC; TOPICAL at 22:21

## 2020-01-01 RX ADMIN — Medication 1 DROP(S): at 17:26

## 2020-01-01 RX ADMIN — HYDROMORPHONE HYDROCHLORIDE 0.5 MILLIGRAM(S): 2 INJECTION INTRAMUSCULAR; INTRAVENOUS; SUBCUTANEOUS at 07:49

## 2020-01-01 RX ADMIN — Medication 1 DROP(S): at 18:43

## 2020-01-01 RX ADMIN — TRAMADOL HYDROCHLORIDE 25 MILLIGRAM(S): 50 TABLET ORAL at 14:40

## 2020-01-01 RX ADMIN — HYDROMORPHONE HYDROCHLORIDE 0.5 MILLIGRAM(S): 2 INJECTION INTRAMUSCULAR; INTRAVENOUS; SUBCUTANEOUS at 06:52

## 2020-01-01 RX ADMIN — Medication 1 TABLET(S): at 12:58

## 2020-01-01 RX ADMIN — Medication 1 DROP(S): at 17:52

## 2020-01-01 RX ADMIN — HEPARIN SODIUM 1200 UNIT(S)/HR: 5000 INJECTION INTRAVENOUS; SUBCUTANEOUS at 20:31

## 2020-01-01 RX ADMIN — Medication 1 TABLET(S): at 13:31

## 2020-01-01 RX ADMIN — HYDROMORPHONE HYDROCHLORIDE 0.2 MILLIGRAM(S): 2 INJECTION INTRAMUSCULAR; INTRAVENOUS; SUBCUTANEOUS at 14:10

## 2020-01-01 RX ADMIN — CHLORHEXIDINE GLUCONATE 1 APPLICATION(S): 213 SOLUTION TOPICAL at 05:22

## 2020-01-01 RX ADMIN — SODIUM CHLORIDE 10 MILLILITER(S): 9 INJECTION INTRAMUSCULAR; INTRAVENOUS; SUBCUTANEOUS at 21:41

## 2020-01-01 RX ADMIN — ATORVASTATIN CALCIUM 80 MILLIGRAM(S): 80 TABLET, FILM COATED ORAL at 22:29

## 2020-01-01 RX ADMIN — Medication 100 MILLIGRAM(S): at 12:35

## 2020-01-01 RX ADMIN — HEPARIN SODIUM 1100 UNIT(S)/HR: 5000 INJECTION INTRAVENOUS; SUBCUTANEOUS at 13:29

## 2020-01-01 RX ADMIN — Medication 1 TABLET(S): at 08:23

## 2020-01-01 RX ADMIN — HEPARIN SODIUM 1100 UNIT(S)/HR: 5000 INJECTION INTRAVENOUS; SUBCUTANEOUS at 09:37

## 2020-01-01 RX ADMIN — POLYETHYLENE GLYCOL 3350 17 GRAM(S): 17 POWDER, FOR SOLUTION ORAL at 13:05

## 2020-01-01 RX ADMIN — LATANOPROST 1 DROP(S): 0.05 SOLUTION/ DROPS OPHTHALMIC; TOPICAL at 21:45

## 2020-01-01 RX ADMIN — MORPHINE SULFATE 2 MILLIGRAM(S): 50 CAPSULE, EXTENDED RELEASE ORAL at 14:43

## 2020-01-01 RX ADMIN — SENNA PLUS 2 TABLET(S): 8.6 TABLET ORAL at 21:47

## 2020-01-01 RX ADMIN — Medication 250 MILLIGRAM(S): at 12:06

## 2020-01-01 RX ADMIN — MIRTAZAPINE 15 MILLIGRAM(S): 45 TABLET, ORALLY DISINTEGRATING ORAL at 21:35

## 2020-01-01 RX ADMIN — POLYETHYLENE GLYCOL 3350 17 GRAM(S): 17 POWDER, FOR SOLUTION ORAL at 12:16

## 2020-01-01 RX ADMIN — Medication 100 MILLIGRAM(S): at 12:26

## 2020-01-01 RX ADMIN — LATANOPROST 1 DROP(S): 0.05 SOLUTION/ DROPS OPHTHALMIC; TOPICAL at 21:40

## 2020-01-01 RX ADMIN — Medication 500 MILLIGRAM(S): at 21:45

## 2020-01-01 RX ADMIN — AMLODIPINE BESYLATE 2.5 MILLIGRAM(S): 2.5 TABLET ORAL at 05:27

## 2020-01-01 RX ADMIN — ATORVASTATIN CALCIUM 80 MILLIGRAM(S): 80 TABLET, FILM COATED ORAL at 21:42

## 2020-01-01 RX ADMIN — ATORVASTATIN CALCIUM 80 MILLIGRAM(S): 80 TABLET, FILM COATED ORAL at 21:45

## 2020-01-01 RX ADMIN — AMLODIPINE BESYLATE 2.5 MILLIGRAM(S): 2.5 TABLET ORAL at 05:17

## 2020-01-01 RX ADMIN — AMLODIPINE BESYLATE 2.5 MILLIGRAM(S): 2.5 TABLET ORAL at 05:38

## 2020-01-01 RX ADMIN — LATANOPROST 1 DROP(S): 0.05 SOLUTION/ DROPS OPHTHALMIC; TOPICAL at 21:47

## 2020-01-01 RX ADMIN — POLYETHYLENE GLYCOL 3350 17 GRAM(S): 17 POWDER, FOR SOLUTION ORAL at 13:48

## 2020-01-01 RX ADMIN — Medication 1 DROP(S): at 05:16

## 2020-01-01 RX ADMIN — TAMSULOSIN HYDROCHLORIDE 0.4 MILLIGRAM(S): 0.4 CAPSULE ORAL at 22:20

## 2020-01-01 RX ADMIN — POLYETHYLENE GLYCOL 3350 17 GRAM(S): 17 POWDER, FOR SOLUTION ORAL at 12:46

## 2020-01-01 RX ADMIN — LATANOPROST 1 DROP(S): 0.05 SOLUTION/ DROPS OPHTHALMIC; TOPICAL at 21:19

## 2020-01-01 RX ADMIN — CEFEPIME 100 MILLIGRAM(S): 1 INJECTION, POWDER, FOR SOLUTION INTRAMUSCULAR; INTRAVENOUS at 20:21

## 2020-01-01 RX ADMIN — ONDANSETRON 116 MILLIGRAM(S): 8 TABLET, FILM COATED ORAL at 17:03

## 2020-01-01 RX ADMIN — ATORVASTATIN CALCIUM 80 MILLIGRAM(S): 80 TABLET, FILM COATED ORAL at 22:20

## 2020-01-01 RX ADMIN — Medication 650 MILLIGRAM(S): at 14:44

## 2020-01-01 RX ADMIN — ALBUTEROL 10 MILLIGRAM(S): 90 AEROSOL, METERED ORAL at 07:37

## 2020-01-01 RX ADMIN — CHLORHEXIDINE GLUCONATE 1 APPLICATION(S): 213 SOLUTION TOPICAL at 11:30

## 2020-01-01 RX ADMIN — Medication 500 MILLIGRAM(S): at 14:43

## 2020-01-01 RX ADMIN — TAMSULOSIN HYDROCHLORIDE 0.4 MILLIGRAM(S): 0.4 CAPSULE ORAL at 21:13

## 2020-01-01 RX ADMIN — Medication 1 DROP(S): at 05:09

## 2020-01-01 RX ADMIN — TRAMADOL HYDROCHLORIDE 25 MILLIGRAM(S): 50 TABLET ORAL at 12:17

## 2020-01-01 RX ADMIN — Medication 650 MILLIGRAM(S): at 05:45

## 2020-01-01 RX ADMIN — AMLODIPINE BESYLATE 2.5 MILLIGRAM(S): 2.5 TABLET ORAL at 05:05

## 2020-01-01 RX ADMIN — HYDROMORPHONE HYDROCHLORIDE 0.5 MILLIGRAM(S): 2 INJECTION INTRAMUSCULAR; INTRAVENOUS; SUBCUTANEOUS at 23:27

## 2020-01-01 RX ADMIN — CEFEPIME 100 MILLIGRAM(S): 1 INJECTION, POWDER, FOR SOLUTION INTRAMUSCULAR; INTRAVENOUS at 07:45

## 2020-01-01 RX ADMIN — HEPARIN SODIUM 1100 UNIT(S)/HR: 5000 INJECTION INTRAVENOUS; SUBCUTANEOUS at 05:17

## 2020-01-01 RX ADMIN — Medication 1000 MILLIGRAM(S): at 13:26

## 2020-01-01 RX ADMIN — HYDROMORPHONE HYDROCHLORIDE 0.2 MILLIGRAM(S): 2 INJECTION INTRAMUSCULAR; INTRAVENOUS; SUBCUTANEOUS at 12:57

## 2020-01-01 RX ADMIN — HYDROMORPHONE HYDROCHLORIDE 0.2 MG/HR: 2 INJECTION INTRAMUSCULAR; INTRAVENOUS; SUBCUTANEOUS at 17:33

## 2020-01-01 RX ADMIN — HEPARIN SODIUM 5000 UNIT(S): 5000 INJECTION INTRAVENOUS; SUBCUTANEOUS at 05:27

## 2020-01-01 RX ADMIN — POLYETHYLENE GLYCOL 3350 17 GRAM(S): 17 POWDER, FOR SOLUTION ORAL at 12:23

## 2020-01-01 RX ADMIN — Medication 0.5 MILLIGRAM(S): at 21:59

## 2020-01-01 RX ADMIN — Medication 0.5 MILLIGRAM(S): at 09:36

## 2020-01-01 RX ADMIN — Medication 1 DROP(S): at 17:30

## 2020-01-01 RX ADMIN — SODIUM CHLORIDE 40 MILLILITER(S): 9 INJECTION INTRAMUSCULAR; INTRAVENOUS; SUBCUTANEOUS at 21:05

## 2020-01-01 RX ADMIN — LATANOPROST 1 DROP(S): 0.05 SOLUTION/ DROPS OPHTHALMIC; TOPICAL at 21:07

## 2020-01-01 RX ADMIN — HEPARIN SODIUM 900 UNIT(S)/HR: 5000 INJECTION INTRAVENOUS; SUBCUTANEOUS at 01:25

## 2020-01-01 RX ADMIN — CEFEPIME 100 MILLIGRAM(S): 1 INJECTION, POWDER, FOR SOLUTION INTRAMUSCULAR; INTRAVENOUS at 20:55

## 2020-01-01 RX ADMIN — HYDROMORPHONE HYDROCHLORIDE 0.5 MILLIGRAM(S): 2 INJECTION INTRAMUSCULAR; INTRAVENOUS; SUBCUTANEOUS at 09:36

## 2020-01-01 RX ADMIN — Medication 650 MILLIGRAM(S): at 06:00

## 2020-01-01 RX ADMIN — Medication 100 MILLIGRAM(S): at 12:23

## 2020-01-01 RX ADMIN — Medication 1 DROP(S): at 17:56

## 2020-01-01 RX ADMIN — Medication 0.5 MILLIGRAM(S): at 01:26

## 2020-01-01 RX ADMIN — ATORVASTATIN CALCIUM 80 MILLIGRAM(S): 80 TABLET, FILM COATED ORAL at 21:53

## 2020-01-01 RX ADMIN — CHLORHEXIDINE GLUCONATE 1 APPLICATION(S): 213 SOLUTION TOPICAL at 12:36

## 2020-01-01 RX ADMIN — CEFEPIME 100 MILLIGRAM(S): 1 INJECTION, POWDER, FOR SOLUTION INTRAMUSCULAR; INTRAVENOUS at 08:46

## 2020-01-01 RX ADMIN — Medication 500 MILLIGRAM(S): at 05:23

## 2020-01-01 RX ADMIN — SENNA PLUS 2 TABLET(S): 8.6 TABLET ORAL at 00:31

## 2020-01-01 RX ADMIN — CHLORHEXIDINE GLUCONATE 1 APPLICATION(S): 213 SOLUTION TOPICAL at 14:12

## 2020-01-01 RX ADMIN — Medication 500 MILLIGRAM(S): at 15:14

## 2020-01-01 RX ADMIN — AMLODIPINE BESYLATE 2.5 MILLIGRAM(S): 2.5 TABLET ORAL at 05:18

## 2020-01-01 RX ADMIN — TRAMADOL HYDROCHLORIDE 25 MILLIGRAM(S): 50 TABLET ORAL at 22:30

## 2020-01-01 RX ADMIN — HYDROMORPHONE HYDROCHLORIDE 0.2 MILLIGRAM(S): 2 INJECTION INTRAMUSCULAR; INTRAVENOUS; SUBCUTANEOUS at 02:06

## 2020-01-01 RX ADMIN — TAMSULOSIN HYDROCHLORIDE 0.4 MILLIGRAM(S): 0.4 CAPSULE ORAL at 21:51

## 2020-01-01 RX ADMIN — TRAMADOL HYDROCHLORIDE 25 MILLIGRAM(S): 50 TABLET ORAL at 12:05

## 2020-01-01 RX ADMIN — Medication 500 MILLIGRAM(S): at 05:38

## 2020-01-01 RX ADMIN — Medication 500 MILLIGRAM(S): at 22:59

## 2020-01-01 RX ADMIN — BENDAMUSTINE HYDROCHLORIDE 339.6 MILLIGRAM(S): 100 INJECTION, POWDER, LYOPHILIZED, FOR SOLUTION INTRAVENOUS at 17:53

## 2020-01-01 RX ADMIN — HEPARIN SODIUM 5000 UNIT(S): 5000 INJECTION INTRAVENOUS; SUBCUTANEOUS at 14:43

## 2020-01-01 RX ADMIN — Medication 1 DROP(S): at 17:13

## 2020-01-01 RX ADMIN — SENNA PLUS 2 TABLET(S): 8.6 TABLET ORAL at 21:34

## 2020-01-01 RX ADMIN — CHLORHEXIDINE GLUCONATE 1 APPLICATION(S): 213 SOLUTION TOPICAL at 05:43

## 2020-01-01 RX ADMIN — SENNA PLUS 2 TABLET(S): 8.6 TABLET ORAL at 22:05

## 2020-01-01 RX ADMIN — TRAMADOL HYDROCHLORIDE 25 MILLIGRAM(S): 50 TABLET ORAL at 18:26

## 2020-01-01 RX ADMIN — Medication 50 MILLILITER(S): at 07:36

## 2020-01-01 RX ADMIN — HEPARIN SODIUM 1100 UNIT(S)/HR: 5000 INJECTION INTRAVENOUS; SUBCUTANEOUS at 10:11

## 2020-01-01 RX ADMIN — Medication 100 MILLIGRAM(S): at 12:50

## 2020-01-01 RX ADMIN — ONDANSETRON 116 MILLIGRAM(S): 8 TABLET, FILM COATED ORAL at 16:01

## 2020-01-01 RX ADMIN — SENNA PLUS 2 TABLET(S): 8.6 TABLET ORAL at 20:58

## 2020-01-01 RX ADMIN — NYSTATIN CREAM 1 APPLICATION(S): 100000 CREAM TOPICAL at 06:03

## 2020-01-01 RX ADMIN — TRAMADOL HYDROCHLORIDE 25 MILLIGRAM(S): 50 TABLET ORAL at 13:36

## 2020-01-01 RX ADMIN — MIRTAZAPINE 15 MILLIGRAM(S): 45 TABLET, ORALLY DISINTEGRATING ORAL at 22:20

## 2020-01-01 RX ADMIN — Medication 1 DROP(S): at 18:20

## 2020-01-01 RX ADMIN — ENOXAPARIN SODIUM 70 MILLIGRAM(S): 100 INJECTION SUBCUTANEOUS at 11:30

## 2020-01-01 RX ADMIN — MORPHINE SULFATE 2 MILLIGRAM(S): 50 CAPSULE, EXTENDED RELEASE ORAL at 06:49

## 2020-01-01 RX ADMIN — Medication 20 MILLILITER(S): at 11:00

## 2020-01-01 RX ADMIN — HEPARIN SODIUM 5000 UNIT(S): 5000 INJECTION INTRAVENOUS; SUBCUTANEOUS at 21:13

## 2020-01-01 RX ADMIN — Medication 650 MILLIGRAM(S): at 05:43

## 2020-01-01 RX ADMIN — CHLORHEXIDINE GLUCONATE 1 APPLICATION(S): 213 SOLUTION TOPICAL at 11:22

## 2020-01-01 RX ADMIN — ENOXAPARIN SODIUM 70 MILLIGRAM(S): 100 INJECTION SUBCUTANEOUS at 17:26

## 2020-01-01 RX ADMIN — AMLODIPINE BESYLATE 2.5 MILLIGRAM(S): 2.5 TABLET ORAL at 05:13

## 2020-01-01 RX ADMIN — MORPHINE SULFATE 2 MILLIGRAM(S): 50 CAPSULE, EXTENDED RELEASE ORAL at 21:38

## 2020-01-01 RX ADMIN — SENNA PLUS 2 TABLET(S): 8.6 TABLET ORAL at 21:51

## 2020-01-01 RX ADMIN — TAMSULOSIN HYDROCHLORIDE 0.4 MILLIGRAM(S): 0.4 CAPSULE ORAL at 21:41

## 2020-01-01 RX ADMIN — AMLODIPINE BESYLATE 2.5 MILLIGRAM(S): 2.5 TABLET ORAL at 05:30

## 2020-01-01 RX ADMIN — MORPHINE SULFATE 2 MILLIGRAM(S): 50 CAPSULE, EXTENDED RELEASE ORAL at 12:25

## 2020-01-01 RX ADMIN — Medication 50 MILLILITER(S): at 13:38

## 2020-01-01 RX ADMIN — AMLODIPINE BESYLATE 2.5 MILLIGRAM(S): 2.5 TABLET ORAL at 05:45

## 2020-01-01 RX ADMIN — LATANOPROST 1 DROP(S): 0.05 SOLUTION/ DROPS OPHTHALMIC; TOPICAL at 22:29

## 2020-01-01 RX ADMIN — Medication 500 MILLIGRAM(S): at 05:06

## 2020-01-01 RX ADMIN — ATORVASTATIN CALCIUM 80 MILLIGRAM(S): 80 TABLET, FILM COATED ORAL at 21:34

## 2020-01-01 RX ADMIN — HYDROMORPHONE HYDROCHLORIDE 0.2 MILLIGRAM(S): 2 INJECTION INTRAMUSCULAR; INTRAVENOUS; SUBCUTANEOUS at 12:30

## 2020-01-01 RX ADMIN — Medication 250 MILLIGRAM(S): at 20:18

## 2020-01-01 RX ADMIN — Medication 2.5 MILLIGRAM(S): at 06:05

## 2020-01-01 RX ADMIN — Medication 650 MILLIGRAM(S): at 23:30

## 2020-01-01 RX ADMIN — Medication 101.6 MILLIGRAM(S): at 19:43

## 2020-01-01 RX ADMIN — HYDROMORPHONE HYDROCHLORIDE 0.2 MILLIGRAM(S): 2 INJECTION INTRAMUSCULAR; INTRAVENOUS; SUBCUTANEOUS at 11:39

## 2020-01-01 RX ADMIN — MIRTAZAPINE 15 MILLIGRAM(S): 45 TABLET, ORALLY DISINTEGRATING ORAL at 21:13

## 2020-01-01 RX ADMIN — Medication 650 MILLIGRAM(S): at 18:11

## 2020-01-01 RX ADMIN — MORPHINE SULFATE 2 MILLIGRAM(S): 50 CAPSULE, EXTENDED RELEASE ORAL at 12:10

## 2020-01-01 RX ADMIN — CHLORHEXIDINE GLUCONATE 1 APPLICATION(S): 213 SOLUTION TOPICAL at 05:03

## 2020-01-01 RX ADMIN — ATORVASTATIN CALCIUM 80 MILLIGRAM(S): 80 TABLET, FILM COATED ORAL at 20:58

## 2020-01-01 RX ADMIN — HEPARIN SODIUM 5000 UNIT(S): 5000 INJECTION INTRAVENOUS; SUBCUTANEOUS at 22:28

## 2020-01-01 RX ADMIN — HEPARIN SODIUM 1000 UNIT(S)/HR: 5000 INJECTION INTRAVENOUS; SUBCUTANEOUS at 08:00

## 2020-01-01 RX ADMIN — Medication 100 MILLIGRAM(S): at 14:17

## 2020-01-01 RX ADMIN — TRAMADOL HYDROCHLORIDE 25 MILLIGRAM(S): 50 TABLET ORAL at 21:54

## 2020-01-01 RX ADMIN — HEPARIN SODIUM 2500 UNIT(S): 5000 INJECTION INTRAVENOUS; SUBCUTANEOUS at 22:29

## 2020-01-01 RX ADMIN — HYDROMORPHONE HYDROCHLORIDE 0.5 MILLIGRAM(S): 2 INJECTION INTRAMUSCULAR; INTRAVENOUS; SUBCUTANEOUS at 14:03

## 2020-01-01 RX ADMIN — Medication 1 TABLET(S): at 22:29

## 2020-01-01 RX ADMIN — HEPARIN SODIUM 1100 UNIT(S)/HR: 5000 INJECTION INTRAVENOUS; SUBCUTANEOUS at 12:23

## 2020-01-01 RX ADMIN — LATANOPROST 1 DROP(S): 0.05 SOLUTION/ DROPS OPHTHALMIC; TOPICAL at 21:51

## 2020-01-01 RX ADMIN — Medication 0.5 MILLIGRAM(S): at 01:55

## 2020-01-01 RX ADMIN — Medication 100 MILLIGRAM(S): at 22:04

## 2020-01-01 RX ADMIN — LATANOPROST 1 DROP(S): 0.05 SOLUTION/ DROPS OPHTHALMIC; TOPICAL at 20:58

## 2020-01-01 RX ADMIN — HYDROMORPHONE HYDROCHLORIDE 0.5 MILLIGRAM(S): 2 INJECTION INTRAMUSCULAR; INTRAVENOUS; SUBCUTANEOUS at 13:33

## 2020-01-01 RX ADMIN — HYDROMORPHONE HYDROCHLORIDE 0.2 MILLIGRAM(S): 2 INJECTION INTRAMUSCULAR; INTRAVENOUS; SUBCUTANEOUS at 16:17

## 2020-01-01 RX ADMIN — INSULIN HUMAN 10 UNIT(S): 100 INJECTION, SOLUTION SUBCUTANEOUS at 07:36

## 2020-01-01 RX ADMIN — Medication 1 TABLET(S): at 14:11

## 2020-01-01 RX ADMIN — HYDROMORPHONE HYDROCHLORIDE 0.5 MILLIGRAM(S): 2 INJECTION INTRAMUSCULAR; INTRAVENOUS; SUBCUTANEOUS at 23:42

## 2020-01-01 RX ADMIN — MORPHINE SULFATE 4 MILLIGRAM(S): 50 CAPSULE, EXTENDED RELEASE ORAL at 12:13

## 2020-01-01 RX ADMIN — Medication 1 TABLET(S): at 22:20

## 2020-01-01 RX ADMIN — HEPARIN SODIUM 900 UNIT(S)/HR: 5000 INJECTION INTRAVENOUS; SUBCUTANEOUS at 04:29

## 2020-01-01 RX ADMIN — MORPHINE SULFATE 2 MILLIGRAM(S): 50 CAPSULE, EXTENDED RELEASE ORAL at 20:57

## 2020-01-01 RX ADMIN — Medication 1 DROP(S): at 05:31

## 2020-01-01 RX ADMIN — Medication 100 MILLIGRAM(S): at 15:11

## 2020-01-01 RX ADMIN — CEFEPIME 100 MILLIGRAM(S): 1 INJECTION, POWDER, FOR SOLUTION INTRAMUSCULAR; INTRAVENOUS at 09:09

## 2020-01-01 RX ADMIN — ENOXAPARIN SODIUM 70 MILLIGRAM(S): 100 INJECTION SUBCUTANEOUS at 11:39

## 2020-01-01 RX ADMIN — HEPARIN SODIUM 1200 UNIT(S)/HR: 5000 INJECTION INTRAVENOUS; SUBCUTANEOUS at 22:25

## 2020-01-01 RX ADMIN — CEFEPIME 100 MILLIGRAM(S): 1 INJECTION, POWDER, FOR SOLUTION INTRAMUSCULAR; INTRAVENOUS at 13:15

## 2020-01-01 RX ADMIN — Medication 500000 UNIT(S): at 17:26

## 2020-01-01 RX ADMIN — HEPARIN SODIUM 1100 UNIT(S)/HR: 5000 INJECTION INTRAVENOUS; SUBCUTANEOUS at 15:47

## 2020-01-01 RX ADMIN — Medication 500 MILLIGRAM(S): at 22:29

## 2020-01-01 RX ADMIN — Medication 0.5 MILLIGRAM(S): at 06:07

## 2020-01-01 RX ADMIN — SODIUM CHLORIDE 10 MILLILITER(S): 9 INJECTION INTRAMUSCULAR; INTRAVENOUS; SUBCUTANEOUS at 07:38

## 2020-01-01 RX ADMIN — POLYETHYLENE GLYCOL 3350 17 GRAM(S): 17 POWDER, FOR SOLUTION ORAL at 11:46

## 2020-01-01 RX ADMIN — Medication 100 MILLIGRAM(S): at 12:58

## 2020-01-01 RX ADMIN — HEPARIN SODIUM 900 UNIT(S)/HR: 5000 INJECTION INTRAVENOUS; SUBCUTANEOUS at 18:45

## 2020-01-01 RX ADMIN — Medication 2.5 MILLIGRAM(S): at 17:27

## 2020-01-01 RX ADMIN — Medication 650 MILLIGRAM(S): at 19:00

## 2020-01-01 RX ADMIN — Medication 1 TABLET(S): at 12:23

## 2020-01-01 RX ADMIN — FLUCONAZOLE 200 MILLIGRAM(S): 150 TABLET ORAL at 13:49

## 2020-01-01 RX ADMIN — SODIUM CHLORIDE 40 MILLILITER(S): 9 INJECTION INTRAMUSCULAR; INTRAVENOUS; SUBCUTANEOUS at 10:12

## 2020-01-01 RX ADMIN — CHLORHEXIDINE GLUCONATE 1 APPLICATION(S): 213 SOLUTION TOPICAL at 05:45

## 2020-01-01 RX ADMIN — TRAMADOL HYDROCHLORIDE 25 MILLIGRAM(S): 50 TABLET ORAL at 13:18

## 2020-01-01 RX ADMIN — TRAMADOL HYDROCHLORIDE 25 MILLIGRAM(S): 50 TABLET ORAL at 17:28

## 2020-01-01 RX ADMIN — TRAMADOL HYDROCHLORIDE 25 MILLIGRAM(S): 50 TABLET ORAL at 11:17

## 2020-01-01 RX ADMIN — SENNA PLUS 2 TABLET(S): 8.6 TABLET ORAL at 20:57

## 2020-01-01 RX ADMIN — SODIUM CHLORIDE 75 MILLILITER(S): 9 INJECTION INTRAMUSCULAR; INTRAVENOUS; SUBCUTANEOUS at 15:20

## 2020-01-01 RX ADMIN — SODIUM CHLORIDE 1000 MILLILITER(S): 9 INJECTION INTRAMUSCULAR; INTRAVENOUS; SUBCUTANEOUS at 12:13

## 2020-01-01 RX ADMIN — Medication 1 DROP(S): at 17:44

## 2020-01-01 RX ADMIN — LATANOPROST 1 DROP(S): 0.05 SOLUTION/ DROPS OPHTHALMIC; TOPICAL at 22:19

## 2020-01-01 RX ADMIN — LATANOPROST 1 DROP(S): 0.05 SOLUTION/ DROPS OPHTHALMIC; TOPICAL at 21:43

## 2020-01-01 RX ADMIN — RITUXIMAB 137.2 MILLIGRAM(S): 10 INJECTION, SOLUTION INTRAVENOUS at 13:17

## 2020-01-01 RX ADMIN — Medication 1 DROP(S): at 05:03

## 2020-01-01 RX ADMIN — Medication 650 MILLIGRAM(S): at 11:29

## 2020-01-01 RX ADMIN — Medication 40 MILLIGRAM(S): at 13:48

## 2020-01-01 RX ADMIN — HYDROMORPHONE HYDROCHLORIDE 0.2 MG/HR: 2 INJECTION INTRAMUSCULAR; INTRAVENOUS; SUBCUTANEOUS at 07:38

## 2020-01-01 RX ADMIN — Medication 650 MILLIGRAM(S): at 17:26

## 2020-01-01 RX ADMIN — Medication 500 MILLIGRAM(S): at 13:47

## 2020-01-01 RX ADMIN — SENNA PLUS 2 TABLET(S): 8.6 TABLET ORAL at 21:13

## 2020-01-01 RX ADMIN — LATANOPROST 1 DROP(S): 0.05 SOLUTION/ DROPS OPHTHALMIC; TOPICAL at 22:59

## 2020-01-01 RX ADMIN — ATORVASTATIN CALCIUM 80 MILLIGRAM(S): 80 TABLET, FILM COATED ORAL at 21:13

## 2020-01-01 RX ADMIN — CHLORHEXIDINE GLUCONATE 1 APPLICATION(S): 213 SOLUTION TOPICAL at 10:41

## 2020-01-01 RX ADMIN — HEPARIN SODIUM 5000 UNIT(S): 5000 INJECTION INTRAVENOUS; SUBCUTANEOUS at 13:44

## 2020-01-01 RX ADMIN — MIRTAZAPINE 7.5 MILLIGRAM(S): 45 TABLET, ORALLY DISINTEGRATING ORAL at 22:05

## 2020-01-01 RX ADMIN — Medication 650 MILLIGRAM(S): at 12:46

## 2020-01-01 RX ADMIN — CEFEPIME 100 MILLIGRAM(S): 1 INJECTION, POWDER, FOR SOLUTION INTRAMUSCULAR; INTRAVENOUS at 19:36

## 2020-01-01 RX ADMIN — HEPARIN SODIUM 1100 UNIT(S)/HR: 5000 INJECTION INTRAVENOUS; SUBCUTANEOUS at 11:39

## 2020-01-01 RX ADMIN — LATANOPROST 1 DROP(S): 0.05 SOLUTION/ DROPS OPHTHALMIC; TOPICAL at 21:34

## 2020-01-01 RX ADMIN — Medication 1 TABLET(S): at 12:24

## 2020-01-01 RX ADMIN — POLYETHYLENE GLYCOL 3350 17 GRAM(S): 17 POWDER, FOR SOLUTION ORAL at 12:35

## 2020-01-01 RX ADMIN — HYDROMORPHONE HYDROCHLORIDE 0.5 MILLIGRAM(S): 2 INJECTION INTRAMUSCULAR; INTRAVENOUS; SUBCUTANEOUS at 08:00

## 2020-01-01 RX ADMIN — OXYCODONE HYDROCHLORIDE 2.5 MILLIGRAM(S): 5 TABLET ORAL at 13:22

## 2020-01-01 RX ADMIN — HEPARIN SODIUM 5500 UNIT(S): 5000 INJECTION INTRAVENOUS; SUBCUTANEOUS at 20:57

## 2020-01-01 RX ADMIN — MORPHINE SULFATE 2 MILLIGRAM(S): 50 CAPSULE, EXTENDED RELEASE ORAL at 12:21

## 2020-01-01 RX ADMIN — Medication 0.5 MILLIGRAM(S): at 22:28

## 2020-01-01 RX ADMIN — POLYETHYLENE GLYCOL 3350 17 GRAM(S): 17 POWDER, FOR SOLUTION ORAL at 12:52

## 2020-01-01 RX ADMIN — CEFEPIME 100 MILLIGRAM(S): 1 INJECTION, POWDER, FOR SOLUTION INTRAMUSCULAR; INTRAVENOUS at 14:17

## 2020-01-01 RX ADMIN — Medication 500 MILLIGRAM(S): at 05:07

## 2020-01-01 RX ADMIN — CHLORHEXIDINE GLUCONATE 1 APPLICATION(S): 213 SOLUTION TOPICAL at 13:49

## 2020-01-01 RX ADMIN — HEPARIN SODIUM 1000 UNIT(S)/HR: 5000 INJECTION INTRAVENOUS; SUBCUTANEOUS at 21:34

## 2020-01-01 RX ADMIN — Medication 1 TABLET(S): at 17:30

## 2020-01-01 RX ADMIN — HEPARIN SODIUM 5000 UNIT(S): 5000 INJECTION INTRAVENOUS; SUBCUTANEOUS at 06:20

## 2020-01-01 RX ADMIN — Medication 1 DROP(S): at 05:06

## 2020-01-01 RX ADMIN — MIRTAZAPINE 15 MILLIGRAM(S): 45 TABLET, ORALLY DISINTEGRATING ORAL at 21:07

## 2020-01-01 RX ADMIN — HYDROMORPHONE HYDROCHLORIDE 0.5 MILLIGRAM(S): 2 INJECTION INTRAMUSCULAR; INTRAVENOUS; SUBCUTANEOUS at 11:17

## 2020-01-01 RX ADMIN — TAMSULOSIN HYDROCHLORIDE 0.4 MILLIGRAM(S): 0.4 CAPSULE ORAL at 21:36

## 2020-01-01 RX ADMIN — HEPARIN SODIUM 5000 UNIT(S): 5000 INJECTION INTRAVENOUS; SUBCUTANEOUS at 05:38

## 2020-01-01 RX ADMIN — Medication 650 MILLIGRAM(S): at 17:14

## 2020-01-01 RX ADMIN — Medication 1 TABLET(S): at 12:05

## 2020-01-01 RX ADMIN — Medication 650 MILLIGRAM(S): at 05:31

## 2020-01-01 RX ADMIN — Medication 1 DROP(S): at 05:24

## 2020-01-01 RX ADMIN — Medication 500 MILLIGRAM(S): at 05:15

## 2020-01-01 RX ADMIN — HEPARIN SODIUM 2500 UNIT(S): 5000 INJECTION INTRAVENOUS; SUBCUTANEOUS at 10:12

## 2020-01-01 RX ADMIN — HEPARIN SODIUM 5000 UNIT(S): 5000 INJECTION INTRAVENOUS; SUBCUTANEOUS at 05:15

## 2020-01-01 RX ADMIN — Medication 1 TABLET(S): at 12:46

## 2020-01-01 RX ADMIN — Medication 500000 UNIT(S): at 12:58

## 2020-01-01 RX ADMIN — HEPARIN SODIUM 5000 UNIT(S): 5000 INJECTION INTRAVENOUS; SUBCUTANEOUS at 13:18

## 2020-01-01 RX ADMIN — Medication 500000 UNIT(S): at 05:44

## 2020-01-01 RX ADMIN — TRAMADOL HYDROCHLORIDE 25 MILLIGRAM(S): 50 TABLET ORAL at 20:32

## 2020-01-01 RX ADMIN — Medication 650 MILLIGRAM(S): at 23:00

## 2020-01-01 RX ADMIN — Medication 2.5 MILLIGRAM(S): at 06:08

## 2020-01-01 RX ADMIN — ATORVASTATIN CALCIUM 80 MILLIGRAM(S): 80 TABLET, FILM COATED ORAL at 21:41

## 2020-01-01 RX ADMIN — Medication 1 DROP(S): at 18:28

## 2020-01-01 RX ADMIN — Medication 650 MILLIGRAM(S): at 13:44

## 2020-01-01 RX ADMIN — ATORVASTATIN CALCIUM 80 MILLIGRAM(S): 80 TABLET, FILM COATED ORAL at 22:49

## 2020-01-01 RX ADMIN — Medication 500 MILLIGRAM(S): at 06:20

## 2020-01-01 RX ADMIN — ENOXAPARIN SODIUM 70 MILLIGRAM(S): 100 INJECTION SUBCUTANEOUS at 12:51

## 2020-01-01 RX ADMIN — Medication 0.5 MILLIGRAM(S): at 16:58

## 2020-01-01 RX ADMIN — HEPARIN SODIUM 5000 UNIT(S): 5000 INJECTION INTRAVENOUS; SUBCUTANEOUS at 12:57

## 2020-01-01 RX ADMIN — Medication 500 MILLIGRAM(S): at 21:35

## 2020-01-01 RX ADMIN — Medication 500 MILLIGRAM(S): at 22:05

## 2020-01-01 RX ADMIN — TRAMADOL HYDROCHLORIDE 25 MILLIGRAM(S): 50 TABLET ORAL at 18:50

## 2020-01-01 RX ADMIN — Medication 650 MILLIGRAM(S): at 17:52

## 2020-01-01 RX ADMIN — TRAMADOL HYDROCHLORIDE 25 MILLIGRAM(S): 50 TABLET ORAL at 17:47

## 2020-01-01 RX ADMIN — Medication 250 MILLIGRAM(S): at 17:26

## 2020-01-01 RX ADMIN — Medication 100 MILLIGRAM(S): at 13:31

## 2020-01-01 RX ADMIN — Medication 250 MILLIGRAM(S): at 13:18

## 2020-01-01 RX ADMIN — Medication 100 MILLIGRAM(S): at 14:51

## 2020-01-01 RX ADMIN — CEFEPIME 100 MILLIGRAM(S): 1 INJECTION, POWDER, FOR SOLUTION INTRAMUSCULAR; INTRAVENOUS at 05:54

## 2020-01-01 RX ADMIN — Medication 250 MILLIGRAM(S): at 18:20

## 2020-01-01 RX ADMIN — Medication 1 TABLET(S): at 11:30

## 2020-01-01 RX ADMIN — HEPARIN SODIUM 5000 UNIT(S): 5000 INJECTION INTRAVENOUS; SUBCUTANEOUS at 21:45

## 2020-01-01 RX ADMIN — Medication 650 MILLIGRAM(S): at 15:45

## 2020-01-01 RX ADMIN — TRAMADOL HYDROCHLORIDE 25 MILLIGRAM(S): 50 TABLET ORAL at 10:04

## 2020-01-01 RX ADMIN — HEPARIN SODIUM 5000 UNIT(S): 5000 INJECTION INTRAVENOUS; SUBCUTANEOUS at 21:43

## 2020-01-01 RX ADMIN — CHLORHEXIDINE GLUCONATE 1 APPLICATION(S): 213 SOLUTION TOPICAL at 11:47

## 2020-01-01 RX ADMIN — CEFEPIME 100 MILLIGRAM(S): 1 INJECTION, POWDER, FOR SOLUTION INTRAMUSCULAR; INTRAVENOUS at 19:52

## 2020-01-01 RX ADMIN — CHLORHEXIDINE GLUCONATE 1 APPLICATION(S): 213 SOLUTION TOPICAL at 12:32

## 2020-01-01 RX ADMIN — Medication 500000 UNIT(S): at 05:22

## 2020-01-01 RX ADMIN — MIRTAZAPINE 15 MILLIGRAM(S): 45 TABLET, ORALLY DISINTEGRATING ORAL at 21:48

## 2020-01-01 RX ADMIN — Medication 500000 UNIT(S): at 05:18

## 2020-01-01 RX ADMIN — HYDROMORPHONE HYDROCHLORIDE 0.2 MILLIGRAM(S): 2 INJECTION INTRAMUSCULAR; INTRAVENOUS; SUBCUTANEOUS at 20:07

## 2020-01-01 RX ADMIN — MIRTAZAPINE 15 MILLIGRAM(S): 45 TABLET, ORALLY DISINTEGRATING ORAL at 21:06

## 2020-01-01 RX ADMIN — AMLODIPINE BESYLATE 2.5 MILLIGRAM(S): 2.5 TABLET ORAL at 05:43

## 2020-01-01 RX ADMIN — Medication 250 MILLIGRAM(S): at 12:30

## 2020-01-01 RX ADMIN — HEPARIN SODIUM 900 UNIT(S)/HR: 5000 INJECTION INTRAVENOUS; SUBCUTANEOUS at 21:54

## 2020-01-01 RX ADMIN — TRAMADOL HYDROCHLORIDE 25 MILLIGRAM(S): 50 TABLET ORAL at 12:26

## 2020-01-01 RX ADMIN — TRAMADOL HYDROCHLORIDE 25 MILLIGRAM(S): 50 TABLET ORAL at 12:20

## 2020-04-16 NOTE — ED PROVIDER NOTE - NSFOLLOWUPINSTRUCTIONS_ED_ALL_ED_FT
You were seen here for syncopal episode.  You likely had a vasovagal syncopal episode.  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  Follow up with your oncologist.  Follow up with your cardiologist immediately.  Return to ER for life threatening emergencies.  Follow up with your doctor.

## 2020-04-16 NOTE — ED PROVIDER NOTE - PATIENT PORTAL LINK FT
You can access the FollowMyHealth Patient Portal offered by [facility] by registering at the following website: [email or web address]. By joining [facility]’s FollowMyHealth portal, you will also be able to view your health information using other applications (apps) compatible with our system.

## 2020-04-16 NOTE — ED PROVIDER NOTE - ATTENDING CONTRIBUTION TO CARE
Attending MD Austin:  I personally have seen and examined this patient.  Resident note reviewed and agree on plan of care and except where noted.  See HPI, PE, and MDM for details.

## 2020-04-16 NOTE — ED PROVIDER NOTE - PROGRESS NOTE DETAILS
Pt stable, continues to belch. Pt with possible mesenteric lymph node, no obvious lesions responsible for hiccups. Will DC with results.

## 2020-04-16 NOTE — ED ADULT TRIAGE NOTE - AS TEMP SITE
Notice received from [employer] that Freestyle Kit Sensor is not covered by Medicare . Pt has Accu Check Comfort. Freestchastity Avelar'keshav. oral

## 2020-04-16 NOTE — ED PROVIDER NOTE - NS ED ROS FT
ROS: denies HA, weakness, dizziness, fevers/chills, nausea/vomiting, chest pain, SOB, diaphoresis, abdominal pain, diarrhea, joint pain, neuro deficits, dysuria/hematuria, rash    +belching, syncope

## 2020-04-16 NOTE — ED PROVIDER NOTE - OBJECTIVE STATEMENT
87yo M with hx of NHL (last chemo 2017) , HTN/HLD presents today with syncopal episode. Undergoing work up for ongoing hiccups by GI doctor for the last 3-4 weeks. The hiccups have not stopped and makes a belching sound. Not associated with pain but interfering with PO intake due to discomfort. ~5lb weight loss. When drawing blood at outpatient office patient was alarmed by needles and passed out. Has not happened in the past. Was unconscious for ~30 seconds. Denies cp/sob. Was feeling woozy prior to fainting when seeing needles. COVID swabbed 4/9, negative  PMD: [facility]

## 2020-04-16 NOTE — ED PROVIDER NOTE - CLINICAL SUMMARY MEDICAL DECISION MAKING FREE TEXT BOX
Attending MD Austin: 86M with HTN, ho NHL presenting with "belching" x weeks and syncope at PMD's office during blood draw. Patient states he is usually scared of needles and felt dizzy prior to LOC. Syncope likely related to vagal event from blood draw but patient is katt on arrival to mid 50s, plan for ECG, labs, screening cardiac biomarkers to ro atypical ACS, CT a/p to r/o intra-abd pathology given vague symptomatology, reassess

## 2020-04-16 NOTE — ED PROVIDER NOTE - DATE/TIME 1
16-Apr-2020 15:49

## 2020-10-17 NOTE — H&P ADULT - NSHPREVIEWOFSYSTEMS_GEN_ALL_CORE
Progress Notes by Chris Mcdermott at 05/05/17 01:19 PM     Author:  Chris Mcdermott Service:  (none) Author Type:  Certified Medical Assistant     Filed:  05/05/17 01:19 PM Encounter Date:  5/5/2017 Status:  Signed     :  Jhon Morales (Certified Medical Assistant)            Patient notified of results via My Chart.  [CF1.1M]    Revision History        User Key Date/Time User Provider Type Action    > CF1.1 05/05/17 01:19 PM Junior Jerry Kim Certified Medical Assistant Sign    M - Manual
Progress Notes by Christ Edward DO at 05/05/17 12:16 PM     Author:  Christ Edward DO Service:  (none) Author Type:  Physician     Filed:  05/05/17 12:16 PM Encounter Date:  5/5/2017 Status:  Signed     :  Christ Edward DO (Physician)            UA is negative for infection or blood.  thanks.  [ND1.1M]    Revision History        User Key Date/Time User Provider Type Action    > ND1.1 05/05/17 12:16 PM Christ Edward DO Physician Sign    M - Manual
CONSTITUTIONAL: No weakness, fevers or chills  EYES/ENT: No visual changes;  No dysphagia  NECK: No pain or stiffness  RESPIRATORY: No cough, wheezing, hemoptysis; No shortness of breath  CARDIOVASCULAR: No chest pain or palpitations; No lower extremity edema  EXTREMITIES: no le edema, cyanosis, clubbing  MUSCULOSKELETAL: left shoulder pain and swelling  GASTROINTESTINAL: No abdominal or epigastric pain. No nausea, vomiting, or hematemesis; No diarrhea or constipation. No melena or hematochezia.  BACK: No back pain  GENITOURINARY: No dysuria, frequency or hematuria  NEUROLOGICAL: No numbness or weakness  SKIN: No itching, burning, rashes, or lesions   PSYCH: no agitation  All other review of systems is negative unless indicated above.

## 2020-10-17 NOTE — H&P ADULT - PROBLEM SELECTOR PLAN 2
Patient was sent here for further management especially chemo and radiatoin  will get heme/onc in AM regarding further management  c/w tramadol for pain

## 2020-10-17 NOTE — H&P ADULT - NSHPPHYSICALEXAM_GEN_ALL_CORE
PHYSICAL EXAM:  Vital Signs Last 24 Hrs  T(C): 37.1 (10-17-20 @ 20:35)  T(F): 98.7 (10-17-20 @ 20:35), Max: 98.7 (10-17-20 @ 20:35)  HR: 100 (10-17-20 @ 20:35) (100 - 112)  BP: 125/75 (10-17-20 @ 20:35)  BP(mean): --  RR: 20 (10-17-20 @ 20:35) (20 - 20)  SpO2: 98% (10-17-20 @ 20:35) (98% - 98%)  Wt(kg): --    Constitutional: NAD, awake and alert  EYES: EOMI  ENMT:  Normal Hearing, no tonsillar exudates ; dry mucous membrane  Neck: Soft and supple, No JVD  Lungs: Breath sounds are clear bilaterally, No wheezing, rales or rhonchi  Heart: S1 and S2, regular rate and rhythm, no Murmurs, gallops or rubs  Abdomen: Bowel Sounds present, soft, nontender, nondistended, no guarding, no rebound  Extremities: LUE swelling, tenderness and large erythema of the shoulder with edema extending to hand, soft compartment, warm to touch  Vascular: 2+ peripheral pulses lower ex  Neurological: A/O x 3, no focal deficits  Musculoskeletal: 5/5 strength b/l lower extremities; LUE movement limited due to pain  Skin: No rashes  Psych: no depression or anhedonia  HEME: no bruises, no nose bleeds

## 2020-10-17 NOTE — H&P ADULT - NSICDXPASTSURGICALHX_GEN_ALL_CORE_FT
PAST SURGICAL HISTORY:  History of left shoulder fracture     Osteomyelitis of left shoulder region

## 2020-10-17 NOTE — H&P ADULT - PROBLEM SELECTOR PLAN 3
unknown most recent creatinine  will start gentle hydration  hold lisinopril  obtain outpatient records of most recent labs

## 2020-10-17 NOTE — H&P ADULT - HISTORY OF PRESENT ILLNESS
87 yo male with PMH of NHL (last chemo 2017), HTN, HLD, CKD, syncope,     P    82 M with a history of HTN, unable to recall his home medications, was picking up a friend from [facility] and while standing at a desk and had a sudden LOC. He is unsure how long the LOC lasted .But he came too by tactile stimuli applied by the [facility] staff members. He denies head and body trauma, denies pre and post syncope dizziness, no HA, no chest pain, no palpitations, no nausea, no vomit, no diaphoresis, no chills. He has been experiencing several episodes of diarrhea over the past 5 days. He denies recent antibiotic use.   The following was the hospital course:  CXR preliomiany = clear  CE negaitve x 1  BUN/ creatinine= 37/2.0  Lisinopril on hold   K+= 3.3 ( supplemented)   EKG NSR @ 60b/ min , LAD RBBB   6/17- ECHO - min MR, normal LV fxn, normal RV fxn  6/17 cards: check TTE, check spect, check TSH  6/18 K- 3.2- supplemented  NST 6/19 IMPRESSIONS:Normal Study  * The left ventricle was normal in size.  * Tracer uptake was homogeneous throughout the left  ventricle.  * Normal study; no evidence for myocardial infarction or  ischemia.  * Gated wall motion analysis was performed, and shows  normal wall motion.  6/19 D/W attending-pt. may be discharged and follow up with Dr. Poon in 1 week   /85 yo male with PMH of NHL (last chemo 2017), HTN, HLD, CKD, anemia, syncope, anxiety/adjustment disorder,   /   Patient with diffuse large B cell lymphoma who underwent left proximal humerus open biopsy, radical resection of tumor, and ORIF with IMN and cementation on 9/25/20 for a recently sustained pathalogic fracture.  He was planned to undergo postop radiation therapy, which was cancelled due to having redness and swelling of left shoulder.  Patient was treated with IV zosyn and vancomycin.  Patient was seen by orthopedics on 10/13/20, was cleared for radiation therapy under broad spectrum IV antibiotic coverage.    XR showed reactive bony changes around joint capsule and proximal humerus with extraarticular bone cement pieces that were placed during surgery, likely post surgical changes and changes due to massive tumor infiltration.    On October 16, 2020, patient was seen by psychiatry for evaluation for depression and need for psychotropic treatment.  Patient did not have suicidal ideation. No indication for inpatient psych treatment.  Patient was not interested in psychotropic medication.    On October 15, 2020, patient was seen by nephrology, was recommended to obtain renal nuclear scan w & w/o lasix.  Plan was to dc PPI and replacing with H2b.    For left shoulder, cefepime and vanco    Plan for radiation on 10/19/20    On 10/14/20, patient was seen by ID;  While in ED on 10/13/20, patient was given IV zosyn and IV vanc, was then transitioned to IV cefepime 2g IV daily, flagyl 500mg q8 PO, IV van 500mg daily IV.  Recommended to get MRI to r/o acute OM.  .  Blood culture on 10/13.      82 M with a history of HTN, unable to recall his home medications, was picking up a friend from [facility] and while standing at a desk and had a sudden LOC. He is unsure how long the LOC lasted .But he came too by tactile stimuli applied by the [facility] staff members. He denies head and body trauma, denies pre and post syncope dizziness, no HA, no chest pain, no palpitations, no nausea, no vomit, no diaphoresis, no chills. He has been experiencing several episodes of diarrhea over the past 5 days. He denies recent antibiotic use.   The following was the hospital course:  CXR preliomiany = clear  CE negaitve x 1  BUN/ creatinine= 37/2.0  Lisinopril on hold   K+= 3.3 ( supplemented)   EKG NSR @ 60b/ min , LAD RBBB   6/17- ECHO - min MR, normal LV fxn, normal RV fxn  6/17 cards: check TTE, check spect, check TSH  6/18 K- 3.2- supplemented  NST 6/19 IMPRESSIONS:Normal Study  * The left ventricle was normal in size.  * Tracer uptake was homogeneous throughout the left  ventricle.  * Normal study; no evidence for myocardial infarction or  ischemia.  * Gated wall motion analysis was performed, and shows  normal wall motion.  6/19 D/W attending-pt. may be discharged and follow up with Dr. Poon in 1 week   /87 yo male with PMH of NHL (last chemo 2017), HTN, HLD, CKD, BPH, anemia, syncope, anxiety/adjustment disorder/depression, presents here from [facility] for further management of his diffuse large B cell lymphoma.  History obtained from patient, his wife, Mayda (HCP) and chart review.  Patient was treated for NHL 5 years ago with chemotherapy and was in remission.  Earlier this year, patient had weight loss.  CT scan showed new lesion of the messentery and kidney.  He was receiving PT at home for left shoulder weakness, later found to have mass of left shoulder and pathologic fracture.  Patient underwent left proximal humerus open biopsy, radical resection of tumor, and ORIF with IMN and cementation on 9/25/20 for a recently sustained pathalogic fracture.  He was planned to undergo postop radiation therapy, which was cancelled due to having redness and swelling of left shoulder that started on 10/13/20, which prompted him to go to the hospital.  While in ED, patient was initially started on IV zosyn and IV vanc, which was then transitioned to IV cefepime, IV vanc and PO flagyl.   Patient had XR and CT scan of left shoulder, which were suggestive of osteomyelitis.  MRI could not be performed due to having metal in left shoulder.  Patient was seen by orthopedics on 10/13/20, was cleared for radiation therapy under broad spectrum IV antibiotic coverage.  On October 16, 2020, patient was seen by psychiatry for evaluation for depression and need for psychotropic treatment.  Patient did not have suicidal ideation. No indication for inpatient psych treatment.  Per wife, patient was started on antipsychotic and was given xanax .5mg bid PRN for anxiety, especially prior to any tests and blood work.  He has mild dementia and currently AAO x 2 (baseline as per wife).  Patient was also being seen by nephrology for DANIELITO.  Currently he complains of mild left shoulder pain.  He appears upset, does not want to be bothered.  Refusing any blood work or any tests at this time.  Patient otherwise denies fever, chills, nausea, vomiting, abdominal pain, diarrhea or dysuria.     85 yo male with PMH of NHL (last chemo 2017), HTN, HLD, CKD, BPH, anemia, syncope, anxiety/adjustment disorder/depression, presents here from [facility] for further management of his diffuse large B cell lymphoma.  History obtained from patient, his wife, Mayda (HCP) and chart review.  Patient was treated for NHL 5 years ago with chemotherapy and was in remission.  Earlier this year, patient had weight loss.  CT scan showed new lesion of the messentery and kidney.  He was receiving PT at home for left shoulder weakness, later found to have mass of left shoulder and pathologic fracture.  Patient underwent left proximal humerus open biopsy, radical resection of tumor, and ORIF with IMN and cementation on 9/25/20 for a recently sustained pathalogic fracture.  He was planned to undergo postop radiation therapy, which was cancelled due to having redness and swelling of left shoulder that started on 10/13/20, which prompted him to go to the hospital.  While in ED, patient was initially started on IV zosyn and IV vanc, which was then transitioned to IV cefepime, IV vanc and PO flagyl.   Patient had XR and CT scan of left shoulder, which were suggestive of osteomyelitis.  MRI could not be performed due to having metal in left shoulder.  Patient was seen by orthopedics on 10/13/20, was cleared for radiation therapy under broad spectrum IV antibiotic coverage.  On October 16, 2020, patient was seen by psychiatry for evaluation for depression and need for psychotropic treatment.  Patient did not have suicidal ideation. No indication for inpatient psych treatment.  Per wife, patient was started on antipsychotic and was given xanax .5mg bid PRN for anxiety, especially prior to any tests and blood work.  He has mild dementia and currently AAO x 2 (baseline as per wife).  Patient was also being seen by nephrology for DANIELITO.  Currently he complains of mild left shoulder pain.  He appears upset, does not want to be bothered.  Refusing any blood work or any tests at this time.  Patient otherwise denies fever, chills, nausea, vomiting, abdominal pain, diarrhea or dysuria.

## 2020-10-17 NOTE — H&P ADULT - NSICDXPASTMEDICALHX_GEN_ALL_CORE_FT
PAST MEDICAL HISTORY:  BPH (benign prostatic hyperplasia)     Chronic kidney disease (CKD)     Diffuse large B cell lymphoma     HLD (hyperlipidemia)     HTN (hypertension)     Syncope

## 2020-10-17 NOTE — H&P ADULT - ATTENDING COMMENTS
Regarding this case, a review of paper chart from prior hospitalization, which was complex with multiple complications, as part of his admission process upon transfer to our hospital. Full prior management at prior hospitalization was detailed in above HPI and plan.  Further care is to be assumed by teaching hospitalist in am to whom this case will be assigned; my interaction consisted of the initial encounter of care. Total time spent on non-face-to-face services was 35 minutes; start time was 9 pm on 10/17/20 and end time was 935 pm on 10/17/20.  This time was spent on reviewing the myriad of lab tests, imaging results, progress notes, and consultant notes from the previous hospitalization and outpatient records, and was in addition to the actual H/P  performed as part of the admission process at our hospital.

## 2020-10-17 NOTE — H&P ADULT - NSHPLABSRESULTS_GEN_ALL_CORE
Labs 10/13/20: CBC --> wbc 7.89, Hb 7.9, platelet 445  10/14/20: sodium 139, potassium 4.6, Chloride 102, CO2 26, Calcium 8.9, Phos 3.8, Albumin 3.3, total protein 5.6, alk phos 99, SGOT 24, SGPT 17, total bili 0.6, magnesium 1.9, ESR >140    XR of left shoulder/humerus 10/13/20: comminuted fracture of the head and neck of the left humerus with permeative pattern and soft tissue osseous fragments, nonspecific.  Given history, this may be osteomyelitis.  Neoplasia is not excluded.  Duplex of left arm 10/13/20: negative for  DVT  CT abd w/o contrast: multifocal neoplastic disease  CT upper Ext w/o contrast: model demineralized appearance of the head of the humerus with loss of normal cortical contour.  Osteomyelitis cannot be excluded based upon these findings.  Recommendation for correlation with MRI.  The differential diagnosis includes osteomyelitis, metastatic disase vs. osteolysis due to traumatic injury and placement of an intramedullary julianne. PATIENT IS REFUSING LABS, EKG, IMAGING AT THIS TIME    Records reviewed from previous hospitalization.    Labs 10/13/20: CBC --> wbc 7.89, Hb 7.9, platelet 445  10/14/20: sodium 139, potassium 4.6, Chloride 102, CO2 26, Calcium 8.9, Phos 3.8, Albumin 3.3, total protein 5.6, alk phos 99, SGOT 24, SGPT 17, total bili 0.6, magnesium 1.9, ESR >140    XR of left shoulder/humerus 10/13/20: comminuted fracture of the head and neck of the left humerus with permeative pattern and soft tissue osseous fragments, nonspecific.  Given history, this may be osteomyelitis.  Neoplasia is not excluded.  Duplex of left arm 10/13/20: negative for  DVT  CT abd w/o contrast: multifocal neoplastic disease  CT upper Ext w/o contrast: model demineralized appearance of the head of the humerus with loss of normal cortical contour.  Osteomyelitis cannot be excluded based upon these findings.  Recommendation for correlation with MRI.  The differential diagnosis includes osteomyelitis, metastatic disase vs. osteolysis due to traumatic injury and placement of an intramedullary julianne. PATIENT IS REFUSING LABS, EKG, IMAGING AT THIS TIME  Labs and EKG ordered    Records reviewed from previous hospitalization.    Labs 10/13/20: CBC --> wbc 7.89, Hb 7.9, platelet 445  10/14/20: sodium 139, potassium 4.6, Chloride 102, CO2 26, Calcium 8.9, Phos 3.8, Albumin 3.3, total protein 5.6, alk phos 99, SGOT 24, SGPT 17, total bili 0.6, magnesium 1.9, ESR >140    XR of left shoulder/humerus 10/13/20: comminuted fracture of the head and neck of the left humerus with permeative pattern and soft tissue osseous fragments, nonspecific.  Given history, this may be osteomyelitis.  Neoplasia is not excluded.  Duplex of left arm 10/13/20: negative for  DVT  CT abd w/o contrast: multifocal neoplastic disease  CT upper Ext w/o contrast: model demineralized appearance of the head of the humerus with loss of normal cortical contour.  Osteomyelitis cannot be excluded based upon these findings.  Recommendation for correlation with MRI.  The differential diagnosis includes osteomyelitis, metastatic disase vs. osteolysis due to traumatic injury and placement of an intramedullary julianne.

## 2020-10-17 NOTE — H&P ADULT - PROBLEM SELECTOR PLAN 9
I had a 16 minute discussion with patient's wife Mayda (HCP) regarding advanced directives.  I had discussed with her the current plan of care.  I had also discussed all resuscitative measures and she verbalized understanding.  She confimed that patient's wish was to have everything done as needed.  Currently patient is FULL CODE. I had a 16 minute discussion with patient's wife Mayda (HCP) regarding advanced directives.  I had discussed with her the current plan of care.  I had also discussed all resuscitative measures and she verbalized understanding.  She confirmed that patient's wish was to have everything done as needed.  Currently patient is FULL CODE.

## 2020-10-17 NOTE — H&P ADULT - ASSESSMENT
87 yo male with PMH of NHL (last chemo 2017), HTN, HLD, CKD, BPH, anemia, syncope, anxiety/adjustment disorder/depression, presents here from [facility] for further management of his diffuse large B cell lymphoma.

## 2020-10-17 NOTE — GOALS OF CARE CONVERSATION - ADVANCED CARE PLANNING - CONVERSATION DETAILS
I had a 16 minute discussion with patient's wife Mayda (HCP) regarding advanced directives.  I had discussed with her the current plan of care.  I had also discussed all resuscitative measures and she verbalized understanding.  She confirmed that patient's wish is to have everything done as needed.  Currently patient is FULL CODE.

## 2020-10-17 NOTE — H&P ADULT - PROBLEM SELECTOR PLAN 1
Patient with infiltrating mass of left shoulder and possible osteomyelitis   c/w IV vancomycin 500mg qdaily, cefepime IV 2g qdaily and PO flagyl  check blood culture, ESR/CRP  obtain ID consult in AM  c/w tramadol for pain

## 2020-10-18 NOTE — CONSULT NOTE ADULT - ASSESSMENT
87 yo male with PMH of NHL (last chemo 2017), HTN, HLD, CKD, BPH, anemia, syncope, anxiety/adjustment disorder/depression, presents here from [facility] for further management of his diffuse large B cell lymphoma, and found to have erythema and swelling of Left shoulder concerning for OM    Left shoulder Erythema R/o Acute OM - in setting of ORIF, shoulder tumor resection, and Intramedullary nail and cementation on 9/25. Ad[facility] to [facility]2020 and XR and CT concerning for acute OM. He received vanc/zosyn then admitted with vanc, cefepime, and flagyll since 10/13. No fevers before or during admission. No other symptoms. Pending chemo/radiation.     Recommend:   - Send vanc trough prior to next dose  - adjust vanc according to trough  - Cont Cefepime 2gqd  and flagyl 500q8  - Request CT imaging to eval with radiology here or consider repeat CT of shoulder as he was unable to do MRI due to intramedullary nail  - Consult ortho    Julio Perez MD  Fellow, Infectious Diseases, PGY-4  Pager: [phone]  Before 9am or after 5pm/Weekends: Call [phone]

## 2020-10-18 NOTE — PROGRESS NOTE ADULT - PROBLEM SELECTOR PLAN 3
unknown most recent creatinine. Hold lisinopril.  Suspect due to dehydration. Start NS at 75 cc/hr   Monitor lactate and BMP  obtain outpatient records of most recent labs

## 2020-10-18 NOTE — PROGRESS NOTE ADULT - PROBLEM SELECTOR PLAN 1
Patient with infiltrating mass of left shoulder and possible osteomyelitis   c/w IV vancomycin 500mg qdaily, cefepime IV 2g qdaily and PO flagyl  Follow up blood culture. ESR/CRP very high  ID consult called this morning  c/w tramadol for pain

## 2020-10-18 NOTE — PROGRESS NOTE ADULT - SUBJECTIVE AND OBJECTIVE BOX
[facility] Division of Hospital Medicine  Quincy Flaherty MD, REBEKAH  Pager (ANICETO, 7G-5P): [phone]  Other Times:  [phone]    Patient is a 86y old  Male who presents with a chief complaint of sent here for chemo and radiation (17 Oct 2020 20:23)      SUBJECTIVE / OVERNIGHT EVENTS:  No events overnight. The patient was sleeping at the time of my examination. Was briefly arousable, and did state his name, but went to sleep quickly after that. Not in any distress.     MEDICATIONS  (STANDING):  amLODIPine   Tablet 2.5 milliGRAM(s) Oral daily  atorvastatin 80 milliGRAM(s) Oral at bedtime  cefepime   IVPB 2000 milliGRAM(s) IV Intermittent daily  heparin   Injectable 5000 Unit(s) SubCutaneous every 8 hours  influenza   Vaccine 0.5 milliLiter(s) IntraMuscular once  lactobacillus acidophilus 1 Tablet(s) Oral daily  latanoprost 0.005% Ophthalmic Solution 1 Drop(s) Both EYES at bedtime  metroNIDAZOLE    Tablet 500 milliGRAM(s) Oral every 8 hours  mirtazapine 7.5 milliGRAM(s) Oral at bedtime  timolol 0.5% Solution 1 Drop(s) Both EYES two times a day  vancomycin  IVPB 500 milliGRAM(s) IV Intermittent daily    MEDICATIONS  (PRN):  acetaminophen   Tablet .. 650 milliGRAM(s) Oral every 6 hours PRN Mild Pain (1 - 3), Moderate Pain (4 - 6)  ALPRAZolam 0.5 milliGRAM(s) Oral two times a day PRN anxiety  traMADol 25 milliGRAM(s) Oral every 6 hours PRN Moderate Pain (4 - 6)    I&O's Summary    17 Oct 2020 07:01  -  18 Oct 2020 07:00  --------------------------------------------------------  IN: 0 mL / OUT: 300 mL / NET: -300 mL        PHYSICAL EXAM:  Vital Signs Last 24 Hrs  T(C): 36.5 (18 Oct 2020 12:25), Max: 37.1 (17 Oct 2020 20:35)  T(F): 97.7 (18 Oct 2020 12:25), Max: 98.7 (17 Oct 2020 20:35)  HR: 103 (18 Oct 2020 12:25) (100 - 120)  BP: 117/67 (18 Oct 2020 12:25) (117/67 - 138/79)  BP(mean): --  RR: 19 (18 Oct 2020 12:25) (18 - 20)  SpO2: 97% (18 Oct 2020 12:25) (95% - 98%)    Constitutional: NAD, sleeping  EYES: EOMI  ENMT:  Normal Hearing  Neck: No JVD  Lungs: Breath sounds are clear bilaterally (anteriorly), No wheezing, rales or rhonchi  Heart: S1 and S2, regular rate and rhythm, no Murmurs, gallops or rubs  Abdomen: Bowel Sounds present, soft, nontender, nondistended, no guarding, no rebound  Extremities: LUE swelling, tenderness and large erythema of the left shoulder with edema extending to hand, soft compartment, warm to touch  Vascular: 2+ peripheral pulses lower ex  Neurological: AO x 1 at least (could not assess, went to sleep quickly)      LABS:                        8.3    12.34 )-----------( 366      ( 18 Oct 2020 07:17 )             26.3     10-18    130<L>  |  95<L>  |  21  ----------------------------<  99  3.9   |  23  |  1.74<H>    Ca    8.8      18 Oct 2020 07:17  Phos  3.9     10-18  Mg     2.3     10-18    TPro  6.1  /  Alb  2.8<L>  /  TBili  0.4  /  DBili  x   /  AST  28  /  ALT  12  /  AlkPhos  91  10-18    PT/INR - ( 18 Oct 2020 09:06 )   PT: 15.4 sec;   INR: 1.32 ratio           RADIOLOGY & ADDITIONAL TESTS:    Lab Results Reviewed: leukocytosis with neutrophil predominance, high , CRP high 28, lactate 2.2, Na mildly low 130, Cr 1.74

## 2020-10-18 NOTE — CHART NOTE - NSCHARTNOTEFT_GEN_A_CORE
Internal Medicine PA Note     87 yo male with PMH of NHL (last chemo 2017), HTN, HLD, CKD, BPH, anemia, syncope, anxiety/adjustment disorder/depression, presents here from [facility] for further management of his diffuse large B cell lymphoma.      Notified by RN that patient with HR 120s, patient seen and assessed by me. Patient is A&Ox1-2 at baseline.   Patient denies any chest pain, abdominal pain, dypsnea, headache, fever, chills.  EKG ordered and results reviewed. Patient with EKG that suggests Sinus Tachycardia with PVC, LAD, and RBBB.  Discussed EKG results with Hospitalist, Dr. Smith, will defer cardiology, until further records from outpatient hospital are followed up on. Will continue to monitor patient.   Per Dr. Smith, give Tylenol PRN dose, given that patient has OM of the LEFT shoulder.   Plan d/w ELLA STOVALL  Dept of Medicine   [id number] Pt resting,no respiratory distress noticed.

## 2020-10-18 NOTE — CONSULT NOTE ADULT - SUBJECTIVE AND OBJECTIVE BOX
Patient is a 86y old  Male who presents with a chief complaint of sent here for chemo and radiation (18 Oct 2020 13:47)    HPI:  85 yo male with PMH of NHL (last chemo 2017), HTN, HLD, CKD, BPH, anemia, syncope, anxiety/adjustment disorder/depression, presents here from [facility] for further management of his diffuse large B cell lymphoma.      History obtained from patient, his wife, Mayda (HCP) and chart review.  Patient was treated for NHL 5 years ago with chemotherapy and was in remission.  Earlier this year, patient had weight loss.  CT scan showed new lesion of the messentery and kidney.  He was receiving PT at home for left shoulder weakness, later found to have mass of left shoulder and pathologic fracture.  Patient underwent left proximal humerus open biopsy, radical resection of tumor, and ORIF with IMN and cementation on 9/25/20 for a recently sustained pathalogic fracture.      He was planned to undergo postop radiation therapy, which was cancelled due to having redness and swelling of left shoulder that started on 10/13/20, [facility].    While in ED, patient was initially started on IV zosyn and IV vanc, which was then transitioned to IV cefepime, IV vanc and PO flagyl.   Patient had XR and CT scan of left shoulder, which were suggestive of osteomyelitis.  MRI could not be performed due to having metal in left shoulder.  Patient was seen by orthopedics on 10/13/20, was cleared for radiation therapy under broad spectrum IV antibiotic coverage.  On October 16, 2020, patient was seen by psychiatry for evaluation for depression and need for psychotropic treatment.  Patient did not have suicidal ideation. No indication for inpatient psych treatment.  Per wife, patient was started on antipsychotic and was given xanax .5mg bid PRN for anxiety, especially prior to any tests and blood work.  He has mild dementia and currently AAO x 2 (baseline as per wife).  Patient was also being seen by nephrology for DANIELITO.  Currently he complains of mild left shoulder pain. Patient otherwise denies fever, chills, nausea, vomiting, abdominal pain, diarrhea or dysuria.               prior hospital charts reviewed [  ]  primary team notes reviewed [  ]  other consultant notes reviewed [  ]    PAST MEDICAL & SURGICAL HISTORY:  Syncope    Chronic kidney disease (CKD)    Diffuse large B cell lymphoma    HLD (hyperlipidemia)    BPH (benign prostatic hyperplasia)    HTN (hypertension)    History of left shoulder fracture    Osteomyelitis of left shoulder region        Allergies  No Known Allergies    ANTIMICROBIALS (past 90 days)  MEDICATIONS  (STANDING):  cefepime   IVPB   100 mL/Hr IV Intermittent (10-18-20 @ 13:15)    metroNIDAZOLE    Tablet   500 milliGRAM(s) Oral (10-18-20 @ 06:00)   500 milliGRAM(s) Oral (10-17-20 @ 22:59)    vancomycin  IVPB   100 mL/Hr IV Intermittent (10-18-20 @ 14:06)        cefepime   IVPB 2000 daily  metroNIDAZOLE    Tablet 500 every 8 hours  vancomycin  IVPB 500 daily    OTHER MEDS: MEDICATIONS  (STANDING):  acetaminophen   Tablet .. 650 every 6 hours PRN  ALPRAZolam 0.5 two times a day PRN  amLODIPine   Tablet 2.5 daily  atorvastatin 80 at bedtime  heparin   Injectable 5000 every 8 hours  influenza   Vaccine 0.5 once  mirtazapine 7.5 at bedtime  traMADol 25 every 6 hours PRN    SOCIAL HISTORY:   Lives with wife, has 5 kids and few grandkids (17 Oct 2020 20:23)      FAMILY HISTORY:  FH: brain cancer  brother    FH: lung cancer  father      REVIEW OF SYSTEMS  [  ] ROS unobtainable because:    [  ] All other systems negative except as noted below:	    Constitutional:  [ ] fever [ ] chills  [ ] weight loss  [ ] weakness  Skin:  [ ] rash [ ] phlebitis	  Eyes: [ ] icterus [ ] pain  [ ] discharge	  ENMT: [ ] sore throat  [ ] thrush [ ] ulcers [ ] exudates  Respiratory: [ ] dyspnea [ ] hemoptysis [ ] cough [ ] sputum	  Cardiovascular:  [ ] chest pain [ ] palpitations [ ] edema	  Gastrointestinal:  [ ] nausea [ ] vomiting [ ] diarrhea [ ] constipation [ ] pain	  Genitourinary:  [ ] dysuria [ ] frequency [ ] hematuria [ ] discharge [ ] flank pain  [ ] incontinence  Musculoskeletal:  [ ] myalgias [ ] arthralgias [ ] arthritis  [ ] back pain  Neurological:  [ ] headache [ ] seizures  [ ] confusion/altered mental status  Psychiatric:  [ ] anxiety [ ] depression	  Hematology/Lymphatics:  [ ] lymphadenopathy  Endocrine:  [ ] adrenal [ ] thyroid  Allergic/Immunologic:	 [ ] transplant [ ] seasonal    Vital Signs Last 24 Hrs  T(F): 97.7 (10-18-20 @ 12:25), Max: 98.7 (10-17-20 @ 20:35)  Vital Signs Last 24 Hrs  HR: 103 (10-18-20 @ 12:25) (100 - 120)  BP: 117/67 (10-18-20 @ 12:25) (117/67 - 138/79)  RR: 19 (10-18-20 @ 12:25)  SpO2: 97% (10-18-20 @ 12:25) (95% - 98%)  Wt(kg): --    PHYSICAL EXAM:  Constitutional: non-toxic, no distress  HEAD/EYES: anicteric, no conjunctival injection  ENT:  supple, no thrush  Cardiovascular:   normal S1, S2, no murmur, no edema  Respiratory:  clear BS bilaterally, no wheezes, no rales  GI:  soft, non-tender, normal bowel sounds  :  no tobar, no CVA tenderness  Musculoskeletal:  no synovitis, normal ROM  Neurologic: awake and alert, normal strength, no focal findings  Skin:  no rash, no erythema, no phlebitis  Heme/Onc: no lymphadenopathy   Psychiatric:  awake, alert, appropriate mood                            8.3    12.34 )-----------( 366      ( 18 Oct 2020 07:17 )             26.3   10-18    130<L>  |  95<L>  |  21  ----------------------------<  99  3.9   |  23  |  1.74<H>    Ca    8.8      18 Oct 2020 07:17  Phos  3.9     10-18  Mg     2.3     10-18    TPro  6.1  /  Alb  2.8<L>  /  TBili  0.4  /  DBili  x   /  AST  28  /  ALT  12  /  AlkPhos  91  10-18    MICROBIOLOGY:              RADIOLOGY:  imaging below personally reviewed and agree with findings Patient is a 86y old  Male who presents with a chief complaint of sent here for chemo and radiation (18 Oct 2020 13:47)    HPI:  85 yo male with PMH of NHL (last chemo 2017), HTN, HLD, CKD, BPH, anemia, syncope, anxiety/adjustment disorder/depression, presents here from [facility] for further management of his diffuse large B cell lymphoma.      History obtained from patient, his wife, Mayda (HCP) and chart review.  Patient was treated for NHL 5 years ago with chemotherapy and was in remission.  Earlier this year, patient had weight loss.  CT scan showed new lesion of the messentery and kidney.  He was receiving PT at home for left shoulder weakness, later found to have mass of left shoulder and pathologic fracture.  Patient underwent left proximal humerus open biopsy, radical resection of tumor, and ORIF with IMN and cementation on 9/25/20 for a recently sustained pathalogic fracture.      He was planned to undergo postop radiation therapy, which was cancelled due to having redness and swelling of left shoulder that started on 10/13/20, [facility]    While in ED, patient was initially started on IV zosyn and IV vanc, which was then transitioned to IV cefepime, IV vanc and PO flagyl.   Patient had XR and CT scan of left shoulder, which were suggestive of osteomyelitis.  MRI could not be performed due to having metal in left shoulder.  Patient was seen by orthopedics on 10/13/20, was cleared for radiation therapy under broad spectrum IV antibiotic coverage.  On October 16, 2020, patient was seen by psychiatry for evaluation for depression and need for psychotropic treatment.  Patient did not have suicidal ideation. No indication for inpatient psych treatment.  Per wife, patient was started on antipsychotic and was given xanax .5mg bid PRN for anxiety, especially prior to any tests and blood work.  He has mild dementia and currently AAO x 2 (baseline as per wife).  Patient was also being seen by nephrology for DANIELITO.  Currently he complains of mild left shoulder pain. Patient otherwise denies fever, chills, nausea, vomiting, abdominal pain, diarrhea or dysuria.      Transferred to [facility] on 10/17. Afebrile on arrival, remains on vancomycin, cefepime and flagyll. Reports no fevers, nausea, vomiting, diarrhea since.  Reports he is unable to notice any change in the left shoulder erythema. Endorses no changes in sensation or swelling.   ID consulted for abx management.     prior hospital charts reviewed [ x ]  primary team notes reviewed [ x ]  other consultant notes reviewed [ x ]    PAST MEDICAL & SURGICAL HISTORY:  Syncope  Chronic kidney disease (CKD)  Diffuse large B cell lymphoma  HLD (hyperlipidemia)  BPH (benign prostatic hyperplasia)  HTN (hypertension)  History of left shoulder fracture  Osteomyelitis of left shoulder region      Allergies  No Known Allergies    ANTIMICROBIALS (past 90 days)  MEDICATIONS  (STANDING):  cefepime   IVPB   100 mL/Hr IV Intermittent (10-18-20 @ 13:15)    metroNIDAZOLE    Tablet   500 milliGRAM(s) Oral (10-18-20 @ 06:00)   500 milliGRAM(s) Oral (10-17-20 @ 22:59)    vancomycin  IVPB   100 mL/Hr IV Intermittent (10-18-20 @ 14:06)    cefepime   IVPB 2000 daily  metroNIDAZOLE    Tablet 500 every 8 hours  vancomycin  IVPB 500 daily    OTHER MEDS: MEDICATIONS  (STANDING):  acetaminophen   Tablet .. 650 every 6 hours PRN  ALPRAZolam 0.5 two times a day PRN  amLODIPine   Tablet 2.5 daily  atorvastatin 80 at bedtime  heparin   Injectable 5000 every 8 hours  influenza   Vaccine 0.5 once  mirtazapine 7.5 at bedtime  traMADol 25 every 6 hours PRN    SOCIAL HISTORY:   Lives with wife, has 5 kids and few grandkids. Was in the [employer].     FAMILY HISTORY:  FH: brain cancer  brother    FH: lung cancer  father      REVIEW OF SYSTEMS  [  ] ROS unobtainable because:    [ x] All other systems negative except as noted below:	    Constitutional:  [ ] fever [ ] chills  [ ] weight loss  [ ] weakness  Skin:  [ x rash [ ] phlebitis	  Eyes: [ ] icterus [ ] pain  [ ] discharge	  ENMT: [ ] sore throat  [ ] thrush [ ] ulcers [ ] exudates  Respiratory: [ ] dyspnea [ ] hemoptysis [ ] cough [ ] sputum	  Cardiovascular:  [ ] chest pain [ ] palpitations [ ] edema	  Gastrointestinal:  [ ] nausea [ ] vomiting [ ] diarrhea [ ] constipation [ ] pain	  Genitourinary:  [ ] dysuria [ ] frequency [ ] hematuria [ ] discharge [ ] flank pain  [ ] incontinence  Musculoskeletal:  [ ] myalgias [ ] arthralgias [ ] arthritis  [ ] back pain  Neurological:  [ ] headache [ ] seizures  [ ] confusion/altered mental status  Psychiatric:  [ ] anxiety [ ] depression	  Hematology/Lymphatics:  [ ] lymphadenopathy  Endocrine:  [ ] adrenal [ ] thyroid  Allergic/Immunologic:	 [ ] transplant [ ] seasonal    Vital Signs Last 24 Hrs  T(F): 97.7 (10-18-20 @ 12:25), Max: 98.7 (10-17-20 @ 20:35)  Vital Signs Last 24 Hrs  HR: 103 (10-18-20 @ 12:25) (100 - 120)  BP: 117/67 (10-18-20 @ 12:25) (117/67 - 138/79)  RR: 19 (10-18-20 @ 12:25)  SpO2: 97% (10-18-20 @ 12:25) (95% - 98%)  Wt(kg): --    PHYSICAL EXAM:  Constitutional: non-toxic, no distress  HEAD/EYES: anicteric, no conjunctival injection  ENT:  supple, no thrush  Cardiovascular:   normal S1, S2, no murmur, no edema  Respiratory:  clear BS bilaterally, no wheezes, no rales  GI:  soft, non-tender, normal bowel sounds  :  no tobar, no CVA tenderness  Musculoskeletal:  Left shoulder with circumferential erythema and swelling, mild tenderness to palpation  Neurologic: awake and alert, unable to lift at left shoulder,   Skin:  no rash, no erythema, no phlebitis  Heme/Onc: no lymphadenopathy   Psychiatric:  awake, alert, appropriate mood                            8.3    12.34 )-----------( 366      ( 18 Oct 2020 07:17 )             26.3   10-18    130<L>  |  95<L>  |  21  ----------------------------<  99  3.9   |  23  |  1.74<H>    Ca    8.8      18 Oct 2020 07:17  Phos  3.9     10-18  Mg     2.3     10-18    TPro  6.1  /  Alb  2.8<L>  /  TBili  0.4  /  DBili  x   /  AST  28  /  ALT  12  /  AlkPhos  91  10-18    MICROBIOLOGY:  Blood clx 10/18 pending      RADIOLOGY:  None here    [facility]:   XR of left shoulder/humerus 10/13/20: comminuted fracture of the head and neck of the left humerus with permeative pattern and soft tissue osseous fragments, nonspecific.  Given history, this may be osteomyelitis.  Neoplasia is not excluded.  Duplex of left arm 10/13/20: negative for  DVT  CT abd w/o contrast: multifocal neoplastic disease  CT upper Ext w/o contrast: model demineralized appearance of the head of the humerus with loss of normal cortical contour.  Osteomyelitis cannot be excluded based upon these findings.  Recommendation for correlation with MRI.  The differential diagnosis includes osteomyelitis, metastatic disase vs. osteolysis due to traumatic injury and placement of an intramedullary julianne.

## 2020-10-18 NOTE — CONSULT NOTE ADULT - ASSESSMENT
Patient is an 87 y/o M w/ a PMHx of NHL (treatment history unclear, ? recent relapse w/ L shoulder mass and pathologic fracture s/p left proximal humerus open biopsy, radical resection of tumor, and ORIF with IMN and cementation on 9/25/20), HTN, HLD, CKD, BPH, anemia, syncope, and anxiety/adjustment disorder/depression who was transferred to [facility] from the [facility] for further management of his diffuse large B cell lymphoma. Hematology was consulted for further evaluation.      *** NOTE INCOMPLETE *** Patient is an 87 y/o M w/ a PMHx of NHL (treatment history unclear, ? recent relapse w/ L shoulder mass and pathologic fracture s/p left proximal humerus open biopsy, radical resection of tumor, and ORIF with IMN and cementation on 9/25/20), HTN, HLD, CKD, BPH, anemia, syncope, and anxiety/adjustment disorder/depression who was transferred to [facility] from the [facility] for further management of his diffuse large B cell lymphoma. Hematology was consulted for further evaluation.    # DLBCL  - Patient with a reported history of DLBCL, GCB subtype. Per chart review, patient last received chemotherapy in 2017 though it is unclear what he received. It appears his lymphoma recently relapsed as he was found to have a L shoulder mass and pathologic fracture. His is s/p ORIF and radical resection of tumor on 9/25. Per d/w Hematologist who was contacted prior to patient's transfer to 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   - Attempted to call patient's prior Hematologist (Dr. Jose Mcintosh in [place], [phone]) but was unsuccessful. Will need to try again tomorrow  - Please obtain prior Hematologic records and recent hospitalization records to help provide collateral information  - Please obtain restaging scans including CT C/A/P and MRI Brain w/ contrast. Would recommend CT L Shoulder as well given exam findings and recent surgery  - If the concern for secondary CNS involvement is accurate, patient will need an LP for IT chemo and CSF analysis  - Appreciate ID evaluation. C/w IV antibiotics as per ID  - Recommend Orthopedic evaluation once CT L shoulder is obtained  - Please check CBC WITH DIFF and TLS labs (CMP, Phos, LDH, and Uric acid) daily  - Appreciate care as per Medicine team  - Will continue to follow    Plan d/w primary team NP    Ramesh Cox, PGY5  Hematology-Oncology Fellow  Pager: [phone]/ [phone]

## 2020-10-18 NOTE — CONSULT NOTE ADULT - SUBJECTIVE AND OBJECTIVE BOX
HPI:  87 yo male with PMH of NHL (last chemo 2017), HTN, HLD, CKD, BPH, anemia, syncope, anxiety/adjustment disorder/depression, presents here from [facility] for further management of his diffuse large B cell lymphoma.  History obtained from patient, his wife, Mayda (HCP) and chart review.  Patient was treated for NHL 5 years ago with chemotherapy and was in remission.  Earlier this year, patient had weight loss.  CT scan showed new lesion of the messentery and kidney.  He was receiving PT at home for left shoulder weakness, later found to have mass of left shoulder and pathologic fracture.  Patient underwent left proximal humerus open biopsy, radical resection of tumor, and ORIF with IMN and cementation on 9/25/20 for a recently sustained pathalogic fracture.  He was planned to undergo postop radiation therapy, which was cancelled due to having redness and swelling of left shoulder that started on 10/13/20, which prompted him to go to the hospital.  While in ED, patient was initially started on IV zosyn and IV vanc, which was then transitioned to IV cefepime, IV vanc and PO flagyl.   Patient had XR and CT scan of left shoulder, which were suggestive of osteomyelitis.  MRI could not be performed due to having metal in left shoulder.  Patient was seen by orthopedics on 10/13/20, was cleared for radiation therapy under broad spectrum IV antibiotic coverage.  On October 16, 2020, patient was seen by psychiatry for evaluation for depression and need for psychotropic treatment.  Patient did not have suicidal ideation. No indication for inpatient psych treatment.  Per wife, patient was started on antipsychotic and was given xanax .5mg bid PRN for anxiety, especially prior to any tests and blood work.  He has mild dementia and currently AAO x 2 (baseline as per wife).  Patient was also being seen by nephrology for DANIELITO.  Currently he complains of mild left shoulder pain.  He appears upset, does not want to be bothered.  Refusing any blood work or any tests at this time.  Patient otherwise denies fever, chills, nausea, vomiting, abdominal pain, diarrhea or dysuria.     (17 Oct 2020 20:23)      PAST MEDICAL & SURGICAL HISTORY:  Syncope    Chronic kidney disease (CKD)    Diffuse large B cell lymphoma    HLD (hyperlipidemia)    BPH (benign prostatic hyperplasia)    HTN (hypertension)    History of left shoulder fracture    Osteomyelitis of left shoulder region    Review of Systems:   CONSTITUTIONAL: No fever, weight loss, or fatigue  EYES: No eye pain, visual disturbances, or discharge  ENMT:  No difficulty hearing, tinnitus, vertigo; No sinus or throat pain  NECK: No pain or stiffness  BREASTS: No pain, masses, or nipple discharge  RESPIRATORY: No cough, wheezing, chills or hemoptysis; No shortness of breath  CARDIOVASCULAR: No chest pain, palpitations, dizziness, or leg swelling  GASTROINTESTINAL: No abdominal or epigastric pain. No nausea, vomiting, or hematemesis; No diarrhea or constipation. No melena or hematochezia.  GENITOURINARY: No dysuria, frequency, hematuria, or incontinence  NEUROLOGICAL: No headaches, memory loss, loss of strength, numbness, or tremors  SKIN: No itching, burning, rashes, or lesions   LYMPH NODES: No enlarged glands  ENDOCRINE: No heat or cold intolerance; No hair loss  MUSCULOSKELETAL: No joint pain or swelling; No muscle, back, or extremity pain  PSYCHIATRIC: No depression, anxiety, mood swings, or difficulty sleeping  HEME/LYMPH: No easy bruising, or bleeding gums  ALLERY AND IMMUNOLOGIC: No hives or eczema    Allergies    No Known Allergies    Intolerances    Social History: No active smoking, EtOH, or illicit drug use    FAMILY HISTORY:  FH: brain cancer  brother    FH: lung cancer  father    MEDICATIONS  (STANDING):  amLODIPine   Tablet 2.5 milliGRAM(s) Oral daily  atorvastatin 80 milliGRAM(s) Oral at bedtime  cefepime   IVPB 2000 milliGRAM(s) IV Intermittent daily  heparin   Injectable 5000 Unit(s) SubCutaneous every 8 hours  influenza   Vaccine 0.5 milliLiter(s) IntraMuscular once  lactobacillus acidophilus 1 Tablet(s) Oral daily  latanoprost 0.005% Ophthalmic Solution 1 Drop(s) Both EYES at bedtime  metroNIDAZOLE    Tablet 500 milliGRAM(s) Oral every 8 hours  mirtazapine 7.5 milliGRAM(s) Oral at bedtime  timolol 0.5% Solution 1 Drop(s) Both EYES two times a day  vancomycin  IVPB 500 milliGRAM(s) IV Intermittent daily    MEDICATIONS  (PRN):  acetaminophen   Tablet .. 650 milliGRAM(s) Oral every 6 hours PRN Mild Pain (1 - 3), Moderate Pain (4 - 6)  ALPRAZolam 0.5 milliGRAM(s) Oral two times a day PRN anxiety  traMADol 25 milliGRAM(s) Oral every 6 hours PRN Moderate Pain (4 - 6)        CAPILLARY BLOOD GLUCOSE        I&O's Summary    17 Oct 2020 07:01  -  18 Oct 2020 07:00  --------------------------------------------------------  IN: 0 mL / OUT: 300 mL / NET: -300 mL    18 Oct 2020 07:01  -  18 Oct 2020 17:38  --------------------------------------------------------  IN: 50 mL / OUT: 350 mL / NET: -300 mL    Vital Signs Last 24 Hrs  T(C): 36.5 (18 Oct 2020 12:25), Max: 37.1 (17 Oct 2020 20:35)  T(F): 97.7 (18 Oct 2020 12:25), Max: 98.7 (17 Oct 2020 20:35)  HR: 103 (18 Oct 2020 12:25) (100 - 120)  BP: 117/67 (18 Oct 2020 12:25) (117/67 - 138/79)  BP(mean): --  RR: 19 (18 Oct 2020 12:25) (18 - 20)  SpO2: 97% (18 Oct 2020 12:25) (95% - 98%)    PHYSICAL EXAM:  GENERAL: NAD, Laying in bed  HEENT: NC/AT, Slightly dry mucous membranes  NECK: Supple  CHEST/LUNG: Grossly CTAB; No wheeze appreciated  HEART: Mild tachycardia, Regular rhythm  ABDOMEN: +BS, Soft, NT  EXTREMITIES: No LE edema, + L Shoulder edema with overlying erythema and warmth, Reduced ROM of LUE  NEUROLOGY: A+Ox1-2 (oriented to name, knows he is in a hospital, though unsure which one), Answering questions and following commands   SKIN: L shoulder erythema and warmth  PSYCH: Calm and cooperative    LABS:                        8.3    12.34 )-----------( 366      ( 18 Oct 2020 07:17 )             26.3     10-18    130<L>  |  95<L>  |  21  ----------------------------<  99  3.9   |  23  |  1.74<H>    Ca    8.8      18 Oct 2020 07:17  Phos  3.9     10-18  Mg     2.3     10-18    TPro  6.1  /  Alb  2.8<L>  /  TBili  0.4  /  DBili  x   /  AST  28  /  ALT  12  /  AlkPhos  91  10-18    PT/INR - ( 18 Oct 2020 09:06 )   PT: 15.4 sec;   INR: 1.32 ratio      RADIOLOGY & ADDITIONAL TESTS:  Studies reviewed. HPI:  Patient is an 85 y/o M w/ a PMHx of NHL (treatment history unclear, ? recent relapse w/ L shoulder mass and pathologic fracture s/p left proximal humerus open biopsy, radical resection of tumor, and ORIF with IMN and cementation on 9/25/20), HTN, HLD, CKD, BPH, anemia, syncope, and anxiety/adjustment disorder/depression who was transferred to [facility] from the [facility] for further management of his diffuse large B cell lymphoma. History obtained from patient was limited as he is a poor historian. Attempted to call patient's wife and his Hematologist's office (Dr. Jose Mcintosh), but was unable to reach anyone. History was primary obtained per chart review. Patient was reportedly treated for NHL 5 years ago with chemotherapy and was in remission.  Earlier this year, patient began to have weight loss. A CT scan showed a new lesion of the mesentery and kidney. He was receiving PT at home for left shoulder weakness when he was found to have a mass of the left shoulder and pathologic fracture. Patient underwent a left proximal humerus open biopsy, radical resection of tumor, and ORIF with IMN and cementation on 9/25/20 for pathologic fracture. He was planned to undergo postop RT, but this was cancelled due to the development of redness and swelling of his left shoulder which started on 10/13/20. This is what prompted him to initially go to the hospital.  While in the ED, patient was started on IV antibiotics. He had an XR and CT scan of his left shoulder which were suggestive of osteomyelitis. Of note, an MRI could not be performed due to having metal in his left shoulder. Patient was seen by Orthopedics on 10/13/20 and he was cleared for RT under broad spectrum IV antibiotic coverage. On October 16, 2020, patient was seen by Psychiatry for evaluation for depression and need for psychotropic treatment.  Patient did not have suicidal ideation. No indication for inpatient psych treatment. At baseline, patient reportedly has mild dementia and is A+Ox2.  Patient was also being seen by Nephrology for DANIELITO. Given concern for relapse of his underlying B-cell lymphoma, patient was transferred to [facility] for further management.    Patient seen this afternoon. He denies any acute complaints currently including L shoulder pain. He does have reduced ROM of his L shoulder. Patient denies any chest pain, shortness of breath, nausea, vomiting, or abdominal pain. Patient was afebrile overnight. When asked about his lymphoma history, he was unsure what type of lymphoma he had. He was not aware if he even received chemotherapy in the past.    Spoke with the Hematologist who was contacted prior to patient's transfer to [facility]. Patient has followed with Dr. Jose Mcintosh (Hematology) in the past. It appears patient was recently found to have a relapse of his B-cell lymphoma within the past few months and it is possible that he had secondary CNS involvement as well. Per review of patient's [facility] records, patient is documented as having a history of DLBCL (GCB subtype). A L shoulder/humerus X-ray on 10/13 showed a "comminuted fracture of the head and neck of the left humerus with a permeative pattern and soft tissue osseous fragments, nonspecific. Given the history, this could represent osteomyelitis." Also, a Duplex of the L arm on 10/13 showed no sonographic evidence of a DVT in the visualized LUE.      PAST MEDICAL & SURGICAL HISTORY:  Syncope    Chronic kidney disease (CKD)    Diffuse large B cell lymphoma    HLD (hyperlipidemia)    BPH (benign prostatic hyperplasia)    HTN (hypertension)    History of left shoulder fracture    Osteomyelitis of left shoulder region    Review of Systems:   CONSTITUTIONAL: No fever or chills  EYES: No eye pain or discharge  ENMT: No sinus or throat pain  NECK: No pain or stiffness  RESPIRATORY: No shortness of breath or cough  CARDIOVASCULAR: No chest pain or palpitations  GASTROINTESTINAL: No vomiting or abdominal pain  GENITOURINARY: No dysuria or hematuria  NEUROLOGICAL: No headache or syncope  SKIN: + L shoulder redness, + L shoulder warmth  MUSCULOSKELETAL: + L shoulder pain, + Reduced ROM of L shoulder    Allergies    No Known Allergies    Intolerances    Social History: No active smoking, EtOH, or illicit drug use    FAMILY HISTORY:  FH: brain cancer  brother    FH: lung cancer  father    MEDICATIONS  (STANDING):  amLODIPine   Tablet 2.5 milliGRAM(s) Oral daily  atorvastatin 80 milliGRAM(s) Oral at bedtime  cefepime   IVPB 2000 milliGRAM(s) IV Intermittent daily  heparin   Injectable 5000 Unit(s) SubCutaneous every 8 hours  influenza   Vaccine 0.5 milliLiter(s) IntraMuscular once  lactobacillus acidophilus 1 Tablet(s) Oral daily  latanoprost 0.005% Ophthalmic Solution 1 Drop(s) Both EYES at bedtime  metroNIDAZOLE    Tablet 500 milliGRAM(s) Oral every 8 hours  mirtazapine 7.5 milliGRAM(s) Oral at bedtime  timolol 0.5% Solution 1 Drop(s) Both EYES two times a day  vancomycin  IVPB 500 milliGRAM(s) IV Intermittent daily    MEDICATIONS  (PRN):  acetaminophen   Tablet .. 650 milliGRAM(s) Oral every 6 hours PRN Mild Pain (1 - 3), Moderate Pain (4 - 6)  ALPRAZolam 0.5 milliGRAM(s) Oral two times a day PRN anxiety  traMADol 25 milliGRAM(s) Oral every 6 hours PRN Moderate Pain (4 - 6)        CAPILLARY BLOOD GLUCOSE        I&O's Summary    17 Oct 2020 07:01  -  18 Oct 2020 07:00  --------------------------------------------------------  IN: 0 mL / OUT: 300 mL / NET: -300 mL    18 Oct 2020 07:01  -  18 Oct 2020 17:38  --------------------------------------------------------  IN: 50 mL / OUT: 350 mL / NET: -300 mL    Vital Signs Last 24 Hrs  T(C): 36.5 (18 Oct 2020 12:25), Max: 37.1 (17 Oct 2020 20:35)  T(F): 97.7 (18 Oct 2020 12:25), Max: 98.7 (17 Oct 2020 20:35)  HR: 103 (18 Oct 2020 12:25) (100 - 120)  BP: 117/67 (18 Oct 2020 12:25) (117/67 - 138/79)  BP(mean): --  RR: 19 (18 Oct 2020 12:25) (18 - 20)  SpO2: 97% (18 Oct 2020 12:25) (95% - 98%)    PHYSICAL EXAM:  GENERAL: NAD, Laying in bed  HEENT: NC/AT, Slightly dry mucous membranes  NECK: Supple  CHEST/LUNG: Grossly CTAB; No wheeze appreciated  HEART: Mild tachycardia, Regular rhythm  ABDOMEN: +BS, Soft, NT  EXTREMITIES: No LE edema, + L Shoulder edema with overlying erythema and warmth, Reduced ROM of LUE  NEUROLOGY: A+Ox1-2 (oriented to name, knows he is in a hospital, though unsure which one), Answering questions and following commands   SKIN: L shoulder erythema and warmth  PSYCH: Calm and cooperative    LABS:                        8.3    12.34 )-----------( 366      ( 18 Oct 2020 07:17 )             26.3     10-18    130<L>  |  95<L>  |  21  ----------------------------<  99  3.9   |  23  |  1.74<H>    Ca    8.8      18 Oct 2020 07:17  Phos  3.9     10-18  Mg     2.3     10-18    TPro  6.1  /  Alb  2.8<L>  /  TBili  0.4  /  DBili  x   /  AST  28  /  ALT  12  /  AlkPhos  91  10-18    PT/INR - ( 18 Oct 2020 09:06 )   PT: 15.4 sec;   INR: 1.32 ratio      RADIOLOGY & ADDITIONAL TESTS:  Studies reviewed. HPI:  Patient is an 85 y/o M w/ a PMHx of NHL (treatment history unclear, ? recent relapse w/ L shoulder mass and pathologic fracture s/p left proximal humerus open biopsy, radical resection of tumor, and ORIF with IMN and cementation on 9/25/20), HTN, HLD, CKD, BPH, anemia, syncope, and anxiety/adjustment disorder/depression who was transferred to [facility] from the [facility] for further management of his diffuse large B cell lymphoma. History obtained from patient was limited as he is a poor historian. Attempted to call patient's wife and his Hematologist's office (Dr. Jose Mcintosh), but was unable to reach anyone. History was primary obtained per chart review. Patient was reportedly treated for NHL 5 years ago with chemotherapy and was in remission.  Earlier this year, patient began to have weight loss. A CT scan showed a new lesion of the mesentery and kidney. He was receiving PT at home for left shoulder weakness when he was found to have a mass of the left shoulder and pathologic fracture. Patient underwent a left proximal humerus open biopsy, radical resection of tumor, and ORIF with IMN and cementation on 9/25/20 for pathologic fracture. He was planned to undergo postop RT, but this was cancelled due to the development of redness and swelling of his left shoulder which started on 10/13/20. This is what prompted him to initially go to the hospital.  While in the ED, patient was started on IV antibiotics. He had an XR and CT scan of his left shoulder which were suggestive of osteomyelitis. Of note, an MRI could not be performed due to having metal in his left shoulder. Patient was seen by Orthopedics on 10/13/20 and he was cleared for RT under broad spectrum IV antibiotic coverage. On October 16, 2020, patient was seen by Psychiatry for evaluation for depression and need for psychotropic treatment.  Patient did not have suicidal ideation. No indication for inpatient psych treatment. At baseline, patient reportedly has mild dementia and is A+Ox2.  Patient was also being seen by Nephrology for DANIELITO. Given concern for relapse of his underlying B-cell lymphoma, patient was transferred to [facility] for further management.    Patient seen this afternoon. He denies any acute complaints currently including L shoulder pain. He does have reduced ROM of his L shoulder. Patient denies any chest pain, shortness of breath, nausea, vomiting, or abdominal pain. Patient was afebrile overnight. When asked about his lymphoma history, he was unsure what type of lymphoma he had. He was not aware if he even received chemotherapy in the past.    Spoke with the Hematologist who was contacted prior to patient's transfer to [facility]. Patient has followed with Dr. Jose Mcintosh (Hematology) in the past. It appears patient was recently found to have a relapse of his B-cell lymphoma within the past few months and it is possible that he had secondary CNS involvement as well, though unclear how this was diagnosed. Per review of patient's [facility] records, patient is documented as having a history of DLBCL (GCB subtype). A L shoulder/humerus X-ray on 10/13 showed a "comminuted fracture of the head and neck of the left humerus with a permeative pattern and soft tissue osseous fragments, nonspecific. Given the history, this could represent osteomyelitis." Also, a Duplex of the L arm on 10/13 showed no sonographic evidence of a DVT in the visualized LUE.      PAST MEDICAL & SURGICAL HISTORY:  Syncope    Chronic kidney disease (CKD)    Diffuse large B cell lymphoma    HLD (hyperlipidemia)    BPH (benign prostatic hyperplasia)    HTN (hypertension)    History of left shoulder fracture    Osteomyelitis of left shoulder region    Review of Systems:   CONSTITUTIONAL: No fever or chills  EYES: No eye pain or discharge  ENMT: No sinus or throat pain  NECK: No pain or stiffness  RESPIRATORY: No shortness of breath or cough  CARDIOVASCULAR: No chest pain or palpitations  GASTROINTESTINAL: No vomiting or abdominal pain  GENITOURINARY: No dysuria or hematuria  NEUROLOGICAL: No headache or syncope  SKIN: + L shoulder redness, + L shoulder warmth  MUSCULOSKELETAL: + L shoulder pain, + Reduced ROM of L shoulder    Allergies    No Known Allergies    Intolerances    Social History: No active smoking, EtOH, or illicit drug use    FAMILY HISTORY:  FH: brain cancer  brother    FH: lung cancer  father    MEDICATIONS  (STANDING):  amLODIPine   Tablet 2.5 milliGRAM(s) Oral daily  atorvastatin 80 milliGRAM(s) Oral at bedtime  cefepime   IVPB 2000 milliGRAM(s) IV Intermittent daily  heparin   Injectable 5000 Unit(s) SubCutaneous every 8 hours  influenza   Vaccine 0.5 milliLiter(s) IntraMuscular once  lactobacillus acidophilus 1 Tablet(s) Oral daily  latanoprost 0.005% Ophthalmic Solution 1 Drop(s) Both EYES at bedtime  metroNIDAZOLE    Tablet 500 milliGRAM(s) Oral every 8 hours  mirtazapine 7.5 milliGRAM(s) Oral at bedtime  timolol 0.5% Solution 1 Drop(s) Both EYES two times a day  vancomycin  IVPB 500 milliGRAM(s) IV Intermittent daily    MEDICATIONS  (PRN):  acetaminophen   Tablet .. 650 milliGRAM(s) Oral every 6 hours PRN Mild Pain (1 - 3), Moderate Pain (4 - 6)  ALPRAZolam 0.5 milliGRAM(s) Oral two times a day PRN anxiety  traMADol 25 milliGRAM(s) Oral every 6 hours PRN Moderate Pain (4 - 6)        CAPILLARY BLOOD GLUCOSE        I&O's Summary    17 Oct 2020 07:01  -  18 Oct 2020 07:00  --------------------------------------------------------  IN: 0 mL / OUT: 300 mL / NET: -300 mL    18 Oct 2020 07:01  -  18 Oct 2020 17:38  --------------------------------------------------------  IN: 50 mL / OUT: 350 mL / NET: -300 mL    Vital Signs Last 24 Hrs  T(C): 36.5 (18 Oct 2020 12:25), Max: 37.1 (17 Oct 2020 20:35)  T(F): 97.7 (18 Oct 2020 12:25), Max: 98.7 (17 Oct 2020 20:35)  HR: 103 (18 Oct 2020 12:25) (100 - 120)  BP: 117/67 (18 Oct 2020 12:25) (117/67 - 138/79)  BP(mean): --  RR: 19 (18 Oct 2020 12:25) (18 - 20)  SpO2: 97% (18 Oct 2020 12:25) (95% - 98%)    PHYSICAL EXAM:  GENERAL: NAD, Laying in bed  HEENT: NC/AT, Slightly dry mucous membranes  NECK: Supple  CHEST/LUNG: Grossly CTAB; No wheeze appreciated  HEART: Mild tachycardia, Regular rhythm  ABDOMEN: +BS, Soft, NT  EXTREMITIES: No LE edema, + L Shoulder edema with overlying erythema and warmth, Reduced ROM of LUE  NEUROLOGY: A+Ox1-2 (oriented to name, knows he is in a hospital, though unsure which one), Answering questions and following commands   SKIN: L shoulder erythema and warmth  PSYCH: Calm and cooperative    LABS:                        8.3    12.34 )-----------( 366      ( 18 Oct 2020 07:17 )             26.3     10-18    130<L>  |  95<L>  |  21  ----------------------------<  99  3.9   |  23  |  1.74<H>    Ca    8.8      18 Oct 2020 07:17  Phos  3.9     10-18  Mg     2.3     10-18    TPro  6.1  /  Alb  2.8<L>  /  TBili  0.4  /  DBili  x   /  AST  28  /  ALT  12  /  AlkPhos  91  10-18    PT/INR - ( 18 Oct 2020 09:06 )   PT: 15.4 sec;   INR: 1.32 ratio      RADIOLOGY & ADDITIONAL TESTS:  Studies reviewed.

## 2020-10-18 NOTE — PROGRESS NOTE ADULT - ASSESSMENT
87 yo male with PMH of NHL (last chemo 2017), HTN, HLD, CKD, BPH, anemia, syncope, anxiety/adjustment disorder/depression, presents here from [facility] for further management of his diffuse large B cell lymphoma.

## 2020-10-19 NOTE — PROGRESS NOTE ADULT - ATTENDING COMMENTS
86 year old man who has a history of DLBCL in 2017 s/p RCHOP in [place] as of pet in 2018 recently with increasing L shoulder pain found to have a pathologic fracture s/p ORIF with biopsy (reportedly proven relapse) now with swelling and erythema concerning for infection  -images obtained this evening will follow  -concerning for infectious etiology given appearance, follow up ID recommendations, continue antibiotics  [facility], CNS lesion as well reported   -obtain slides from outside facility, will determine need and ease of repeat biopsy  -patient with good ps prior to september when he started to feel ill, at this time appears to be a candidate for second like therapy if proven positive disease, will continue to discuss with patient and family- rick given reported presence of CNS disease as well

## 2020-10-19 NOTE — PROGRESS NOTE ADULT - SUBJECTIVE AND OBJECTIVE BOX
PROGRESS NOTE:   Authored by Dr. Marsha Downey MD  Pager [phone]     Patient is a 86y old  Male who presents with a chief complaint of sent here for chemo and radiation (19 Oct 2020 10:53)      SUBJECTIVE / OVERNIGHT EVENTS: Patient seen and examined at bedside. Patient doing ok, seems frail, says pain in L shoulder is controlled with pain medication. Patient slightly confused as to date, known hospital but cannot say name    ADDITIONAL REVIEW OF SYSTEMS: as above    MEDICATIONS  (STANDING):  amLODIPine   Tablet 2.5 milliGRAM(s) Oral daily  atorvastatin 80 milliGRAM(s) Oral at bedtime  cefepime   IVPB 2000 milliGRAM(s) IV Intermittent daily  heparin   Injectable 5000 Unit(s) SubCutaneous every 8 hours  influenza   Vaccine 0.5 milliLiter(s) IntraMuscular once  lactobacillus acidophilus 1 Tablet(s) Oral daily  latanoprost 0.005% Ophthalmic Solution 1 Drop(s) Both EYES at bedtime  metroNIDAZOLE    Tablet 500 milliGRAM(s) Oral every 8 hours  mirtazapine 7.5 milliGRAM(s) Oral at bedtime  polyethylene glycol 3350 17 Gram(s) Oral daily  senna 2 Tablet(s) Oral at bedtime  timolol 0.5% Solution 1 Drop(s) Both EYES two times a day  vancomycin  IVPB 500 milliGRAM(s) IV Intermittent daily    MEDICATIONS  (PRN):  acetaminophen   Tablet .. 650 milliGRAM(s) Oral every 6 hours PRN Mild Pain (1 - 3), Moderate Pain (4 - 6)  ALPRAZolam 0.5 milliGRAM(s) Oral two times a day PRN anxiety  traMADol 25 milliGRAM(s) Oral every 6 hours PRN Moderate Pain (4 - 6)      CAPILLARY BLOOD GLUCOSE        I&O's Summary    18 Oct 2020 07:01  -  19 Oct 2020 07:00  --------------------------------------------------------  IN: 230 mL / OUT: 1150 mL / NET: -920 mL    19 Oct 2020 07:01  -  19 Oct 2020 13:37  --------------------------------------------------------  IN: 240 mL / OUT: 325 mL / NET: -85 mL        PHYSICAL EXAM:  Vital Signs Last 24 Hrs  T(C): 36.7 (19 Oct 2020 12:42), Max: 37.2 (19 Oct 2020 04:47)  T(F): 98.1 (19 Oct 2020 12:42), Max: 98.9 (19 Oct 2020 04:47)  HR: 97 (19 Oct 2020 12:42) (90 - 97)  BP: 117/72 (19 Oct 2020 12:42) (117/72 - 125/63)  BP(mean): --  RR: 23 (19 Oct 2020 12:42) (18 - 23)  SpO2: 96% (19 Oct 2020 12:42) (96% - 98%)    CONSTITUTIONAL: NAD, frail  RESPIRATORY: Normal respiratory effort; lungs are clear to auscultation bilaterally  CARDIOVASCULAR: Regular rate and rhythm, normal S1 and S2, no murmur/rub/gallop; No lower extremity edema; Peripheral pulses are 2+ bilaterally  ABDOMEN: Nontender to palpation, normoactive bowel sounds, no rebound/guarding; No hepatosplenomegaly  MUSCLOSKELETAL: L shoulder erythema, swelling  PSYCH: A+O to person, place; affect appropriate    LABS:                        7.3    9.05  )-----------( 277      ( 19 Oct 2020 07:11 )             23.3     10-19    130<L>  |  99  |  27<H>  ----------------------------<  92  4.1   |  20<L>  |  1.64<H>    Ca    8.1<L>      19 Oct 2020 07:11  Phos  3.0     10-19  Mg     2.3     10-19    TPro  6.1  /  Alb  2.8<L>  /  TBili  0.4  /  DBili  x   /  AST  28  /  ALT  12  /  AlkPhos  91  10-18    PT/INR - ( 18 Oct 2020 09:06 )   PT: 15.4 sec;   INR: 1.32 ratio                   Culture - Blood (collected 18 Oct 2020 10:05)  Source: .Blood Blood  Preliminary Report (19 Oct 2020 11:00):    No growth to date.    Culture - Blood (collected 18 Oct 2020 10:05)  Source: .Blood Blood  Preliminary Report (19 Oct 2020 11:00):    No growth to date.        RADIOLOGY & ADDITIONAL TESTS:  Results Reviewed:   Imaging Personally Reviewed:  Electrocardiogram Personally Reviewed:    COORDINATION OF CARE:  Care Discussed with Consultants/Other Providers [Y/N]:  Prior or Outpatient Records Reviewed [Y/N]:

## 2020-10-19 NOTE — PHYSICAL THERAPY INITIAL EVALUATION ADULT - GENERAL OBSERVATIONS, REHAB EVAL
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 Pt is A&OX2 (self and place), follows simple commands, +IVL.

## 2020-10-19 NOTE — PHYSICAL THERAPY INITIAL EVALUATION ADULT - ACTIVE RANGE OF MOTION EXAMINATION, REHAB EVAL
Right UE Active ROM was WFL (within functional limits)/Left LE Active ROM was WFL (within functional limits)/BUE and RUE AROM WFL; LUE ROM limited 2* pain/discomfort, shld flex NT, pt able to shrug L shld; AAROM elbow flex <half ROM,limited 2*edema/Right LE Active ROM was WFL (within functional limits)

## 2020-10-19 NOTE — PHYSICAL THERAPY INITIAL EVALUATION ADULT - PLANNED THERAPY INTERVENTIONS, PT EVAL
gait training/strengthening/transfer training/bed mobility training/stair neg; GOAL: pt will neg 4 steps to enter cgx1 in 4wks.

## 2020-10-19 NOTE — PROGRESS NOTE ADULT - ASSESSMENT
-------------------------incomplete      Patient is an 85 y/o M w/ a PMHx of NHL (treatment history unclear, ? recent relapse w/ L shoulder mass and pathologic fracture s/p left proximal humerus open biopsy, radical resection of tumor, and ORIF with IMN and cementation on 9/25/20), HTN, HLD, CKD, BPH, anemia, syncope, and anxiety/adjustment disorder/depression who was transferred to [facility] from the [facility] for further management of his diffuse large B cell lymphoma. Hematology was consulted for further evaluation.    Contacted [facility] Dr. Jose Mcintosh's office. Medical records to be sent.    # DLBCL  - Patient with a reported history of DLBCL, GCB subtype. Per chart review, patient last received chemotherapy in 2017 though it is unclear what he received. It appears his lymphoma recently relapsed as he was found to have a L shoulder mass and pathologic fracture. His is s/p ORIF and radical resection of tumor on 9/25. 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   - Dr. Jose Mcintosh in [place], [phone])   - Please obtain prior Hematologic records and recent hospitalization records to help provide collateral information  - f/u CT C/A/P and MRI Brain w/ contrast. 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 C/w IV antibiotics as per ID  - Recommend Orthopedic evaluation once CT L shoulder is obtained   Patient is an 87 y/o M w/ a PMHx of DLBCL (s/p 6 cycles of R-CHOP and in DELANEY until 9/2019), who p/w possible relapse w/ L shoulder mass and pathologic fracture s/p left proximal humerus open biopsy, radical resection of tumor, and ORIF with IMN and cementation on 9/25/20), HTN, HLD, CKD, BPH, anemia, syncope, and anxiety/adjustment disorder/depression who was transferred to [facility] from the [facility] for further management of his diffuse large B cell lymphoma. Hematology was consulted for further evaluation.    Contacted [facility] Dr. Jose Mcintosh's office. Medical records to be sent.  Need more info from [facility] as well rick    # hx DLBCL, concerning for relapse  - history of DLBCL, GCB subtype (BCL2, BCL6, MYC, all negative by FISH). Per chart review, patient last received 6 cycles of R-CHOP in 2017 with DELANEY until 11/2019. Patient had mesenteric mass with size of 2.7x4.8 in 2017.   - There is unconfirmed report from OSH about relapsing lymphoma with CNS involvement. He was found to have a L shoulder mass and pathologic fracture. His is s/p ORIF and radical resection of tumor on 9/25.   - Dr. Jose Mcintosh in [place], [phone])   - Please obtain prior Hematologic records ([place]) and recent hospitalization records ([facility]) to help provide collateral information  - f/u CT C/A/P and CT head w/ contrast. Would recommend CT L Shoulder as well given exam findings and recent surgery  - Please check CBC WITH DIFF and TLS labs (CMP, Phos, LDH, and Uric acid) daily  - MRI contraindicated d/t metallic julianne  - If the concern for secondary CNS involvement is accurate, patient will need an LP for IT chemo and CSF analysis  - will see if we need PET/CT and extra biopsy  - Appreciate care as per Medicine team  - Will continue to follow    #Osteomyelitis of L shoulder s/p internal fixation  - Appreciate ID evaluation. C/w IV antibiotics as per ID  - Recommend Orthopedic evaluation once CT L shoulder is obtained  - need biopsy report from previous surgery      Hall-in MD Rubén, PGY-4  Translational Medical Oncology Fellow  Pager: [phone]  Case d/w Carissa Welch Patient is an 85 y/o M w/ a PMHx of DLBCL (s/p 6 cycles of R-CHOP and in DELANEY until 9/2019), who p/w possible relapse w/ L shoulder mass and pathologic fracture s/p left proximal humerus open biopsy, radical resection of tumor, and ORIF with IMN and cementation on 9/25/20), HTN, HLD, CKD, BPH, anemia, syncope, and anxiety/adjustment disorder/depression who was transferred to [facility] from the [facility] for further management of his diffuse large B cell lymphoma. Hematology was consulted for further evaluation.    [facility]. Medical records to be sent.  Need more info from [facility] as well rick    # hx DLBCL, concerning for relapse  - history of DLBCL, GCB subtype (BCL2, BCL6, MYC, all negative by FISH). Per chart review, patient last received 6 cycles of R-CHOP in 2017 with DELANEY until 11/2019. Patient had mesenteric mass with size of 2.7x4.8 in 2017.   - There is unconfirmed report from OSH about relapsing lymphoma with CNS involvement. He was found to have a L shoulder mass and pathologic fracture. His is s/p ORIF and radical resection of tumor on 9/25.   - Dr. Jose Mcintosh in [place], [phone])   - Please obtain prior Hematologic records ([place]) and recent hospitalization records ([facility]) to help provide collateral information  - f/u CT C/A/P and CT head w/ contrast. Would recommend CT L Shoulder as well given exam findings and recent surgery  - CT CAP apparently shows enlarging mesenteric mass and new Lt pelvic renal mass concerning of relapse  - Please check CBC WITH DIFF and TLS labs (CMP, Phos, LDH, and Uric acid) daily  - MRI contraindicated d/t metallic julianne  - If the concern for secondary CNS involvement is accurate, patient will need an LP for IT chemo and CSF analysis  - will see if we need PET/CT and extra biopsy  - Appreciate care as per Medicine team  - Will continue to follow    #Osteomyelitis of L shoulder s/p internal fixation  - Appreciate ID evaluation. C/w IV antibiotics as per ID  - Recommend Orthopedic evaluation once CT L shoulder is obtained  - need biopsy report from previous surgery      Hall-in MD Rubén, PGY-4  Translational Medical Oncology Fellow  Pager: [phone]  Case d/w Dr. Degroot Carissa

## 2020-10-19 NOTE — PROGRESS NOTE ADULT - PROBLEM SELECTOR PLAN 1
Patient with infiltrating mass of left shoulder and possible osteomyelitis   c/w IV vancomycin 500mg qdaily, cefepime IV 2g qdaily and PO flagyl  Follow up 10/17 blood culture - so far negative. ESR/CRP very high  ID consult appreciated - agree with abx as above  c/w tylenol and tramadol PRN for pain  CT L shoulder to eval and potential ortho consult

## 2020-10-19 NOTE — PHYSICAL THERAPY INITIAL EVALUATION ADULT - PRECAUTIONS/LIMITATIONS, REHAB EVAL
Hospital course: Pt with infiltrating mass of left shoulder and possible osteomyelitis. Per ID, Left shoulder Erythema R/o Acute OM - in setting of ORIF, shoulder tumor resection, and Intramedullary nail and cementation on 9/25. [facility] and [facility]R and CT concerning for acute OM. He received vanc/zosyn then admitted with vanc, cefepime, and flagyll since 10/13.Per heme/onc, Recommend Orthopedic evaluation once CT L shoulder is obtained Hospital course: Pt with infiltrating mass of left shoulder and possible osteomyelitis. Per ID, Left shoulder Erythema R/o Acute OM - in setting of ORIF, shoulder tumor resection, and Intramedullary nail and cementation on 9/25. [facility]2020 and XR and CT concerning for acute OM. He received vanc/zosyn then admitted with vanc, cefepime, and flagyll since 10/13.Per heme/onc, Recommend Orthopedic evaluation once CT L shoulder is obtained/fall precautions

## 2020-10-19 NOTE — PHYSICAL THERAPY INITIAL EVALUATION ADULT - ADDITIONAL COMMENTS
As per pt and confirmed by CM notes; pt lives with spouse in a private house-has 4 entry steps, (per CM notes) has hospital bed in the living room, wife assists with ADL's, self care and mobility. Additional DMEs at home: wheelchair, quad cane, raised toilet seat with arm rest.

## 2020-10-19 NOTE — CHART NOTE - NSCHARTNOTEFT_GEN_A_CORE
pt with DLBCL , spoke with hem/onc, recs appreciated, also asked to start allopurinol for tumor lysis syndrome prophylaxis, order placed

## 2020-10-19 NOTE — PROGRESS NOTE ADULT - ASSESSMENT
85 yo male with PMH of NHL (last chemo 2017), HTN, HLD, CKD, BPH, anemia, syncope, anxiety/adjustment disorder/depression, presents here from [facility] for further management of his diffuse large B cell lymphoma.

## 2020-10-19 NOTE — PROGRESS NOTE ADULT - SUBJECTIVE AND OBJECTIVE BOX
PAULARASH  MRN-[id number]    Interval hx:      Subjective:      MEDICATIONS  (STANDING):  amLODIPine   Tablet 2.5 milliGRAM(s) Oral daily  atorvastatin 80 milliGRAM(s) Oral at bedtime  cefepime   IVPB 2000 milliGRAM(s) IV Intermittent daily  heparin   Injectable 5000 Unit(s) SubCutaneous every 8 hours  influenza   Vaccine 0.5 milliLiter(s) IntraMuscular once  lactobacillus acidophilus 1 Tablet(s) Oral daily  latanoprost 0.005% Ophthalmic Solution 1 Drop(s) Both EYES at bedtime  metroNIDAZOLE    Tablet 500 milliGRAM(s) Oral every 8 hours  mirtazapine 7.5 milliGRAM(s) Oral at bedtime  polyethylene glycol 3350 17 Gram(s) Oral daily  senna 2 Tablet(s) Oral at bedtime  timolol 0.5% Solution 1 Drop(s) Both EYES two times a day  vancomycin  IVPB 500 milliGRAM(s) IV Intermittent daily    MEDICATIONS  (PRN):  acetaminophen   Tablet .. 650 milliGRAM(s) Oral every 6 hours PRN Mild Pain (1 - 3), Moderate Pain (4 - 6)  ALPRAZolam 0.5 milliGRAM(s) Oral two times a day PRN anxiety  traMADol 25 milliGRAM(s) Oral every 6 hours PRN Moderate Pain (4 - 6)      Allergies    No Known Allergies    Intolerances        Objectives:  Vital Signs Last 24 Hrs  T(C): 37.1 (19 Oct 2020 09:24), Max: 37.2 (19 Oct 2020 04:47)  T(F): 98.7 (19 Oct 2020 09:24), Max: 98.9 (19 Oct 2020 04:47)  HR: 96 (19 Oct 2020 09:24) (90 - 103)  BP: 123/74 (19 Oct 2020 09:24) (117/67 - 125/63)  BP(mean): --  RR: 20 (19 Oct 2020 09:24) (18 - 20)  SpO2: 96% (19 Oct 2020 09:24) (96% - 98%)    Physical exam  General - NAD, alert and oriented  HEENT - PERRLA, EOM intact, sclera and conjunctiva clear, oropharynx, nares clear  Neck - Supple, no thyromegaly or thyroid nodules, no bruits  Cardiovascular - RRR no m/r/g, no JVD, no carotid bruits  Lungs - CTAB, no use of acessory muscles, no w/c/r  Abdomen - Normal bowel sounds, NT/ND  Genito Urinary –   Lymph Nodes - No LAD  Extremeties - No e/c/c  Skin - No rashes, skin warm and dry, no erythematous areas  Musculo Skeletal - 5/5 strength, normal range of motion, no swollen or erythematous joints.  Neurological – Alert and oriented x 3, CN 2-12 grossly intact.        10-18-20 @ 07:01  -  10-19-20 @ 07:00  --------------------------------------------------------  IN: 230 mL / OUT: 1150 mL / NET: -920 mL    10-19-20 @ 07:01  -  10-19-20 @ 10:53  --------------------------------------------------------  IN: 240 mL / OUT: 325 mL / NET: -85 mL        Labs:    CBC Full  -  ( 19 Oct 2020 07:11 )  WBC Count : 9.05 K/uL  RBC Count : 2.69 M/uL  Hemoglobin : 7.3 g/dL  Hematocrit : 23.3 %  Platelet Count - Automated : 277 K/uL  Mean Cell Volume : 86.6 fl  Mean Cell Hemoglobin : 27.1 pg  Mean Cell Hemoglobin Concentration : 31.3 gm/dL  Auto Neutrophil # : 6.28 K/uL  Auto Lymphocyte # : 1.03 K/uL  Auto Monocyte # : 1.58 K/uL  Auto Eosinophil # : 0.11 K/uL  Auto Basophil # : 0.02 K/uL  Auto Neutrophil % : 69.4 %  Auto Lymphocyte % : 11.4 %  Auto Monocyte % : 17.5 %  Auto Eosinophil % : 1.2 %  Auto Basophil % : 0.2 %    PT/INR - ( 18 Oct 2020 09:06 )   PT: 15.4 sec;   INR: 1.32 ratio             10-19    130<L>  |  99  |  27<H>  ----------------------------<  92  4.1   |  20<L>  |  1.64<H>    Ca    8.1<L>      19 Oct 2020 07:11  Phos  3.0     10-19  Mg     2.3     10-19    TPro  6.1  /  Alb  2.8<L>  /  TBili  0.4  /  DBili  x   /  AST  28  /  ALT  12  /  AlkPhos  91  10-18    LIVER FUNCTIONS - ( 18 Oct 2020 07:17 )  Alb: 2.8 g/dL / Pro: 6.1 g/dL / ALK PHOS: 91 U/L / ALT: 12 U/L / AST: 28 U/L / GGT: x                     Imagings:       PAULARASH  MRN-[id number]    Interval hx:      Subjective:      MEDICATIONS  (STANDING):  amLODIPine   Tablet 2.5 milliGRAM(s) Oral daily  atorvastatin 80 milliGRAM(s) Oral at bedtime  cefepime   IVPB 2000 milliGRAM(s) IV Intermittent daily  heparin   Injectable 5000 Unit(s) SubCutaneous every 8 hours  influenza   Vaccine 0.5 milliLiter(s) IntraMuscular once  lactobacillus acidophilus 1 Tablet(s) Oral daily  latanoprost 0.005% Ophthalmic Solution 1 Drop(s) Both EYES at bedtime  metroNIDAZOLE    Tablet 500 milliGRAM(s) Oral every 8 hours  mirtazapine 7.5 milliGRAM(s) Oral at bedtime  polyethylene glycol 3350 17 Gram(s) Oral daily  senna 2 Tablet(s) Oral at bedtime  timolol 0.5% Solution 1 Drop(s) Both EYES two times a day  vancomycin  IVPB 500 milliGRAM(s) IV Intermittent daily    MEDICATIONS  (PRN):  acetaminophen   Tablet .. 650 milliGRAM(s) Oral every 6 hours PRN Mild Pain (1 - 3), Moderate Pain (4 - 6)  ALPRAZolam 0.5 milliGRAM(s) Oral two times a day PRN anxiety  traMADol 25 milliGRAM(s) Oral every 6 hours PRN Moderate Pain (4 - 6)      Allergies    No Known Allergies    Intolerances        Objectives:  Vital Signs Last 24 Hrs  T(C): 37.1 (19 Oct 2020 09:24), Max: 37.2 (19 Oct 2020 04:47)  T(F): 98.7 (19 Oct 2020 09:24), Max: 98.9 (19 Oct 2020 04:47)  HR: 96 (19 Oct 2020 09:24) (90 - 103)  BP: 123/74 (19 Oct 2020 09:24) (117/67 - 125/63)  BP(mean): --  RR: 20 (19 Oct 2020 09:24) (18 - 20)  SpO2: 96% (19 Oct 2020 09:24) (96% - 98%)    Physical exam  General - NAD, alert and oriented  HEENT - PERRLA, EOM intact, sclera and conjunctiva clear, oropharynx, nares clear  Neck - Supple, no thyromegaly or thyroid nodules, no bruits  Cardiovascular - RRR no m/r/g, no JVD, no carotid bruits  Lungs - CTAB, no use of acessory muscles, no w/c/r  Abdomen - Normal bowel sounds, NT/ND  Genito Urinary –   Lymph Nodes - No LAD  Extremeties - No e/c/c  Skin - No rashes, skin warm and dry, no erythematous areas  Musculo Skeletal - 5/5 strength, normal range of motion, no swollen or erythematous joints.  Neurological – Alert and oriented x 3, CN 2-12 grossly intact.        10-18-20 @ 07:01  -  10-19-20 @ 07:00  --------------------------------------------------------  IN: 230 mL / OUT: 1150 mL / NET: -920 mL    10-19-20 @ 07:01  -  10-19-20 @ 10:53  --------------------------------------------------------  IN: 240 mL / OUT: 325 mL / NET: -85 mL        Labs:    CBC Full  -  ( 19 Oct 2020 07:11 )  WBC Count : 9.05 K/uL  RBC Count : 2.69 M/uL  Hemoglobin : 7.3 g/dL  Hematocrit : 23.3 %  Platelet Count - Automated : 277 K/uL  Mean Cell Volume : 86.6 fl  Mean Cell Hemoglobin : 27.1 pg  Mean Cell Hemoglobin Concentration : 31.3 gm/dL  Auto Neutrophil # : 6.28 K/uL  Auto Lymphocyte # : 1.03 K/uL  Auto Monocyte # : 1.58 K/uL  Auto Eosinophil # : 0.11 K/uL  Auto Basophil # : 0.02 K/uL  Auto Neutrophil % : 69.4 %  Auto Lymphocyte % : 11.4 %  Auto Monocyte % : 17.5 %  Auto Eosinophil % : 1.2 %  Auto Basophil % : 0.2 %    PT/INR - ( 18 Oct 2020 09:06 )   PT: 15.4 sec;   INR: 1.32 ratio             10-19    130<L>  |  99  |  27<H>  ----------------------------<  92  4.1   |  20<L>  |  1.64<H>    Ca    8.1<L>      19 Oct 2020 07:11  Phos  3.0     10-19  Mg     2.3     10-19    TPro  6.1  /  Alb  2.8<L>  /  TBili  0.4  /  DBili  x   /  AST  28  /  ALT  12  /  AlkPhos  91  10-18    LIVER FUNCTIONS - ( 18 Oct 2020 07:17 )  Alb: 2.8 g/dL / Pro: 6.1 g/dL / ALK PHOS: 91 U/L / ALT: 12 U/L / AST: 28 U/L / GGT: x                     Imagings:    < from: CT Abdomen and Pelvis No Cont (04.16.20 @ 18:30) >  IMPRESSION: A 4 x 2 cm mesenteric soft tissue lesion worrisome for neoplastic disease such as lymphoma.    < end of copied text >     BRIELLEARASH ANDREA  MRN-[id number]    Interval hx:  Chart review although incomplete information. DLBCL diagnosed in 2017 and s/p R-CHOP x6 with CR. But suspicious for relapse in conjunction with shoulder fracture  Patient was not able to be seen as patient was off floor for imagining studies.    MEDICATIONS  (STANDING):  amLODIPine   Tablet 2.5 milliGRAM(s) Oral daily  atorvastatin 80 milliGRAM(s) Oral at bedtime  cefepime   IVPB 2000 milliGRAM(s) IV Intermittent daily  heparin   Injectable 5000 Unit(s) SubCutaneous every 8 hours  influenza   Vaccine 0.5 milliLiter(s) IntraMuscular once  lactobacillus acidophilus 1 Tablet(s) Oral daily  latanoprost 0.005% Ophthalmic Solution 1 Drop(s) Both EYES at bedtime  metroNIDAZOLE    Tablet 500 milliGRAM(s) Oral every 8 hours  mirtazapine 7.5 milliGRAM(s) Oral at bedtime  polyethylene glycol 3350 17 Gram(s) Oral daily  senna 2 Tablet(s) Oral at bedtime  timolol 0.5% Solution 1 Drop(s) Both EYES two times a day  vancomycin  IVPB 500 milliGRAM(s) IV Intermittent daily    MEDICATIONS  (PRN):  acetaminophen   Tablet .. 650 milliGRAM(s) Oral every 6 hours PRN Mild Pain (1 - 3), Moderate Pain (4 - 6)  ALPRAZolam 0.5 milliGRAM(s) Oral two times a day PRN anxiety  traMADol 25 milliGRAM(s) Oral every 6 hours PRN Moderate Pain (4 - 6)      Allergies    No Known Allergies    Intolerances        Objectives:  Vital Signs Last 24 Hrs  T(C): 37.1 (19 Oct 2020 09:24), Max: 37.2 (19 Oct 2020 04:47)  T(F): 98.7 (19 Oct 2020 09:24), Max: 98.9 (19 Oct 2020 04:47)  HR: 96 (19 Oct 2020 09:24) (90 - 103)  BP: 123/74 (19 Oct 2020 09:24) (117/67 - 125/63)  BP(mean): --  RR: 20 (19 Oct 2020 09:24) (18 - 20)  SpO2: 96% (19 Oct 2020 09:24) (96% - 98%)    Physical exam  deferred        10-18-20 @ 07:01  -  10-19-20 @ 07:00  --------------------------------------------------------  IN: 230 mL / OUT: 1150 mL / NET: -920 mL    10-19-20 @ 07:01  -  10-19-20 @ 10:53  --------------------------------------------------------  IN: 240 mL / OUT: 325 mL / NET: -85 mL        Labs:    CBC Full  -  ( 19 Oct 2020 07:11 )  WBC Count : 9.05 K/uL  RBC Count : 2.69 M/uL  Hemoglobin : 7.3 g/dL  Hematocrit : 23.3 %  Platelet Count - Automated : 277 K/uL  Mean Cell Volume : 86.6 fl  Mean Cell Hemoglobin : 27.1 pg  Mean Cell Hemoglobin Concentration : 31.3 gm/dL  Auto Neutrophil # : 6.28 K/uL  Auto Lymphocyte # : 1.03 K/uL  Auto Monocyte # : 1.58 K/uL  Auto Eosinophil # : 0.11 K/uL  Auto Basophil # : 0.02 K/uL  Auto Neutrophil % : 69.4 %  Auto Lymphocyte % : 11.4 %  Auto Monocyte % : 17.5 %  Auto Eosinophil % : 1.2 %  Auto Basophil % : 0.2 %    PT/INR - ( 18 Oct 2020 09:06 )   PT: 15.4 sec;   INR: 1.32 ratio             10-19    130<L>  |  99  |  27<H>  ----------------------------<  92  4.1   |  20<L>  |  1.64<H>    Ca    8.1<L>      19 Oct 2020 07:11  Phos  3.0     10-19  Mg     2.3     10-19    TPro  6.1  /  Alb  2.8<L>  /  TBili  0.4  /  DBili  x   /  AST  28  /  ALT  12  /  AlkPhos  91  10-18    LIVER FUNCTIONS - ( 18 Oct 2020 07:17 )  Alb: 2.8 g/dL / Pro: 6.1 g/dL / ALK PHOS: 91 U/L / ALT: 12 U/L / AST: 28 U/L / GGT: x                     Imagings:    < from: CT Abdomen and Pelvis w/ Oral Cont and w/ IV Cont (10.19.20 @ 15:36) >  FINDINGS:  CHEST:  LUNGS AND LARGE AIRWAYS: Patent central airways. Biapical pleural-parenchymal scarring. Peripheral reticular reticulation, most prominent at the lung bases, without honeycombing. Calcified granulomas. Few scattered noncalcified pulmonary nodules measuring up to 5 mm in the left lower lobe (3, 93).  PLEURA: No pleural effusion.  VESSELS: Atherosclerotic changes of the aorta and coronary arteries.  HEART: Heart size is normal. No pericardial effusion.  MEDIASTINUM AND MADELIN: No lymphadenopathy.  CHEST WALL AND LOWER NECK: Within normal limits.    ABDOMEN AND PELVIS:  LIVER: Few scattered hypodensities too small to characterize.  BILE DUCTS: Normal caliber.  GALLBLADDER: Within normal limits.  SPLEEN: Within normal limits.  PANCREAS: Within normal limits.  ADRENALS: Within normal limits.  KIDNEYS/URETERS: Mild left hydronephrosis with associated delayed nephrogram due to an infiltrative soft tissue mass at the level of the renal pelvis, approximately measuring 4.9 x 4.5 cm (3, 183). Bilateral renal cysts and hypodensities too small to characterize.    BLADDER: Within normal limits.  REPRODUCTIVE ORGANS: Prostate within normal limits.    BOWEL: Tiny hiatal hernia. No bowel obstruction. Appendix is normal. Colonic diverticulosis.  PERITONEUM: No ascites. 8.7 x 4.3 cm soft tissue mass in the mesentery, difficult to delineate from the adjacent small bowel.  VESSELS: Atherosclerotic changes.  RETROPERITONEUM/LYMPH NODES: Mesenteric lymphadenopathy, for example a 1.6 x 1.3 cm node in the right (3, 240).  ABDOMINAL WALL: Within normal limits.  BONES: Status post ORIF of a left proximal humerus fracture with marked adjacent soft tissue infiltration.    IMPRESSION:    Mild left hydronephrosis with delayed nephrogram due to an obstructing soft tissue mass at the level of the renal pelvis. Large mesenteric jerman mass, difficult to delineate from the small bowel in the absence of oral contrast. Findings are likely related to known lymphoma.    Few sub-6 mm pulmonary nodules are indeterminate.    Status post ORIF of a left proximal humerus fracture, with marked adjacent soft tissue infiltration. Please refer to dedicated CT report of the left shoulder.    < end of copied text >      < from: CT Abdomen and Pelvis No Cont (04.16.20 @ 18:30) >  IMPRESSION: A 4 x 2 cm mesenteric soft tissue lesion worrisome for neoplastic disease such as lymphoma.    < end of copied text >

## 2020-10-19 NOTE — PROGRESS NOTE ADULT - ASSESSMENT
87 yo male with PMH of NHL (last chemo 2017), HTN, HLD, CKD, BPH, anemia, syncope, anxiety/adjustment disorder/depression, presents here from [facility] for further management of his diffuse large B cell lymphoma, and found to have erythema and swelling of Left shoulder concerning for OM    Left shoulder Erythema R/o Acute OM - in setting of ORIF, shoulder tumor resection, and Intramedullary nail and cementation on 9/25. Admitted to [facility] 10/13/2020 and XR and CT concerning for acute OM. He received vanc/zosyn then admitted with vanc, cefepime, and flagyll since 10/13. No fevers before or during admission. No other symptoms. Pending chemo/radiation.     Recommend:   - repeat vancomycin troughs   - Cont Cefepime 2gqd  and flagyl 500q8  - awaitt CT imaging to eval with radiology here or consider repeat CT of shoulder as he was unable to do MRI due to intramedullary nail  - Consult ortho  - team to address anemia  - appreciate heme/onc input

## 2020-10-19 NOTE — PROGRESS NOTE ADULT - PROBLEM SELECTOR PLAN 3
Cr 1.64  BP stable, hold lisinopril.  c/w gentle hydration NS at 75 cc/hr   Monitor lactate and BMP  Na 130, may be component of SIADH, monitor with fluids

## 2020-10-19 NOTE — PHYSICAL THERAPY INITIAL EVALUATION ADULT - DISCHARGE DISPOSITION, PT EVAL
DC pending completion of functional eval; anticipate subacute rehab; request OT eval, NP to be notified. subacute rehab, AILYN Rios aware/rehabilitation facility

## 2020-10-19 NOTE — PROGRESS NOTE ADULT - SUBJECTIVE AND OBJECTIVE BOX
Patient is a 86y old  Male who presents with a chief complaint of sent here for chemo and radiation (19 Oct 2020 13:37)    Being followed by ID for        Interval history:  No other acute events      ROS:  No cough,SOB,CP  No N/V/D  No abd pain  No urinary complaints  No HA  No joint or limb pain  No other complaints    PAST MEDICAL & SURGICAL HISTORY:  Syncope    Chronic kidney disease (CKD)    Diffuse large B cell lymphoma    HLD (hyperlipidemia)    BPH (benign prostatic hyperplasia)    HTN (hypertension)    History of left shoulder fracture    Osteomyelitis of left shoulder region      Allergies    No Known Allergies    Intolerances      Antimicrobials:    cefepime   IVPB 2000 milliGRAM(s) IV Intermittent daily  metroNIDAZOLE    Tablet 500 milliGRAM(s) Oral every 8 hours  vancomycin  IVPB 500 milliGRAM(s) IV Intermittent daily    MEDICATIONS  (STANDING):  allopurinol 100 milliGRAM(s) Oral daily  amLODIPine   Tablet 2.5 milliGRAM(s) Oral daily  atorvastatin 80 milliGRAM(s) Oral at bedtime  cefepime   IVPB 2000 milliGRAM(s) IV Intermittent daily  heparin   Injectable 5000 Unit(s) SubCutaneous every 8 hours  influenza   Vaccine 0.5 milliLiter(s) IntraMuscular once  lactobacillus acidophilus 1 Tablet(s) Oral daily  latanoprost 0.005% Ophthalmic Solution 1 Drop(s) Both EYES at bedtime  metroNIDAZOLE    Tablet 500 milliGRAM(s) Oral every 8 hours  mirtazapine 7.5 milliGRAM(s) Oral at bedtime  polyethylene glycol 3350 17 Gram(s) Oral daily  senna 2 Tablet(s) Oral at bedtime  timolol 0.5% Solution 1 Drop(s) Both EYES two times a day  vancomycin  IVPB 500 milliGRAM(s) IV Intermittent daily      Vital Signs Last 24 Hrs  T(C): 36.7 (10-19-20 @ 16:38), Max: 37.2 (10-19-20 @ 04:47)  T(F): 98.1 (10-19-20 @ 16:38), Max: 98.9 (10-19-20 @ 04:47)  HR: 96 (10-19-20 @ 16:38) (90 - 97)  BP: 119/76 (10-19-20 @ 16:38) (117/72 - 125/63)  BP(mean): --  RR: 20 (10-19-20 @ 16:38) (18 - 23)  SpO2: 97% (10-19-20 @ 16:38) (96% - 98%)    Physical Exam:    Constitutional well preserved,comfortable,pleasant    HEENT PERRLA EOMI,No pallor or icterus    No oral exudate or erythema    Neck supple no JVD or LN    Chest Good AE,CTA    CVS RRR S1 S2 WNl No murmur or rub or gallop    Abd soft BS normal No tenderness no masses    Ext No cyanosis clubbing or edema    IV site no erythema tenderness or discharge    Joints no swelling or LOM    CNS AAO X 3 no focal    Lab Data:                          7.3    9.05  )-----------( 277      ( 19 Oct 2020 07:11 )             23.3       10-19    130<L>  |  99  |  27<H>  ----------------------------<  92  4.1   |  20<L>  |  1.64<H>    Ca    8.1<L>      19 Oct 2020 07:11  Phos  3.0     10-19  Mg     2.3     10-19    TPro  6.1  /  Alb  2.8<L>  /  TBili  0.4  /  DBili  x   /  AST  28  /  ALT  12  /  AlkPhos  91  10-18          .Blood Blood  10-18-20   No growth to date.  --  --                Vancomycin Level, Trough: 7.7 ug/mL (10-19-20 @ 07:15)      WBC Count: 9.05 (10-19-20 @ 07:11)  WBC Count: 12.34 (10-18-20 @ 07:17)             Patient is a 86y old  Male who presents with a chief complaint of sent here for chemo and radiation (19 Oct 2020 13:37)    Being followed by ID for        Interval history:  pt resting quietly  daughter at bedside  had CT earlier  No other acute events      PAST MEDICAL & SURGICAL HISTORY:  Syncope    Chronic kidney disease (CKD)    Diffuse large B cell lymphoma    HLD (hyperlipidemia)    BPH (benign prostatic hyperplasia)    HTN (hypertension)    History of left shoulder fracture    Osteomyelitis of left shoulder region      Allergies    No Known Allergies    Intolerances      Antimicrobials:    cefepime   IVPB 2000 milliGRAM(s) IV Intermittent daily  metroNIDAZOLE    Tablet 500 milliGRAM(s) Oral every 8 hours  vancomycin  IVPB 500 milliGRAM(s) IV Intermittent daily    MEDICATIONS  (STANDING):  allopurinol 100 milliGRAM(s) Oral daily  amLODIPine   Tablet 2.5 milliGRAM(s) Oral daily  atorvastatin 80 milliGRAM(s) Oral at bedtime  cefepime   IVPB 2000 milliGRAM(s) IV Intermittent daily  heparin   Injectable 5000 Unit(s) SubCutaneous every 8 hours  influenza   Vaccine 0.5 milliLiter(s) IntraMuscular once  lactobacillus acidophilus 1 Tablet(s) Oral daily  latanoprost 0.005% Ophthalmic Solution 1 Drop(s) Both EYES at bedtime  metroNIDAZOLE    Tablet 500 milliGRAM(s) Oral every 8 hours  mirtazapine 7.5 milliGRAM(s) Oral at bedtime  polyethylene glycol 3350 17 Gram(s) Oral daily  senna 2 Tablet(s) Oral at bedtime  timolol 0.5% Solution 1 Drop(s) Both EYES two times a day  vancomycin  IVPB 500 milliGRAM(s) IV Intermittent daily      Vital Signs Last 24 Hrs  T(C): 36.7 (10-19-20 @ 16:38), Max: 37.2 (10-19-20 @ 04:47)  T(F): 98.1 (10-19-20 @ 16:38), Max: 98.9 (10-19-20 @ 04:47)  HR: 96 (10-19-20 @ 16:38) (90 - 97)  BP: 119/76 (10-19-20 @ 16:38) (117/72 - 125/63)  BP(mean): --  RR: 20 (10-19-20 @ 16:38) (18 - 23)  SpO2: 97% (10-19-20 @ 16:38) (96% - 98%)    Physical Exam:    Constitutional  pale    HEENT PERRLA EOMI,    No oral exudate or erythema    Neck supple no JVD or LN    Chest Good AE,CTA    CVS RRR S1 S2 WN    Abd soft BS normal No tenderness    Ext N left shoulder swelling     IV site no erythema tenderness or discharge    Joints no swelling or LOM        Lab Data:                          7.3    9.05  )-----------( 277      ( 19 Oct 2020 07:11 )             23.3       10-19    130<L>  |  99  |  27<H>  ----------------------------<  92  4.1   |  20<L>  |  1.64<H>    Ca    8.1<L>      19 Oct 2020 07:11  Phos  3.0     10-19  Mg     2.3     10-19    TPro  6.1  /  Alb  2.8<L>  /  TBili  0.4  /  DBili  x   /  AST  28  /  ALT  12  /  AlkPhos  91  10-18          .Blood Blood  10-18-20   No growth to date.  --  --        Vancomycin Level, Trough: 7.7 ug/mL (10-19-20 @ 07:15)      WBC Count: 9.05 (10-19-20 @ 07:11)  WBC Count: 12.34 (10-18-20 @ 07:17)

## 2020-10-19 NOTE — PHYSICAL THERAPY INITIAL EVALUATION ADULT - GAIT DEVIATIONS NOTED, PT EVAL
decreased step length/decreased malachi/shaky, unsteady, +buckling/decreased weight-shifting ability

## 2020-10-19 NOTE — PHYSICAL THERAPY INITIAL EVALUATION ADULT - PERTINENT HX OF CURRENT PROBLEM, REHAB EVAL
Pt is a 85 yo male admitted to [facility] on 10/17/20 with PMH of NHL (last chemo 2017), HTN, HLD, CKD, BPH, anemia, syncope, anxiety/adjustment disorder/depression, presents here from [facility] for further management of his diffuse large B cell lymphoma.

## 2020-10-20 NOTE — PROGRESS NOTE ADULT - PROBLEM SELECTOR PLAN 3
Cr 1.54  BP stable, hold lisinopril.   Monitor lactate and BMP  Na 131, may be component of SIADH, monitor with fluids

## 2020-10-20 NOTE — PROGRESS NOTE ADULT - ATTENDING COMMENTS
d/w Nacho (Rubén) - plan for biopsy by IR, need to complete staging. Will d/w family options once results are back.

## 2020-10-20 NOTE — PROGRESS NOTE ADULT - ASSESSMENT
87 yo male with PMH of NHL (last chemo 2017), HTN, HLD, CKD, BPH, anemia, syncope, anxiety/adjustment disorder/depression, presents here from [facility] for further management of his diffuse large B cell lymphoma.

## 2020-10-20 NOTE — PROGRESS NOTE ADULT - ATTENDING COMMENTS
86 yoM with relapsed DLBCL awaiting confirmation of relapse  -to get drained by IR for possible infectious etiology, requesting repeat core biopsy at that time  -continue antibiotics per ID  -monitor appearance and size of the shoulder  -while radiation will help the shoulder, I am concerned about his other sites of disease- 8cm mesenteric mass, dural based lesion, renal lesion, as such would likely favor systemic chemo to start once confirmation  -plan for bmbx, mri of the brain to ensure no e/o lepto/cns disease and also plan for LP to rule out further  -at this time, given his prior PS and discussing with the family favor initiation of therapy to see what is possible in terms of improvement.  Patient seemed more alert at the bedside today and more involved in conversation about his health with wife at bedside, however will discuss goals further over the next 1-2 days prior to starting therapy

## 2020-10-20 NOTE — PROGRESS NOTE ADULT - SUBJECTIVE AND OBJECTIVE BOX
PROGRESS NOTE:   Authored by Dr. Marsha Downey MD  Pager [phone]     Patient is a 86y old  Male who presents with a chief complaint of sent here for chemo and radiation (20 Oct 2020 10:46)      SUBJECTIVE / OVERNIGHT EVENTS: Patient seen and examined at bedside. Patient confused this am, restless, slightly anxious.     ADDITIONAL REVIEW OF SYSTEMS: unable to obtain due to confusion    MEDICATIONS  (STANDING):  allopurinol 100 milliGRAM(s) Oral daily  amLODIPine   Tablet 2.5 milliGRAM(s) Oral daily  atorvastatin 80 milliGRAM(s) Oral at bedtime  cefepime   IVPB 2000 milliGRAM(s) IV Intermittent daily  heparin   Injectable 5000 Unit(s) SubCutaneous every 8 hours  influenza   Vaccine 0.5 milliLiter(s) IntraMuscular once  lactobacillus acidophilus 1 Tablet(s) Oral daily  latanoprost 0.005% Ophthalmic Solution 1 Drop(s) Both EYES at bedtime  metroNIDAZOLE    Tablet 500 milliGRAM(s) Oral every 8 hours  mirtazapine 7.5 milliGRAM(s) Oral at bedtime  polyethylene glycol 3350 17 Gram(s) Oral daily  senna 2 Tablet(s) Oral at bedtime  timolol 0.5% Solution 1 Drop(s) Both EYES two times a day  vancomycin  IVPB 500 milliGRAM(s) IV Intermittent daily    MEDICATIONS  (PRN):  acetaminophen   Tablet .. 650 milliGRAM(s) Oral every 6 hours PRN Mild Pain (1 - 3), Moderate Pain (4 - 6)  ALPRAZolam 0.5 milliGRAM(s) Oral two times a day PRN anxiety  traMADol 25 milliGRAM(s) Oral every 6 hours PRN Moderate Pain (4 - 6)      CAPILLARY BLOOD GLUCOSE        I&O's Summary    19 Oct 2020 07:01  -  20 Oct 2020 07:00  --------------------------------------------------------  IN: 460 mL / OUT: 675 mL / NET: -215 mL    20 Oct 2020 07:01  -  20 Oct 2020 13:22  --------------------------------------------------------  IN: 290 mL / OUT: 450 mL / NET: -160 mL        PHYSICAL EXAM:  Vital Signs Last 24 Hrs  T(C): 36.9 (20 Oct 2020 10:35), Max: 36.9 (20 Oct 2020 10:35)  T(F): 98.5 (20 Oct 2020 10:35), Max: 98.5 (20 Oct 2020 10:35)  HR: 93 (20 Oct 2020 10:35) (85 - 96)  BP: 121/64 (20 Oct 2020 10:35) (109/67 - 133/72)  BP(mean): --  RR: 20 (20 Oct 2020 10:35) (20 - 20)  SpO2: 94% (20 Oct 2020 10:35) (94% - 97%)    CONSTITUTIONAL: frail, confused  RESPIRATORY: Normal respiratory effort; lungs are clear to auscultation bilaterally  CARDIOVASCULAR: Regular rate and rhythm, normal S1 and S2, no murmur/rub/gallop; No lower extremity edema; Peripheral pulses are 2+ bilaterally  ABDOMEN: Nontender to palpation, normoactive bowel sounds, no rebound/guarding; No hepatosplenomegaly  MUSCLOSKELETAL: L shoulder erythema, swelling, swelling extending down the arm.   PSYCH: A+O to person    LABS:                        7.3    10.02 )-----------( 293      ( 20 Oct 2020 04:16 )             22.3     10-20    131<L>  |  99  |  27<H>  ----------------------------<  101<H>  4.1   |  21<L>  |  1.54<H>    Ca    8.4      20 Oct 2020 04:16  Phos  3.0     10-19  Mg     2.3     10-19    TPro  5.5<L>  /  Alb  2.3<L>  /  TBili  0.4  /  DBili  0.2  /  AST  41<H>  /  ALT  12  /  AlkPhos  87  10-20              Culture - Blood (collected 18 Oct 2020 10:05)  Source: .Blood Blood  Preliminary Report (19 Oct 2020 11:00):    No growth to date.    Culture - Blood (collected 18 Oct 2020 10:05)  Source: .Blood Blood  Preliminary Report (19 Oct 2020 11:00):    No growth to date.        RADIOLOGY & ADDITIONAL TESTS:  Results Reviewed:   Imaging Personally Reviewed:  Electrocardiogram Personally Reviewed:    COORDINATION OF CARE:  Care Discussed with Consultants/Other Providers [Y/N]:  Prior or Outpatient Records Reviewed [Y/N]:

## 2020-10-20 NOTE — PROGRESS NOTE ADULT - PROBLEM SELECTOR PLAN 1
Patient with infiltrating mass of left shoulder and possible osteomyelitis   c/w IV vancomycin 500mg qdaily, cefepime IV 2g qdaily and PO flagyl  10/17 blood culture - so far negative. ESR/CRP very high  ID consult appreciated - agree with abx as above  c/w tylenol and tramadol PRN for pain  CT L shoulder noted, d/w ortho/IR seems like infiltration and not primarily infectious. Plan for IR biopsy  MRI L shoulder. Per ortho - ok for MRI

## 2020-10-20 NOTE — PROGRESS NOTE ADULT - ASSESSMENT
87 yo male with PMH of NHL (last chemo 2017), HTN, HLD, CKD, BPH, anemia, syncope, anxiety/adjustment disorder/depression, presents here from [facility] for further management of his diffuse large B cell lymphoma, and found to have erythema and swelling of Left shoulder concerning for OM    Left shoulder Erythema R/o Acute OM - in setting of ORIF, shoulder tumor resection, and Intramedullary nail and cementation on 9/25. Ad[facility]2020 and XR and CT concerning for acute OM. He received vanc/zosyn then admitted with vanc, cefepime, and flagyll since 10/13. No fevers before or during admission. No other symptoms. Pending chemo/radiation.     Recommend:   - repeat vancomycin troughs , can probably increase to 750 mg IVSS daily  - Cont Cefepime 2gqd  and flagyl 500q8  - for IR drainage    Please send for culture ( routine ) as well as AFB and fungal and ofcourse pathology/ cytology  I

## 2020-10-20 NOTE — PROGRESS NOTE ADULT - SUBJECTIVE AND OBJECTIVE BOX
Patient is a 86y old  Male who presents with a chief complaint of sent here for chemo and radiation (20 Oct 2020 13:22)    Being followed by ID for        Interval history:  No other acute events      ROS:  No cough,SOB,CP  No N/V/D  No abd pain  No urinary complaints  No HA  No joint or limb pain  No other complaints    PAST MEDICAL & SURGICAL HISTORY:  Syncope    Chronic kidney disease (CKD)    Diffuse large B cell lymphoma    HLD (hyperlipidemia)    BPH (benign prostatic hyperplasia)    HTN (hypertension)    History of left shoulder fracture    Osteomyelitis of left shoulder region      Allergies    No Known Allergies    Intolerances      Antimicrobials:    cefepime   IVPB 2000 milliGRAM(s) IV Intermittent daily  metroNIDAZOLE    Tablet 500 milliGRAM(s) Oral every 8 hours  vancomycin  IVPB 500 milliGRAM(s) IV Intermittent daily    MEDICATIONS  (STANDING):  allopurinol 100 milliGRAM(s) Oral daily  amLODIPine   Tablet 2.5 milliGRAM(s) Oral daily  atorvastatin 80 milliGRAM(s) Oral at bedtime  cefepime   IVPB 2000 milliGRAM(s) IV Intermittent daily  heparin   Injectable 5000 Unit(s) SubCutaneous every 8 hours  influenza   Vaccine 0.5 milliLiter(s) IntraMuscular once  lactobacillus acidophilus 1 Tablet(s) Oral daily  latanoprost 0.005% Ophthalmic Solution 1 Drop(s) Both EYES at bedtime  metroNIDAZOLE    Tablet 500 milliGRAM(s) Oral every 8 hours  mirtazapine 7.5 milliGRAM(s) Oral at bedtime  polyethylene glycol 3350 17 Gram(s) Oral daily  senna 2 Tablet(s) Oral at bedtime  tamsulosin 0.4 milliGRAM(s) Oral at bedtime  timolol 0.5% Solution 1 Drop(s) Both EYES two times a day  vancomycin  IVPB 500 milliGRAM(s) IV Intermittent daily      Vital Signs Last 24 Hrs  T(C): 36.9 (10-20-20 @ 10:35), Max: 36.9 (10-20-20 @ 10:35)  T(F): 98.5 (10-20-20 @ 10:35), Max: 98.5 (10-20-20 @ 10:35)  HR: 93 (10-20-20 @ 10:35) (85 - 94)  BP: 121/64 (10-20-20 @ 10:35) (109/67 - 133/72)  BP(mean): --  RR: 20 (10-20-20 @ 10:35) (20 - 20)  SpO2: 94% (10-20-20 @ 10:35) (94% - 97%)    Physical Exam:    Constitutional well preserved,comfortable,pleasant    HEENT PERRLA EOMI,No pallor or icterus    No oral exudate or erythema    Neck supple no JVD or LN    Chest Good AE,CTA    CVS RRR S1 S2 WNl No murmur or rub or gallop    Abd soft BS normal No tenderness no masses    Ext No cyanosis clubbing or edema    IV site no erythema tenderness or discharge    Joints no swelling or LOM    CNS AAO X 3 no focal    Lab Data:                          7.3    10.02 )-----------( 293      ( 20 Oct 2020 04:16 )             22.3       10-20    131<L>  |  99  |  27<H>  ----------------------------<  101<H>  4.1   |  21<L>  |  1.54<H>    Ca    8.4      20 Oct 2020 04:16  Phos  3.0     10-19  Mg     2.3     10-19    TPro  4.7<L>  /  Alb  x   /  TBili  x   /  DBili  x   /  AST  x   /  ALT  x   /  AlkPhos  x   10-20          .Blood Blood  10-18-20   No growth to date.  --  --                Vancomycin Level, Trough: 8.2 ug/mL (10-20-20 @ 11:42)  Vancomycin Level, Trough: 7.7 ug/mL (10-19-20 @ 07:15)      WBC Count: 10.02 (10-20-20 @ 04:16)  WBC Count: 9.05 (10-19-20 @ 07:11)  WBC Count: 12.34 (10-18-20 @ 07:17)      < from: CT Shoulder w/ IV Cont, Left (10.19.20 @ 15:36) >    EXAM:  CT SHOULDER ONLY IC LT                            PROCEDURE DATE:  10/19/2020            INTERPRETATION:  CT of the left shoulder    CLINICAL INFORMATION: Status post ORIF with intramedullary nail and cement fixation. Non-Hodgkin's lymphoma.Concern for osteomyelitis.  TECHNIQUE: Axial CT images were obtained of the left shoulder with coronal and sagittal reconstructions. Contrast was administered for CT of the chest with 90 cc of Omnipaque 350 and  intravenously and 10 cc discarded.    FINDINGS:    Intramedullary julianne is present with anterolateral interlocking screws. There is marked lucency within the humeral head and neck which may represent sequela of infection and/or metastatic disease. There is lucency about the interlocking screws. There is a large low density collection along the posterior and lateral aspect of the humeral joint measuring at least 10.3 x 4.0 cm glenohumeral joint and surrounding the proximal humeral component measuring at least 11 x 10 x 11 cm. It is extensive subcutaneous edema about the visualized proximal arm. There is low density surrounding the inferior body of the the scapula, which may represent sequela of metastatic disease versus fluid.    IMPRESSION:    Status post ORIF of the humerus. Lucency within the humeral head/neck, which may represent sequela metastatic disease versus infection. Lucency is present about the interlocking screws. Extensive low density collection surrounding the glenohumeral joint and proximal humerus, concerning for infection.  Low density surrounding the body of the scapula, which may represent sequela of metastatic disease versus infection.        SANDIP NEWMAN M.D., ATTENDING RADIOLOGIST  This document has been electronically signed. Oct 19 2020  6:19PM    < end of copied text >         Patient is a 86y old  Male who presents with a chief complaint of sent here for chemo and radiation (20 Oct 2020 13:22)    Being followed by ID for        Interval history:  pt resting quietly  results of CT noted and reviewed with Dr Deleon  Wife notes no improvement with abs  No other acute events        PAST MEDICAL & SURGICAL HISTORY:  Syncope    Chronic kidney disease (CKD)    Diffuse large B cell lymphoma    HLD (hyperlipidemia)    BPH (benign prostatic hyperplasia)    HTN (hypertension)    History of left shoulder fracture    Osteomyelitis of left shoulder region      Allergies    No Known Allergies    Intolerances      Antimicrobials:    cefepime   IVPB 2000 milliGRAM(s) IV Intermittent daily  metroNIDAZOLE    Tablet 500 milliGRAM(s) Oral every 8 hours  vancomycin  IVPB 500 milliGRAM(s) IV Intermittent daily    MEDICATIONS  (STANDING):  allopurinol 100 milliGRAM(s) Oral daily  amLODIPine   Tablet 2.5 milliGRAM(s) Oral daily  atorvastatin 80 milliGRAM(s) Oral at bedtime  cefepime   IVPB 2000 milliGRAM(s) IV Intermittent daily  heparin   Injectable 5000 Unit(s) SubCutaneous every 8 hours  influenza   Vaccine 0.5 milliLiter(s) IntraMuscular once  lactobacillus acidophilus 1 Tablet(s) Oral daily  latanoprost 0.005% Ophthalmic Solution 1 Drop(s) Both EYES at bedtime  metroNIDAZOLE    Tablet 500 milliGRAM(s) Oral every 8 hours  mirtazapine 7.5 milliGRAM(s) Oral at bedtime  polyethylene glycol 3350 17 Gram(s) Oral daily  senna 2 Tablet(s) Oral at bedtime  tamsulosin 0.4 milliGRAM(s) Oral at bedtime  timolol 0.5% Solution 1 Drop(s) Both EYES two times a day  vancomycin  IVPB 500 milliGRAM(s) IV Intermittent daily      Vital Signs Last 24 Hrs  T(C): 36.9 (10-20-20 @ 10:35), Max: 36.9 (10-20-20 @ 10:35)  T(F): 98.5 (10-20-20 @ 10:35), Max: 98.5 (10-20-20 @ 10:35)  HR: 93 (10-20-20 @ 10:35) (85 - 94)  BP: 121/64 (10-20-20 @ 10:35) (109/67 - 133/72)  BP(mean): --  RR: 20 (10-20-20 @ 10:35) (20 - 20)  SpO2: 94% (10-20-20 @ 10:35) (94% - 97%)    Physical Exam:    Constitutional  resting quielty    SHAHAB ZIEGLER EOMI,No pallor or icterus    No oral exudate or erythema    Neck supple no JVD or LN    Chest Good AE,CTA    CVS RRR S1 S2     Abd soft BS normal No tenderness    Ext  left shoulder swelling , erythema  unable to lift left arm    IV site no erythema tenderness or discharge  Lab Data:                          7.3    10.02 )-----------( 293      ( 20 Oct 2020 04:16 )             22.3       10-20    131<L>  |  99  |  27<H>  ----------------------------<  101<H>  4.1   |  21<L>  |  1.54<H>    Ca    8.4      20 Oct 2020 04:16  Phos  3.0     10-19  Mg     2.3     10-19    TPro  4.7<L>  /  Alb  x   /  TBili  x   /  DBili  x   /  AST  x   /  ALT  x   /  AlkPhos  x   10-20          .Blood Blood  10-18-20   No growth to date.  --  --        Vancomycin Level, Trough: 8.2 ug/mL (10-20-20 @ 11:42)  Vancomycin Level, Trough: 7.7 ug/mL (10-19-20 @ 07:15)      WBC Count: 10.02 (10-20-20 @ 04:16)  WBC Count: 9.05 (10-19-20 @ 07:11)  WBC Count: 12.34 (10-18-20 @ 07:17)      < from: CT Shoulder w/ IV Cont, Left (10.19.20 @ 15:36) >    EXAM:  CT SHOULDER ONLY IC LT                            PROCEDURE DATE:  10/19/2020            INTERPRETATION:  CT of the left shoulder    CLINICAL INFORMATION: Status post ORIF with intramedullary nail and cement fixation. Non-Hodgkin's lymphoma.Concern for osteomyelitis.  TECHNIQUE: Axial CT images were obtained of the left shoulder with coronal and sagittal reconstructions. Contrast was administered for CT of the chest with 90 cc of Omnipaque 350 and  intravenously and 10 cc discarded.    FINDINGS:    Intramedullary julianne is present with anterolateral interlocking screws. There is marked lucency within the humeral head and neck which may represent sequela of infection and/or metastatic disease. There is lucency about the interlocking screws. There is a large low density collection along the posterior and lateral aspect of the humeral joint measuring at least 10.3 x 4.0 cm glenohumeral joint and surrounding the proximal humeral component measuring at least 11 x 10 x 11 cm. It is extensive subcutaneous edema about the visualized proximal arm. There is low density surrounding the inferior body of the the scapula, which may represent sequela of metastatic disease versus fluid.    IMPRESSION:    Status post ORIF of the humerus. Lucency within the humeral head/neck, which may represent sequela metastatic disease versus infection. Lucency is present about the interlocking screws. Extensive low density collection surrounding the glenohumeral joint and proximal humerus, concerning for infection.  Low density surrounding the body of the scapula, which may represent sequela of metastatic disease versus infection.        SANDIP NEWMAN M.D., ATTENDING RADIOLOGIST  This document has been electronically signed. Oct 19 2020  6:19PM    < end of copied text >

## 2020-10-20 NOTE — CONSULT NOTE ADULT - SUBJECTIVE AND OBJECTIVE BOX
------------------------------------------------------------  Interventional Radiology Pre-Procedure Note  ------------------------------------------------------------    HPI: 86y Male with DLBCL s/p resection of left shoulder mass with internal fixation presented with concern for left shoulder infection. Initial plan was for postoperative radiation therapy however due to development of cellulitis overlying the shoulder the RT was cancelled and patient instead was hospitalized for further management. CT was performed showing likely fluid collection vs soft tissue infiltration surrounding the left shoulder joint and IR is consulted for aspiration vs biopsy to exclude infection.    Past Medical History:  Syncope    Chronic kidney disease (CKD)    Diffuse large B cell lymphoma    HLD (hyperlipidemia)    BPH (benign prostatic hyperplasia)    HTN (hypertension)    No pertinent past medical history        Allergies: No Known Allergies      Medications:  amLODIPine   Tablet: 2.5 milliGRAM(s) Oral (10-20-20 @ 05:16)  cefepime   IVPB: 100 mL/Hr IV Intermittent (10-19-20 @ 12:12)  heparin   Injectable: 5000 Unit(s) SubCutaneous (10-20-20 @ 05:16)  metroNIDAZOLE    Tablet: 500 milliGRAM(s) Oral (10-20-20 @ 05:16)  vancomycin  IVPB: 100 mL/Hr IV Intermittent (10-19-20 @ 12:50)      Vital Signs:   T(F): 98.5 (10:35), Max: 98.5 (10:35)  HR: 93 (10:35)  BP: 121/64 (10:35)  RR: 20 (10:35)  SpO2: 94% (10:35)    Labs:           7.3  10.02)-----(293     (10-20-20 @ 04:16)         22.3     131 | 99 | 27  --------------------< 101     (10-20-20 @ 04:16)  4.1 | 21 | 1.54       PT: 15.4<H> 10-18-20 @ 09:06  aPTT: -- 10-18-20 @ 09:06   INR: 1.32<H> 10-18-20 @ 09:06    Imaging: CT reviewed, there is a probable fluid collection surrounding the left shoulder joint

## 2020-10-20 NOTE — CONSULT NOTE ADULT - ASSESSMENT
Assessment: 86y Male s/p resection of tumor from the proximal left humerus now with concern for infected left shoulder/harware potentially requiring washout.    Plan: Left shoulder fluid collection aspiration 10/20 under local anesthesia  -Please place order for IR Procedure, approving attending Dr. Mary Ellen Drake MD, RPVI  Chief Resident, Interventional Radiology  [facility]: [phone]  [facility]: [phone]

## 2020-10-20 NOTE — PROGRESS NOTE ADULT - PROBLEM SELECTOR PLAN 9
d/w length with wife and also patient's daughter. There are 5 children from his first marriage and he has been  to his new wife for 25 years.  d/w wife - she believes this cancer is treatable, I gently approached the subject with her and that this cancer is aggressive and advanced. She would like to consider all options at this time.  d/w daughter (2nd daughter - Stella) - she is more realistic. Does not think her father could tolerate chemo. However, he defers to his wife.  Plan for family meeting once biopsy obtained and can meet with heme/onc  full code

## 2020-10-20 NOTE — PROCEDURE NOTE - PROCEDURE FINDINGS AND DETAILS
large left shoulder soft tissue mass, multiple sites biopsied without any pus. serosanguinous aspirate sent for culture along with 1 core. 5 cores sent in formalin for surg path eval. Not enough fluid for cell count

## 2020-10-20 NOTE — PROGRESS NOTE ADULT - ASSESSMENT
-----------------incomplete    Patient is an 85 y/o M w/ a PMHx of DLBCL (s/p 6 cycles of R-CHOP and in DELANEY until 9/2019), who p/w possible relapse w/ L shoulder mass and pathologic fracture s/p left proximal humerus open biopsy, radical resection of tumor, and ORIF with IMN and cementation on 9/25/20), HTN, HLD, CKD, BPH, anemia, syncope, and anxiety/adjustment disorder/depression who was transferred to [facility] from the [facility] for further management of his diffuse large B cell lymphoma. Hematology was consulted for further evaluation.    [facility]. Medical records to be sent.  Need more info from [facility] as well rick    # DLBCL, concerning for relapse  - history of DLBCL, GCB subtype (BCL2, BCL6, MYC, all negative by FISH). Per chart review, patient last received 6 cycles of R-CHOP in 2017 with DELANEY until 11/2019. Patient had mesenteric mass with size of 2.7x4.8 in 2017.   - CT CAP apparently shows enlarging mesenteric mass and new Lt pelvic renal mass concerning of relapse  - There is unconfirmed report from OSH about relapsing lymphoma with CNS involvement. He was found to have a L shoulder mass and pathologic fracture. His is s/p ORIF and radical resection of tumor on 9/25. PET/CT was performed.  - Dr. Jose Mcintosh in [place], [phone])   - Please obtain prior Hematologic records ([place]) and recent hospitalization records ([facility]) to help provide collateral information  - Please check CBC WITH DIFF and TLS labs (CMP, Phos, LDH, and Uric acid) daily  - MRI contraindicated d/t metallic julianne  - If the concern for secondary CNS involvement is accurate, patient will need an LP for IT chemo and CSF analysis  - Appreciate care as per Medicine team  - Will continue to follow    #Possible infection of L shoulder s/p internal fixation  - Appreciate ID and ortho evaluation. C/w IV antibiotics as per ID  - as per ortho, shoulder lesion could be 2/2 his lymphoma, requiring MRI shoulder and rebiopsy  - need biopsy report from previous surgery      Hall-in MD Rubén, PGY-4  Translational Medical Oncology Fellow  Pager: [phone]  Case d/w Dr. Degroot Carissa Patient is an 85 y/o M w/ a PMHx of DLBCL (s/p 6 cycles of R-CHOP and in DELANEY until 9/2019), who p/w possible relapse w/ L shoulder mass and pathologic fracture s/p left proximal humerus open biopsy, radical resection of tumor, and ORIF with IMN and cementation on 9/25/20), HTN, HLD, CKD, BPH, anemia, syncope, and anxiety/adjustment disorder/depression who was transferred to [facility] from the [facility] for further management of his diffuse large B cell lymphoma. Hematology was consulted for further evaluation.    Obtained medical information from [facility] and [facility]. Surgical specimen with DLBCL. PET/CT significant with hypermetabolic brain lesion and mass at Lt renal pelvic area.  HCP Mayda leaning toward to systemic chemotherapy    # DLBCL, relapsed  - history of DLBCL, GCB subtype (BCL2, BCL6, MYC, all negative by FISH). Per chart review, patient last received 6 cycles of R-CHOP in 2017 with DELANEY until 11/2019. Patient had mesenteric mass with size of 2.7x4.8 in 2017.   - CT CAP apparently shows enlarging mesenteric mass and new Lt pelvic renal mass concerning of relapse  - At [facility], he was found to have a L shoulder mass and pathologic fracture. His is s/p ORIF and radical resection of tumor on 9/25. PET/CT was performed revealing brain lesion with hypermetabolic activity  - Dr. Jose Mcintosh in [place], [phone]  - Please check CBC WITH DIFF and TLS labs (CMP, Phos, LDH, and Uric acid) daily  - f/u MRI brain and Lt shoudler   - requesting family to have [facility] to send biopsy slide, although unclear when this will happen  - planning possible BM biopsy tomorrow  - Appreciate care as per Medicine team  - patient and his family leaning more to the systemic therapy, will talk to patient and his wife after all the information is gathered  - Will continue to follow    #Possible infection of L shoulder s/p internal fixation  - Appreciate ID and ortho evaluation. C/w IV antibiotics as per ID  - as per ortho, shoulder lesion could be 2/2 his lymphoma, requiring MRI shoulder and rebiopsy  - need biopsy report from previous surgery      Hall-in MD Rubén, PGY-4  Translational Medical Oncology Fellow  Pager: [phone]  Case d/w Dr. Degroot Carissa Patient is an 87 y/o M w/ a PMHx of DLBCL (s/p 6 cycles of R-CHOP and in DELANEY until 9/2019), who p/w possible relapse w/ L shoulder mass and pathologic fracture s/p left proximal humerus open biopsy, radical resection of tumor, and ORIF with IMN and cementation on 9/25/20), HTN, HLD, CKD, BPH, anemia, syncope, and anxiety/adjustment disorder/depression who was transferred to [facility] from the [facility] for further management of his diffuse large B cell lymphoma. Hematology was consulted for further evaluation.    Obtained medical information from [facility] and [facility]. Surgical specimen with DLBCL. PET/CT significant with hypermetabolic brain lesion and mass at Lt renal pelvic area.  HCP Mayda leaning toward to systemic chemotherapy    # DLBCL, relapsed  - history of DLBCL, GCB subtype (BCL2, BCL6, MYC, all negative by FISH). Per chart review, patient last received 6 cycles of R-CHOP in 2017 with DELANEY until 11/2019. Patient had mesenteric mass with size of 2.7x4.8 in 2017.   - CT CAP apparently shows enlarging mesenteric mass and new Lt pelvic renal mass concerning of relapse  - At [facility], he was found to have a L shoulder mass and pathologic fracture. His is s/p ORIF and radical resection of tumor on 9/25. PET/CT was performed revealing brain lesion with hypermetabolic activity  - Dr. Jose Mcintosh in [place], [phone]  - Please check CBC WITH DIFF and TLS labs (CMP, Phos, LDH, and Uric acid) daily  - f/u MRI brain and Lt shoudler   - requesting family to have [facility] to send biopsy slide, although unclear when this will happen  - planning possible BM biopsy tomorrow  - Appreciate care as per Medicine team  - patient and his family leaning more to the systemic therapy, will talk to patient and his wife after all the information is gathered  - Will continue to follow  - ferritin is elevated, could be suggestive of cytokine storm.  Please send fibrinogen, resend ferritin, triglycerides, soluble IL2R, NK cell activity, coags in order to complete workup for HLH, however would assume this is part of the underlying lymphoma and would treat accordingly    #Possible infection of L shoulder s/p internal fixation  - Appreciate ID and ortho evaluation. C/w IV antibiotics as per ID  - as per ortho, shoulder lesion could be 2/2 his lymphoma, requiring MRI shoulder and rebiopsy  - need biopsy report from previous surgery      Hall-in MD Rubén, PGY-4  Translational Medical Oncology Fellow  Pager: [phone]  Case d/w Dr. Degroot Carissa

## 2020-10-20 NOTE — PROGRESS NOTE ADULT - SUBJECTIVE AND OBJECTIVE BOX
PAULARASH  MRN-[id number]    Interval hx:      Subjective:    Review of System (ROS)  Constitutional:  No Weight Change, No Fever, No Chills, No Night Sweats, No Fatigue, No Malaise  ENT/Mouth:  No Hearing Changes, No Ear Pain, No Nasal Congestion, No Sinus Pain, No Hoarseness, No sore throat, No Rhinorrhea, No Swallowing Difficulty  Eyes:  No Eye Pain, No Swelling, No Redness, No Foreign Body, No Discharge, No Vision Changes  Cardiovascular:  No Chest Pain, No SOB, No PND, No Dyspnea on Exertion, No Orthopnea, No Claudication, No Edema, No Palpitations  Respiratory:  No Cough, No Sputum, No Wheezing, No Smoke Exposure, No Dyspnea  Gastrointestinal:  No Nausea, No Vomiting, No Diarrhea, No Constipation, No Pain, No Heartburn, No Anorexia, No Dysphagia, No Hematochezia, No Melena, No Flatulence, No Jaundice  Genitourinary:  No Dysmenorrhea, No DUB, No Dyspareunia, No Dysuria, No Urinary Frequency, No Hematuria, No Urinary Incontinence, No Urgency, No Flank Pain, No Urinary Flow Changes, No Hesitancy  Musculoskeletal:  No Arthralgias, No Myalgias, No Joint Swelling, No Joint Stiffness, No Back Pain, No Neck Pain, No Injury History  Skin:  No Skin Lesions, No Pruritis, No Hair Changes, No Breast/Skin Changes, No Nipple Discharge  Neuro:  No Weakness, No Numbness, No Paresthesias, No Loss of Consciousness, No Syncope, No Dizziness, No Headache, No Coordination Changes, No Recent Falls  Psych:  No Anxiety/Panic, No Depression, No Insomnia, No Personality Changes, No Delusions, No Rumination, No SI/HI/AH/VH, No Social Issues, No Memory Changes, No Violence/Abuse Hx., No Eating Concerns  Heme/Lymph:  No Bruising, No Bleeding, No Transfusions History, No Lymphadenopathy  Endocrine:  No Polyuria, No Polydipsia, No Temperature Intolerance    MEDICATIONS  (STANDING):  allopurinol 100 milliGRAM(s) Oral daily  amLODIPine   Tablet 2.5 milliGRAM(s) Oral daily  atorvastatin 80 milliGRAM(s) Oral at bedtime  cefepime   IVPB 2000 milliGRAM(s) IV Intermittent daily  heparin   Injectable 5000 Unit(s) SubCutaneous every 8 hours  influenza   Vaccine 0.5 milliLiter(s) IntraMuscular once  lactobacillus acidophilus 1 Tablet(s) Oral daily  latanoprost 0.005% Ophthalmic Solution 1 Drop(s) Both EYES at bedtime  metroNIDAZOLE    Tablet 500 milliGRAM(s) Oral every 8 hours  mirtazapine 7.5 milliGRAM(s) Oral at bedtime  polyethylene glycol 3350 17 Gram(s) Oral daily  senna 2 Tablet(s) Oral at bedtime  timolol 0.5% Solution 1 Drop(s) Both EYES two times a day  vancomycin  IVPB 500 milliGRAM(s) IV Intermittent daily    MEDICATIONS  (PRN):  acetaminophen   Tablet .. 650 milliGRAM(s) Oral every 6 hours PRN Mild Pain (1 - 3), Moderate Pain (4 - 6)  ALPRAZolam 0.5 milliGRAM(s) Oral two times a day PRN anxiety  traMADol 25 milliGRAM(s) Oral every 6 hours PRN Moderate Pain (4 - 6)      Allergies    No Known Allergies    Intolerances        Objectives:  Vital Signs Last 24 Hrs  T(C): 36.9 (20 Oct 2020 10:35), Max: 36.9 (20 Oct 2020 10:35)  T(F): 98.5 (20 Oct 2020 10:35), Max: 98.5 (20 Oct 2020 10:35)  HR: 93 (20 Oct 2020 10:35) (85 - 97)  BP: 121/64 (20 Oct 2020 10:35) (109/67 - 133/72)  BP(mean): --  RR: 20 (20 Oct 2020 10:35) (20 - 23)  SpO2: 94% (20 Oct 2020 10:35) (94% - 97%)    Physical exam  General - NAD, alert and oriented  HEENT - PERRLA, EOM intact, sclera and conjunctiva clear, oropharynx, nares clear  Neck - Supple, no thyromegaly or thyroid nodules, no bruits  Cardiovascular - RRR no m/r/g, no JVD, no carotid bruits  Lungs - CTAB, no use of acessory muscles, no w/c/r  Abdomen - Normal bowel sounds, NT/ND  Genito Urinary –   Lymph Nodes - No LAD  Extremeties - No e/c/c  Skin - No rashes, skin warm and dry, no erythematous areas  Musculo Skeletal - 5/5 strength, normal range of motion, no swollen or erythematous joints.  Neurological – Alert and oriented x 3, CN 2-12 grossly intact.        10-19-20 @ 07:01  -  10-20-20 @ 07:00  --------------------------------------------------------  IN: 460 mL / OUT: 675 mL / NET: -215 mL    10-20-20 @ 07:01  -  10-20-20 @ 10:41  --------------------------------------------------------  IN: 240 mL / OUT: 450 mL / NET: -210 mL        Labs:    CBC Full  -  ( 20 Oct 2020 04:16 )  WBC Count : 10.02 K/uL  RBC Count : 2.67 M/uL  Hemoglobin : 7.3 g/dL  Hematocrit : 22.3 %  Platelet Count - Automated : 293 K/uL  Mean Cell Volume : 83.5 fl  Mean Cell Hemoglobin : 27.3 pg  Mean Cell Hemoglobin Concentration : 32.7 gm/dL  Auto Neutrophil # : x  Auto Lymphocyte # : x  Auto Monocyte # : x  Auto Eosinophil # : x  Auto Basophil # : x  Auto Neutrophil % : x  Auto Lymphocyte % : x  Auto Monocyte % : x  Auto Eosinophil % : x  Auto Basophil % : x        10-20    131<L>  |  99  |  27<H>  ----------------------------<  101<H>  4.1   |  21<L>  |  1.54<H>    Ca    8.4      20 Oct 2020 04:16  Phos  3.0     10-19  Mg     2.3     10-19    TPro  5.5<L>  /  Alb  2.3<L>  /  TBili  0.4  /  DBili  0.2  /  AST  41<H>  /  ALT  12  /  AlkPhos  87  10-20    LIVER FUNCTIONS - ( 20 Oct 2020 04:16 )  Alb: 2.3 g/dL / Pro: 5.5 g/dL / ALK PHOS: 87 U/L / ALT: 12 U/L / AST: 41 U/L / GGT: x                   Culture - Blood (collected 18 Oct 2020 10:05)  Source: .Blood Blood  Preliminary Report (19 Oct 2020 11:00):    No growth to date.    Culture - Blood (collected 18 Oct 2020 10:05)  Source: .Blood Blood  Preliminary Report (19 Oct 2020 11:00):    No growth to date.        Imagings:       BRIELLEARASH ANDREA  MRN-[id number]    Interval hx:  Patient had biopsy with DLBCL at L shoulder mass and PET/CT scan report at [facility].   Patient was seen and examined at the bedside. Patient is AAOx2 and is able to answer appropriately to questions.  However, deferring all details and decision to his wife Mayda. Mayda wishes to proceed with therapy once all the diagnosis is made.    MEDICATIONS  (STANDING):  allopurinol 100 milliGRAM(s) Oral daily  amLODIPine   Tablet 2.5 milliGRAM(s) Oral daily  atorvastatin 80 milliGRAM(s) Oral at bedtime  cefepime   IVPB 2000 milliGRAM(s) IV Intermittent daily  heparin   Injectable 5000 Unit(s) SubCutaneous every 8 hours  influenza   Vaccine 0.5 milliLiter(s) IntraMuscular once  lactobacillus acidophilus 1 Tablet(s) Oral daily  latanoprost 0.005% Ophthalmic Solution 1 Drop(s) Both EYES at bedtime  metroNIDAZOLE    Tablet 500 milliGRAM(s) Oral every 8 hours  mirtazapine 7.5 milliGRAM(s) Oral at bedtime  polyethylene glycol 3350 17 Gram(s) Oral daily  senna 2 Tablet(s) Oral at bedtime  timolol 0.5% Solution 1 Drop(s) Both EYES two times a day  vancomycin  IVPB 500 milliGRAM(s) IV Intermittent daily    MEDICATIONS  (PRN):  acetaminophen   Tablet .. 650 milliGRAM(s) Oral every 6 hours PRN Mild Pain (1 - 3), Moderate Pain (4 - 6)  ALPRAZolam 0.5 milliGRAM(s) Oral two times a day PRN anxiety  traMADol 25 milliGRAM(s) Oral every 6 hours PRN Moderate Pain (4 - 6)      Allergies    No Known Allergies    Intolerances        Objectives:  Vital Signs Last 24 Hrs  T(C): 36.9 (20 Oct 2020 10:35), Max: 36.9 (20 Oct 2020 10:35)  T(F): 98.5 (20 Oct 2020 10:35), Max: 98.5 (20 Oct 2020 10:35)  HR: 93 (20 Oct 2020 10:35) (85 - 97)  BP: 121/64 (20 Oct 2020 10:35) (109/67 - 133/72)  BP(mean): --  RR: 20 (20 Oct 2020 10:35) (20 - 23)  SpO2: 94% (20 Oct 2020 10:35) (94% - 97%)    Physical exam  General - NAD, elderly  HEENT - PERRLA  Neck - Supple, no thyromegaly or thyroid nodules  Cardiovascular - RRR no m/r/g  Lungs - CTAB  Abdomen - Normal bowel sounds, NT/ND  Extremeties - No e/c/c  Skin - erythematous warm Lt shoulder with well-healed scar  Musculo Skeletal - limited ROM at Lt shoudler with swelling with at least 48 cm of circumference  Neurological – Alert and oriented x 3, CN 2-12 grossly intact.        10-19-20 @ 07:01  -  10-20-20 @ 07:00  --------------------------------------------------------  IN: 460 mL / OUT: 675 mL / NET: -215 mL    10-20-20 @ 07:01  -  10-20-20 @ 10:41  --------------------------------------------------------  IN: 240 mL / OUT: 450 mL / NET: -210 mL        Labs:    CBC Full  -  ( 20 Oct 2020 04:16 )  WBC Count : 10.02 K/uL  RBC Count : 2.67 M/uL  Hemoglobin : 7.3 g/dL  Hematocrit : 22.3 %  Platelet Count - Automated : 293 K/uL  Mean Cell Volume : 83.5 fl  Mean Cell Hemoglobin : 27.3 pg  Mean Cell Hemoglobin Concentration : 32.7 gm/dL  Auto Neutrophil # : x  Auto Lymphocyte # : x  Auto Monocyte # : x  Auto Eosinophil # : x  Auto Basophil # : x  Auto Neutrophil % : x  Auto Lymphocyte % : x  Auto Monocyte % : x  Auto Eosinophil % : x  Auto Basophil % : x        10-20    131<L>  |  99  |  27<H>  ----------------------------<  101<H>  4.1   |  21<L>  |  1.54<H>    Ca    8.4      20 Oct 2020 04:16  Phos  3.0     10-19  Mg     2.3     10-19    TPro  5.5<L>  /  Alb  2.3<L>  /  TBili  0.4  /  DBili  0.2  /  AST  41<H>  /  ALT  12  /  AlkPhos  87  10-20    LIVER FUNCTIONS - ( 20 Oct 2020 04:16 )  Alb: 2.3 g/dL / Pro: 5.5 g/dL / ALK PHOS: 87 U/L / ALT: 12 U/L / AST: 41 U/L / GGT: x                   Culture - Blood (collected 18 Oct 2020 10:05)  Source: .Blood Blood  Preliminary Report (19 Oct 2020 11:00):    No growth to date.    Culture - Blood (collected 18 Oct 2020 10:05)  Source: .Blood Blood  Preliminary Report (19 Oct 2020 11:00):    No growth to date.        Imagings:    < from: CT Shoulder w/ IV Cont, Left (10.19.20 @ 15:36) >  IMPRESSION:    Status post ORIF of the humerus. Lucency within the humeral head/neck, which may represent sequela metastatic disease versus infection. Lucency is present about the interlocking screws. Extensive low density collection surrounding the glenohumeral joint and proximal humerus, concerning for infection.  Low density surrounding the body of the scapula, which may represent sequela of metastatic disease versus infection.      < end of copied text >    < from: CT Abdomen and Pelvis w/ Oral Cont and w/ IV Cont (10.19.20 @ 15:36) >  KIDNEYS/URETERS: Mild left hydronephrosis with associated delayed nephrogram due to an infiltrative soft tissue mass at the level of the renal pelvis, approximately measuring 4.9 x 4.5 cm (3, 183). Bilateral renal cysts and hypodensities too small to characterize.    BLADDER: Within normal limits.  REPRODUCTIVE ORGANS: Prostate within normal limits.    BOWEL: Tiny hiatal hernia. No bowel obstruction. Appendix is normal. Colonic diverticulosis.  PERITONEUM: No ascites. 8.7 x 4.3 cm soft tissue mass in the mesentery, difficult to delineate from the adjacent small bowel.  VESSELS: Atherosclerotic changes.  RETROPERITONEUM/LYMPH NODES: Mesenteric lymphadenopathy, for example a 1.6 x 1.3 cm node in the right (3, 240).  ABDOMINAL WALL: Within normal limits.  BONES: Status post ORIF of a left proximal humerus fracture with marked adjacent soft tissue infiltration.    IMPRESSION:    Mild left hydronephrosis with delayed nephrogram due to an obstructing soft tissue mass at the level of the renal pelvis. Large mesenteric jerman mass, difficult to delineate from the small bowel in the absence of oral contrast. Findings are likely related to known lymphoma.    Few sub-6 mm pulmonary nodules are indeterminate.    Status post ORIF of a left proximal humerus fracture, with marked adjacent soft tissue infiltration. Please refer to dedicated CT report of the left shoulder.        < end of copied text >

## 2020-10-20 NOTE — CONSULT NOTE ADULT - SUBJECTIVE AND OBJECTIVE BOX
85 yo male with PMH of NHL (last chemo 2017), HTN, HLD, CKD, BPH, anemia, syncope, anxiety/adjustment disorder/depression, presents here from [facility] for further management of his diffuse large B cell lymphoma.  History obtained from wife Mayda ([facility], [phone]). Patient was treated for NHL 5 years ago with chemotherapy and was in remission.  Earlier this year, was found to have left shoulder weakness, later found to have mass of left shoulder and pathologic fracture.  Patient underwent left proximal humerus open biopsy, tumor resection, and IMN and cementation on 9/25/20 the pathalogic fracture. Per wife this was done by Dr. Camejo (unclear if this an attending vs resident) at the [facility]. He was planned to undergo postop radiation therapy, but was cancelled due to having redness and swelling of left shoulder that started on 10/13/20. Patient was treated with antibiotics for the shoulder redness/swelling which did not improve symptoms. Currently he complains of left shoulder pain.  Denies fever/chills. He is admitted now with further management of recurrence of lymphoma      PAST MEDICAL & SURGICAL HISTORY:  Syncope    Chronic kidney disease (CKD)    Diffuse large B cell lymphoma    HLD (hyperlipidemia)    BPH (benign prostatic hyperplasia)    HTN (hypertension)    History of left shoulder fracture    Osteomyelitis of left shoulder region      MEDICATIONS  (STANDING):  allopurinol 100 milliGRAM(s) Oral daily  amLODIPine   Tablet 2.5 milliGRAM(s) Oral daily  atorvastatin 80 milliGRAM(s) Oral at bedtime  cefepime   IVPB 2000 milliGRAM(s) IV Intermittent daily  heparin   Injectable 5000 Unit(s) SubCutaneous every 8 hours  influenza   Vaccine 0.5 milliLiter(s) IntraMuscular once  lactobacillus acidophilus 1 Tablet(s) Oral daily  latanoprost 0.005% Ophthalmic Solution 1 Drop(s) Both EYES at bedtime  metroNIDAZOLE    Tablet 500 milliGRAM(s) Oral every 8 hours  mirtazapine 7.5 milliGRAM(s) Oral at bedtime  polyethylene glycol 3350 17 Gram(s) Oral daily  senna 2 Tablet(s) Oral at bedtime  timolol 0.5% Solution 1 Drop(s) Both EYES two times a day  vancomycin  IVPB 500 milliGRAM(s) IV Intermittent daily    MEDICATIONS  (PRN):  acetaminophen   Tablet .. 650 milliGRAM(s) Oral every 6 hours PRN Mild Pain (1 - 3), Moderate Pain (4 - 6)  ALPRAZolam 0.5 milliGRAM(s) Oral two times a day PRN anxiety  traMADol 25 milliGRAM(s) Oral every 6 hours PRN Moderate Pain (4 - 6)    No Known Allergies      Physical Exam  T(C): 36.8 (10-20-20 @ 04:30), Max: 37.1 (10-19-20 @ 09:24)  HR: 85 (10-20-20 @ 04:30) (85 - 97)  BP: 133/72 (10-20-20 @ 04:30) (109/67 - 133/72)  RR: 20 (10-20-20 @ 04:30) (20 - 23)  SpO2: 95% (10-20-20 @ 04:30) (95% - 97%)  Wt(kg): --    Gen: NAD  LUE: proximal incision is intact and healed, distally there is some skin breakdown, but incision is intact. No dehiscence  L shoulder is very erythematous and swollen. There is a diffusely palpable firm collection circumferentially around proximal humerus  Patient has severe pain with any ROM of L shoulder  M/R/U/Ax/Msc  intact  SILT M/U/R/Ax/Msc  2+ radial pulses, cap refill < 2s    Imaging  < from: CT Shoulder w/ IV Cont, Left (10.19.20 @ 15:36) >    Status post ORIF of the humerus. Lucency within the humeral head/neck, which may represent sequela metastatic disease versus infection. Lucency is present about the interlocking screws. Extensive low density collection surrounding the glenohumeral joint and proximal humerus, concerning for infection.  Low density surrounding the body of the scapula, which may represent sequela of metastatic disease versus infection.      A/P: 86y Male w/ Hx Diffuse large B cell lymphoma s/p Resection and ORIF of L shoulder 2/2 pathologic fracture. Now with L shoulder pain/swelling/erythema concerning for infection vs tumor  - Recommend urgent IR biopsy of collection to send for culture and pathology  - Possible OR tomorrow for washout if the IR study indicates infection  - XR L shoulder/humerus  - MRI L shoulder and humerus w/ contrast  - Bone scan  - MRI/bone scan can be done after IR biopsy  - pain control  - case discussed with Dr. Andujar 87 yo male with PMH of NHL (last chemo 2017), HTN, HLD, CKD, BPH, anemia, syncope, anxiety/adjustment disorder/depression, presents here from [facility] for further management of his diffuse large B cell lymphoma.  History obtained from wife Mayda ([facility], [phone]). Patient was treated for NHL 5 years ago with chemotherapy and was in remission.  Earlier this year, was found to have left shoulder weakness, later found to have mass of left shoulder and pathologic fracture.  Patient underwent left proximal humerus open biopsy, tumor resection, and IMN and cementation on 9/25/20 the pathalogic fracture. Per wife this was done by Dr. Camejo (unclear if this an attending vs resident) at the [facility]. He was planned to undergo postop radiation therapy, but was cancelled due to having redness and swelling of left shoulder that started on 10/13/20. Patient was treated with antibiotics for the shoulder redness/swelling which did not improve symptoms. Currently he complains of left shoulder pain.  Denies fever/chills. He is admitted now with further management of recurrence of lymphoma      **Spoke with orthopaedic resident at Dr. Camejo at [facility] who is familiar with patients care. On follow up visits with the patient, [facility] this is not an infection but a rather reaction from lymphoma infiltrating the soft tissue. They believe this is progressing because the patient has not been receiving chemo and radiation postop due to the possibility of infection. They recommend chemo and radiation be started, with IV antibiotics.    PAST MEDICAL & SURGICAL HISTORY:  Syncope    Chronic kidney disease (CKD)    Diffuse large B cell lymphoma    HLD (hyperlipidemia)    BPH (benign prostatic hyperplasia)    HTN (hypertension)    History of left shoulder fracture    Osteomyelitis of left shoulder region      MEDICATIONS  (STANDING):  allopurinol 100 milliGRAM(s) Oral daily  amLODIPine   Tablet 2.5 milliGRAM(s) Oral daily  atorvastatin 80 milliGRAM(s) Oral at bedtime  cefepime   IVPB 2000 milliGRAM(s) IV Intermittent daily  heparin   Injectable 5000 Unit(s) SubCutaneous every 8 hours  influenza   Vaccine 0.5 milliLiter(s) IntraMuscular once  lactobacillus acidophilus 1 Tablet(s) Oral daily  latanoprost 0.005% Ophthalmic Solution 1 Drop(s) Both EYES at bedtime  metroNIDAZOLE    Tablet 500 milliGRAM(s) Oral every 8 hours  mirtazapine 7.5 milliGRAM(s) Oral at bedtime  polyethylene glycol 3350 17 Gram(s) Oral daily  senna 2 Tablet(s) Oral at bedtime  timolol 0.5% Solution 1 Drop(s) Both EYES two times a day  vancomycin  IVPB 500 milliGRAM(s) IV Intermittent daily    MEDICATIONS  (PRN):  acetaminophen   Tablet .. 650 milliGRAM(s) Oral every 6 hours PRN Mild Pain (1 - 3), Moderate Pain (4 - 6)  ALPRAZolam 0.5 milliGRAM(s) Oral two times a day PRN anxiety  traMADol 25 milliGRAM(s) Oral every 6 hours PRN Moderate Pain (4 - 6)    No Known Allergies      Physical Exam  T(C): 36.8 (10-20-20 @ 04:30), Max: 37.1 (10-19-20 @ 09:24)  HR: 85 (10-20-20 @ 04:30) (85 - 97)  BP: 133/72 (10-20-20 @ 04:30) (109/67 - 133/72)  RR: 20 (10-20-20 @ 04:30) (20 - 23)  SpO2: 95% (10-20-20 @ 04:30) (95% - 97%)  Wt(kg): --    Gen: NAD  LUE: proximal incision is intact and healed, distally there is some skin breakdown, but incision is intact. No dehiscence  L shoulder is very erythematous and swollen. There is a diffusely palpable firm collection circumferentially around proximal humerus  Patient has severe pain with any ROM of L shoulder  M/R/U/Ax/Msc  intact  SILT M/U/R/Ax/Msc  2+ radial pulses, cap refill < 2s    Imaging  < from: CT Shoulder w/ IV Cont, Left (10.19.20 @ 15:36) >    Status post ORIF of the humerus. Lucency within the humeral head/neck, which may represent sequela metastatic disease versus infection. Lucency is present about the interlocking screws. Extensive low density collection surrounding the glenohumeral joint and proximal humerus, concerning for infection.  Low density surrounding the body of the scapula, which may represent sequela of metastatic disease versus infection.      A/P: 86y Male w/ Hx Diffuse large B cell lymphoma s/p Resection and ORIF of L shoulder 2/2 pathologic fracture. Now with L shoulder pain/swelling/erythema concerning for infection vs tumor  - Recommend urgent IR biopsy of collection to send for culture and pathology  - XR L shoulder/humerus  - MRI L shoulder and humerus w/ contrast  - Bone scan  - MRI/bone scan can be done after IR biopsy  - pain control  - case discussed with Dr. Andujar 87 yo male with PMH of NHL (last chemo 2017), HTN, HLD, CKD, BPH, anemia, syncope, anxiety/adjustment disorder/depression, presents here from [facility] for further management of his diffuse large B cell lymphoma.  History obtained from wife Mayda ([facility], [phone]). Patient was treated for NHL 5 years ago with chemotherapy and was in remission.  Earlier this year, was found to have left shoulder weakness, later found to have mass of left shoulder and pathologic fracture.  Patient underwent left proximal humerus open biopsy, tumor resection, and IMN and cementation on 9/25/20 the pathalogic fracture. Per wife this was done by Dr. Camejo (unclear if this an attending vs resident) at the [facility]. He was planned to undergo postop radiation therapy, but was cancelled due to having redness and swelling of left shoulder that started on 10/13/20. Patient was treated with antibiotics for the shoulder redness/swelling which did not improve symptoms. Currently he complains of left shoulder pain.  Denies fever/chills. He is admitted now with further management of recurrence of lymphoma      **Spoke with orthopaedic resident at Dr. Camejo at [facility] who is familiar with patients care. On follow up visits with the patient, [facility] believe that this is not an infection but a rather reaction from lymphoma infiltrating the soft tissue. They believe this is progressing because the patient has not been receiving chemo and radiation postop due to the possibility of infection. They recommend chemo and radiation be started, with IV antibiotics. They are okay with plan for IR aspiration/biopsy.    PAST MEDICAL & SURGICAL HISTORY:  Syncope    Chronic kidney disease (CKD)    Diffuse large B cell lymphoma    HLD (hyperlipidemia)    BPH (benign prostatic hyperplasia)    HTN (hypertension)    History of left shoulder fracture    Osteomyelitis of left shoulder region      MEDICATIONS  (STANDING):  allopurinol 100 milliGRAM(s) Oral daily  amLODIPine   Tablet 2.5 milliGRAM(s) Oral daily  atorvastatin 80 milliGRAM(s) Oral at bedtime  cefepime   IVPB 2000 milliGRAM(s) IV Intermittent daily  heparin   Injectable 5000 Unit(s) SubCutaneous every 8 hours  influenza   Vaccine 0.5 milliLiter(s) IntraMuscular once  lactobacillus acidophilus 1 Tablet(s) Oral daily  latanoprost 0.005% Ophthalmic Solution 1 Drop(s) Both EYES at bedtime  metroNIDAZOLE    Tablet 500 milliGRAM(s) Oral every 8 hours  mirtazapine 7.5 milliGRAM(s) Oral at bedtime  polyethylene glycol 3350 17 Gram(s) Oral daily  senna 2 Tablet(s) Oral at bedtime  timolol 0.5% Solution 1 Drop(s) Both EYES two times a day  vancomycin  IVPB 500 milliGRAM(s) IV Intermittent daily    MEDICATIONS  (PRN):  acetaminophen   Tablet .. 650 milliGRAM(s) Oral every 6 hours PRN Mild Pain (1 - 3), Moderate Pain (4 - 6)  ALPRAZolam 0.5 milliGRAM(s) Oral two times a day PRN anxiety  traMADol 25 milliGRAM(s) Oral every 6 hours PRN Moderate Pain (4 - 6)    No Known Allergies      Physical Exam  T(C): 36.8 (10-20-20 @ 04:30), Max: 37.1 (10-19-20 @ 09:24)  HR: 85 (10-20-20 @ 04:30) (85 - 97)  BP: 133/72 (10-20-20 @ 04:30) (109/67 - 133/72)  RR: 20 (10-20-20 @ 04:30) (20 - 23)  SpO2: 95% (10-20-20 @ 04:30) (95% - 97%)  Wt(kg): --    Gen: NAD  LUE: proximal incision is intact and healed, distally there is some skin breakdown, but incision is intact. No dehiscence  L shoulder is very erythematous and swollen. There is a diffusely palpable firm collection circumferentially around proximal humerus  Patient has severe pain with any ROM of L shoulder  M/R/U/Ax/Msc  intact  SILT M/U/R/Ax/Msc  2+ radial pulses, cap refill < 2s    Imaging  < from: CT Shoulder w/ IV Cont, Left (10.19.20 @ 15:36) >    Status post ORIF of the humerus. Lucency within the humeral head/neck, which may represent sequela metastatic disease versus infection. Lucency is present about the interlocking screws. Extensive low density collection surrounding the glenohumeral joint and proximal humerus, concerning for infection.  Low density surrounding the body of the scapula, which may represent sequela of metastatic disease versus infection.      A/P: 86y Male w/ Hx Diffuse large B cell lymphoma s/p Resection and ORIF of L shoulder 2/2 pathologic fracture. Now with L shoulder pain/swelling/erythema concerning for infection vs tumor  - Recommend urgent IR biopsy of collection to send for culture and pathology  - XR L shoulder/humerus  - MRI L shoulder and humerus w/ contrast  - Bone scan  - MRI/bone scan can be done after IR biopsy  - pain control  - case discussed with Dr. Andujar

## 2020-10-21 NOTE — PROGRESS NOTE ADULT - SUBJECTIVE AND OBJECTIVE BOX
Interventional Radiology Follow- Up Note (seen & examined at bedside at 11:05)    This is a 86y Male s/p large left shoulder soft tissue mass aspiration and biopsy on 10/20  in Interventional Radiology with Dr. Hyde.     Patient seen and examined @ bedside. No complaints offered.    PAST MEDICAL & SURGICAL HISTORY:  Syncope    Chronic kidney disease (CKD)    Diffuse large B cell lymphoma    HLD (hyperlipidemia)    BPH (benign prostatic hyperplasia)    HTN (hypertension)    History of left shoulder fracture    Osteomyelitis of left shoulder region      No Known Allergies      LABS:                        9.1    9.55  )-----------( 335      ( 21 Oct 2020 06:26 )             28.0     10-21    131<L>  |  98  |  27<H>  ----------------------------<  102<H>  4.0   |  21<L>  |  1.66<H>    Ca    8.6      21 Oct 2020 06:25  Phos  2.7     10-21    TPro  5.9<L>  /  Alb  2.5<L>  /  TBili  0.6  /  DBili  x   /  AST  46<H>  /  ALT  15  /  AlkPhos  87  10-21    PT/INR - ( 21 Oct 2020 08:29 )   PT: 13.7 sec;   INR: 1.16 ratio      PTT - ( 21 Oct 2020 08:29 )  PTT:32.0 sec  I&O's Detail    20 Oct 2020 07:01  -  21 Oct 2020 07:00  --------------------------------------------------------  IN:    IV PiggyBack: 150 mL    Oral Fluid: 610 mL  Total IN: 760 mL    OUT:    Incontinent per Condom Catheter (mL): 250 mL    Stool (mL): 0 mL    Voided (mL): 450 mL  Total OUT: 700 mL    Total NET: 60 mL    21 Oct 2020 07:01  -  21 Oct 2020 12:31  --------------------------------------------------------  IN:    Oral Fluid: 120 mL  Total IN: 120 mL    OUT:    Incontinent per Condom Catheter (mL): 250 mL  Total OUT: 250 mL    Total NET: -130 mL    Hematopathology/surgical pathology reports pending    Vitals: T(F): 98.3 (10-21-20 @ 09:56), Max: 98.3 (10-21-20 @ 09:56)  HR: 87 (10-21-20 @ 09:56) (76 - 87)  BP: 111/66 (10-21-20 @ 09:56) (111/65 - 130/70)  RR: 18 (10-21-20 @ 09:56) (18 - 18)  SpO2: 96% (10-21-20 @ 09:56) (95% - 100%)  Wt(kg): --    PHYSICAL EXAM:  General: Nontoxic, in NAD, A&O x3  Extremities: L shoulder diffusely erythematous, swollen without tenderness. No pedal edema or calf tenderness noted     Impression: 86y Male admitted with Acute lymphoblastic leukemia (ALL) not having achieved remission now day 1 s/p large left shoulder soft tissue mass aspiration and biopsy on 10/20  in Interventional Radiology with Dr. Hyde. Hematopathology and surgical pathology reports pending.    Plan:  -f/u hematopathology and surgical pathology reports  -care per primary team  -IR to sign off.  Please call IR at extension [phone] with any questions, concerns, or issues regarding above.      Abhinav STOVALL  Spectra# [id number]

## 2020-10-21 NOTE — PROGRESS NOTE ADULT - SUBJECTIVE AND OBJECTIVE BOX
PROGRESS NOTE:   Authored by Dr. Marsha Downey MD  Pager [phone]     Patient is a 86y old  Male who presents with a chief complaint of sent here for chemo and radiation (20 Oct 2020 17:00)      SUBJECTIVE / OVERNIGHT EVENTS: Patient seen and examined at bedside. Patient in bed, less confused today, denies pain. s/p IR biopsy last night    ADDITIONAL REVIEW OF SYSTEMS: as above    MEDICATIONS  (STANDING):  allopurinol 100 milliGRAM(s) Oral daily  amLODIPine   Tablet 2.5 milliGRAM(s) Oral daily  atorvastatin 80 milliGRAM(s) Oral at bedtime  cefepime   IVPB 2000 milliGRAM(s) IV Intermittent daily  heparin   Injectable 5000 Unit(s) SubCutaneous every 8 hours  influenza   Vaccine 0.5 milliLiter(s) IntraMuscular once  lactobacillus acidophilus 1 Tablet(s) Oral daily  latanoprost 0.005% Ophthalmic Solution 1 Drop(s) Both EYES at bedtime  metroNIDAZOLE    Tablet 500 milliGRAM(s) Oral every 8 hours  mirtazapine 7.5 milliGRAM(s) Oral at bedtime  polyethylene glycol 3350 17 Gram(s) Oral daily  senna 2 Tablet(s) Oral at bedtime  tamsulosin 0.4 milliGRAM(s) Oral at bedtime  timolol 0.5% Solution 1 Drop(s) Both EYES two times a day  vancomycin  IVPB 500 milliGRAM(s) IV Intermittent daily    MEDICATIONS  (PRN):  acetaminophen   Tablet .. 650 milliGRAM(s) Oral every 6 hours PRN Mild Pain (1 - 3), Moderate Pain (4 - 6)  ALPRAZolam 0.5 milliGRAM(s) Oral two times a day PRN anxiety  traMADol 25 milliGRAM(s) Oral every 6 hours PRN Moderate Pain (4 - 6)      CAPILLARY BLOOD GLUCOSE        I&O's Summary    20 Oct 2020 07:01  -  21 Oct 2020 07:00  --------------------------------------------------------  IN: 760 mL / OUT: 700 mL / NET: 60 mL    21 Oct 2020 07:01  -  21 Oct 2020 12:15  --------------------------------------------------------  IN: 120 mL / OUT: 250 mL / NET: -130 mL        PHYSICAL EXAM:  Vital Signs Last 24 Hrs  T(C): 36.8 (21 Oct 2020 09:56), Max: 36.8 (21 Oct 2020 09:56)  T(F): 98.3 (21 Oct 2020 09:56), Max: 98.3 (21 Oct 2020 09:56)  HR: 87 (21 Oct 2020 09:56) (76 - 87)  BP: 111/66 (21 Oct 2020 09:56) (111/65 - 130/70)  BP(mean): --  RR: 18 (21 Oct 2020 09:56) (18 - 18)  SpO2: 96% (21 Oct 2020 09:56) (95% - 100%)    CONSTITUTIONAL: NAD  RESPIRATORY: Normal respiratory effort; lungs are clear to auscultation bilaterally  CARDIOVASCULAR: Regular rate and rhythm, normal S1 and S2, no murmur/rub/gallop; No lower extremity edema; Peripheral pulses are 2+ bilaterally  ABDOMEN: Nontender to palpation, normoactive bowel sounds, no rebound/guarding; No hepatosplenomegaly  MUSCLOSKELETAL: L shoulder swelling/erythema  PSYCH: A+O to person, place; affect appropriate    LABS:                        9.1    9.55  )-----------( 335      ( 21 Oct 2020 06:26 )             28.0     10-21    131<L>  |  98  |  27<H>  ----------------------------<  102<H>  4.0   |  21<L>  |  1.66<H>    Ca    8.6      21 Oct 2020 06:25  Phos  2.7     10-21    TPro  5.9<L>  /  Alb  2.5<L>  /  TBili  0.6  /  DBili  x   /  AST  46<H>  /  ALT  15  /  AlkPhos  87  10-21    PT/INR - ( 21 Oct 2020 08:29 )   PT: 13.7 sec;   INR: 1.16 ratio         PTT - ( 21 Oct 2020 08:29 )  PTT:32.0 sec          Culture - Tissue with Gram Stain (collected 20 Oct 2020 22:20)  Source: .Tissue left shoulder mass  Gram Stain (21 Oct 2020 01:23):    Rare polymorphonuclear leukocytes seen per low power field    No organisms seen per oil power field        RADIOLOGY & ADDITIONAL TESTS:  Results Reviewed:   Imaging Personally Reviewed:  Electrocardiogram Personally Reviewed:    COORDINATION OF CARE:  Care Discussed with Consultants/Other Providers [Y/N]:  Prior or Outpatient Records Reviewed [Y/N]:

## 2020-10-21 NOTE — PROGRESS NOTE ADULT - PROBLEM SELECTOR PLAN 3
Cr 1.66, slightly higher than baseline, patient received IV contrast  BP stable, hold lisinopril.   Monitor lactate and BMP  Na 131, may be component of SIADH, monitor with fluids

## 2020-10-21 NOTE — PROGRESS NOTE ADULT - ASSESSMENT
85 yo male with PMH of NHL (last chemo 2017), HTN, HLD, CKD, BPH, anemia, syncope, anxiety/adjustment disorder/depression, presents here from [facility] for further management of his diffuse large B cell lymphoma, and found to have erythema and swelling of Left shoulder concerning for OM    Left shoulder Erythema R/o Acute OM - in setting of ORIF, shoulder tumor resection, and Intramedullary nail and cementation on 9/25. [facility] CT concerning for acute OM. He received vanc/zosyn then admitted with vanc, cefepime, and flagyll since 10/13. No fevers before or during admission. No other symptoms. Pending chemo/radiation.     Recommend:   - repeat vancomycin troughs- increase to 750 mg IVSS daily  - Cont Cefepime 2gqd  and flagyl 500q8  - s/p IR procedure   -await pathology- apparently slides were sent over from outside hospital as well

## 2020-10-21 NOTE — OCCUPATIONAL THERAPY INITIAL EVALUATION ADULT - PLANNED THERAPY INTERVENTIONS, OT EVAL
ADL retraining/strengthening/fine motor coordination training/transfer training/bed mobility training/balance training

## 2020-10-21 NOTE — OCCUPATIONAL THERAPY INITIAL EVALUATION ADULT - LIVES WITH, PROFILE
As per pt and confirmed by CM notes; pt lives with spouse in a private house-has 4 entry steps, (per CM notes) has hospital bed in the living room, wife assists with ADL's, self care and mobility. Additional DMEs at home: wheelchair, quad cane, raised toilet seat with arm rest/spouse

## 2020-10-21 NOTE — PROGRESS NOTE ADULT - SUBJECTIVE AND OBJECTIVE BOX
Afebrile overnight.  s/p IR biopsies last night, s/p BMBx this AM.    Vital Signs Last 24 Hrs  T(C): 36.8 (21 Oct 2020 09:56), Max: 36.8 (21 Oct 2020 09:56)  T(F): 98.3 (21 Oct 2020 09:56), Max: 98.3 (21 Oct 2020 09:56)  HR: 102 (21 Oct 2020 12:47) (76 - 102)  BP: 122/78 (21 Oct 2020 12:47) (111/65 - 130/70)  BP(mean): --  RR: 18 (21 Oct 2020 09:56) (18 - 18)  SpO2: 96% (21 Oct 2020 12:47) (95% - 100%)  PHYSICAL EXAM:    GENERAL: NAD, AAO x 3  HEAD:  NC/AT  EYES: EOMI, PERRLA, no scleral icterus  HEENT: Moist mucous membranes  LUNG: Clear to auscultation bilaterally; No rales, rhonchi, wheezing, or rubs  HEART: RRR; No murmurs, rubs, or gallops  ABDOMEN: +BS, ST/ND/NT  EXTREMITIES:  + L shoulder bandaged, erythematous, painful to touch  LAD: no palpable adenopathy  10-21    131<L>  |  98  |  27<H>  ----------------------------<  102<H>  4.0   |  21<L>  |  1.66<H>    Ca    8.6      21 Oct 2020 06:25  Phos  2.7     10-21    TPro  5.9<L>  /  Alb  2.5<L>  /  TBili  0.6  /  DBili  x   /  AST  46<H>  /  ALT  15  /  AlkPhos  87  10-21      CBC Full  -  ( 21 Oct 2020 06:26 )  WBC Count : 9.55 K/uL  RBC Count : 3.30 M/uL  Hemoglobin : 9.1 g/dL  Hematocrit : 28.0 %  Platelet Count - Automated : 335 K/uL  Mean Cell Volume : 84.8 fl  Mean Cell Hemoglobin : 27.6 pg  Mean Cell Hemoglobin Concentration : 32.5 gm/dL  Auto Neutrophil # : 7.20 K/uL  Auto Lymphocyte # : 0.92 K/uL  Auto Monocyte # : 1.20 K/uL  Auto Eosinophil # : 0.12 K/uL  Auto Basophil # : 0.03 K/uL  Auto Neutrophil % : 75.4 %  Auto Lymphocyte % : 9.6 %  Auto Monocyte % : 12.6 %  Auto Eosinophil % : 1.3 %  Auto Basophil % : 0.3 %      LIVER FUNCTIONS - ( 21 Oct 2020 06:25 )  Alb: 2.5 g/dL / Pro: 5.9 g/dL / ALK PHOS: 87 U/L / ALT: 15 U/L / AST: 46 U/L / GGT: x             PT/INR - ( 21 Oct 2020 08:29 )   PT: 13.7 sec;   INR: 1.16 ratio         PTT - ( 21 Oct 2020 08:29 )  PTT:32.0 sec    Fibrinogen Assay: 1190 mg/dL (10-21-20 @ 08:29)  Lactate Dehydrogenase, Serum: 1455 U/L (10-21-20 @ 06:25)  Uric Acid, Serum: 5.4 mg/dL (10-21-20 @ 06:25)

## 2020-10-21 NOTE — OCCUPATIONAL THERAPY INITIAL EVALUATION ADULT - PRECAUTIONS/LIMITATIONS, REHAB EVAL
Con't: Hospital course: Pt with infiltrating mass of left shoulder and possible osteomyelitis. Per ID, Left shoulder Erythema R/o Acute OM - in setting of ORIF, shoulder tumor resection, and Intramedullary nail and cementation on 9/25. Ad[facility]2020 and XR and CT concerning for acute OM. He received vanc/zosyn then admitted with vanc, cefepime, and flagyll since 10/13.Per heme/onc, Recommend Orthopedic evaluation once CT L shoulder is obtained; fall precautions. XRay (10/20): Diffuse subcutaneous edema is noted. There is a long intramedullary julianne with two proximal interlocking screws and one distal interlocking screw transfixing a pathologic fracture at the surgical neck of the left humerus. There is cement at the lateral aspect of the proximal julianne with a laterally adjacent lytic lesion with osseous fragmentation. The films are available for direct review. Con't: Hospital course: Pt with infiltrating mass of left shoulder and possible osteomyelitis. Per ID, Left shoulder Erythema R/o Acute OM - in setting of ORIF, shoulder tumor resection, and Intramedullary nail and cementation on 9/25. [facility] and XR and CT concerning for acute OM. He received vanc/zosyn then admitted with vanc, cefepime, and flagyll since 10/13.Per heme/onc, Recommend Orthopedic evaluation once CT L shoulder is obtained; fall precautions. XRay (10/20): Diffuse subcutaneous edema is noted. There is a long intramedullary julianne with two proximal interlocking screws and one distal interlocking screw transfixing a pathologic fracture at the surgical neck of the left humerus. There is cement at the lateral aspect of the proximal julianne with a laterally adjacent lytic lesion with osseous fragmentation. The films are available for direct review./fall precautions

## 2020-10-21 NOTE — PROGRESS NOTE ADULT - PROBLEM SELECTOR PLAN 1
Patient with infiltrating mass of left shoulder and possible osteomyelitis   c/w IV vancomycin 500mg qdaily, cefepime IV 2g qdaily and PO flagyl (Since 10/10)  10/17 blood culture - so far negative. ESR/CRP very high  ID consult appreciated - agree with abx as above  c/w tylenol and tramadol PRN for pain  CT L shoulder noted, d/w ortho/IR seems like infiltration and not primarily infectious. s/p IR biopsy, very little fluid, sent for pathology, gram stain negative.  MRI L shoulder. Per ortho - ok for MRI

## 2020-10-21 NOTE — PROGRESS NOTE ADULT - ASSESSMENT
87 yo male with PMH of NHL (last chemo 2017), HTN, HLD, CKD, BPH, anemia, syncope, anxiety/adjustment disorder/depression, presents here from [facility] where he was getting treated for L shoulder infection (s/p ORIF for pathologic fx) for further management of his diffuse large B cell lymphoma and L shoulder lesion

## 2020-10-21 NOTE — OCCUPATIONAL THERAPY INITIAL EVALUATION ADULT - BALANCE TRAINING, PT EVAL
GOAL: Pt will increase standing balance to good(-) in order to complete standing ADL task in 4 weeks. GOAL: Pt will increase standing balance to good (-) in order to complete standing ADL task in 4 weeks.

## 2020-10-21 NOTE — PROGRESS NOTE ADULT - ATTENDING COMMENTS
Kirstin Leroy M.D. ,   Pager [phone]     after 5PM/ weekends [phone]      I will be away tomorrow. My associates will be covering. Please call [phone] with acute issues, questions.

## 2020-10-21 NOTE — OCCUPATIONAL THERAPY INITIAL EVALUATION ADULT - RANGE OF MOTION EXAMINATION, UPPER EXTREMITY
Right UE Active ROM was WFL (within functional limits)/L sh not tested due to pain/edema, L elbow PROM WFL, L wrist/digits AROM WFL

## 2020-10-21 NOTE — PROGRESS NOTE ADULT - SUBJECTIVE AND OBJECTIVE BOX
Patient is a 86y old  Male who presents with a chief complaint of sent here for chemo and radiation (21 Oct 2020 12:31)    Being followed by ID for        Interval history:  No other acute events      ROS:  No cough,SOB,CP  No N/V/D  No abd pain  No urinary complaints  No HA  No joint or limb pain  No other complaints    PAST MEDICAL & SURGICAL HISTORY:  Syncope    Chronic kidney disease (CKD)    Diffuse large B cell lymphoma    HLD (hyperlipidemia)    BPH (benign prostatic hyperplasia)    HTN (hypertension)    History of left shoulder fracture    Osteomyelitis of left shoulder region      Allergies    No Known Allergies    Intolerances      Antimicrobials:    cefepime   IVPB 2000 milliGRAM(s) IV Intermittent daily  metroNIDAZOLE    Tablet 500 milliGRAM(s) Oral every 8 hours  vancomycin  IVPB 500 milliGRAM(s) IV Intermittent daily    MEDICATIONS  (STANDING):  allopurinol 100 milliGRAM(s) Oral daily  amLODIPine   Tablet 2.5 milliGRAM(s) Oral daily  atorvastatin 80 milliGRAM(s) Oral at bedtime  cefepime   IVPB 2000 milliGRAM(s) IV Intermittent daily  heparin   Injectable 5000 Unit(s) SubCutaneous every 8 hours  influenza   Vaccine 0.5 milliLiter(s) IntraMuscular once  lactobacillus acidophilus 1 Tablet(s) Oral daily  latanoprost 0.005% Ophthalmic Solution 1 Drop(s) Both EYES at bedtime  metroNIDAZOLE    Tablet 500 milliGRAM(s) Oral every 8 hours  mirtazapine 7.5 milliGRAM(s) Oral at bedtime  polyethylene glycol 3350 17 Gram(s) Oral daily  senna 2 Tablet(s) Oral at bedtime  tamsulosin 0.4 milliGRAM(s) Oral at bedtime  timolol 0.5% Solution 1 Drop(s) Both EYES two times a day  vancomycin  IVPB 500 milliGRAM(s) IV Intermittent daily      Vital Signs Last 24 Hrs  T(C): 36.8 (10-21-20 @ 09:56), Max: 36.8 (10-21-20 @ 09:56)  T(F): 98.3 (10-21-20 @ 09:56), Max: 98.3 (10-21-20 @ 09:56)  HR: 102 (10-21-20 @ 12:47) (76 - 102)  BP: 122/78 (10-21-20 @ 12:47) (111/65 - 130/70)  BP(mean): --  RR: 18 (10-21-20 @ 09:56) (18 - 18)  SpO2: 96% (10-21-20 @ 12:47) (95% - 100%)    Physical Exam:    Constitutional well preserved,comfortable,pleasant    HEENT PERRLA EOMI,No pallor or icterus    No oral exudate or erythema    Neck supple no JVD or LN    Chest Good AE,CTA    CVS RRR S1 S2 WNl No murmur or rub or gallop    Abd soft BS normal No tenderness no masses    Ext No cyanosis clubbing or edema    IV site no erythema tenderness or discharge    Joints no swelling or LOM    CNS AAO X 3 no focal    Lab Data:                          9.1    9.55  )-----------( 335      ( 21 Oct 2020 06:26 )             28.0       10-21    131<L>  |  98  |  27<H>  ----------------------------<  102<H>  4.0   |  21<L>  |  1.66<H>    Ca    8.6      21 Oct 2020 06:25  Phos  2.7     10-21    TPro  5.9<L>  /  Alb  2.5<L>  /  TBili  0.6  /  DBili  x   /  AST  46<H>  /  ALT  15  /  AlkPhos  87  10-21          .Tissue left shoulder mass  10-20-20 --  --    Rare polymorphonuclear leukocytes seen per low power field  No organisms seen per oil power field      .Blood Blood  10-18-20   No growth to date.  --  --                Vancomycin Level, Trough: 8.2 ug/mL (10-20-20 @ 11:42)      WBC Count: 9.55 (10-21-20 @ 06:26)  WBC Count: 10.02 (10-20-20 @ 04:16)  WBC Count: 9.05 (10-19-20 @ 07:11)  WBC Count: 12.34 (10-18-20 @ 07:17)             Patient is a 86y old  Male who presents with a chief complaint of sent here for chemo and radiation (21 Oct 2020 12:31)    Being followed by ID for        Interval history:  pt s/p biopsy yesterday   pt resting quietly  No other acute events        PAST MEDICAL & SURGICAL HISTORY:  Syncope    Chronic kidney disease (CKD)    Diffuse large B cell lymphoma    HLD (hyperlipidemia)    BPH (benign prostatic hyperplasia)    HTN (hypertension)    History of left shoulder fracture    Osteomyelitis of left shoulder region      Allergies    No Known Allergies    Intolerances      Antimicrobials:    cefepime   IVPB 2000 milliGRAM(s) IV Intermittent daily  metroNIDAZOLE    Tablet 500 milliGRAM(s) Oral every 8 hours  vancomycin  IVPB 500 milliGRAM(s) IV Intermittent daily    MEDICATIONS  (STANDING):  allopurinol 100 milliGRAM(s) Oral daily  amLODIPine   Tablet 2.5 milliGRAM(s) Oral daily  atorvastatin 80 milliGRAM(s) Oral at bedtime  cefepime   IVPB 2000 milliGRAM(s) IV Intermittent daily  heparin   Injectable 5000 Unit(s) SubCutaneous every 8 hours  influenza   Vaccine 0.5 milliLiter(s) IntraMuscular once  lactobacillus acidophilus 1 Tablet(s) Oral daily  latanoprost 0.005% Ophthalmic Solution 1 Drop(s) Both EYES at bedtime  metroNIDAZOLE    Tablet 500 milliGRAM(s) Oral every 8 hours  mirtazapine 7.5 milliGRAM(s) Oral at bedtime  polyethylene glycol 3350 17 Gram(s) Oral daily  senna 2 Tablet(s) Oral at bedtime  tamsulosin 0.4 milliGRAM(s) Oral at bedtime  timolol 0.5% Solution 1 Drop(s) Both EYES two times a day  vancomycin  IVPB 500 milliGRAM(s) IV Intermittent daily      Vital Signs Last 24 Hrs  T(C): 36.8 (10-21-20 @ 09:56), Max: 36.8 (10-21-20 @ 09:56)  T(F): 98.3 (10-21-20 @ 09:56), Max: 98.3 (10-21-20 @ 09:56)  HR: 102 (10-21-20 @ 12:47) (76 - 102)  BP: 122/78 (10-21-20 @ 12:47) (111/65 - 130/70)  BP(mean): --  RR: 18 (10-21-20 @ 09:56) (18 - 18)  SpO2: 96% (10-21-20 @ 12:47) (95% - 100%)    Physical Exam:    Constitutional  resting quietly    HEENT PERRLA EOMI,No pallor or icterus    No oral exudate or erythema    Neck supple no JVD or LN    Chest Good AE,CTA    CVS RRR S1 S2     Abd soft BS normal No tenderness     Ext   swollen erythematous left shoulder   LUE edema       IV site  swollen above on right, ? infiltration, nurse to remove     Joints  left shoulder swollen , erythematous  limited ROM        Lab Data:                          9.1    9.55  )-----------( 335      ( 21 Oct 2020 06:26 )             28.0       10-21    131<L>  |  98  |  27<H>  ----------------------------<  102<H>  4.0   |  21<L>  |  1.66<H>    Ca    8.6      21 Oct 2020 06:25  Phos  2.7     10-21    TPro  5.9<L>  /  Alb  2.5<L>  /  TBili  0.6  /  DBili  x   /  AST  46<H>  /  ALT  15  /  AlkPhos  87  10-21          .Tissue left shoulder mass  10-20-20 --  --    Rare polymorphonuclear leukocytes seen per low power field  No organisms seen per oil power field      .Blood Blood  10-18-20   No growth to date.  --  --        Vancomycin Level, Trough: 8.2 ug/mL (10-20-20 @ 11:42)      WBC Count: 9.55 (10-21-20 @ 06:26)  WBC Count: 10.02 (10-20-20 @ 04:16)  WBC Count: 9.05 (10-19-20 @ 07:11)  WBC Count: 12.34 (10-18-20 @ 07:17)

## 2020-10-21 NOTE — PROGRESS NOTE ADULT - ASSESSMENT
Patient is an 85 y/o M w/ a PMHx of DLBCL (s/p 6 cycles of R-CHOP and in DELANEY until 9/2019), who p/w possible relapse w/ L shoulder mass and pathologic fracture s/p left proximal humerus open biopsy, radical resection of tumor, and ORIF with IMN and cementation on 9/25/20), HTN, HLD, CKD, BPH, anemia, syncope, and anxiety/adjustment disorder/depression who was transferred to [facility] from the [facility] for further management of his diffuse large B cell lymphoma. Hematology was consulted for further evaluation.    Obtained medical information from [facility] and [facility]. Surgical specimen with DLBCL. PET/CT significant with hypermetabolic brain lesion and mass at Lt renal pelvic area.  HCP Mayda leaning toward to systemic chemotherapy    # DLBCL, relapsed  - history of DLBCL, GCB subtype (BCL2, BCL6, MYC, all negative by FISH). Per chart review, patient last received 6 cycles of R-CHOP in 2017 with DELANEY until 11/2019. Patient had mesenteric mass with size of 2.7x4.8 in 2017.   - CT CAP apparently shows enlarging mesenteric mass and new Lt pelvic renal mass concerning of relapse  - At [facility], he was found to have a L shoulder mass and pathologic fracture. His is s/p ORIF and radical resection of tumor on 9/25. PET/CT was performed revealing extradural lesion with hypermetabolic activity  - Dr. Jose Mcintosh in [place], [phone]  - Please check CBC WITH DIFF and TLS labs (CMP, Phos, LDH, and Uric acid) daily  - f/u MRI brain and L shoulder  - requesting family to have [facility] to send biopsy slide, they have faxed the request  - s/p BMBx on 10/21, will f/u results  - s/p IR bx of shoulder 10/20, will f/u results.  Once path is determined, can determine treatment plan.  - Appreciate care as per Medicine team  - patient and his family leaning more to the systemic therapy, will talk to patient and his wife after all the information is gathered  - Will continue to follow  - ferritin is elevated, could be suggestive of cytokine storm.  Please send fibrinogen, resend ferritin, triglycerides, soluble IL2R, NK cell activity, coags in order to complete workup for HLH, however would assume this is part of the underlying lymphoma and would treat accordingly    #Possible infection of L shoulder s/p internal fixation  - Appreciate ID and ortho evaluation. C/w IV antibiotics as per ID  - as per ortho, shoulder lesion could be 2/2 his lymphoma, requiring MRI shoulder and rebiopsy  - need biopsy report from previous surgery    Please let us know when reports from [facility] have arrived.    Anastasia Echavarria DO  Hematology/Oncology Fellow, PGY6  Pager: [phone]

## 2020-10-21 NOTE — OCCUPATIONAL THERAPY INITIAL EVALUATION ADULT - FINE MOTOR COORDINATION TRAINING, OT EVAL
GOAL: Pt will manipulate grooming items with precision and accuracy in order to complete functional tasks in 4 weeks. GOAL: Pt will manipulate grooming items with precision and accuracy utilizing BUE in order to complete functional tasks in 4 weeks.

## 2020-10-22 NOTE — PROGRESS NOTE ADULT - ASSESSMENT
---------incomplete    Patient is an 87 y/o M w/ a PMHx of DLBCL (s/p 6 cycles of R-CHOP and in DELANEY until 9/2019), who p/w possible relapse w/ L shoulder mass and pathologic fracture s/p left proximal humerus open biopsy, radical resection of tumor, and ORIF with IMN and cementation on 9/25/20), HTN, HLD, CKD, BPH, anemia, syncope, and anxiety/adjustment disorder/depression who was transferred to [facility] from the [facility] for further management of his diffuse large B cell lymphoma. Hematology was consulted for further evaluation.    Obtained medical information from [facility] and [facility]. Surgical specimen with DLBCL. PET/CT significant with hypermetabolic brain lesion and mass at Lt renal pelvic area.  HCP Mayda leaning toward to systemic chemotherapy    # DLBCL, relapsed  - history of DLBCL, GCB subtype (BCL2, BCL6, MYC, all negative by FISH). Per chart review, patient last received 6 cycles of R-CHOP in 2017 with DELANEY until 11/2019. Patient had mesenteric mass with size of 2.7x4.8 in 2017.   - CT CAP apparently shows enlarging mesenteric mass and new Lt pelvic renal mass concerning of relapse  - At [facility], he was found to have a L shoulder mass and pathologic fracture. His is s/p ORIF and radical resection of tumor on 9/25. PET/CT was performed revealing extradural lesion with hypermetabolic activity  - Dr. Jose Mcintosh in [place], [phone]  - Please check CBC WITH DIFF and TLS labs (CMP, Phos, LDH, and Uric acid) daily  - f/u MRI brain and L shoulder  - requesting family to have [facility] to send biopsy slide, they have faxed the request  - s/p BMBx on 10/21, will f/u results  - s/p IR bx of shoulder 10/20, will f/u results.  Once path is determined, can determine treatment plan.  - Appreciate care as per Medicine team  - patient and his family leaning more to the systemic therapy, will talk to patient and his wife after all the information is gathered  - Will continue to follow  - ferritin is elevated, could be suggestive of cytokine storm.  Please send fibrinogen, resend ferritin, triglycerides, soluble IL2R, NK cell activity, coags in order to complete workup for HLH, however would assume this is part of the underlying lymphoma and would treat accordingly    #Possible infection of L shoulder s/p internal fixation  - Appreciate ID and ortho evaluation. C/w IV antibiotics as per ID  - as per ortho, shoulder lesion could be 2/2 his lymphoma, requiring MRI shoulder and rebiopsy  - need biopsy report from previous surgery Patient is an 87 y/o M w/ a PMHx of DLBCL (s/p 6 cycles of R-CHOP and in DELANEY until 9/2019), who p/w possible relapse w/ L shoulder mass and pathologic fracture s/p left proximal humerus open biopsy, radical resection of tumor, and ORIF with IMN and cementation on 9/25/20), HTN, HLD, CKD, BPH, anemia, syncope, and anxiety/adjustment disorder/depression who was transferred to [facility] from the [facility] for further management of his diffuse large B cell lymphoma. Hematology was consulted for further evaluation.    Obtained medical information from [facility] and [facility]. Surgical specimen with DLBCL. PET/CT significant with hypermetabolic brain lesion and mass at Lt renal pelvic area.  HCP Mayda leaning toward to systemic chemotherapy. Can try with mild chemotherapy.    # DLBCL, relapsed  - history of DLBCL, GCB subtype (BCL2, BCL6, MYC, all negative by FISH). Per chart review, patient last received 6 cycles of R-CHOP in 2017 with DELANEY until 11/2019. Patient had mesenteric mass with size of 2.7x4.8 in 2017.   - CT CAP apparently shows enlarging mesenteric mass and new Lt pelvic renal mass concerning of relapse  - At [facility], he was found to have a L shoulder mass and pathologic fracture. His is s/p ORIF and radical resection of tumor on 9/25. PET/CT was performed revealing extradural lesion with hypermetabolic activity  - Dr. 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  Once path is determined, can determine treatment plan.  - Appreciate care as per Medicine team  - patient and his family leaning more to the systemic therapy, will talk to patient and his wife after all the information is gathered  - Will continue to follow  - ferritin is elevated, could be suggestive of cytokine storm.    -F/u fibrinogen, resend ferritin, triglycerides, soluble IL2R, NK cell activity, coags in order to complete workup for HLH, however would assume this is part of the underlying lymphoma and would treat accordingly    #Possible infection of L shoulder s/p internal fixation  - Appreciate ID and ortho evaluation. C/w IV antibiotics as per ID  - as per ortho, shoulder lesion could be 2/2 his lymphoma, requiring MRI shoulder and rebiopsy  - need biopsy report from previous surgery    Hall-in MD Rubén, PGY-4  Translational Medical Oncology Fellow  Pager: [phone]  Case d/w Dr. Degroot, [place]

## 2020-10-22 NOTE — PROGRESS NOTE ADULT - PROBLEM SELECTOR PLAN 1
Infiltrating mass of left shoulder and possible osteomyelitis   bone scan with uptake in L femur. c/w IV vancomycin, cefepime, and PO flagyl (Since 10/10)  10/17 blood culture - so far negative. ESR/CRP very high  ID consult appreciated - agree with abx as above  c/w tylenol and tramadol PRN for pain  CT L shoulder noted, d/w ortho/IR seems like infiltration and not primarily infectious. s/p IR biopsy, very little fluid, sent for pathology, gram stain negative.  MRI L shoulder. Per ortho - ok for MRI

## 2020-10-22 NOTE — PROGRESS NOTE ADULT - PROBLEM SELECTOR PLAN 10
d/w length with wife and also patient's daughter. There are 5 children from his first marriage and he has been  to his new wife for 25 years.  d/w wife - she believes this cancer is treatable, I gently approached the subject with her and that this cancer is aggressive and advanced. She would like to consider all options at this time.  d/w daughter (2nd daughter - Setlla) - she is more realistic. Does not think her father could tolerate chemo. However, he defers to his wife.  Plan for family meeting once biopsy obtained and can meet with heme/onc  full code

## 2020-10-22 NOTE — PROGRESS NOTE ADULT - ATTENDING COMMENTS
86 year old man with a history of DLBCL s/p RCHOP x 6 now with relapse disease with significant involvement of the L shoulder, mesetery and a perirenal mass  -patient is pleasant at thebedside not in pain but unable to move his L arm  -he has had forgetfulness over the last 6-8 months per the wife, he does not drive anymore because he has been getting lost.  He was performing his ADLS alone and was walking in August.  -at this time, there is no evidence of leptomeningeal disease, his current mass in the L shoulder is prohibiting further movement/ use of his left arm. Additionally he has a mass that is at risk of causing further hydronephrosis and obstruction. speaking with the wife, she is very much interested in pursuing therapy at this time.  If treatment is planned, would consider Bendamustine Rituixmab treatment which would be relatively well tolerated and if works, will provide symptom relief and preserve kidney function.  Radiation therapy could also be offered for relief of discomfort to the shoulder, but in this instance would consider hospice/palliative only approach.  Understanding patient current age and clinical status, we can plan for a family meeting.

## 2020-10-22 NOTE — PROGRESS NOTE ADULT - ASSESSMENT
85 yo male with PMH of NHL (last chemo 2017), HTN, HLD, CKD, BPH, anemia, syncope, anxiety/adjustment disorder/depression, presents here from [facility] where he was getting treated for L shoulder infection (s/p ORIF for pathologic fx) for further management of his diffuse large B cell lymphoma and L shoulder lesion

## 2020-10-22 NOTE — PROGRESS NOTE ADULT - SUBJECTIVE AND OBJECTIVE BOX
BRIELLEARASH ANDREA  MRN-[id number]    Interval hx:        MEDICATIONS  (STANDING):  allopurinol 100 milliGRAM(s) Oral daily  amLODIPine   Tablet 2.5 milliGRAM(s) Oral daily  atorvastatin 80 milliGRAM(s) Oral at bedtime  cefepime   IVPB 2000 milliGRAM(s) IV Intermittent daily  heparin   Injectable 5000 Unit(s) SubCutaneous every 8 hours  influenza   Vaccine 0.5 milliLiter(s) IntraMuscular once  lactobacillus acidophilus 1 Tablet(s) Oral daily  latanoprost 0.005% Ophthalmic Solution 1 Drop(s) Both EYES at bedtime  metroNIDAZOLE    Tablet 500 milliGRAM(s) Oral every 8 hours  mirtazapine 7.5 milliGRAM(s) Oral at bedtime  polyethylene glycol 3350 17 Gram(s) Oral daily  senna 2 Tablet(s) Oral at bedtime  tamsulosin 0.4 milliGRAM(s) Oral at bedtime  timolol 0.5% Solution 1 Drop(s) Both EYES two times a day  vancomycin  IVPB 750 milliGRAM(s) IV Intermittent daily    MEDICATIONS  (PRN):  acetaminophen   Tablet .. 650 milliGRAM(s) Oral every 6 hours PRN Mild Pain (1 - 3), Moderate Pain (4 - 6)  ALPRAZolam 0.5 milliGRAM(s) Oral two times a day PRN anxiety  traMADol 25 milliGRAM(s) Oral every 6 hours PRN Moderate Pain (4 - 6)      Allergies    No Known Allergies    Intolerances        Objectives:  Vital Signs Last 24 Hrs  T(C): 36.4 (22 Oct 2020 04:41), Max: 37.1 (21 Oct 2020 19:57)  T(F): 97.5 (22 Oct 2020 04:41), Max: 98.7 (21 Oct 2020 19:57)  HR: 93 (22 Oct 2020 04:41) (89 - 102)  BP: 142/87 (22 Oct 2020 04:41) (122/78 - 142/87)  BP(mean): --  RR: 18 (22 Oct 2020 04:41) (18 - 18)  SpO2: 97% (22 Oct 2020 04:41) (96% - 97%)    Physical exam  General - NAD, elderly  HEENT - PERRLA  Neck - Supple, no thyromegaly or thyroid nodules  Cardiovascular - RRR no m/r/g  Lungs - CTAB  Abdomen - Normal bowel sounds, NT/ND  Extremeties - No e/c/c  Skin - erythematous warm Lt shoulder with well-healed scar  Musculo Skeletal - limited ROM at Lt shoudler with swelling with at least 44 cm of circumference  Neurological – Alert and oriented x 3, CN 2-12 grossly intact.        10-21-20 @ 07:01  -  10-22-20 @ 07:00  --------------------------------------------------------  IN: 600 mL / OUT: 950 mL / NET: -350 mL        Labs:    CBC Full  -  ( 22 Oct 2020 07:24 )  WBC Count : 10.72 K/uL  RBC Count : 3.49 M/uL  Hemoglobin : 9.7 g/dL  Hematocrit : 29.1 %  Platelet Count - Automated : 339 K/uL  Mean Cell Volume : 83.4 fl  Mean Cell Hemoglobin : 27.8 pg  Mean Cell Hemoglobin Concentration : 33.3 gm/dL  Auto Neutrophil # : 8.34 K/uL  Auto Lymphocyte # : 0.73 K/uL  Auto Monocyte # : 1.40 K/uL  Auto Eosinophil # : 0.14 K/uL  Auto Basophil # : 0.05 K/uL  Auto Neutrophil % : 77.7 %  Auto Lymphocyte % : 6.8 %  Auto Monocyte % : 13.1 %  Auto Eosinophil % : 1.3 %  Auto Basophil % : 0.5 %    PT/INR - ( 21 Oct 2020 08:29 )   PT: 13.7 sec;   INR: 1.16 ratio         PTT - ( 21 Oct 2020 08:29 )  PTT:32.0 sec    10-22    128<L>  |  97  |  25<H>  ----------------------------<  90  4.1   |  19<L>  |  1.52<H>    Ca    8.6      22 Oct 2020 07:24  Phos  2.8     10-22  Mg     2.2     10-22    TPro  5.9<L>  /  Alb  2.3<L>  /  TBili  0.5  /  DBili  x   /  AST  32  /  ALT  12  /  AlkPhos  86  10-22    LIVER FUNCTIONS - ( 22 Oct 2020 07:24 )  Alb: 2.3 g/dL / Pro: 5.9 g/dL / ALK PHOS: 86 U/L / ALT: 12 U/L / AST: 32 U/L / GGT: x                   Culture - Tissue with Gram Stain (collected 20 Oct 2020 22:20)  Source: .Tissue left shoulder mass  Gram Stain (21 Oct 2020 01:23):    Rare polymorphonuclear leukocytes seen per low power field    No organisms seen per oil power field  Preliminary Report (21 Oct 2020 19:30):    No growth        Imagings:       PAULARASH  MRN-[id number]    Interval hx:  Patient was seen and examined. Feels better.      MEDICATIONS  (STANDING):  allopurinol 100 milliGRAM(s) Oral daily  amLODIPine   Tablet 2.5 milliGRAM(s) Oral daily  atorvastatin 80 milliGRAM(s) Oral at bedtime  cefepime   IVPB 2000 milliGRAM(s) IV Intermittent daily  heparin   Injectable 5000 Unit(s) SubCutaneous every 8 hours  influenza   Vaccine 0.5 milliLiter(s) IntraMuscular once  lactobacillus acidophilus 1 Tablet(s) Oral daily  latanoprost 0.005% Ophthalmic Solution 1 Drop(s) Both EYES at bedtime  metroNIDAZOLE    Tablet 500 milliGRAM(s) Oral every 8 hours  mirtazapine 7.5 milliGRAM(s) Oral at bedtime  polyethylene glycol 3350 17 Gram(s) Oral daily  senna 2 Tablet(s) Oral at bedtime  tamsulosin 0.4 milliGRAM(s) Oral at bedtime  timolol 0.5% Solution 1 Drop(s) Both EYES two times a day  vancomycin  IVPB 750 milliGRAM(s) IV Intermittent daily    MEDICATIONS  (PRN):  acetaminophen   Tablet .. 650 milliGRAM(s) Oral every 6 hours PRN Mild Pain (1 - 3), Moderate Pain (4 - 6)  ALPRAZolam 0.5 milliGRAM(s) Oral two times a day PRN anxiety  traMADol 25 milliGRAM(s) Oral every 6 hours PRN Moderate Pain (4 - 6)      Allergies    No Known Allergies    Intolerances        Objectives:  Vital Signs Last 24 Hrs  T(C): 36.4 (22 Oct 2020 04:41), Max: 37.1 (21 Oct 2020 19:57)  T(F): 97.5 (22 Oct 2020 04:41), Max: 98.7 (21 Oct 2020 19:57)  HR: 93 (22 Oct 2020 04:41) (89 - 102)  BP: 142/87 (22 Oct 2020 04:41) (122/78 - 142/87)  BP(mean): --  RR: 18 (22 Oct 2020 04:41) (18 - 18)  SpO2: 97% (22 Oct 2020 04:41) (96% - 97%)    Physical exam  General - NAD, elderly  HEENT - PERRLA  Neck - Supple, no thyromegaly or thyroid nodules  Cardiovascular - RRR no m/r/g  Lungs - CTAB  Abdomen - Normal bowel sounds, NT/ND  Extremeties - No e/c/c  Skin - erythematous warm Lt shoulder with well-healed scar  Musculo Skeletal - limited ROM at Lt shoudler with swelling with at least 44 cm of circumference  Neurological – Alert and oriented x 3, CN 2-12 grossly intact.        10-21-20 @ 07:01  -  10-22-20 @ 07:00  --------------------------------------------------------  IN: 600 mL / OUT: 950 mL / NET: -350 mL        Labs:    CBC Full  -  ( 22 Oct 2020 07:24 )  WBC Count : 10.72 K/uL  RBC Count : 3.49 M/uL  Hemoglobin : 9.7 g/dL  Hematocrit : 29.1 %  Platelet Count - Automated : 339 K/uL  Mean Cell Volume : 83.4 fl  Mean Cell Hemoglobin : 27.8 pg  Mean Cell Hemoglobin Concentration : 33.3 gm/dL  Auto Neutrophil # : 8.34 K/uL  Auto Lymphocyte # : 0.73 K/uL  Auto Monocyte # : 1.40 K/uL  Auto Eosinophil # : 0.14 K/uL  Auto Basophil # : 0.05 K/uL  Auto Neutrophil % : 77.7 %  Auto Lymphocyte % : 6.8 %  Auto Monocyte % : 13.1 %  Auto Eosinophil % : 1.3 %  Auto Basophil % : 0.5 %    PT/INR - ( 21 Oct 2020 08:29 )   PT: 13.7 sec;   INR: 1.16 ratio         PTT - ( 21 Oct 2020 08:29 )  PTT:32.0 sec    10-22    128<L>  |  97  |  25<H>  ----------------------------<  90  4.1   |  19<L>  |  1.52<H>    Ca    8.6      22 Oct 2020 07:24  Phos  2.8     10-22  Mg     2.2     10-22    TPro  5.9<L>  /  Alb  2.3<L>  /  TBili  0.5  /  DBili  x   /  AST  32  /  ALT  12  /  AlkPhos  86  10-22    LIVER FUNCTIONS - ( 22 Oct 2020 07:24 )  Alb: 2.3 g/dL / Pro: 5.9 g/dL / ALK PHOS: 86 U/L / ALT: 12 U/L / AST: 32 U/L / GGT: x                   Culture - Tissue with Gram Stain (collected 20 Oct 2020 22:20)  Source: .Tissue left shoulder mass  Gram Stain (21 Oct 2020 01:23):    Rare polymorphonuclear leukocytes seen per low power field    No organisms seen per oil power field  Preliminary Report (21 Oct 2020 19:30):    No growth        Imagings:    < from: MR Head w/wo IV Cont (10.21.20 @ 19:16) >  IMPRESSION:    New 2.2 x 1.6 x 1.1 cm   uniformly enhancing, T1 and T2 isointense high right posterior frontal extra-axial mass. The lesion demonstrates restricted diffusion.Differential diagnosis includes meningioma and extra-axial lymphoma.    No parenchymal or leptomeningeal enhancement.    No acute intracranial hemorrhage, vasogenic edema, or acute origin.    Dr. Quintanilla discussed these findings with nurse practitionerMamanny on 10/22/2020 11:27 AM with read back.        < end of copied text >

## 2020-10-22 NOTE — PROGRESS NOTE ADULT - SUBJECTIVE AND OBJECTIVE BOX
PROGRESS NOTE:   Authored by Dr. Marsha Downey MD  Pager [phone]     Patient is a 86y old  Male who presents with a chief complaint of sent here for chemo and radiation (22 Oct 2020 10:23)      SUBJECTIVE / OVERNIGHT EVENTS: Patient seen and examined at bedside. Patient says he is doing well. c/o L shoulder pain 7/10, patient cannot articulate the pain given his dementia    ADDITIONAL REVIEW OF SYSTEMS: unable to fully obtain due to dementia    MEDICATIONS  (STANDING):  allopurinol 100 milliGRAM(s) Oral daily  amLODIPine   Tablet 2.5 milliGRAM(s) Oral daily  atorvastatin 80 milliGRAM(s) Oral at bedtime  cefepime   IVPB 2000 milliGRAM(s) IV Intermittent daily  heparin   Injectable 5000 Unit(s) SubCutaneous every 8 hours  influenza   Vaccine 0.5 milliLiter(s) IntraMuscular once  lactobacillus acidophilus 1 Tablet(s) Oral daily  latanoprost 0.005% Ophthalmic Solution 1 Drop(s) Both EYES at bedtime  metroNIDAZOLE    Tablet 500 milliGRAM(s) Oral every 8 hours  mirtazapine 7.5 milliGRAM(s) Oral at bedtime  polyethylene glycol 3350 17 Gram(s) Oral daily  senna 2 Tablet(s) Oral at bedtime  tamsulosin 0.4 milliGRAM(s) Oral at bedtime  timolol 0.5% Solution 1 Drop(s) Both EYES two times a day  vancomycin  IVPB 750 milliGRAM(s) IV Intermittent daily    MEDICATIONS  (PRN):  acetaminophen   Tablet .. 650 milliGRAM(s) Oral every 6 hours PRN Mild Pain (1 - 3), Moderate Pain (4 - 6)  ALPRAZolam 0.5 milliGRAM(s) Oral two times a day PRN anxiety  traMADol 25 milliGRAM(s) Oral every 6 hours PRN Moderate Pain (4 - 6)      CAPILLARY BLOOD GLUCOSE        I&O's Summary    21 Oct 2020 07:01  -  22 Oct 2020 07:00  --------------------------------------------------------  IN: 600 mL / OUT: 950 mL / NET: -350 mL    22 Oct 2020 07:01  -  22 Oct 2020 11:26  --------------------------------------------------------  IN: 80 mL / OUT: 0 mL / NET: 80 mL        PHYSICAL EXAM:  Vital Signs Last 24 Hrs  T(C): 36.8 (22 Oct 2020 08:45), Max: 37.1 (21 Oct 2020 19:57)  T(F): 98.2 (22 Oct 2020 08:45), Max: 98.7 (21 Oct 2020 19:57)  HR: 92 (22 Oct 2020 08:45) (89 - 102)  BP: 137/74 (22 Oct 2020 08:45) (122/78 - 142/87)  BP(mean): --  RR: 18 (22 Oct 2020 08:45) (18 - 18)  SpO2: 95% (22 Oct 2020 08:45) (95% - 97%)    CONSTITUTIONAL: NAD  RESPIRATORY: Normal respiratory effort; lungs are clear to auscultation bilaterally  CARDIOVASCULAR: Regular rate and rhythm, normal S1 and S2, no murmur/rub/gallop; No lower extremity edema; Peripheral pulses are 2+ bilaterally  ABDOMEN: Nontender to palpation, normoactive bowel sounds, no rebound/guarding; No hepatosplenomegaly  MUSCLOSKELETAL: L shoulder swelling, erythema extending down to arm  PSYCH: A+O to person; affect appropriate    LABS:                        9.7    10.72 )-----------( 339      ( 22 Oct 2020 07:24 )             29.1     10-22    128<L>  |  97  |  25<H>  ----------------------------<  90  4.1   |  19<L>  |  1.52<H>    Ca    8.6      22 Oct 2020 07:24  Phos  2.8     10-22  Mg     2.2     10-22    TPro  5.9<L>  /  Alb  2.3<L>  /  TBili  0.5  /  DBili  x   /  AST  32  /  ALT  12  /  AlkPhos  86  10-22    PT/INR - ( 21 Oct 2020 08:29 )   PT: 13.7 sec;   INR: 1.16 ratio         PTT - ( 21 Oct 2020 08:29 )  PTT:32.0 sec          Culture - Tissue with Gram Stain (collected 20 Oct 2020 22:20)  Source: .Tissue left shoulder mass  Gram Stain (21 Oct 2020 01:23):    Rare polymorphonuclear leukocytes seen per low power field    No organisms seen per oil power field  Preliminary Report (21 Oct 2020 19:30):    No growth    Bone scan: Heterogeneous tracer uptake in the entire left humerus with relatively decreased uptake in the left humeral head. Differentials postsurgical change, lymphomatous involvement, or infection. If infection is a clinical concern, labeled WBC/bone marrow scan may be performed.      RADIOLOGY & ADDITIONAL TESTS:  Results Reviewed:   Imaging Personally Reviewed:  Electrocardiogram Personally Reviewed:    COORDINATION OF CARE:  Care Discussed with Consultants/Other Providers [Y/N]:  Prior or Outpatient Records Reviewed [Y/N]:

## 2020-10-23 NOTE — PROGRESS NOTE ADULT - PROBLEM SELECTOR PLAN 10
Previous MD d/w length with wife and also patient's daughter. There are 5 children from his first marriage and he has been  to his new wife for 25 years.  Pervious MD d/w wife - she believes this cancer is treatable, She would like to consider all options at this time.  d/w daughter (2nd daughter - Stella) - she is more realistic. Does not think her father could tolerate chemo. However, he defers to his wife.  Plan for family meeting once biopsy obtained and can meet with heme/onc  full code

## 2020-10-23 NOTE — PROGRESS NOTE ADULT - ASSESSMENT
85 yo male with PMH of NHL (last chemo 2017), HTN, HLD, CKD, BPH, anemia, syncope, anxiety/adjustment disorder/depression, presents here from [facility] for further management of his diffuse large B cell lymphoma, and found to have erythema and swelling of Left shoulder concerning for OM    Left shoulder Erythema R/o Acute OM - in setting of ORIF, shoulder tumor resection, and Intramedullary nail and cementation on 9/25. [facility]2020 and XR and CT concerning for acute OM. He received vanc/zosyn then admitted with vanc, cefepime, and flagyll since 10/13. No fevers before or during admission. No other symptoms. Pending chemo/radiation.     Recommend:   - repeat vancomycin trough still low , change vancomycin to 500 mg IVSS Q 12 , will have to monitor levels closely  -  can likely d/c the cefepime and flagyl  s/p IR procedure  ? whether patient is infected  -await pathology- apparently slides were sent over from outside hospital as well  CT C/A/P with abd mass, LN. MRI Brain w/ contrast to eval leptomeningeal dz.   - If the concern for secondary CNS involvement is accurate, patient will need an LP for IT chemo and CSF analysis  -Continue discussion for GOC

## 2020-10-23 NOTE — CHART NOTE - TREATMENT: THE FOLLOWING DIET HAS BEEN RECOMMENDED
Diet, Soft:   Supplement Feeding Modality:  Oral  Ensure Enlive Cans or Servings Per Day:  3       Frequency:  Daily (10-22-20 @ 11:55) [Active]

## 2020-10-23 NOTE — PROGRESS NOTE ADULT - SUBJECTIVE AND OBJECTIVE BOX
Patient is a 86y old  Male who presents with a chief complaint of sent here for chemo and radiation (23 Oct 2020 12:39)    Being followed by ID for        Interval history:  No other acute events      ROS:  No cough,SOB,CP  No N/V/D  No abd pain  No urinary complaints  No HA  No joint or limb pain  No other complaints    PAST MEDICAL & SURGICAL HISTORY:  Syncope    Chronic kidney disease (CKD)    Diffuse large B cell lymphoma    HLD (hyperlipidemia)    BPH (benign prostatic hyperplasia)    HTN (hypertension)    History of left shoulder fracture    Osteomyelitis of left shoulder region      Allergies    No Known Allergies    Intolerances      Antimicrobials:    cefepime   IVPB 2000 milliGRAM(s) IV Intermittent daily  metroNIDAZOLE    Tablet 500 milliGRAM(s) Oral every 8 hours  vancomycin  IVPB 750 milliGRAM(s) IV Intermittent daily    MEDICATIONS  (STANDING):  allopurinol 100 milliGRAM(s) Oral daily  amLODIPine   Tablet 2.5 milliGRAM(s) Oral daily  atorvastatin 80 milliGRAM(s) Oral at bedtime  cefepime   IVPB 2000 milliGRAM(s) IV Intermittent daily  chlorhexidine 2% Cloths 1 Application(s) Topical daily  heparin   Injectable 5000 Unit(s) SubCutaneous every 8 hours  influenza   Vaccine 0.5 milliLiter(s) IntraMuscular once  lactobacillus acidophilus 1 Tablet(s) Oral daily  latanoprost 0.005% Ophthalmic Solution 1 Drop(s) Both EYES at bedtime  metroNIDAZOLE    Tablet 500 milliGRAM(s) Oral every 8 hours  mirtazapine 15 milliGRAM(s) Oral at bedtime  polyethylene glycol 3350 17 Gram(s) Oral daily  senna 2 Tablet(s) Oral at bedtime  tamsulosin 0.4 milliGRAM(s) Oral at bedtime  timolol 0.5% Solution 1 Drop(s) Both EYES two times a day  vancomycin  IVPB 750 milliGRAM(s) IV Intermittent daily      Vital Signs Last 24 Hrs  T(C): 37 (10-23-20 @ 08:52), Max: 37 (10-23-20 @ 08:52)  T(F): 98.6 (10-23-20 @ 08:52), Max: 98.6 (10-23-20 @ 08:52)  HR: 93 (10-23-20 @ 08:52) (82 - 93)  BP: 124/63 (10-23-20 @ 08:52) (107/70 - 124/63)  BP(mean): --  RR: 18 (10-23-20 @ 08:52) (18 - 20)  SpO2: 98% (10-23-20 @ 08:52) (95% - 98%)    Physical Exam:    Constitutional well preserved,comfortable,pleasant    HEENT PERRLA EOMI,No pallor or icterus    No oral exudate or erythema    Neck supple no JVD or LN    Chest Good AE,CTA    CVS RRR S1 S2 WNl No murmur or rub or gallop    Abd soft BS normal No tenderness no masses    Ext No cyanosis clubbing or edema    IV site no erythema tenderness or discharge    Joints no swelling or LOM    CNS AAO X 3 no focal    Lab Data:                          9.2    10.45 )-----------( 334      ( 23 Oct 2020 07:21 )             28.2       10-23    133<L>  |  100  |  28<H>  ----------------------------<  90  4.4   |  23  |  1.61<H>    Ca    8.9      23 Oct 2020 07:20  Phos  2.6     10-23  Mg     2.1     10-23    TPro  6.0  /  Alb  2.4<L>  /  TBili  0.5  /  DBili  x   /  AST  23  /  ALT  10  /  AlkPhos  85  10-23          .Tissue left shoulder mass  10-20-20   No growth  --    Rare polymorphonuclear leukocytes seen per low power field  No organisms seen per oil power field      .Blood Blood  10-18-20   No Growth Final  --  --                    WBC Count: 10.45 (10-23-20 @ 07:21)  WBC Count: 10.72 (10-22-20 @ 07:24)  WBC Count: 9.55 (10-21-20 @ 06:26)  WBC Count: 10.02 (10-20-20 @ 04:16)  WBC Count: 9.05 (10-19-20 @ 07:11)  WBC Count: 12.34 (10-18-20 @ 07:17)             Patient is a 86y old  Male who presents with a chief complaint of sent here for chemo and radiation (23 Oct 2020 12:39)    Being followed by ID for        Interval history:  pt now on 8 tasha  still with LUE pain, swelling  No other acute events      PAST MEDICAL & SURGICAL HISTORY:  Syncope    Chronic kidney disease (CKD)    Diffuse large B cell lymphoma    HLD (hyperlipidemia)    BPH (benign prostatic hyperplasia)    HTN (hypertension)    History of left shoulder fracture    Osteomyelitis of left shoulder region      Allergies    No Known Allergies    Intolerances      Antimicrobials:    cefepime   IVPB 2000 milliGRAM(s) IV Intermittent daily  metroNIDAZOLE    Tablet 500 milliGRAM(s) Oral every 8 hours  vancomycin  IVPB 750 milliGRAM(s) IV Intermittent daily    MEDICATIONS  (STANDING):  allopurinol 100 milliGRAM(s) Oral daily  amLODIPine   Tablet 2.5 milliGRAM(s) Oral daily  atorvastatin 80 milliGRAM(s) Oral at bedtime  cefepime   IVPB 2000 milliGRAM(s) IV Intermittent daily  chlorhexidine 2% Cloths 1 Application(s) Topical daily  heparin   Injectable 5000 Unit(s) SubCutaneous every 8 hours  influenza   Vaccine 0.5 milliLiter(s) IntraMuscular once  lactobacillus acidophilus 1 Tablet(s) Oral daily  latanoprost 0.005% Ophthalmic Solution 1 Drop(s) Both EYES at bedtime  metroNIDAZOLE    Tablet 500 milliGRAM(s) Oral every 8 hours  mirtazapine 15 milliGRAM(s) Oral at bedtime  polyethylene glycol 3350 17 Gram(s) Oral daily  senna 2 Tablet(s) Oral at bedtime  tamsulosin 0.4 milliGRAM(s) Oral at bedtime  timolol 0.5% Solution 1 Drop(s) Both EYES two times a day  vancomycin  IVPB 750 milliGRAM(s) IV Intermittent daily      Vital Signs Last 24 Hrs  T(C): 37 (10-23-20 @ 08:52), Max: 37 (10-23-20 @ 08:52)  T(F): 98.6 (10-23-20 @ 08:52), Max: 98.6 (10-23-20 @ 08:52)  HR: 93 (10-23-20 @ 08:52) (82 - 93)  BP: 124/63 (10-23-20 @ 08:52) (107/70 - 124/63)  BP(mean): --  RR: 18 (10-23-20 @ 08:52) (18 - 20)  SpO2: 98% (10-23-20 @ 08:52) (95% - 98%)    Physical Exam:    Constitutional well preserved,comfortable,pleasant    HEENT PERRLA EOMI,No pallor or icterus    No oral exudate or erythema    Neck supple no JVD or LN    Chest Good AE,CTA    CVS RRR S1 S2 WNl     Abd soft BS normal No tenderness no masses    Ext left shoulder with swelling, erythema slightly less    IV site no erythema tenderness or discharge          Lab Data:                          9.2    10.45 )-----------( 334      ( 23 Oct 2020 07:21 )             28.2       10-23    133<L>  |  100  |  28<H>  ----------------------------<  90  4.4   |  23  |  1.61<H>    Ca    8.9      23 Oct 2020 07:20  Phos  2.6     10-23  Mg     2.1     10-23    TPro  6.0  /  Alb  2.4<L>  /  TBili  0.5  /  DBili  x   /  AST  23  /  ALT  10  /  AlkPhos  85  10-23          .Tissue left shoulder mass  10-20-20   No growth  --    Rare polymorphonuclear leukocytes seen per low power field  No organisms seen per oil power field      .Blood Blood  10-18-20   No Growth Final  --  --        WBC Count: 10.45 (10-23-20 @ 07:21)  WBC Count: 10.72 (10-22-20 @ 07:24)  WBC Count: 9.55 (10-21-20 @ 06:26)  WBC Count: 10.02 (10-20-20 @ 04:16)  WBC Count: 9.05 (10-19-20 @ 07:11)  WBC Count: 12.34 (10-18-20 @ 07:17)        < from: CT Abdomen and Pelvis w/ Oral Cont and w/ IV Cont (10.19.20 @ 15:36) >  EXAM:  CT CHEST IC                          EXAM:  CT ABDOMEN AND PELVIS OC IC                            PROCEDURE DATE:  10/19/2020            INTERPRETATION:  CLINICAL INFORMATION: Non-Hodgkin's lymphoma. Evaluate for metastatic disease.    COMPARISON: CT abdomen pelvis 4/16/2020.    PROCEDURE:  CT of the Chest, Abdomen and Pelvis was performed with intravenous contrast.  Intravenous contrast: 90 ml Omnipaque 350. 10 ml discarded.  Oral contrast: Positive contrast was administered.  [place] coronal reformats were performed.    FINDINGS:  CHEST:  LUNGS AND LARGE AIRWAYS: Patent central airways. Biapical pleural-parenchymal scarring. Peripheral reticular reticulation, most prominent at the lung bases, without honeycombing. Calcified granulomas. Few scattered noncalcified pulmonary nodules measuring up to 5 mm in the left lower lobe (3, 93).  PLEURA: No pleural effusion.  VESSELS: Atherosclerotic changes of the aorta and coronary arteries.  HEART: Heart size is normal. No pericardial effusion.  MEDIASTINUM AND MADELIN: No lymphadenopathy.  CHEST WALL AND LOWER NECK: Within normal limits.    ABDOMEN AND PELVIS:  LIVER: Few scattered hypodensities too small to characterize.  BILE DUCTS: Normal caliber.  GALLBLADDER: Within normal limits.  SPLEEN: Within normal limits.  PANCREAS: Within normal limits.  ADRENALS: Within normal limits.  KIDNEYS/URETERS: Mild left hydronephrosis with associated delayed nephrogram due to an infiltrative soft tissue mass at the level of the renal pelvis, approximately measuring 4.9 x 4.5 cm (3, 183). Bilateral renal cysts and hypodensities too small to characterize.    BLADDER: Within normal limits.  REPRODUCTIVE ORGANS: Prostate within normal limits.    BOWEL: Tiny hiatal hernia. No bowel obstruction. Appendix is normal. Colonic diverticulosis.  PERITONEUM: No ascites. 8.7 x 4.3 cm soft tissue mass in the mesentery, difficult to delineate from the adjacent small bowel.  VESSELS: Atherosclerotic changes.  RETROPERITONEUM/LYMPH NODES: Mesenteric lymphadenopathy, for example a 1.6 x 1.3 cm node in the right (3, 240).  ABDOMINAL WALL: Within normal limits.  BONES: Status post ORIF of a left proximal humerus fracture with marked adjacent soft tissue infiltration.    IMPRESSION:    Mild left hydronephrosis with delayed nephrogram due to an obstructing soft tissue mass at the level of the renal pelvis. Large mesenteric jerman mass, difficult to delineate from the small bowel in the absence of oral contrast. Findings are likely related to known lymphoma.    Few sub-6 mm pulmonary nodules are indeterminate.    Status post ORIF of a left proximal humerus fracture, with marked adjacent soft tissue infiltration. Please refer to dedicated CT report of the left shoulder.          SOFIA ENGLE, RADIOLOGY FELLOW  This document has been electronically signed.  SOLITARIO CEDEÑO MD; Attending Radiologist  This document has been electronically signed. Oct 19 2020  5:07PM    < end of copied text >

## 2020-10-23 NOTE — CHART NOTE - NSCHARTNOTEFT_GEN_A_CORE
Okay to place bedside PICC line in this patient.    Emre Drake MD, RPVI  Chief Resident, Interventional Radiology  [facility]: [phone] ([phone]  [facility]: [phone]

## 2020-10-23 NOTE — DIETITIAN INITIAL EVALUATION ADULT. - PERTINENT LABORATORY DATA
10-23 Na133 mmol/L<L> Glu 90 mg/dL K+ 4.4 mmol/L Cr  1.61 mg/dL<H> BUN 28 mg/dL<H> Phos 2.6 mg/dL Alb 2.4 g/dL<L>

## 2020-10-23 NOTE — PROGRESS NOTE ADULT - SUBJECTIVE AND OBJECTIVE BOX
Patient is a 86y old  Male who presents with a chief complaint of sent here for chemo and radiation (22 Oct 2020 11:26)      SUBJECTIVE / OVERNIGHT EVENTS: no acute events overnight     MEDICATIONS  (STANDING):  allopurinol 100 milliGRAM(s) Oral daily  amLODIPine   Tablet 2.5 milliGRAM(s) Oral daily  atorvastatin 80 milliGRAM(s) Oral at bedtime  cefepime   IVPB 2000 milliGRAM(s) IV Intermittent daily  chlorhexidine 2% Cloths 1 Application(s) Topical daily  heparin   Injectable 5000 Unit(s) SubCutaneous every 8 hours  influenza   Vaccine 0.5 milliLiter(s) IntraMuscular once  lactobacillus acidophilus 1 Tablet(s) Oral daily  latanoprost 0.005% Ophthalmic Solution 1 Drop(s) Both EYES at bedtime  metroNIDAZOLE    Tablet 500 milliGRAM(s) Oral every 8 hours  mirtazapine 15 milliGRAM(s) Oral at bedtime  polyethylene glycol 3350 17 Gram(s) Oral daily  senna 2 Tablet(s) Oral at bedtime  tamsulosin 0.4 milliGRAM(s) Oral at bedtime  timolol 0.5% Solution 1 Drop(s) Both EYES two times a day  vancomycin  IVPB 750 milliGRAM(s) IV Intermittent daily    MEDICATIONS  (PRN):  acetaminophen   Tablet .. 650 milliGRAM(s) Oral every 6 hours PRN Mild Pain (1 - 3), Moderate Pain (4 - 6)  ALPRAZolam 0.5 milliGRAM(s) Oral two times a day PRN anxiety  traMADol 25 milliGRAM(s) Oral every 6 hours PRN Moderate Pain (4 - 6)      Vital Signs Last 24 Hrs  T(C): 37 (23 Oct 2020 08:52), Max: 37.1 (22 Oct 2020 13:46)  T(F): 98.6 (23 Oct 2020 08:52), Max: 98.8 (22 Oct 2020 13:46)  HR: 93 (23 Oct 2020 08:52) (82 - 93)  BP: 124/63 (23 Oct 2020 08:52) (107/70 - 124/63)  BP(mean): --  RR: 18 (23 Oct 2020 08:52) (18 - 20)  SpO2: 98% (23 Oct 2020 08:52) (95% - 98%)  CAPILLARY BLOOD GLUCOSE        I&O's Summary    22 Oct 2020 07:01  -  23 Oct 2020 07:00  --------------------------------------------------------  IN: 280 mL / OUT: 400 mL / NET: -120 mL        PHYSICAL EXAM:  GENERAL: NAD, well-developed  HEAD:  Atraumatic, Normocephalic  EYES: EOMI, PERRLA, conjunctiva and sclera clear  NECK: Supple, No JVD  CHEST/LUNG: Clear to auscultation bilaterally; No wheeze  HEART: Regular rate and rhythm; No murmurs, rubs, or gallops  ABDOMEN: Soft, Nontender, Nondistended; Bowel sounds present  EXTREMITIES:  2+ Peripheral Pulses, No clubbing, cyanosis, or edema  PSYCH: AAOx3  NEUROLOGY: non-focal  SKIN: No rashes or lesions    LABS:                        9.2    10.45 )-----------( 334      ( 23 Oct 2020 07:21 )             28.2     10-23    133<L>  |  100  |  28<H>  ----------------------------<  90  4.4   |  23  |  1.61<H>    Ca    8.9      23 Oct 2020 07:20  Phos  2.6     10-23  Mg     2.1     10-23    TPro  6.0  /  Alb  2.4<L>  /  TBili  0.5  /  DBili  x   /  AST  23  /  ALT  10  /  AlkPhos  85  10-23              RADIOLOGY & ADDITIONAL TESTS:    Imaging Personally Reviewed:    Consultant(s) Notes Reviewed:      Care Discussed with Consultants/Other Providers:   Patient is a 86y old  Male who presents with a chief complaint of sent here for chemo and radiation (22 Oct 2020 11:26)      SUBJECTIVE / OVERNIGHT EVENTS: no acute events overnight, pt reporting left shoulder pain which has been chronic. No other complaints.     MEDICATIONS  (STANDING):  allopurinol 100 milliGRAM(s) Oral daily  amLODIPine   Tablet 2.5 milliGRAM(s) Oral daily  atorvastatin 80 milliGRAM(s) Oral at bedtime  cefepime   IVPB 2000 milliGRAM(s) IV Intermittent daily  chlorhexidine 2% Cloths 1 Application(s) Topical daily  heparin   Injectable 5000 Unit(s) SubCutaneous every 8 hours  influenza   Vaccine 0.5 milliLiter(s) IntraMuscular once  lactobacillus acidophilus 1 Tablet(s) Oral daily  latanoprost 0.005% Ophthalmic Solution 1 Drop(s) Both EYES at bedtime  metroNIDAZOLE    Tablet 500 milliGRAM(s) Oral every 8 hours  mirtazapine 15 milliGRAM(s) Oral at bedtime  polyethylene glycol 3350 17 Gram(s) Oral daily  senna 2 Tablet(s) Oral at bedtime  tamsulosin 0.4 milliGRAM(s) Oral at bedtime  timolol 0.5% Solution 1 Drop(s) Both EYES two times a day  vancomycin  IVPB 750 milliGRAM(s) IV Intermittent daily    MEDICATIONS  (PRN):  acetaminophen   Tablet .. 650 milliGRAM(s) Oral every 6 hours PRN Mild Pain (1 - 3), Moderate Pain (4 - 6)  ALPRAZolam 0.5 milliGRAM(s) Oral two times a day PRN anxiety  traMADol 25 milliGRAM(s) Oral every 6 hours PRN Moderate Pain (4 - 6)      Vital Signs Last 24 Hrs  T(C): 37 (23 Oct 2020 08:52), Max: 37.1 (22 Oct 2020 13:46)  T(F): 98.6 (23 Oct 2020 08:52), Max: 98.8 (22 Oct 2020 13:46)  HR: 93 (23 Oct 2020 08:52) (82 - 93)  BP: 124/63 (23 Oct 2020 08:52) (107/70 - 124/63)  BP(mean): --  RR: 18 (23 Oct 2020 08:52) (18 - 20)  SpO2: 98% (23 Oct 2020 08:52) (95% - 98%)  CAPILLARY BLOOD GLUCOSE        I&O's Summary    22 Oct 2020 07:01  -  23 Oct 2020 07:00  --------------------------------------------------------  IN: 280 mL / OUT: 400 mL / NET: -120 mL        PHYSICAL EXAM:  GENERAL: NAD  EYES: conjunctiva and sclera clear  CHEST/LUNG: Clear to auscultation bilaterally; No wheeze  HEART: +S1/S2, reg   ABDOMEN: Soft, Nontender, Nondistended  EXTREMITIES: no LE edema   PSYCH: Alert and awake, appropriate, following commands     LABS:                        9.2    10.45 )-----------( 334      ( 23 Oct 2020 07:21 )             28.2     10-23    133<L>  |  100  |  28<H>  ----------------------------<  90  4.4   |  23  |  1.61<H>    Ca    8.9      23 Oct 2020 07:20  Phos  2.6     10-23  Mg     2.1     10-23    TPro  6.0  /  Alb  2.4<L>  /  TBili  0.5  /  DBili  x   /  AST  23  /  ALT  10  /  AlkPhos  85  10-23

## 2020-10-23 NOTE — PROGRESS NOTE ADULT - ASSESSMENT
85 yo male with PMH of NHL (last chemo 2017), HTN, HLD, CKD, BPH, anemia, syncope, anxiety/adjustment disorder/depression, presents here from [facility] where he was getting treated for L shoulder infection (s/p ORIF for pathologic fx) for further management of his diffuse large B cell lymphoma and L shoulder lesion

## 2020-10-23 NOTE — PROGRESS NOTE ADULT - SUBJECTIVE AND OBJECTIVE BOX
Patient is a 86y old  Male who presents with a chief complaint of sent here for chemo and radiation (23 Oct 2020 12:10)    General: denies fevers, chills  Skin/Breast: denies rash   Ophthalmologic: denies blurry vision  ENMT: denies throat pain  Respiratory and Thorax: denies cough, denies shortness of breath  Cardiovascular: denies chest pain, palpitations. Denies LE swelling   Gastrointestinal: denies abdominal pain/ nausea/ vomiting/ diarrhea. Denies BRBPR/ melena   Genitourinary: Denies dysuria  Musculoskeletal: Denies mylagias   Neurological: Denies syncope  Psychiatric: Denies mood disturbance   Hematology/Lymphatics: denies bleeding/bruising. Denies skin lumps 	  Vital Signs Last 24 Hrs  T(C): 37 (23 Oct 2020 08:52), Max: 37.1 (22 Oct 2020 13:46)  T(F): 98.6 (23 Oct 2020 08:52), Max: 98.8 (22 Oct 2020 13:46)  HR: 93 (23 Oct 2020 08:52) (82 - 93)  BP: 124/63 (23 Oct 2020 08:52) (107/70 - 124/63)  BP(mean): --  RR: 18 (23 Oct 2020 08:52) (18 - 20)  SpO2: 98% (23 Oct 2020 08:52) (95% - 98%)  PHYSICAL EXAM:    GENERAL: NAD, AAOx3   HEAD:  NC/AT  EYES: EOMI, PERRLA, no scleral icterus  HEENT: Moist mucous membranes  LUNG: Clear to auscultation bilaterally; No rales, rhonchi, wheezing, or rubs  HEART: RRR; No murmurs, rubs, or gallops  ABDOMEN: +BS, ST/ND/NT  EXTREMITIES:  2+ Peripheral Pulses, No clubbing, cyanosis, or edema  LAD: no palpable adenopathy  10-23    133<L>  |  100  |  28<H>  ----------------------------<  90  4.4   |  23  |  1.61<H>    Ca    8.9      23 Oct 2020 07:20  Phos  2.6     10-23  Mg     2.1     10-23    TPro  6.0  /  Alb  2.4<L>  /  TBili  0.5  /  DBili  x   /  AST  23  /  ALT  10  /  AlkPhos  85  10-23      CBC Full  -  ( 23 Oct 2020 07:21 )  WBC Count : 10.45 K/uL  RBC Count : 3.32 M/uL  Hemoglobin : 9.2 g/dL  Hematocrit : 28.2 %  Platelet Count - Automated : 334 K/uL  Mean Cell Volume : 84.9 fl  Mean Cell Hemoglobin : 27.7 pg  Mean Cell Hemoglobin Concentration : 32.6 gm/dL  Auto Neutrophil # : 8.11 K/uL  Auto Lymphocyte # : 0.85 K/uL  Auto Monocyte # : 1.14 K/uL  Auto Eosinophil # : 0.18 K/uL  Auto Basophil # : 0.06 K/uL  Auto Neutrophil % : 77.6 %  Auto Lymphocyte % : 8.1 %  Auto Monocyte % : 10.9 %  Auto Eosinophil % : 1.7 %  Auto Basophil % : 0.6 %      LIVER FUNCTIONS - ( 23 Oct 2020 07:20 )  Alb: 2.4 g/dL / Pro: 6.0 g/dL / ALK PHOS: 85 U/L / ALT: 10 U/L / AST: 23 U/L / GGT: x                 Uric Acid, Serum: 4.5 mg/dL (10-23-20 @ 07:20)  Lactate Dehydrogenase, Serum: 1083 U/L (10-23-20 @ 07:20)

## 2020-10-23 NOTE — DIETITIAN INITIAL EVALUATION ADULT. - ADD RECOMMEND
1) Continue current soft diet order without therapeutic restrictions; consistency deferred to provider. 3) Malnutrition alert placed. 4) Continue to trend labs, weight, skin integrity, and intake.

## 2020-10-23 NOTE — PROGRESS NOTE ADULT - ASSESSMENT
86 year old man with relapsed DLBCL causing a pathologic fracture and possible infection   -on IV antibiotics, LDH is decfreasing, his L shoulder appears improved from a few days ago  -awaiting pathology results of core biopsy or slides to confirm diagnosis  -discussed with patient's daughter and wife, they are favoring a trial of treatment to see if it helps his arm and his overall functional status  -once path back, plan for bendamustine and rituixmab for relapsed dlbcl if confirmed

## 2020-10-23 NOTE — PROGRESS NOTE ADULT - PROBLEM SELECTOR PLAN 1
Infiltrating mass of left shoulder and possible osteomyelitis   bone scan with uptake in L femur. c/w IV vancomycin, cefepime, and PO flagyl (Since 10/10)  10/17 blood culture - so far negative. ESR/CRP very high  ID consult appreciated - agree with abx as above  c/w tylenol and tramadol PRN for pain  CT L shoulder noted, d/w ortho/IR seems like infiltration and not primarily infectious. s/p IR biopsy, very little fluid, sent for pathology, gram stain negative.  MRI L shoulder showing:  - aggressive destruction of proximal humerus with concern for pathological fractures.   -Area of humeral head concerning for possible tumor infiltration vs superimposed infection.  -non specific edema above scapula and glenohumeral joint effusion  -discussed results with orthopedics, no change in management for now

## 2020-10-23 NOTE — DIETITIAN INITIAL EVALUATION ADULT. - OTHER INFO
Dosing wt: 154.3 lbs. Daily wt in lbs: 154.5 (10/2), 144.4 (10/17). Reports UBW of 165 lbs, endorses 20 lb unintentional wt loss x6 months in setting of decreased PO intake.    Per RN, pt is currently eating fair. Pt is taking Ensure Enlive x3 daily, however is not finding it palatable; would like to change flavor. Denies recent N/V, diarrhea, or constipation. Last BM 10/20.

## 2020-10-23 NOTE — DIETITIAN INITIAL EVALUATION ADULT. - ORAL INTAKE PTA/DIET HISTORY
Pt was eating fair to well over the past few months with some decrease in appetite. Pt was not following therapeutic diet; eats meals prepared by wife, unable to provide full dietary recall. Confirms no known food allergies. Denies Hx of chewing or swallowing issues; pt on soft texture diet in-house, tolerating well. Reports taking Vitamin C, D, and a multivitamin; denies nutrient supplement use.

## 2020-10-23 NOTE — PROGRESS NOTE ADULT - ATTENDING COMMENTS
Kirstin Leroy M.D. ,   Pager [phone]     after 5PM/ weekends [phone]      ID service will be covering over the weekend. Please call for acute issues or questions. [phone]

## 2020-10-23 NOTE — DIETITIAN INITIAL EVALUATION ADULT. - PROBLEM SELECTOR PLAN 9
I had a 16 minute discussion with patient's wife Mayda (HCP) regarding advanced directives.  I had discussed with her the current plan of care.  I had also discussed all resuscitative measures and she verbalized understanding.  She confirmed that patient's wish was to have everything done as needed.  Currently patient is FULL CODE.

## 2020-10-24 NOTE — PROGRESS NOTE ADULT - PROBLEM SELECTOR PLAN 1
Infiltrating mass of left shoulder and possible osteomyelitis   bone scan with uptake in L femur. c/w IV vancomycin, cefepime, and PO flagyl (Since 10/10)  10/17 blood culture - so far negative. ESR/CRP very high  ID following  c/w tylenol and tramadol PRN for pain  CT L shoulder noted, d/w ortho/IR seems like infiltration and not primarily infectious. s/p IR biopsy, very little fluid, sent for pathology, gram stain negative.  MRI L shoulder showing:  - aggressive destruction of proximal humerus with concern for pathological fractures.   -Area of humeral head concerning for possible tumor infiltration vs superimposed infection.  -non specific edema above scapula and glenohumeral joint effusion  -per orthopedics, no change in management for now

## 2020-10-24 NOTE — PROGRESS NOTE ADULT - SUBJECTIVE AND OBJECTIVE BOX
Patient is a 86y old  Male who presents with a chief complaint of sent here for chemo and radiation (23 Oct 2020 15:38)      SUBJECTIVE / OVERNIGHT EVENTS: Pt in bed, c/o L arm pain. Appears confused.     Vital Signs Last 24 Hrs  T(C): 36.6 (24 Oct 2020 08:43), Max: 36.8 (23 Oct 2020 23:35)  T(F): 97.9 (24 Oct 2020 08:43), Max: 98.3 (23 Oct 2020 23:35)  HR: 73 (24 Oct 2020 08:43) (73 - 101)  BP: 124/68 (24 Oct 2020 08:43) (106/63 - 124/68)  BP(mean): --  RR: 18 (24 Oct 2020 08:43) (18 - 18)  SpO2: 96% (24 Oct 2020 08:43) (96% - 97%)    MEDICATIONS  (STANDING):  allopurinol 100 milliGRAM(s) Oral daily  amLODIPine   Tablet 2.5 milliGRAM(s) Oral daily  atorvastatin 80 milliGRAM(s) Oral at bedtime  cefepime   IVPB 2000 milliGRAM(s) IV Intermittent daily  chlorhexidine 2% Cloths 1 Application(s) Topical daily  heparin   Injectable 5000 Unit(s) SubCutaneous every 8 hours  influenza   Vaccine 0.5 milliLiter(s) IntraMuscular once  lactobacillus acidophilus 1 Tablet(s) Oral daily  latanoprost 0.005% Ophthalmic Solution 1 Drop(s) Both EYES at bedtime  metroNIDAZOLE    Tablet 500 milliGRAM(s) Oral every 8 hours  mirtazapine 15 milliGRAM(s) Oral at bedtime  polyethylene glycol 3350 17 Gram(s) Oral daily  senna 2 Tablet(s) Oral at bedtime  tamsulosin 0.4 milliGRAM(s) Oral at bedtime  timolol 0.5% Solution 1 Drop(s) Both EYES two times a day  vancomycin  IVPB 750 milliGRAM(s) IV Intermittent daily    MEDICATIONS  (PRN):  acetaminophen   Tablet .. 650 milliGRAM(s) Oral every 6 hours PRN Mild Pain (1 - 3), Moderate Pain (4 - 6)  ALPRAZolam 0.5 milliGRAM(s) Oral two times a day PRN anxiety  traMADol 25 milliGRAM(s) Oral every 6 hours PRN Moderate Pain (4 - 6)        CAPILLARY BLOOD GLUCOSE        I&O's Summary    23 Oct 2020 07:01  -  24 Oct 2020 07:00  --------------------------------------------------------  IN: 1040 mL / OUT: 1 mL / NET: 1039 mL    24 Oct 2020 07:01  -  24 Oct 2020 10:36  --------------------------------------------------------  IN: 240 mL / OUT: 0 mL / NET: 240 mL        PHYSICAL EXAM:  GENERAL: In bed, incontinent of urine, appears confused.   HEAD:  Atraumatic, Normocephalic  EYES: EOMI, PERRLA, conjunctiva and sclera clear  NECK: Supple, No JVD  CHEST/LUNG: Clear to auscultation anteriorly  HEART: Irregular rhythm  ABDOMEN: Soft, Nontender, Nondistended; Bowel sounds present  EXTREMITIES:  LUE swelling  PSYCH: Confused  NEUROLOGY: Limited exam  SKIN: No rashes or lesions    LABS:                        9.2    11.33 )-----------( 343      ( 24 Oct 2020 07:43 )             28.2     10-24    130<L>  |  96  |  32<H>  ----------------------------<  100<H>  4.0   |  21<L>  |  1.63<H>    Ca    8.7      24 Oct 2020 07:38  Phos  1.7     10-24  Mg     2.1     10-23    TPro  5.9<L>  /  Alb  2.5<L>  /  TBili  0.3  /  DBili  x   /  AST  23  /  ALT  11  /  AlkPhos  83  10-24              RADIOLOGY & ADDITIONAL TESTS:    Imaging Personally Reviewed:    Consultant(s) Notes Reviewed: Heme    Care Discussed with Consultants/Other Providers:

## 2020-10-24 NOTE — PROGRESS NOTE ADULT - ASSESSMENT
85 yo male with PMH of NHL (last chemo 2017), HTN, HLD, CKD, BPH, anemia, syncope, anxiety/adjustment disorder/depression, presents here from [facility] where he was getting treated for L shoulder infection (s/p ORIF for pathologic fx) for further management of his diffuse large B cell lymphoma and L shoulder lesion

## 2020-10-25 NOTE — PROGRESS NOTE ADULT - SUBJECTIVE AND OBJECTIVE BOX
Patient is a 86y old  Male who presents with a chief complaint of sent here for chemo and radiation (24 Oct 2020 10:35)      SUBJECTIVE / OVERNIGHT EVENTS: Pt sitting up in chair. C/o severe L shoulder pain.     Vital Signs Last 24 Hrs  T(C): 36.7 (25 Oct 2020 08:28), Max: 37 (24 Oct 2020 23:52)  T(F): 98.1 (25 Oct 2020 08:28), Max: 98.6 (24 Oct 2020 23:52)  HR: 96 (25 Oct 2020 08:28) (82 - 99)  BP: 127/75 (25 Oct 2020 08:28) (127/75 - 136/77)  BP(mean): --  RR: 18 (25 Oct 2020 08:28) (18 - 18)  SpO2: 96% (25 Oct 2020 08:28) (95% - 98%)    MEDICATIONS  (STANDING):  allopurinol 100 milliGRAM(s) Oral daily  amLODIPine   Tablet 2.5 milliGRAM(s) Oral daily  atorvastatin 80 milliGRAM(s) Oral at bedtime  cefepime   IVPB 2000 milliGRAM(s) IV Intermittent daily  chlorhexidine 2% Cloths 1 Application(s) Topical daily  heparin   Injectable 5000 Unit(s) SubCutaneous every 8 hours  influenza   Vaccine 0.5 milliLiter(s) IntraMuscular once  lactobacillus acidophilus 1 Tablet(s) Oral daily  latanoprost 0.005% Ophthalmic Solution 1 Drop(s) Both EYES at bedtime  metroNIDAZOLE    Tablet 500 milliGRAM(s) Oral every 8 hours  mirtazapine 15 milliGRAM(s) Oral at bedtime  polyethylene glycol 3350 17 Gram(s) Oral daily  potassium phosphate / sodium phosphate Tablet (K-PHOS No. 2) 1 Tablet(s) Oral four times a day with meals  senna 2 Tablet(s) Oral at bedtime  tamsulosin 0.4 milliGRAM(s) Oral at bedtime  timolol 0.5% Solution 1 Drop(s) Both EYES two times a day  vancomycin  IVPB 750 milliGRAM(s) IV Intermittent daily    MEDICATIONS  (PRN):  acetaminophen   Tablet .. 650 milliGRAM(s) Oral every 6 hours PRN Mild Pain (1 - 3), Moderate Pain (4 - 6)  ALPRAZolam 0.5 milliGRAM(s) Oral two times a day PRN anxiety  morphine  - Injectable 2 milliGRAM(s) IV Push every 4 hours PRN Severe Pain (7 - 10)  traMADol 25 milliGRAM(s) Oral every 6 hours PRN Moderate Pain (4 - 6)        CAPILLARY BLOOD GLUCOSE        I&O's Summary    24 Oct 2020 07:01  -  25 Oct 2020 07:00  --------------------------------------------------------  IN: 690 mL / OUT: 0 mL / NET: 690 mL    25 Oct 2020 07:01  -  25 Oct 2020 13:31  --------------------------------------------------------  IN: 200 mL / OUT: 0 mL / NET: 200 mL        PHYSICAL EXAM:  GENERAL: Sitting up in chair, appears tired.   HEAD:  Atraumatic, Normocephalic  EYES: EOMI, PERRLA, conjunctiva and sclera clear  NECK: Supple, No JVD  CHEST/LUNG: Clear to auscultation anteriorly  HEART: Regular rate and rhythm; No murmurs, rubs, or gallops  ABDOMEN: Soft, Nontender, Nondistended; Bowel sounds present  EXTREMITIES:  L shoulder swelling.   PSYCH: AAOx3  NEUROLOGY: non-focal    LABS:                        9.7    13.16 )-----------( 374      ( 25 Oct 2020 07:19 )             29.4     10-25    131<L>  |  96  |  32<H>  ----------------------------<  112<H>  4.4   |  24  |  1.52<H>    Ca    8.9      25 Oct 2020 07:19  Phos  2.5     10-25    TPro  6.3  /  Alb  2.6<L>  /  TBili  0.4  /  DBili  x   /  AST  22  /  ALT  10  /  AlkPhos  87  10-25              RADIOLOGY & ADDITIONAL TESTS:    Imaging Personally Reviewed:    Consultant(s) Notes Reviewed:      Care Discussed with Consultants/Other Providers:

## 2020-10-25 NOTE — PROGRESS NOTE ADULT - PROBLEM SELECTOR PLAN 1
Infiltrating mass of left shoulder and possible osteomyelitis   bone scan with uptake in L femur. c/w IV vancomycin, cefepime, and PO flagyl (Since 10/10)  10/17 blood culture - so far negative. ESR/CRP very high  ID following  C/o persistent severe pain- IV morphine added.  CT L shoulder noted, d/w ortho/IR seems like infiltration and not primarily infectious. s/p IR biopsy, very little fluid, sent for pathology, gram stain negative.  MRI L shoulder showing:  - aggressive destruction of proximal humerus with concern for pathological fractures. Area of humeral head concerning for possible tumor infiltration vs superimposed infection. Non specific edema above scapula and glenohumeral joint effusion  -per orthopedics, no change in management for now

## 2020-10-25 NOTE — PROGRESS NOTE ADULT - ATTENDING COMMENTS
Spoke w emlisa fellow Dr Butt- awaiting final IR path results. Spoke w wife Viktoria on the phone and updated her. Wife requesting to talk to heme- team to call her tomorrow.

## 2020-10-25 NOTE — PROGRESS NOTE ADULT - ASSESSMENT
87 yo male with PMH of NHL (last chemo 2017), HTN, HLD, CKD, BPH, anemia, syncope, anxiety/adjustment disorder/depression, presents here from [facility] where he was getting treated for L shoulder infection (s/p ORIF for pathologic fx) for further management of his diffuse large B cell lymphoma and L shoulder lesion

## 2020-10-26 NOTE — CONSULT NOTE ADULT - SUBJECTIVE AND OBJECTIVE BOX
Interventional Radiology Inpatient Consult Note       HPI:  87 yo male with PMH of NHL (last chemo 2017), HTN, HLD, CKD, BPH, anemia, syncope, anxiety/adjustment disorder/depression, presents here from [facility] for further management of his diffuse large B cell lymphoma.  History obtained from patient, his wife, Mayda (HCP) and chart review.  Patient was treated for NHL 5 years ago with chemotherapy and was in remission.  Earlier this year, patient had weight loss.  CT scan showed new lesion of the messentery and kidney.  He was receiving PT at home for left shoulder weakness, later found to have mass of left shoulder and pathologic fracture.  Patient underwent left proximal humerus open biopsy, radical resection of tumor, and ORIF with IMN and cementation on 9/25/20 for a recently sustained pathalogic fracture.  He was planned to undergo postop radiation therapy, which was cancelled due to having redness and swelling of left shoulder that started on 10/13/20, which prompted him to go to the hospital.  While in ED, patient was initially started on IV zosyn and IV vanc, which was then transitioned to IV cefepime, IV vanc and PO flagyl.   Patient had XR and CT scan of left shoulder, which were suggestive of osteomyelitis.  MRI could not be performed due to having metal in left shoulder.  Patient was seen by orthopedics on 10/13/20, was cleared for radiation therapy under broad spectrum IV antibiotic coverage.  On October 16, 2020, patient was seen by psychiatry for evaluation for depression and need for psychotropic treatment.  Patient did not have suicidal ideation. No indication for inpatient psych treatment.  Per wife, patient was started on antipsychotic and was given xanax .5mg bid PRN for anxiety, especially prior to any tests and blood work.  He has mild dementia and currently AAO x 2 (baseline as per wife).  Patient was also being seen by nephrology for DANIELITO.  Currently he complains of mild left shoulder pain.  He appears upset, does not want to be bothered.  Refusing any blood work or any tests at this time.  Patient otherwise denies fever, chills, nausea, vomiting, abdominal pain, diarrhea or dysuria.     (17 Oct 2020 20:23)      Vital Signs: Vital Signs Last 24 Hrs  T(C): 36.3 (26 Oct 2020 16:12), Max: 37 (25 Oct 2020 23:36)  T(F): 97.4 (26 Oct 2020 16:12), Max: 98.6 (25 Oct 2020 23:36)  HR: 103 (26 Oct 2020 16:12) (94 - 103)  BP: 120/78 (26 Oct 2020 16:12) (114/76 - 128/81)  BP(mean): --  RR: 18 (26 Oct 2020 16:12) (16 - 18)  SpO2: 96% (26 Oct 2020 16:12) (96% - 97%)    Past Medical/ Surgical History: PAST MEDICAL & SURGICAL HISTORY:  Syncope    Chronic kidney disease (CKD)    Diffuse large B cell lymphoma    HLD (hyperlipidemia)    BPH (benign prostatic hyperplasia)    HTN (hypertension)    History of left shoulder fracture    Osteomyelitis of left shoulder region        Allergies: Allergies    No Known Allergies    Intolerances        Medications: MEDICATIONS  (STANDING):  allopurinol 100 milliGRAM(s) Oral daily  amLODIPine   Tablet 2.5 milliGRAM(s) Oral daily  atorvastatin 80 milliGRAM(s) Oral at bedtime  cefepime   IVPB 2000 milliGRAM(s) IV Intermittent daily  chlorhexidine 2% Cloths 1 Application(s) Topical daily  influenza   Vaccine 0.5 milliLiter(s) IntraMuscular once  lactobacillus acidophilus 1 Tablet(s) Oral daily  latanoprost 0.005% Ophthalmic Solution 1 Drop(s) Both EYES at bedtime  metroNIDAZOLE    Tablet 500 milliGRAM(s) Oral every 8 hours  mirtazapine 15 milliGRAM(s) Oral at bedtime  polyethylene glycol 3350 17 Gram(s) Oral daily  potassium phosphate / sodium phosphate Tablet (K-PHOS No. 2) 1 Tablet(s) Oral four times a day with meals  senna 2 Tablet(s) Oral at bedtime  tamsulosin 0.4 milliGRAM(s) Oral at bedtime  timolol 0.5% Solution 1 Drop(s) Both EYES two times a day  vancomycin  IVPB 750 milliGRAM(s) IV Intermittent daily    MEDICATIONS  (PRN):  acetaminophen   Tablet .. 650 milliGRAM(s) Oral every 6 hours PRN Mild Pain (1 - 3), Moderate Pain (4 - 6)  ALPRAZolam 0.5 milliGRAM(s) Oral two times a day PRN anxiety  morphine  - Injectable 2 milliGRAM(s) IV Push every 4 hours PRN Severe Pain (7 - 10)  traMADol 25 milliGRAM(s) Oral every 6 hours PRN Moderate Pain (4 - 6)      SOCIAL HISTORY:    FAMILY HISTORY:  FH: brain cancer  brother    FH: lung cancer  father      LABS:                        9.6    12.98 )-----------( 376      ( 26 Oct 2020 06:48 )             28.8     10-26    130<L>  |  98  |  37<H>  ----------------------------<  115<H>  5.1   |  20<L>  |  1.73<H>    Ca    8.9      26 Oct 2020 06:48  Phos  2.5     10-25    TPro  6.1  /  Alb  2.6<L>  /  TBili  0.3  /  DBili  x   /  AST  22  /  ALT  8<L>  /  AlkPhos  79  10-26          RADIOLOGY & ADDITIONAL STUDIES:      A/P:  87 yo male with PMH of NHL (last chemo 2017), HTN, HLD, CKD, BPH, anemia, syncope, anxiety/adjustment disorder/depression.  A right PICC was placed for chemotherapy and was found to be coiled in the right subclavian vein. IR was consulted for PICC exchange.   - Place IR procedure order in sunrise under Dr. Peguero. Procedure to be performed 10/26/2020.

## 2020-10-26 NOTE — CHART NOTE - NSCHARTNOTEFT_GEN_A_CORE
Called by Vascular lab to report abnormal Left Upper Extremity doppler-" Acute occlusive thrombus left axillary  and brachial veins". Heparin gtt started for Full AC per Dr Rubio.

## 2020-10-26 NOTE — PROGRESS NOTE ADULT - SUBJECTIVE AND OBJECTIVE BOX
Patient is a 86y old  Male who presents with a chief complaint of sent here for chemo and radiation (25 Oct 2020 13:31)    SUBJECTIVE / OVERNIGHT EVENTS: no acute events overnight     MEDICATIONS  (STANDING):  allopurinol 100 milliGRAM(s) Oral daily  amLODIPine   Tablet 2.5 milliGRAM(s) Oral daily  atorvastatin 80 milliGRAM(s) Oral at bedtime  cefepime   IVPB 2000 milliGRAM(s) IV Intermittent daily  chlorhexidine 2% Cloths 1 Application(s) Topical daily  heparin   Injectable 5000 Unit(s) SubCutaneous every 8 hours  influenza   Vaccine 0.5 milliLiter(s) IntraMuscular once  lactobacillus acidophilus 1 Tablet(s) Oral daily  latanoprost 0.005% Ophthalmic Solution 1 Drop(s) Both EYES at bedtime  metroNIDAZOLE    Tablet 500 milliGRAM(s) Oral every 8 hours  mirtazapine 15 milliGRAM(s) Oral at bedtime  polyethylene glycol 3350 17 Gram(s) Oral daily  potassium phosphate / sodium phosphate Tablet (K-PHOS No. 2) 1 Tablet(s) Oral four times a day with meals  senna 2 Tablet(s) Oral at bedtime  tamsulosin 0.4 milliGRAM(s) Oral at bedtime  timolol 0.5% Solution 1 Drop(s) Both EYES two times a day  vancomycin  IVPB 750 milliGRAM(s) IV Intermittent daily    MEDICATIONS  (PRN):  acetaminophen   Tablet .. 650 milliGRAM(s) Oral every 6 hours PRN Mild Pain (1 - 3), Moderate Pain (4 - 6)  ALPRAZolam 0.5 milliGRAM(s) Oral two times a day PRN anxiety  morphine  - Injectable 2 milliGRAM(s) IV Push every 4 hours PRN Severe Pain (7 - 10)  traMADol 25 milliGRAM(s) Oral every 6 hours PRN Moderate Pain (4 - 6)      Vital Signs Last 24 Hrs  T(C): 37 (26 Oct 2020 07:40), Max: 37 (25 Oct 2020 23:36)  T(F): 98.6 (26 Oct 2020 07:40), Max: 98.6 (25 Oct 2020 23:36)  HR: 100 (26 Oct 2020 07:40) (94 - 100)  BP: 120/78 (26 Oct 2020 07:40) (112/64 - 128/81)  BP(mean): --  RR: 18 (26 Oct 2020 07:40) (16 - 18)  SpO2: 96% (26 Oct 2020 07:40) (96% - 97%)  CAPILLARY BLOOD GLUCOSE        I&O's Summary    25 Oct 2020 07:01  -  26 Oct 2020 07:00  --------------------------------------------------------  IN: 930 mL / OUT: 500 mL / NET: 430 mL    26 Oct 2020 07:01  -  26 Oct 2020 13:07  --------------------------------------------------------  IN: 420 mL / OUT: 0 mL / NET: 420 mL    PHYSICAL EXAM:  GENERAL: NAD  EYES: conjunctiva and sclera clear  CHEST/LUNG: Clear to auscultation bilaterally; No wheeze  HEART: +S1/S2, reg   ABDOMEN: Soft, Nontender, Nondistended  EXTREMITIES:  no LE edema   PSYCH: Alert and awake, appropriate     LABS:                        9.6    12.98 )-----------( 376      ( 26 Oct 2020 06:48 )             28.8     10-26    130<L>  |  98  |  37<H>  ----------------------------<  115<H>  5.1   |  20<L>  |  1.73<H>    Ca    8.9      26 Oct 2020 06:48  Phos  2.5     10-25    TPro  6.1  /  Alb  2.6<L>  /  TBili  0.3  /  DBili  x   /  AST  22  /  ALT  8<L>  /  AlkPhos  79  10-26        Care Discussed with Consultants/Other Providers: Heme Onc

## 2020-10-26 NOTE — CHART NOTE - NSCHARTNOTEFT_GEN_A_CORE
Nutrition Follow Up Note  Patient seen for: Malnutrition Follow Up     Interim events noted, chart reviewed. Pt c DLBCL, noted family deciding about treatment.     Source: RN, daughter at bedside; pt away at Vascular at this time     Diet : Diet, Soft:   Supplement Feeding Modality:  Oral  Ensure Enlive Cans or Servings Per Day:  3       Frequency:  Daily (10-22-20 @ 11:55)    As per RN, pt has been eating some of his meals. Noted per flow sheets, pt has been consuming 50% of his meals. Per daughter, pt c decreased intake, reports pt only consumed a few spoon fulls of lunch today from what she noticed. Noted Ensure Enlive open bottle at bedside, almost empty. Daughter provided RD c some food preferences, will provide.       Daily Weight in k.1 (10-21); no new at this time       Pertinent Medications: MEDICATIONS  (STANDING):  allopurinol 100 milliGRAM(s) Oral daily  amLODIPine   Tablet 2.5 milliGRAM(s) Oral daily  atorvastatin 80 milliGRAM(s) Oral at bedtime  cefepime   IVPB 2000 milliGRAM(s) IV Intermittent daily  chlorhexidine 2% Cloths 1 Application(s) Topical daily  heparin   Injectable 5000 Unit(s) SubCutaneous every 8 hours  influenza   Vaccine 0.5 milliLiter(s) IntraMuscular once  lactobacillus acidophilus 1 Tablet(s) Oral daily  latanoprost 0.005% Ophthalmic Solution 1 Drop(s) Both EYES at bedtime  metroNIDAZOLE    Tablet 500 milliGRAM(s) Oral every 8 hours  mirtazapine 15 milliGRAM(s) Oral at bedtime  polyethylene glycol 3350 17 Gram(s) Oral daily  potassium phosphate / sodium phosphate Tablet (K-PHOS No. 2) 1 Tablet(s) Oral four times a day with meals  senna 2 Tablet(s) Oral at bedtime  tamsulosin 0.4 milliGRAM(s) Oral at bedtime  timolol 0.5% Solution 1 Drop(s) Both EYES two times a day  vancomycin  IVPB 750 milliGRAM(s) IV Intermittent daily    MEDICATIONS  (PRN):  acetaminophen   Tablet .. 650 milliGRAM(s) Oral every 6 hours PRN Mild Pain (1 - 3), Moderate Pain (4 - 6)  ALPRAZolam 0.5 milliGRAM(s) Oral two times a day PRN anxiety  morphine  - Injectable 2 milliGRAM(s) IV Push every 4 hours PRN Severe Pain (7 - 10)  traMADol 25 milliGRAM(s) Oral every 6 hours PRN Moderate Pain (4 - 6)    Pertinent Labs: 10-26 @ 06:48: Na 130<L>, BUN 37<H>, Cr 1.73<H>, <H>, K+ 5.1, Phos --, Mg --, Alk Phos 79, ALT/SGPT 8<L>, AST/SGOT 22, HbA1c --    Finger Sticks: None pertinent to address at this time.       Skin per nursing documentation: no pressure injuries   Edema: none at this time     Estimated Needs:   [x] no change since previous assessment      Previous Nutrition Diagnosis: severe malnutrition   Nutrition Diagnosis continues at this time, care plan in progress, to improve as pt able to improve upon PO intake     New Nutrition Diagnosis: none at this time       Recommend  1. Continue c current diet.   2. Continue c Ensure Enlive.   3. RD to provide food preferences- buttered noodles and ice cream.   4. Discussed c daughter, to encourage intake as tolerated, emphasis on protein, calorically dense foods.     Monitoring and Evaluation:     Continue to monitor Nutritional intake, Tolerance to diet prescription, weights, labs, skin integrity    RD remains available upon request and will follow up per protocol  Laurel Watson MS RD CDN [facility],  #[phone]

## 2020-10-26 NOTE — CHART NOTE - NSCHARTNOTEFT_GEN_A_CORE
Vascular & Interventional Radiology Post-Procedure Note    Pre-Procedure Diagnosis: Malpositioned PICC  Post-Procedure Diagnosis: Same as pre.  Indications for Procedure: Need for initiation of chemotherapy.     Attending: Dr. Peguero  Resident: Dr. Pearson    Procedure Details/Findings: Existing right upper extremity PICC exchanged over a wire. New right upper extremity PICC placed.   Access (if applicable): Right upper extremity.     Complications: None  Estimated Blood Loss: Minimal  Specimen: None. Prior PICC removed in it's entirety.   Contrast:  None  Sedation: None  Patient Condition/Disposition: Stable, to return to floor.     Plan:   -Right upper extremity PICC ok to use.

## 2020-10-26 NOTE — PROGRESS NOTE ADULT - SUBJECTIVE AND OBJECTIVE BOX
Patient is a 86y old  Male who presents with a chief complaint of sent here for chemo and radiation (26 Oct 2020 18:00)    Being followed by ID for        Interval history:  pt remains weak and in pain  poor historian  daughter at bedside  No other acute events        PAST MEDICAL & SURGICAL HISTORY:  Syncope    Chronic kidney disease (CKD)    Diffuse large B cell lymphoma    HLD (hyperlipidemia)    BPH (benign prostatic hyperplasia)    HTN (hypertension)    History of left shoulder fracture    Osteomyelitis of left shoulder region      Allergies    No Known Allergies    Intolerances      Antimicrobials:    cefepime   IVPB 2000 milliGRAM(s) IV Intermittent daily  metroNIDAZOLE    Tablet 500 milliGRAM(s) Oral every 8 hours  vancomycin  IVPB 750 milliGRAM(s) IV Intermittent daily    MEDICATIONS  (STANDING):  allopurinol 100 milliGRAM(s) Oral daily  amLODIPine   Tablet 2.5 milliGRAM(s) Oral daily  atorvastatin 80 milliGRAM(s) Oral at bedtime  cefepime   IVPB 2000 milliGRAM(s) IV Intermittent daily  chlorhexidine 2% Cloths 1 Application(s) Topical daily  heparin   Injectable 5500 Unit(s) IV Push once  heparin  Infusion.  Unit(s)/Hr (12 mL/Hr) IV Continuous <Continuous>  influenza   Vaccine 0.5 milliLiter(s) IntraMuscular once  lactobacillus acidophilus 1 Tablet(s) Oral daily  latanoprost 0.005% Ophthalmic Solution 1 Drop(s) Both EYES at bedtime  metroNIDAZOLE    Tablet 500 milliGRAM(s) Oral every 8 hours  mirtazapine 15 milliGRAM(s) Oral at bedtime  polyethylene glycol 3350 17 Gram(s) Oral daily  potassium phosphate / sodium phosphate Tablet (K-PHOS No. 2) 1 Tablet(s) Oral four times a day with meals  senna 2 Tablet(s) Oral at bedtime  tamsulosin 0.4 milliGRAM(s) Oral at bedtime  timolol 0.5% Solution 1 Drop(s) Both EYES two times a day  vancomycin  IVPB 750 milliGRAM(s) IV Intermittent daily      Vital Signs Last 24 Hrs  T(C): 36.9 (10-26-20 @ 18:29), Max: 37 (10-25-20 @ 23:36)  T(F): 98.5 (10-26-20 @ 18:29), Max: 98.6 (10-25-20 @ 23:36)  HR: 116 (10-26-20 @ 18:29) (94 - 116)  BP: 138/74 (10-26-20 @ 18:29) (114/76 - 138/74)  BP(mean): --  RR: 18 (10-26-20 @ 18:29) (16 - 18)  SpO2: 96% (10-26-20 @ 18:29) (96% - 97%)    Physical Exam:    Constitutional well preserved,comfortable,pleasant    HEENT PERRLA EOMI,No pallor or icterus    No oral exudate or erythema    Neck supple no JVD or LN    Chest Good AE,CTA    CVS RRR S1 S2     Abd soft BS normal No tenderness     Ext  edema    IV site no erythema tenderness or discharge    Joints left shoulder swelling decreased erythema although still erythematous, venules noted        Lab Data:                          9.6    12.98 )-----------( 376      ( 26 Oct 2020 06:48 )             28.8       10-26    130<L>  |  98  |  37<H>  ----------------------------<  115<H>  5.1   |  20<L>  |  1.73<H>    Ca    8.9      26 Oct 2020 06:48  Phos  2.5     10-25    TPro  6.1  /  Alb  2.6<L>  /  TBili  0.3  /  DBili  x   /  AST  22  /  ALT  8<L>  /  AlkPhos  79  10-26          .Tissue left shoulder mass  10-20-20   No growth at 5 days  --    Rare polymorphonuclear leukocytes seen per low power field  No organisms seen per oil power field        < from: [facility] Duplex Upper Ext Vein Scan, Left (10.26.20 @ 15:48) >    EXAM:  DUPLEX EXT VEINS UPPER LT                            PROCEDURE DATE:  10/26/2020            INTERPRETATION:  CLINICAL INFORMATION: Left upper extremity swelling, rule out DVT    COMPARISON: None available.    TECHNIQUE: Duplex sonography of the LEFT UPPER extremity veins with color and spectral Doppler, with and without compression.    FINDINGS: There is edema within the superficial soft tissues of the left upper extremity.    The left internal jugular and subclavian veins are patent andfree of thrombus.    The left axillary and brachial veins are occluded with acute thrombus.    The left basilic and cephalic veins (superficial veins) are patent and without thrombus.    Doppler examination shows normal spontaneous and phasic flow.    IMPRESSION:    Acute, occlusive thrombus affects the left axillary and brachial veins.    MATILDE Timmons notified.    < end of copied text >              WBC Count: 12.98 (10-26-20 @ 06:48)  WBC Count: 13.16 (10-25-20 @ 07:19)  WBC Count: 11.33 (10-24-20 @ 07:43)  WBC Count: 10.45 (10-23-20 @ 07:21)  WBC Count: 10.72 (10-22-20 @ 07:24)  WBC Count: 9.55 (10-21-20 @ 06:26)  WBC Count: 10.02 (10-20-20 @ 04:16)

## 2020-10-26 NOTE — PROGRESS NOTE ADULT - PROBLEM SELECTOR PLAN 9
Tetracycline Pregnancy And Lactation Text: This medication is Pregnancy Category D and not consider safe during pregnancy. It is also excreted in breast milk. Azithromycin Counseling:  I discussed with the patient the risks of azithromycin including but not limited to GI upset, allergic reaction, drug rash, diarrhea, and yeast infections. Bactrim Pregnancy And Lactation Text: This medication is Pregnancy Category D and is known to cause fetal risk.  It is also excreted in breast milk. Azithromycin Pregnancy And Lactation Text: This medication is considered safe during pregnancy and is also secreted in breast milk. Topical Retinoid Pregnancy And Lactation Text: This medication is Pregnancy Category C. It is unknown if this medication is excreted in breast milk. Doxycycline Counseling:  Patient counseled regarding possible photosensitivity and increased risk for sunburn.  Patient instructed to avoid sunlight, if possible.  When exposed to sunlight, patients should wear protective clothing, sunglasses, and sunscreen.  The patient was instructed to call the office immediately if the following severe adverse effects occur:  hearing changes, easy bruising/bleeding, severe headache, or vision changes.  The patient verbalized understanding of the proper use and possible adverse effects of doxycycline.  All of the patient's questions and concerns were addressed. High Dose Vitamin A Counseling: Side effects reviewed, pt to contact office should one occur. Birth Control Pills Pregnancy And Lactation Text: This medication should be avoided if pregnant and for the first 30 days post-partum. Benzoyl Peroxide Pregnancy And Lactation Text: This medication is Pregnancy Category C. It is unknown if benzoyl peroxide is excreted in breast milk. Tazorac Pregnancy And Lactation Text: This medication is not safe during pregnancy. It is unknown if this medication is excreted in breast milk. Birth Control Pills Counseling: Birth Control Pill Counseling: I discussed with the patient the potential side effects of OCPs including but not limited to increased risk of stroke, heart attack, thrombophlebitis, deep venous thrombosis, hepatic adenomas, breast changes, GI upset, headaches, and depression.  The patient verbalized understanding of the proper use and possible adverse effects of OCPs. All of the patient's questions and concerns were addressed. Dapsone Counseling: I discussed with the patient the risks of dapsone including but not limited to hemolytic anemia, agranulocytosis, rashes, methemoglobinemia, kidney failure, peripheral neuropathy, headaches, GI upset, and liver toxicity.  Patients who start dapsone require monitoring including baseline LFTs and weekly CBCs for the first month, then every month thereafter.  The patient verbalized understanding of the proper use and possible adverse effects of dapsone.  All of the patient's questions and concerns were addressed. Detail Level: Detailed Erythromycin Pregnancy And Lactation Text: This medication is Pregnancy Category B and is considered safe during pregnancy. It is also excreted in breast milk. Tetracycline Counseling: Patient counseled regarding possible photosensitivity and increased risk for sunburn.  Patient instructed to avoid sunlight, if possible.  When exposed to sunlight, patients should wear protective clothing, sunglasses, and sunscreen.  The patient was instructed to call the office immediately if the following severe adverse effects occur:  hearing changes, easy bruising/bleeding, severe headache, or vision changes.  The patient verbalized understanding of the proper use and possible adverse effects of tetracycline.  All of the patient's questions and concerns were addressed. Patient understands to avoid pregnancy while on therapy due to potential birth defects. Doxycycline Pregnancy And Lactation Text: This medication is Pregnancy Category D and not consider safe during pregnancy. It is also excreted in breast milk but is considered safe for shorter treatment courses. Erythromycin Counseling:  I discussed with the patient the risks of erythromycin including but not limited to GI upset, allergic reaction, drug rash, diarrhea, increase in liver enzymes, and yeast infections. heparin sc Dapsone Pregnancy And Lactation Text: This medication is Pregnancy Category C and is not considered safe during pregnancy or breast feeding. Isotretinoin Pregnancy And Lactation Text: This medication is Pregnancy Category X and is considered extremely dangerous during pregnancy. It is unknown if it is excreted in breast milk. High Dose Vitamin A Pregnancy And Lactation Text: High dose vitamin A therapy is contraindicated during pregnancy and breast feeding. Topical Sulfur Applications Counseling: Topical Sulfur Counseling: Patient counseled that this medication may cause skin irritation or allergic reactions.  In the event of skin irritation, the patient was advised to reduce the amount of the drug applied or use it less frequently.   The patient verbalized understanding of the proper use and possible adverse effects of topical sulfur application.  All of the patient's questions and concerns were addressed. Isotretinoin Counseling: Patient should get monthly blood tests, not donate blood, not drive at night if vision affected, not share medication, and not undergo elective surgery for 6 months after tx completed. Side effects reviewed, pt to contact office should one occur. Spironolactone Counseling: Patient advised regarding risks of diarrhea, abdominal pain, hyperkalemia, birth defects (for female patients), liver toxicity and renal toxicity. The patient may need blood work to monitor liver and kidney function and potassium levels while on therapy. The patient verbalized understanding of the proper use and possible adverse effects of spironolactone.  All of the patient's questions and concerns were addressed. Use Enhanced Medication Counseling?: No Topical Retinoid counseling:  Patient advised to apply a pea-sized amount only at bedtime and wait 30 minutes after washing their face before applying.  If too drying, patient may add a non-comedogenic moisturizer. The patient verbalized understanding of the proper use and possible adverse effects of retinoids.  All of the patient's questions and concerns were addressed. Benzoyl Peroxide Counseling: Patient counseled that medicine may cause skin irritation and bleach clothing.  In the event of skin irritation, the patient was advised to reduce the amount of the drug applied or use it less frequently.   The patient verbalized understanding of the proper use and possible adverse effects of benzoyl peroxide.  All of the patient's questions and concerns were addressed. Minocycline Counseling: Patient advised regarding possible photosensitivity and discoloration of the teeth, skin, lips, tongue and gums.  Patient instructed to avoid sunlight, if possible.  When exposed to sunlight, patients should wear protective clothing, sunglasses, and sunscreen.  The patient was instructed to call the office immediately if the following severe adverse effects occur:  hearing changes, easy bruising/bleeding, severe headache, or vision changes.  The patient verbalized understanding of the proper use and possible adverse effects of minocycline.  All of the patient's questions and concerns were addressed. Topical Sulfur Applications Pregnancy And Lactation Text: This medication is Pregnancy Category C and has an unknown safety profile during pregnancy. It is unknown if this topical medication is excreted in breast milk. Bactrim Counseling:  I discussed with the patient the risks of sulfa antibiotics including but not limited to GI upset, allergic reaction, drug rash, diarrhea, dizziness, photosensitivity, and yeast infections.  Rarely, more serious reactions can occur including but not limited to aplastic anemia, agranulocytosis, methemoglobinemia, blood dyscrasias, liver or kidney failure, lung infiltrates or desquamative/blistering drug rashes. Topical Clindamycin Counseling: Patient counseled that this medication may cause skin irritation or allergic reactions.  In the event of skin irritation, the patient was advised to reduce the amount of the drug applied or use it less frequently.   The patient verbalized understanding of the proper use and possible adverse effects of clindamycin.  All of the patient's questions and concerns were addressed. Tazorac Counseling:  Patient advised that medication is irritating and drying.  Patient may need to apply sparingly and wash off after an hour before eventually leaving it on overnight.  The patient verbalized understanding of the proper use and possible adverse effects of tazorac.  All of the patient's questions and concerns were addressed. Topical Clindamycin Pregnancy And Lactation Text: This medication is Pregnancy Category B and is considered safe during pregnancy. It is unknown if it is excreted in breast milk. Spironolactone Pregnancy And Lactation Text: This medication can cause feminization of the male fetus and should be avoided during pregnancy. The active metabolite is also found in breast milk.

## 2020-10-26 NOTE — PROGRESS NOTE ADULT - PROBLEM SELECTOR PLAN 1
Infiltrating mass of left shoulder and possible osteomyelitis   bone scan with uptake in L femur. c/w IV vancomycin, cefepime, and PO flagyl (Since 10/10)  10/17 blood culture - so far negative. ESR/CRP very high  ID following  C/o persistent severe pain- IV morphine added.  CT L shoulder noted, d/w ortho/IR seems like infiltration and not primarily infectious. s/p IR biopsy, very little fluid, sent for pathology, gram stain negative.  MRI L shoulder showing:  - aggressive destruction of proximal humerus with concern for pathological fractures. Area of humeral head concerning for possible tumor infiltration vs superimposed infection. Non specific edema above scapula and glenohumeral joint effusion  -per orthopedics, no change in management for now  -biopsy results discussed with heme onc, concerning for diffuse large B cell

## 2020-10-26 NOTE — CHART NOTE - NSCHARTNOTEFT_GEN_A_CORE
Interventional Radiology Pre-Procedure Note    Procedure: PICC exchange.     Diagnosis/Indication: Patient is a 86y old  Male who presents with malpositioned PICC. Patient referred for exchange over a wire with fluoroscopic guidance.       PAST MEDICAL & SURGICAL HISTORY:  Syncope    Chronic kidney disease (CKD)    Diffuse large B cell lymphoma    HLD (hyperlipidemia)    BPH (benign prostatic hyperplasia)    HTN (hypertension)    History of left shoulder fracture    Osteomyelitis of left shoulder region         Allergies: No Known Allergies      LABS:                        9.6    12.98 )-----------( 376      ( 26 Oct 2020 06:48 )             28.8     10-26    130<L>  |  98  |  37<H>  ----------------------------<  115<H>  5.1   |  20<L>  |  1.73<H>    Ca    8.9      26 Oct 2020 06:48  Phos  2.5     10-25    TPro  6.1  /  Alb  2.6<L>  /  TBili  0.3  /  DBili  x   /  AST  22  /  ALT  8<L>  /  AlkPhos  79  10-26        Procedure/ risks/ benefits were explained, informed consent obtained from patient's wife Viktoria Hernandez [phone], verbalizes understanding.

## 2020-10-26 NOTE — PROGRESS NOTE ADULT - ASSESSMENT
85 yo male with PMH of NHL (last chemo 2017), HTN, HLD, CKD, BPH, anemia, syncope, anxiety/adjustment disorder/depression, presents here from [facility] where he was getting treated for L shoulder infection (s/p ORIF for pathologic fx) for further management of his diffuse large B cell lymphoma and L shoulder lesion

## 2020-10-26 NOTE — PROGRESS NOTE ADULT - ASSESSMENT
85 yo male with PMH of NHL (last chemo 2017), HTN, HLD, CKD, BPH, anemia, syncope, anxiety/adjustment disorder/depression, presents here from [facility] where he was getting treated for L shoulder infection (s/p ORIF for pathologic fx) for further management of his diffuse large B cell lymphoma and L shoulder lesion    {[id number]} Problem/Plan - 1:  ·  Problem: Osteomyelitis of shoulder, left.  Plan: Infiltrating mass of left shoulder and possible osteomyelitis     10/17 blood culture - so far negative. ESR/CRP very high  ID following  C/o persistent severe pain- IV morphine added.  CT L shoulder noted, d/w ortho/IR seems like infiltration and not primarily infectious. s/p IR biopsy, very little fluid, sent for pathology, gram stain negative.  MRI L shoulder showing:  - aggressive destruction of proximal humerus with concern for pathological fractures. Area of humeral head concerning for possible tumor infiltration vs superimposed infection. Non specific edema above scapula and glenohumeral joint effusion  -per orthopedics, no change in management for now  -biopsy results discussed with heme onc, concerning for diffuse large B cell.     NOT SURE HOW MUCH LONGER WE NEED TO CONTINUE ABS- BIOPSY CULTURES WERE NEG BUT WERE ON ABS. AWAIT FINAL PATHOLOGY. PERHAPS CAN TAPER AND STOP THE FLAGYL    {[id number]} Problem/Plan - 2:  ·  Problem: Diffuse large B cell lymphoma.  Plan: -Patient was sent here for further management especially chemo and radiation  Heme/onc consulted appreciated, PET obtained.   CT C/A/P with abd mass, LN. MRI Brain w/ contrast- New 2.2 x 1.6 x 1.1 cm   uniformly enhancing, T1 and T2 isointense high right posterior frontal extra-axial mass. The lesion demonstrates restricted diffusion.   - Check CBC WITH DIFF and TLS labs (CMP, Phos, LDH, and Uric acid) daily  started allopurinol to prevent tumor lysis syndrome  -s/p BMB   -biopsy concerning for diffuse large b-cell  -PICC placed- TEAM ADDRESSING , COILED  -planning for chemo this week and IT MTX by IR, will place consult   -ferritin is elevated, could be suggestive of cytokine storm. Monitor fibrinogen, resend ferritin, triglycerides, soluble IL2R, NK cell activity, coags in order to complete workup for HLH, however would assume this is part of the underlying lymphoma and would treat accordingly.     {[id number]} Problem/Plan - 3:  ·  Problem: Acute on chronic kidney failure.  Plan: Cr 1.52, remains within range   BP stable, hold lisinopril.   ADJUST ABS , FOR NOW, TO RENAL FUNCTION    {[id number]} Problem/Plan - 4:  ·  Problem: Hyponatremia.  Plan: -Na 130 today  -continue to monitor   -?SIADH in the setting of malignancy.   TEAM IS ASSESSING    Problem /Plan-5  DVT- pt is now on IV Heparin    {[id number]}

## 2020-10-27 NOTE — PROGRESS NOTE ADULT - SUBJECTIVE AND OBJECTIVE BOX
INTERVAL HPI/OVERNIGHT EVENTS:  No overnight events. Doing well this am.     MEDICATIONS  (STANDING):  allopurinol 100 milliGRAM(s) Oral daily  amLODIPine   Tablet 2.5 milliGRAM(s) Oral daily  atorvastatin 80 milliGRAM(s) Oral at bedtime  cefepime   IVPB 2000 milliGRAM(s) IV Intermittent daily  chlorhexidine 2% Cloths 1 Application(s) Topical daily  chlorhexidine 4% Liquid 1 Application(s) Topical <User Schedule>  heparin  Infusion.  Unit(s)/Hr (12 mL/Hr) IV Continuous <Continuous>  influenza   Vaccine 0.5 milliLiter(s) IntraMuscular once  lactobacillus acidophilus 1 Tablet(s) Oral daily  latanoprost 0.005% Ophthalmic Solution 1 Drop(s) Both EYES at bedtime  mirtazapine 15 milliGRAM(s) Oral at bedtime  polyethylene glycol 3350 17 Gram(s) Oral daily  senna 2 Tablet(s) Oral at bedtime  tamsulosin 0.4 milliGRAM(s) Oral at bedtime  timolol 0.5% Solution 1 Drop(s) Both EYES two times a day  vancomycin  IVPB 750 milliGRAM(s) IV Intermittent daily    MEDICATIONS  (PRN):  acetaminophen   Tablet .. 650 milliGRAM(s) Oral every 6 hours PRN Mild Pain (1 - 3), Moderate Pain (4 - 6)  ALPRAZolam 0.5 milliGRAM(s) Oral two times a day PRN anxiety  heparin   Injectable 5500 Unit(s) IV Push every 6 hours PRN For aPTT less than 40  heparin   Injectable 2500 Unit(s) IV Push every 6 hours PRN For aPTT between 40 - 57  morphine  - Injectable 2 milliGRAM(s) IV Push every 4 hours PRN Severe Pain (7 - 10)  sodium chloride 0.9% lock flush 10 milliLiter(s) IV Push every 1 hour PRN Pre/post blood products, medications, blood draw, and to maintain line patency  traMADol 25 milliGRAM(s) Oral every 6 hours PRN Moderate Pain (4 - 6)    Allergies    No Known Allergies    Intolerances          VITAL SIGNS:  T(F): 97.4 (10-27-20 @ 16:35)  HR: 102 (10-27-20 @ 16:35)  BP: 130/69 (10-27-20 @ 16:35)  RR: 18 (10-27-20 @ 16:35)  SpO2: 97% (10-27-20 @ 16:35)  Wt(kg): --    PHYSICAL EXAM:    Constitutional: NAD, lying comfortably in bed  Eyes: EOMI, PERRLA  Neck: supple, no masses, no JVD  Respiratory: CTAB; no r/r/w  Cardiovascular: RRR, no M/R/G  Gastrointestinal: +BS, soft, NTND, no hepatosplenomegaly  Extremities: Left arm with significant swelling  Neurological: AAOx3, nonfocal    LABS:                        9.0    18.45 )-----------( 329      ( 27 Oct 2020 07:27 )             28.4     10-27    133<L>  |  96  |  39<H>  ----------------------------<  115<H>  5.0   |  22  |  1.57<H>    Ca    8.6      27 Oct 2020 07:27    TPro  6.0  /  Alb  2.5<L>  /  TBili  0.4  /  DBili  x   /  AST  23  /  ALT  9<L>  /  AlkPhos  76  10-27    PTT - ( 27 Oct 2020 16:50 )  PTT:69.7 sec      RADIOLOGY & ADDITIONAL TESTS:  Studies reviewed.

## 2020-10-27 NOTE — PROGRESS NOTE ADULT - ATTENDING COMMENTS
Agree with above. Pt seen and evaluated at bedside. Laboratory data reviewed.     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   d/w pathologist, necrotic specimen so cannot assess the size of the cells, but appears to be an aggressive B cell lymphoma, CD 79+ and PAX 5 positive   d/w pt and wife. Plan to treat with Bendamustine 90 mg/m2/Rituxan 375 mg/m2. Monitor counts closely. Will dose Rasburicase 3 mg x 1 due to elevated LDH and uric acid.   Reviewed SE of treatment. Consent obtained   PICC line already placed. To start tx tomorrow  All ques answered

## 2020-10-27 NOTE — PROGRESS NOTE ADULT - PROBLEM SELECTOR PLAN 3
-acute DVT L axillary and brachial veins  -started on heparin gtt for now  -c/w anticoagulation  -can transition to warfarin vs NOAC once all procedures are done

## 2020-10-27 NOTE — PROGRESS NOTE ADULT - SUBJECTIVE AND OBJECTIVE BOX
Patient is a 86y old  Male who presents with a chief complaint of sent here for chemo and radiation (27 Oct 2020 14:36)    Being followed by ID for        Interval history:  pt with positive DVT   now on IV heparin  No other acute events      ROS:  No cough,SOB,CP  No N/V/D  No abd pain  No urinary complaints  No HA  No joint or limb pain  No other complaints    PAST MEDICAL & SURGICAL HISTORY:  Syncope    Chronic kidney disease (CKD)    Diffuse large B cell lymphoma    HLD (hyperlipidemia)    BPH (benign prostatic hyperplasia)    HTN (hypertension)    History of left shoulder fracture    Osteomyelitis of left shoulder region      Allergies    No Known Allergies    Intolerances      Antimicrobials:    cefepime   IVPB 2000 milliGRAM(s) IV Intermittent daily  metroNIDAZOLE    Tablet 500 milliGRAM(s) Oral every 8 hours  vancomycin  IVPB 750 milliGRAM(s) IV Intermittent daily    MEDICATIONS  (STANDING):  allopurinol 100 milliGRAM(s) Oral daily  amLODIPine   Tablet 2.5 milliGRAM(s) Oral daily  atorvastatin 80 milliGRAM(s) Oral at bedtime  cefepime   IVPB 2000 milliGRAM(s) IV Intermittent daily  chlorhexidine 2% Cloths 1 Application(s) Topical daily  chlorhexidine 4% Liquid 1 Application(s) Topical <User Schedule>  heparin  Infusion.  Unit(s)/Hr (12 mL/Hr) IV Continuous <Continuous>  influenza   Vaccine 0.5 milliLiter(s) IntraMuscular once  lactobacillus acidophilus 1 Tablet(s) Oral daily  latanoprost 0.005% Ophthalmic Solution 1 Drop(s) Both EYES at bedtime  metroNIDAZOLE    Tablet 500 milliGRAM(s) Oral every 8 hours  mirtazapine 15 milliGRAM(s) Oral at bedtime  polyethylene glycol 3350 17 Gram(s) Oral daily  senna 2 Tablet(s) Oral at bedtime  tamsulosin 0.4 milliGRAM(s) Oral at bedtime  timolol 0.5% Solution 1 Drop(s) Both EYES two times a day  vancomycin  IVPB 750 milliGRAM(s) IV Intermittent daily      Vital Signs Last 24 Hrs  T(C): 36.3 (10-27-20 @ 16:35), Max: 37.6 (10-26-20 @ 21:03)  T(F): 97.4 (10-27-20 @ 16:35), Max: 99.7 (10-26-20 @ 21:03)  HR: 102 (10-27-20 @ 16:35) (102 - 120)  BP: 130/69 (10-27-20 @ 16:35) (113/67 - 159/63)  BP(mean): --  RR: 18 (10-27-20 @ 16:35) (18 - 18)  SpO2: 97% (10-27-20 @ 16:35) (95% - 97%)    Physical Exam:    Constitutional well preserved,comfortable,pleasant    HEENT PERRLA EOMI,No pallor or icterus    No oral exudate or erythema    Neck supple no JVD or LN    Chest Good AE,CTA    CVS RRR S1 S2 WNl No murmur or rub or gallop    Abd soft BS normal No tenderness no masses    Ext No cyanosis clubbing or edema    IV site no erythema tenderness or discharge    Joints no swelling or LOM    CNS AAO X 3 no focal    Lab Data:                          9.0    18.45 )-----------( 329      ( 27 Oct 2020 07:27 )             28.4       10-27    133<L>  |  96  |  39<H>  ----------------------------<  115<H>  5.0   |  22  |  1.57<H>    Ca    8.6      27 Oct 2020 07:27    TPro  6.0  /  Alb  2.5<L>  /  TBili  0.4  /  DBili  x   /  AST  23  /  ALT  9<L>  /  AlkPhos  76  10-27          .Tissue left shoulder mass  10-20-20   No growth at 5 days  --    Rare polymorphonuclear leukocytes seen per low power field  No organisms seen per oil power field      Surgical Pathology Report (10.20.20 @ 19:29)    Surgical Pathology Report:   ACCESSION No:  10 [id number]    ARASH MILLER                     2        Surgical Final Report          Final Diagnosis    Left shoulder mass; IR guided core biopsies  - 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 A minute focus shows viable lymphoid cells.  Immunohistochemical stains showed prominent nonspecific staining  in the necrotic areas, but the tumor appears to be CD79a(+),  PAX5(+, weak) with a high Ki67 proliferative index. The tumor is  negative for CD20 and . A few CD3(+) T-lymphocytes are  present.  If more definitive classification is needed  consideration should be given to repeat biopsy.    Slides with built in immunohistochemical study controls  associatedl with this case have been verified by the sign out  pathologist. These immunohistochemical tests have been developed  and their performance characteristics determined by [facility], Department of Pathology, Division  of Immunopathology, [address] [place] [address].  It has not been cleared or approved by the U.S. Food and Drug  Administration.  The FDA has determined that such clearance or  approval is not necessary.  This test is used for clinical  purposes.  The laboratory is certified under the CLIA-88 as  qualified to perform high  complexity clinical testing.    Verified by: Hussein Macario M.D.  (Electronic Signature)  Reported on: 10/27/20 14:44 EDT, [facility], [address] [place] [address]  Phone: [phone]   Fax: [phone]  _________________________________________________________________    Clinical History  87 y/o M w/ a PMHx of DLBCL (s/p 6 cycles of R-CHOP and was DELANEY  until 9/2019). now with a left shoulder mass and pathologic  fracture s/p left proximal humeru.    Specimen(s) Submitted  1     Left shoulder mass 5 cores    Gross Description  1.The specimen is received informalin and the specimen container  is labeled: Left shoulder mass.  It consists of              ARASH MILLER                     2        Surgical Final Report          eight cylindrical, soft, yellow to brown fragments of tissue  ranging from0.1 to 1.7 cm in length and each with an average  diameter of 0.1 cm.  Entirely submitted.  Two cassettes.    In addition to other data that may appear on the specimen  container, the label has been inspected to confirm the presence  of the patient'sname and date of birth.  Noni Torres 10/21/2020 13:15    Disclaimer  Histological Processing Performed at [facility], Department of Pathology, [address] [place] [address].      Surgical Consult Report          Disclaimer  Histological Processing Performed at [facility], Department of Pathology, [address] [place] [address].        WBC Count: 18.45 (10-27-20 @ 07:27)  WBC Count: 17.94 (10-27-20 @ 02:32)  WBC Count: 12.98 (10-26-20 @ 06:48)  WBC Count: 13.16 (10-25-20 @ 07:19)  WBC Count: 11.33 (10-24-20 @ 07:43)  WBC Count: 10.45 (10-23-20 @ 07:21)  WBC Count: 10.72 (10-22-20 @ 07:24)  WBC Count: 9.55 (10-21-20 @ 06:26)             Patient is a 86y old  Male who presents with a chief complaint of sent here for chemo and radiation (27 Oct 2020 14:36)    Being followed by ID for        Interval history:  pt with positive DVT   now on IV heparin  No other acute events          PAST MEDICAL & SURGICAL HISTORY:  Syncope    Chronic kidney disease (CKD)    Diffuse large B cell lymphoma    HLD (hyperlipidemia)    BPH (benign prostatic hyperplasia)    HTN (hypertension)    History of left shoulder fracture    Osteomyelitis of left shoulder region      Allergies    No Known Allergies    Intolerances      Antimicrobials:    cefepime   IVPB 2000 milliGRAM(s) IV Intermittent daily  metroNIDAZOLE    Tablet 500 milliGRAM(s) Oral every 8 hours  vancomycin  IVPB 750 milliGRAM(s) IV Intermittent daily    MEDICATIONS  (STANDING):  allopurinol 100 milliGRAM(s) Oral daily  amLODIPine   Tablet 2.5 milliGRAM(s) Oral daily  atorvastatin 80 milliGRAM(s) Oral at bedtime  cefepime   IVPB 2000 milliGRAM(s) IV Intermittent daily  chlorhexidine 2% Cloths 1 Application(s) Topical daily  chlorhexidine 4% Liquid 1 Application(s) Topical <User Schedule>  heparin  Infusion.  Unit(s)/Hr (12 mL/Hr) IV Continuous <Continuous>  influenza   Vaccine 0.5 milliLiter(s) IntraMuscular once  lactobacillus acidophilus 1 Tablet(s) Oral daily  latanoprost 0.005% Ophthalmic Solution 1 Drop(s) Both EYES at bedtime  metroNIDAZOLE    Tablet 500 milliGRAM(s) Oral every 8 hours  mirtazapine 15 milliGRAM(s) Oral at bedtime  polyethylene glycol 3350 17 Gram(s) Oral daily  senna 2 Tablet(s) Oral at bedtime  tamsulosin 0.4 milliGRAM(s) Oral at bedtime  timolol 0.5% Solution 1 Drop(s) Both EYES two times a day  vancomycin  IVPB 750 milliGRAM(s) IV Intermittent daily      Vital Signs Last 24 Hrs  T(C): 36.3 (10-27-20 @ 16:35), Max: 37.6 (10-26-20 @ 21:03)  T(F): 97.4 (10-27-20 @ 16:35), Max: 99.7 (10-26-20 @ 21:03)  HR: 102 (10-27-20 @ 16:35) (102 - 120)  BP: 130/69 (10-27-20 @ 16:35) (113/67 - 159/63)  BP(mean): --  RR: 18 (10-27-20 @ 16:35) (18 - 18)  SpO2: 97% (10-27-20 @ 16:35) (95% - 97%)    Physical Exam:    Constitutional weak  somewhat out of it    HEENT PERRLA EOMI,No pallor or icterus    No oral exudate or erythema    Neck supple no JVD or LN    Chest Good AE,CTA    CVS RRR S1 S2     Abd soft BS normal     Ext LUE edema    IV site no erythema tenderness or discharge        Lab Data:                          9.0    18.45 )-----------( 329      ( 27 Oct 2020 07:27 )             28.4       10-27    133<L>  |  96  |  39<H>  ----------------------------<  115<H>  5.0   |  22  |  1.57<H>    Ca    8.6      27 Oct 2020 07:27    TPro  6.0  /  Alb  2.5<L>  /  TBili  0.4  /  DBili  x   /  AST  23  /  ALT  9<L>  /  AlkPhos  76  10-27          .Tissue left shoulder mass  10-20-20   No growth at 5 days  --    Rare polymorphonuclear leukocytes seen per low power field  No organisms seen per oil power field      Surgical Pathology Report (10.20.20 @ 19:29)    Surgical Pathology Report:   ACCESSION No:  10 [id number]    ARASH MILLER                     2        Surgical Final Report          Final Diagnosis    Left shoulder mass; IR guided core biopsies  - 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 A minute focus shows viable lymphoid cells.  Immunohistochemical stains showed prominent nonspecific staining  in the necrotic areas, but the tumor appears to be CD79a(+),  PAX5(+, weak) with a high Ki67 proliferative index. The tumor is  negative for CD20 and . A few CD3(+) T-lymphocytes are  present.  If more definitive classification is needed  consideration should be given to repeat biopsy.    Slides with built in immunohistochemical study controls  associatedl with this case have been verified by the sign out  pathologist. These immunohistochemical tests have been developed  and their performance characteristics determined by [facility], Department of Pathology, Division  of Immunopathology, [address] [place] [address].  It has not been cleared or approved by the U.S. Food and Drug  Administration.  The FDA has determined that such clearance or  approval is not necessary.  This test is used for clinical  purposes.  The laboratory is certified under the CLIA-88 as  qualified to perform high  complexity clinical testing.    Verified by: Hussein Macario M.D.  (Electronic Signature)  Reported on: 10/27/20 14:44 EDT, [facility], [address] [place] [address]  Phone: [phone]   Fax: [phone]  _________________________________________________________________    Clinical History  85 y/o M w/ a PMHx of DLBCL (s/p 6 cycles of R-CHOP and was DELANEY  until 9/2019). now with a left shoulder mass and pathologic  fracture s/p left proximal humeru.    Specimen(s) Submitted  1     Left shoulder mass 5 cores    Gross Description  1.The specimen is received informalin and the specimen container  is labeled: Left shoulder mass.  It consists of              ARASH MILLER                     2        Surgical Final Report          eight cylindrical, soft, yellow to brown fragments of tissue  ranging from0.1 to 1.7 cm in length and each with an average  diameter of 0.1 cm.  Entirely submitted.  Two cassettes.    In addition to other data that may appear on the specimen  container, the label has been inspected to confirm the presence  of the patient'sname and date of birth.  Noni Torres 10/21/2020 13:15    Disclaimer  Histological Processing Performed at [facility], Department of Pathology, [address] [place] [address].      Surgical Consult Report          Disclaimer  Histological Processing Performed at [facility], Department of Pathology, [address] [place] [address].        WBC Count: 18.45 (10-27-20 @ 07:27)  WBC Count: 17.94 (10-27-20 @ 02:32)  WBC Count: 12.98 (10-26-20 @ 06:48)  WBC Count: 13.16 (10-25-20 @ 07:19)  WBC Count: 11.33 (10-24-20 @ 07:43)  WBC Count: 10.45 (10-23-20 @ 07:21)  WBC Count: 10.72 (10-22-20 @ 07:24)  WBC Count: 9.55 (10-21-20 @ 06:26)

## 2020-10-27 NOTE — PROGRESS NOTE ADULT - ASSESSMENT
85 yo male with PMH of NHL (last chemo 2017), HTN, HLD, CKD, BPH, anemia, syncope, anxiety/adjustment disorder/depression, presents here from [facility] where he was getting treated for L shoulder infection (s/p ORIF for pathologic fx) for further management of his diffuse large B cell lymphoma and L shoulder lesion    {[id number]} Problem/Plan - 1:  ·  Problem: Osteomyelitis of shoulder, left.  Plan: Infiltrating mass of left shoulder and possible osteomyelitis     10/17 blood culture - so far negative. ESR/CRP very high  ID following  C/o persistent severe pain- IV morphine added.  CT L shoulder noted, d/w ortho/IR seems like infiltration and not primarily infectious. s/p IR biopsy, very little fluid, sent for pathology, gram stain negative.  MRI L shoulder showing:  - aggressive destruction of proximal humerus with concern for pathological fractures. Area of humeral head concerning for possible tumor infiltration vs superimposed infection. Non specific edema above scapula and glenohumeral joint effusion  -per orthopedics, no change in management for now  -biopsy results discussed with heme onc, concerning for diffuse large B cell.     NOT SURE HOW MUCH LONGER WE NEED TO CONTINUE ABS- BIOPSY CULTURES WERE NEG BUT WERE ON ABS. AWAIT FINAL PATHOLOGY. PERHAPS CAN TAPER AND STOP THE FLAGYL  final pathology DOES NOT describe infection or osteomyelitis  If there was a superficial cellulitis , it seems better  start tapering abs    {[id number]} Problem/Plan - 2:  ·  Problem: Diffuse large B cell lymphoma.  Plan: -Patient was sent here for further management especially chemo and radiation  Heme/onc consulted appreciated, PET obtained.   CT C/A/P with abd mass, LN. MRI Brain w/ contrast- New 2.2 x 1.6 x 1.1 cm   uniformly enhancing, T1 and T2 isointense high right posterior frontal extra-axial mass. The lesion demonstrates restricted diffusion.   - Check CBC WITH DIFF and TLS labs (CMP, Phos, LDH, and Uric acid) daily  started allopurinol to prevent tumor lysis syndrome  -s/p BMB   -biopsy concerning for diffuse large b-cell  -PICC placed- TEAM ADDRESSING , COILED  -planning for chemo this week and IT MTX by IR, will place consult   -ferritin is elevated, could be suggestive of cytokine storm. Monitor fibrinogen, resend ferritin, triglycerides, soluble IL2R, NK cell activity, coags in order to complete workup for HLH, however would assume this is part of the underlying lymphoma and would treat accordingly.   suggest PALLIATIVE INPUT    {[id number]} Problem/Plan - 3:  ·  Problem: Acute on chronic kidney failure.  Plan: Cr 1.52, remains within range   BP stable, hold lisinopril.   ADJUST ABS , FOR NOW, TO RENAL FUNCTION      Problem /Plan-4  DVT- pt is now on IV Heparin    {[id number]}

## 2020-10-27 NOTE — PROGRESS NOTE ADULT - ASSESSMENT
85 yo male with PMH of NHL (last chemo 2017), HTN, HLD, CKD, BPH, anemia, syncope, anxiety/adjustment disorder/depression, presents here from [facility] where he was getting treated for L shoulder infection (s/p ORIF for pathologic fx) for further management of his diffuse large B cell lymphoma and L shoulder lesion

## 2020-10-27 NOTE — PROGRESS NOTE ADULT - PROBLEM SELECTOR PLAN 1
Infiltrating mass of left shoulder and possible osteomyelitis   bone scan with uptake in L femur. c/w IV vancomycin, cefepime, and PO flagyl (Since 10/10)  10/17 blood culture - so far negative. ESR/CRP very high  ID following  C/o persistent severe pain- IV morphine added.  CT L shoulder noted, d/w ortho/IR seems like infiltration and not primarily infectious. s/p IR biopsy, very little fluid, sent for pathology, gram stain negative.  MRI L shoulder showing:  - aggressive destruction of proximal humerus with concern for pathological fractures. Area of humeral head concerning for possible tumor infiltration vs superimposed infection. Non specific edema above scapula and glenohumeral joint effusion  -per orthopedics, no change in management for now  -biopsy results discussed with heme onc, concerning for diffuse large B cell  -may be able to taper abx and stop flagyl as per ID, once final results are back. Will f/u with Dr. Leroy.

## 2020-10-27 NOTE — PROGRESS NOTE ADULT - ASSESSMENT
Patient is an 87 y/o M w/ a PMHx of DLBCL (s/p 6 cycles of R-CHOP and in DELANEY until 9/2019), who p/w possible relapse w/ L shoulder mass and pathologic fracture s/p left proximal humerus open biopsy, radical resection of tumor, and ORIF with IMN and cementation on 9/25/20), who was transferred to [facility] from the [facility] for further management of his diffuse large B cell lymphoma. Hematology was consulted for further evaluation.    # DLBCL, relapsed  - history of DLBCL, GCB subtype (BCL2, BCL6, MYC, all negative by FISH). Per chart review, patient last received 6 cycles of R-CHOP in 2017 with DELANEY until 11/2019. Patient had mesenteric mass with size of 2.7x4.8 in 2017.   - CT CAP apparently shows enlarging mesenteric mass and new Lt pelvic renal mass concerning of relapse  - At [facility], he was found to have a L shoulder mass and pathologic fracture. His is s/p ORIF and radical resection of tumor on 9/25. PET/CT was performed revealing brain lesion with hypermetabolic activity  - Bone marrow biopsy without e/o lymphoma  - Left shoulder biopsy: most consistent with DLBCL per d/w pathologist  - Plan to treat with Bendamustine/Rituxan tomorrow  - Rasburicase 3mg IV since high risk of TLS with treatment  - Please check CBC WITH DIFF and TLS labs (CMP, Phos, LDH, and Uric acid) daily  - Plan d/w patient's wife and health care proxy  - Will attempt to get LP for CNS disease some time this week; IT MTX not needed per d/w Dr. Degroot  - Plan communicated personally to Dr. Rubio    # Left UE DVT  - On heparin gtt; cont. for now      Brenda Elliott, PGY-4  Hematology-Oncology Fellow  [phone] ([place]) [phone] ([facility])  Please page fellow on call after 5pm and on weekends

## 2020-10-27 NOTE — PROGRESS NOTE ADULT - SUBJECTIVE AND OBJECTIVE BOX
Patient is a 86y old  Male who presents with a chief complaint of sent here for chemo and radiation (26 Oct 2020 18:00)    SUBJECTIVE / OVERNIGHT EVENTS: patient found to have UE DVT yesterday and started on heparin gtt, reports shoulder pain, no other complaints. Sitting up in bed and eating breakfast this morning.     MEDICATIONS  (STANDING):  allopurinol 100 milliGRAM(s) Oral daily  amLODIPine   Tablet 2.5 milliGRAM(s) Oral daily  atorvastatin 80 milliGRAM(s) Oral at bedtime  cefepime   IVPB 2000 milliGRAM(s) IV Intermittent daily  chlorhexidine 2% Cloths 1 Application(s) Topical daily  chlorhexidine 4% Liquid 1 Application(s) Topical <User Schedule>  heparin  Infusion.  Unit(s)/Hr (12 mL/Hr) IV Continuous <Continuous>  influenza   Vaccine 0.5 milliLiter(s) IntraMuscular once  lactobacillus acidophilus 1 Tablet(s) Oral daily  latanoprost 0.005% Ophthalmic Solution 1 Drop(s) Both EYES at bedtime  metroNIDAZOLE    Tablet 500 milliGRAM(s) Oral every 8 hours  mirtazapine 15 milliGRAM(s) Oral at bedtime  polyethylene glycol 3350 17 Gram(s) Oral daily  senna 2 Tablet(s) Oral at bedtime  tamsulosin 0.4 milliGRAM(s) Oral at bedtime  timolol 0.5% Solution 1 Drop(s) Both EYES two times a day  vancomycin  IVPB 750 milliGRAM(s) IV Intermittent daily    MEDICATIONS  (PRN):  acetaminophen   Tablet .. 650 milliGRAM(s) Oral every 6 hours PRN Mild Pain (1 - 3), Moderate Pain (4 - 6)  ALPRAZolam 0.5 milliGRAM(s) Oral two times a day PRN anxiety  heparin   Injectable 2500 Unit(s) IV Push every 6 hours PRN For aPTT between 40 - 57  heparin   Injectable 5500 Unit(s) IV Push every 6 hours PRN For aPTT less than 40  morphine  - Injectable 2 milliGRAM(s) IV Push every 4 hours PRN Severe Pain (7 - 10)  sodium chloride 0.9% lock flush 10 milliLiter(s) IV Push every 1 hour PRN Pre/post blood products, medications, blood draw, and to maintain line patency  traMADol 25 milliGRAM(s) Oral every 6 hours PRN Moderate Pain (4 - 6)      Vital Signs Last 24 Hrs  T(C): 37.4 (27 Oct 2020 05:03), Max: 37.6 (26 Oct 2020 21:03)  T(F): 99.3 (27 Oct 2020 05:03), Max: 99.7 (26 Oct 2020 21:03)  HR: 115 (27 Oct 2020 05:03) (103 - 120)  BP: 122/74 (27 Oct 2020 05:03) (113/67 - 159/63)  BP(mean): --  RR: 18 (27 Oct 2020 05:03) (18 - 18)  SpO2: 95% (27 Oct 2020 05:03) (95% - 97%)  CAPILLARY BLOOD GLUCOSE        I&O's Summary    26 Oct 2020 07:01  -  27 Oct 2020 07:00  --------------------------------------------------------  IN: 1520 mL / OUT: 600 mL / NET: 920 mL        PHYSICAL EXAM:  GENERAL: NAD  EYES: conjunctiva and sclera clear  CHEST/LUNG: Clear to auscultation bilaterally; No wheeze  HEART: +s1/S2, reg   ABDOMEN: Soft, Nontender, Nondistended; Bowel sounds present  EXTREMITIES: LUE pitting edema, RUE PICC in place, +2 radial pulses b/l   PSYCH: Alert and awake, appropriate       LABS:                        9.0    18.45 )-----------( 329      ( 27 Oct 2020 07:27 )             28.4     10-27    133<L>  |  96  |  39<H>  ----------------------------<  115<H>  5.0   |  22  |  1.57<H>    Ca    8.6      27 Oct 2020 07:27    TPro  6.0  /  Alb  2.5<L>  /  TBili  0.4  /  DBili  x   /  AST  23  /  ALT  9<L>  /  AlkPhos  76  10-27    PTT - ( 27 Oct 2020 03:03 )  PTT:105.8 sec      Care Discussed with Consultants/Other Providers: Heme Onc team regarding chemo

## 2020-10-28 NOTE — ADVANCED PRACTICE NURSE CONSULT - ASSESSMENT
Patient received in chair, alert and oriented x 2, capable of expressing all needs to staff. Cycle 1, day 1/2,Height and weight verified.  Consent in chart. Lab results as per Md ysabel aware of same. Vital signs stable prior to chemotherapy and  within acceptable parameters, see sunrise. Pt educated on the importance of saving urine, verbalizes good understanding. Pt education done re chemo regimen, drug effects and potential side effects, written materials provided, pt states understanding. Pt with Right upper arm DL PICC line, site free from signs and symptoms of infection, good blood return obtained. Pre- medicated with Tylenol 650mg PO; Benadryl 50mg IV. Rituximab 375mg/m2 = 686 mg IV started at a rate of 500cc/hr, increased every 30min by 50cc to a max rate of 400cc/hr. VSS prior, during and post treatment. Premed with Zofran 16mg IV; Decadron 8mg IV. Bendamustine 90mg/m2 = 165mg IV infused over 10min via locked pump. Pt tolerated well. Report given to area nurse.

## 2020-10-28 NOTE — PROGRESS NOTE ADULT - ASSESSMENT
Patient is an 85 y/o M w/ a PMHx of DLBCL (s/p 6 cycles of R-CHOP and in DELANEY until 9/2019), who p/w possible relapse w/ L shoulder mass and pathologic fracture s/p left proximal humerus open biopsy, radical resection of tumor, and ORIF with IMN and cementation on 9/25/20), who was transferred to [facility] from the [facility] for further management of his diffuse large B cell lymphoma.     # DLBCL, relapsed  - history of DLBCL, GCB subtype (BCL2, BCL6, MYC, all negative by FISH). Per chart review, patient last received 6 cycles of R-CHOP in 2017 with DELANEY until 11/2019. Patient had mesenteric mass with size of 2.7x4.8 in 2017.   - CT CAP apparently shows enlarging mesenteric mass and new Lt pelvic renal mass concerning of relapse  - At [facility], he was found to have a L shoulder mass and pathologic fracture. His is s/p ORIF and radical resection of tumor on 9/25. PET/CT was performed revealing brain lesion with hypermetabolic activity  - Bone marrow biopsy without e/o lymphoma  - Left shoulder biopsy: most consistent with DLBCL per d/w pathologist  - started treatment with Bendamustine/Rituxan. C1D1 on 10/28, every 28 days cycle  - Rasburicase 3mg IV since high risk of TLS with treatment  - Please check CBC WITH DIFF and TLS labs (CMP, Phos, LDH, and Uric acid) daily  - Plan d/w patient's wife and health care proxy  - Will attempt to get LP for CNS disease some time this week; IT MTX not needed per d/w Dr. Degroot      # Left UE DVT  - On heparin gtt; cont. for now      Leoncio White MD. PGY 6  Heme-Onc fellow  [phone]; [phone]

## 2020-10-28 NOTE — PROGRESS NOTE ADULT - ATTENDING COMMENTS
Agree with above. Pt seen and evaluated at bedside. Laboratory data reviewed.     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   d/w pathologist, necrotic specimen so cannot assess the size of the cells, but appears to be an aggressive B cell lymphoma, CD 79+ and PAX 5 positive   d/w pt and wife. Plan to treat with Bendamustine 90 mg/m2/Rituxan 375 mg/m2. Monitor counts closely.   elevated LDH and uric acid. Concern for development of TLS. Will monitor daily along with rest of labs. s/p Rasburicase    Started tx today. So far tolerating well.   Has poor appetite despite Remeron. decadron and Prednisone may help. Consider addition of Marinol 2.5 mg BID in the next 1-2 days

## 2020-10-28 NOTE — PROGRESS NOTE ADULT - SUBJECTIVE AND OBJECTIVE BOX
INTERVAL EVENTS:  No acute events overnight. Denies any fever, chills, night sweat, CP, SOB, abd pain, constipation, diarrhea, dysuria.    Vital Signs Last 24 Hrs  T(C): 36.2 (28 Oct 2020 16:36), Max: 36.8 (28 Oct 2020 00:03)  T(F): 97.1 (28 Oct 2020 16:36), Max: 98.2 (28 Oct 2020 00:03)  HR: 107 (28 Oct 2020 16:36) (92 - 111)  BP: 142/57 (28 Oct 2020 16:36) (107/67 - 142/57)  BP(mean): --  RR: 18 (28 Oct 2020 16:36) (18 - 18)  SpO2: 97% (28 Oct 2020 16:36) (94% - 97%)      PHYSICAL EXAM:   GENERAL: no acute distress  HEENT: EOMI, neck supple  RESPIRATORY: LCTAB/L, no rhonchi, rales, or wheezing  CARDIOVASCULAR: RRR, no murmurs, gallops, rubs  ABDOMINAL: soft, non-tender, non-distended, positive bowel sounds   EXTREMITIES: +left should swelling from lymphoma.   NEUROLOGICAL: alert and oriented x 3, non-focal  SKIN: no rashes or lesions   MUSCULOSKELETAL: no gross joint deformity                          8.3    17.54 )-----------( 303      ( 28 Oct 2020 06:34 )             25.2     10-28    131<L>  |  97  |  43<H>  ----------------------------<  93  4.9   |  21<L>  |  1.71<H>    Ca    8.7      28 Oct 2020 06:34    TPro  6.0  /  Alb  2.5<L>  /  TBili  0.4  /  DBili  x   /  AST  23  /  ALT  9<L>  /  AlkPhos  76  10-27    PTT - ( 28 Oct 2020 08:44 )  PTT:64.2 sec    MEDICATIONS  (STANDING):  allopurinol 100 milliGRAM(s) Oral daily  amLODIPine   Tablet 2.5 milliGRAM(s) Oral daily  atorvastatin 80 milliGRAM(s) Oral at bedtime  bendamustine IVPB (BENDEKA) (eMAR) 165 milliGRAM(s) IV Intermittent every 24 hours  cefepime   IVPB 2000 milliGRAM(s) IV Intermittent daily  chlorhexidine 2% Cloths 1 Application(s) Topical daily  chlorhexidine 4% Liquid 1 Application(s) Topical <User Schedule>  dexAMETHasone  IVPB 8 milliGRAM(s) IV Intermittent every 24 hours  heparin  Infusion.  Unit(s)/Hr (12 mL/Hr) IV Continuous <Continuous>  influenza   Vaccine 0.5 milliLiter(s) IntraMuscular once  lactobacillus acidophilus 1 Tablet(s) Oral daily  latanoprost 0.005% Ophthalmic Solution 1 Drop(s) Both EYES at bedtime  mirtazapine 15 milliGRAM(s) Oral at bedtime  ondansetron  IVPB 16 milliGRAM(s) IV Intermittent every 24 hours  polyethylene glycol 3350 17 Gram(s) Oral daily  rasburicase IVPB 3 milliGRAM(s) IV Intermittent once  senna 2 Tablet(s) Oral at bedtime  tamsulosin 0.4 milliGRAM(s) Oral at bedtime  timolol 0.5% Solution 1 Drop(s) Both EYES two times a day  vancomycin  IVPB 750 milliGRAM(s) IV Intermittent daily

## 2020-10-28 NOTE — PROGRESS NOTE ADULT - ASSESSMENT
87 yo male with PMH of NHL (last chemo 2017), HTN, HLD, CKD, BPH, anemia, syncope, anxiety/adjustment disorder/depression, presents here from [facility] where he was getting treated for L shoulder infection (s/p ORIF for pathologic fx) for further management of his diffuse large B cell lymphoma and L shoulder lesion    {[id number]} Problem/Plan - 1:  ·  Problem: Osteomyelitis of shoulder, left.  Plan: Infiltrating mass of left shoulder and possible osteomyelitis     10/17 blood culture - so far negative. ESR/CRP very high  ID following  C/o persistent severe pain- IV morphine added.  CT L shoulder noted, d/w ortho/IR seems like infiltration and not primarily infectious. s/p IR biopsy, very little fluid, sent for pathology, gram stain negative.  MRI L shoulder showing:  - aggressive destruction of proximal humerus with concern for pathological fractures. Area of humeral head concerning for possible tumor infiltration vs superimposed infection. Non specific edema above scapula and glenohumeral joint effusion  -per orthopedics, no change in management for now  -biopsy results discussed with heme onc, concerning for diffuse large B cell.     NOT SURE HOW MUCH LONGER WE NEED TO CONTINUE ABS- BIOPSY CULTURES WERE NEG BUT WERE ON ABS.  FINAL PATHOLOGY.- with no infection  Ffinal pathology DOES NOT describe infection or osteomyelitis  If there was a superficial cellulitis , it seems better  would d/c the antibioitcs    {[id number]} Problem/Plan - 2:  ·  Problem: Diffuse large B cell lymphoma.  Plan: -Patient was sent here for further management especially chemo and radiation  Heme/onc consulted appreciated, PET obtained.   CT C/A/P with abd mass, LN. MRI Brain w/ contrast- New 2.2 x 1.6 x 1.1 cm   uniformly enhancing, T1 and T2 isointense high right posterior frontal extra-axial mass. The lesion demonstrates restricted diffusion.   - Check CBC WITH DIFF and TLS labs (CMP, Phos, LDH, and Uric acid) daily  started allopurinol to prevent tumor lysis syndrome  -s/p BMB   -biopsy concerning for diffuse large b-cell  - started chemo  -ferritin is elevated, could be suggestive of cytokine storm. Monitor fibrinogen, resend ferritin, triglycerides, soluble IL2R, NK cell activity, coags in order to complete workup for HLH, however would assume this is part of the underlying lymphoma and would treat accordingly.   suggest PALLIATIVE INPUT    {[id number]} Problem/Plan - 3:  ·  Problem: Acute on chronic kidney failure.  Plan: Cr elevated   vanco level was therpeutic        Problem /Plan-4  DVT- pt is now on IV Heparin    {[id number]}

## 2020-10-28 NOTE — PROGRESS NOTE ADULT - SUBJECTIVE AND OBJECTIVE BOX
Patient is a 86y old  Male who presents with a chief complaint of sent here for chemo and radiation (28 Oct 2020 19:30)    Being followed by ID for        Interval history:  pt started chemotherapy  remains uncomfortable  No other acute events          PAST MEDICAL & SURGICAL HISTORY:  Syncope    Chronic kidney disease (CKD)    Diffuse large B cell lymphoma    HLD (hyperlipidemia)    BPH (benign prostatic hyperplasia)    HTN (hypertension)    History of left shoulder fracture    Osteomyelitis of left shoulder region      Allergies    No Known Allergies    Intolerances      Antimicrobials:    cefepime   IVPB 2000 milliGRAM(s) IV Intermittent daily  vancomycin  IVPB 750 milliGRAM(s) IV Intermittent daily    MEDICATIONS  (STANDING):  allopurinol 100 milliGRAM(s) Oral daily  amLODIPine   Tablet 2.5 milliGRAM(s) Oral daily  atorvastatin 80 milliGRAM(s) Oral at bedtime  bendamustine IVPB (BENDEKA) (eMAR) 165 milliGRAM(s) IV Intermittent every 24 hours  cefepime   IVPB 2000 milliGRAM(s) IV Intermittent daily  chlorhexidine 2% Cloths 1 Application(s) Topical daily  chlorhexidine 4% Liquid 1 Application(s) Topical <User Schedule>  dexAMETHasone  IVPB 8 milliGRAM(s) IV Intermittent every 24 hours  heparin  Infusion.  Unit(s)/Hr (12 mL/Hr) IV Continuous <Continuous>  influenza   Vaccine 0.5 milliLiter(s) IntraMuscular once  lactobacillus acidophilus 1 Tablet(s) Oral daily  latanoprost 0.005% Ophthalmic Solution 1 Drop(s) Both EYES at bedtime  mirtazapine 15 milliGRAM(s) Oral at bedtime  ondansetron  IVPB 16 milliGRAM(s) IV Intermittent every 24 hours  polyethylene glycol 3350 17 Gram(s) Oral daily  rasburicase IVPB 3 milliGRAM(s) IV Intermittent once  senna 2 Tablet(s) Oral at bedtime  tamsulosin 0.4 milliGRAM(s) Oral at bedtime  timolol 0.5% Solution 1 Drop(s) Both EYES two times a day  vancomycin  IVPB 750 milliGRAM(s) IV Intermittent daily      Vital Signs Last 24 Hrs  T(C): 36.2 (10-28-20 @ 16:36), Max: 36.8 (10-28-20 @ 00:03)  T(F): 97.1 (10-28-20 @ 16:36), Max: 98.2 (10-28-20 @ 00:03)  HR: 107 (10-28-20 @ 16:36) (92 - 111)  BP: 142/57 (10-28-20 @ 16:36) (107/67 - 142/57)  BP(mean): --  RR: 18 (10-28-20 @ 16:36) (18 - 18)  SpO2: 97% (10-28-20 @ 16:36) (94% - 97%)    Physical Exam:    Constitutional  lying in bed, watching tv    HEENT PERRLA EOMI,No pallor or icterus    No oral exudate or erythema    Neck supple no JVD or LN    Chest Good AE,CTA    CVS RRR S1 S2     Abd soft BS normal No tenderness    Ext LUE edema    IV site no erythema tenderness or discharge    Joints no swelling or LOM        Lab Data:                          8.3    17.54 )-----------( 303      ( 28 Oct 2020 06:34 )             25.2       10-28    131<L>  |  97  |  43<H>  ----------------------------<  93  4.9   |  21<L>  |  1.71<H>    Ca    8.7      28 Oct 2020 06:34    TPro  6.0  /  Alb  2.5<L>  /  TBili  0.4  /  DBili  x   /  AST  23  /  ALT  9<L>  /  AlkPhos  76  10-27        Vancomycin Level, Trough: 15.5 ug/mL (10-27-20 @ 22:05)      WBC Count: 17.54 (10-28-20 @ 06:34)  WBC Count: 18.45 (10-27-20 @ 07:27)  WBC Count: 17.94 (10-27-20 @ 02:32)  WBC Count: 12.98 (10-26-20 @ 06:48)  WBC Count: 13.16 (10-25-20 @ 07:19)  WBC Count: 11.33 (10-24-20 @ 07:43)  WBC Count: 10.45 (10-23-20 @ 07:21)  WBC Count: 10.72 (10-22-20 @ 07:24)

## 2020-10-29 NOTE — PROGRESS NOTE ADULT - ASSESSMENT
87 yo male with PMH of NHL (last chemo 2017), HTN, HLD, CKD, BPH, anemia, syncope, anxiety/adjustment disorder/depression, presents here from [facility] where he was getting treated for L shoulder infection (s/p ORIF for pathologic fx) for further management of his diffuse large B cell lymphoma and L shoulder lesion

## 2020-10-29 NOTE — PROGRESS NOTE ADULT - ATTENDING COMMENTS
Agree with above. Pt seen and evaluated at bedside. Laboratory reviewed by me.    Pt is s/p C1 BR 10/28 now with evidence of TLS despite receiving Rasburicase 3 mg prior to treatment.   Recommend renal consult. IV hydration, labs Q 8-12   Monitor LDH and uric acid closely    d/w daughter extensive regarding prognosis and GOC. Ultimate dispo is going to be challenging given pt's overall poor functional status.

## 2020-10-29 NOTE — PROGRESS NOTE ADULT - ASSESSMENT
Patient is an 85 y/o M w/ a PMHx of DLBCL (s/p 6 cycles of R-CHOP and in DELANEY until 9/2019), who p/w possible relapse w/ L shoulder mass and pathologic fracture s/p left proximal humerus open biopsy, radical resection of tumor, and ORIF with IMN and cementation on 9/25/20), who was transferred to [facility] from the [facility] for further management of his diffuse large B cell lymphoma.     #Concern for early TLS  - S/p Rasburicase on 10/27  - Giving NS @ 75cc/h (1L bag)  - Please check TLS labs (CMP, Phos, LDH, and Uric acid) EVERY 8 HOURS. I have placed order for this; please ensure labs are followed  - If uric acid > 8, PLEASE redose with 3mg IV Rasburicase  - Cont. renally dosed Alloprinol    # DLBCL, relapsed  - history of DLBCL, GCB subtype (BCL2, BCL6, MYC, all negative by FISH). Per chart review, patient last received 6 cycles of R-CHOP in 2017 with DELANEY until 11/2019. Patient had mesenteric mass with size of 2.7x4.8 in 2017.   - CT CAP apparently shows enlarging mesenteric mass and new Lt pelvic renal mass concerning of relapse  - At [facility], he was found to have a L shoulder mass and pathologic fracture. His is s/p ORIF and radical resection of tumor on 9/25. PET/CT was performed revealing brain lesion with hypermetabolic activity  - Bone marrow biopsy without e/o lymphoma  - Left shoulder biopsy: most consistent with DLBCL per d/w pathologist  - started treatment with Bendamustine/Rituxan. C1D1 on 10/28, every 28 days cycle  - Plan d/w patient's wife who is the health care proxy  - Will attempt to get LP for CNS disease some time this week; IT MTX not needed per d/w Dr. Degroot    # Left UE DVT  - On heparin gtt; cont. for now        Brenda Elliott, PGY-4  Hematology-Oncology Fellow  [phone] ([place]) [phone] ([facility])  Please page fellow on call after 5pm and on weekends

## 2020-10-29 NOTE — PROGRESS NOTE ADULT - ASSESSMENT
85 yo male with PMH of NHL (last chemo 2017), HTN, HLD, CKD, BPH, anemia, syncope, anxiety/adjustment disorder/depression, presents here from [facility] where he was getting treated for L shoulder infection (s/p ORIF for pathologic fx) for further management of his diffuse large B cell lymphoma and L shoulder lesion    {[id number]} Problem/Plan - 1:  ·  Problem: Osteomyelitis of shoulder, left.  Plan: Infiltrating mass of left shoulder and possible osteomyelitis     10/17 blood culture - so far negative. ESR/CRP very high  ID following  C/o persistent severe pain- IV morphine added.  CT L shoulder noted, d/w ortho/IR seems like infiltration and not primarily infectious. s/p IR biopsy, very little fluid, sent for pathology, gram stain negative.  MRI L shoulder showing:  - aggressive destruction of proximal humerus with concern for pathological fractures. Area of humeral head concerning for possible tumor infiltration vs superimposed infection. Non specific edema above scapula and glenohumeral joint effusion  -per orthopedics, no change in management for now  -biopsy results discussed with heme onc, concerning for diffuse large B cell.     NOT SURE HOW MUCH LONGER WE NEED TO CONTINUE ABS- BIOPSY CULTURES WERE NEG BUT WERE ON ABS.  FINAL PATHOLOGY.- with no infection  Ffinal pathology DOES NOT describe infection or osteomyelitis  If there was a superficial cellulitis , it seems better  would d/c the antibioitcs    {[id number]} Problem/Plan - 2:  ·  Problem: Diffuse large B cell lymphoma.  Plan: -Patient was sent here for further management especially chemo and radiation  Heme/onc consulted appreciated, PET obtained.   CT C/A/P with abd mass, LN. MRI Brain w/ contrast- New 2.2 x 1.6 x 1.1 cm   uniformly enhancing, T1 and T2 isointense high right posterior frontal extra-axial mass. The lesion demonstrates restricted diffusion.   - Check CBC WITH DIFF and TLS labs (CMP, Phos, LDH, and Uric acid) daily  started allopurinol to prevent tumor lysis syndrome  -s/p BMB   -biopsy concerning for diffuse large b-cell  - started chemo  -ferritin is elevated, could be suggestive of cytokine storm. Monitor fibrinogen, resend ferritin, triglycerides, soluble IL2R, NK cell activity, coags in order to complete workup for HLH, however would assume this is part of the underlying lymphoma and would treat accordingly.   suggest PALLIATIVE INPUT    {[id number]} Problem/Plan - 3:  ·  Problem: Acute on chronic kidney failure.  Plan: Cr elevated   vanco level was therpeutic        Problem /Plan-4  DVT- pt is now on IV Heparin  ? IVC filter    {[id number]}

## 2020-10-29 NOTE — PROGRESS NOTE ADULT - SUBJECTIVE AND OBJECTIVE BOX
Patient is a 86y old  Male who presents with a chief complaint of sent here for chemo and radiation (28 Oct 2020 19:30)    Being followed by ID for        Interval history:  pt seems a little brighter today  resting quietly in a chair  No other acute events        PAST MEDICAL & SURGICAL HISTORY:  Syncope    Chronic kidney disease (CKD)    Diffuse large B cell lymphoma    HLD (hyperlipidemia)    BPH (benign prostatic hyperplasia)    HTN (hypertension)    History of left shoulder fracture    Osteomyelitis of left shoulder region      Allergies    No Known Allergies    Intolerances      Antimicrobials:    cefepime   IVPB 2000 milliGRAM(s) IV Intermittent daily  vancomycin  IVPB 750 milliGRAM(s) IV Intermittent daily    MEDICATIONS  (STANDING):  allopurinol 100 milliGRAM(s) Oral daily  amLODIPine   Tablet 2.5 milliGRAM(s) Oral daily  atorvastatin 80 milliGRAM(s) Oral at bedtime  bendamustine IVPB (BENDEKA) (eMAR) 165 milliGRAM(s) IV Intermittent every 24 hours  cefepime   IVPB 2000 milliGRAM(s) IV Intermittent daily  chlorhexidine 2% Cloths 1 Application(s) Topical daily  chlorhexidine 4% Liquid 1 Application(s) Topical <User Schedule>  dexAMETHasone  IVPB 8 milliGRAM(s) IV Intermittent every 24 hours  heparin  Infusion.  Unit(s)/Hr (12 mL/Hr) IV Continuous <Continuous>  influenza   Vaccine 0.5 milliLiter(s) IntraMuscular once  lactobacillus acidophilus 1 Tablet(s) Oral daily  latanoprost 0.005% Ophthalmic Solution 1 Drop(s) Both EYES at bedtime  mirtazapine 15 milliGRAM(s) Oral at bedtime  ondansetron  IVPB 16 milliGRAM(s) IV Intermittent every 24 hours  polyethylene glycol 3350 17 Gram(s) Oral daily  rasburicase IVPB 3 milliGRAM(s) IV Intermittent once  senna 2 Tablet(s) Oral at bedtime  tamsulosin 0.4 milliGRAM(s) Oral at bedtime  timolol 0.5% Solution 1 Drop(s) Both EYES two times a day  vancomycin  IVPB 750 milliGRAM(s) IV Intermittent daily      Vital Signs Last 24 Hrs  T(C): 36.8 (10-29-20 @ 07:33), Max: 36.8 (10-29-20 @ 07:33)  T(F): 98.2 (10-29-20 @ 07:33), Max: 98.2 (10-29-20 @ 07:33)  HR: 90 (10-29-20 @ 07:33) (90 - 111)  BP: 121/67 (10-29-20 @ 07:33) (107/67 - 142/57)  BP(mean): --  RR: 18 (10-29-20 @ 07:33) (18 - 18)  SpO2: 94% (10-29-20 @ 07:33) (94% - 97%)    Physical Exam:    Constitutional well preserved,comfortable,pleasant    HEENT PERRLA EOMI,No pallor or icterus    No oral exudate or erythema    Neck supple no JVD or LN    Chest Good AE,CTA    CVS RRR S1 S2     Abd soft BS normal No tenderness no masses    Ext swollen left shoulder with varicocisites  erythema much improved    IV site no erythema tenderness or discharge    Joints no swelling or LOM    CNS AAO X 3 no focal    Lab Data:                          7.7    15.24 )-----------( 306      ( 29 Oct 2020 07:02 )             23.8       10-29    130<L>  |  95<L>  |  50<H>  ----------------------------<  124<H>  5.9<H>   |  22  |  1.95<H>    Ca    8.5      29 Oct 2020 07:07      Vancomycin Level, Trough: 15.5 ug/mL (10-27-20 @ 22:05)      WBC Count: 15.24 (10-29-20 @ 07:02)  WBC Count: 17.54 (10-28-20 @ 06:34)  WBC Count: 18.45 (10-27-20 @ 07:27)  WBC Count: 17.94 (10-27-20 @ 02:32)  WBC Count: 12.98 (10-26-20 @ 06:48)  WBC Count: 13.16 (10-25-20 @ 07:19)  WBC Count: 11.33 (10-24-20 @ 07:43)  WBC Count: 10.45 (10-23-20 @ 07:21)

## 2020-10-29 NOTE — PROGRESS NOTE ADULT - SUBJECTIVE AND OBJECTIVE BOX
Patient is a 86y old  Male who presents with a chief complaint of sent here for chemo and radiation (29 Oct 2020 12:25)      SUBJECTIVE / OVERNIGHT EVENTS:    MEDICATIONS  (STANDING):  allopurinol 100 milliGRAM(s) Oral daily  amLODIPine   Tablet 2.5 milliGRAM(s) Oral daily  atorvastatin 80 milliGRAM(s) Oral at bedtime  bendamustine IVPB (BENDEKA) (eMAR) 165 milliGRAM(s) IV Intermittent every 24 hours  chlorhexidine 2% Cloths 1 Application(s) Topical daily  chlorhexidine 4% Liquid 1 Application(s) Topical <User Schedule>  dexAMETHasone  IVPB 8 milliGRAM(s) IV Intermittent every 24 hours  heparin  Infusion.  Unit(s)/Hr (12 mL/Hr) IV Continuous <Continuous>  influenza   Vaccine 0.5 milliLiter(s) IntraMuscular once  lactobacillus acidophilus 1 Tablet(s) Oral daily  latanoprost 0.005% Ophthalmic Solution 1 Drop(s) Both EYES at bedtime  mirtazapine 15 milliGRAM(s) Oral at bedtime  ondansetron  IVPB 16 milliGRAM(s) IV Intermittent every 24 hours  polyethylene glycol 3350 17 Gram(s) Oral daily  rasburicase IVPB 3 milliGRAM(s) IV Intermittent once  senna 2 Tablet(s) Oral at bedtime  sodium chloride 0.9%. 1000 milliLiter(s) (75 mL/Hr) IV Continuous <Continuous>  tamsulosin 0.4 milliGRAM(s) Oral at bedtime  timolol 0.5% Solution 1 Drop(s) Both EYES two times a day    MEDICATIONS  (PRN):  acetaminophen   Tablet .. 650 milliGRAM(s) Oral every 6 hours PRN Mild Pain (1 - 3), Moderate Pain (4 - 6)  ALPRAZolam 0.5 milliGRAM(s) Oral two times a day PRN anxiety  heparin   Injectable 5500 Unit(s) IV Push every 6 hours PRN For aPTT less than 40  heparin   Injectable 2500 Unit(s) IV Push every 6 hours PRN For aPTT between 40 - 57  morphine  - Injectable 2 milliGRAM(s) IV Push every 4 hours PRN Severe Pain (7 - 10)  sodium chloride 0.9% lock flush 10 milliLiter(s) IV Push every 1 hour PRN Pre/post blood products, medications, blood draw, and to maintain line patency  traMADol 25 milliGRAM(s) Oral every 6 hours PRN Moderate Pain (4 - 6)      Vital Signs Last 24 Hrs  T(C): 36.8 (29 Oct 2020 07:33), Max: 36.8 (29 Oct 2020 07:33)  T(F): 98.2 (29 Oct 2020 07:33), Max: 98.2 (29 Oct 2020 07:33)  HR: 90 (29 Oct 2020 07:33) (90 - 111)  BP: 121/67 (29 Oct 2020 07:33) (107/67 - 142/57)  BP(mean): --  RR: 18 (29 Oct 2020 07:33) (18 - 18)  SpO2: 94% (29 Oct 2020 07:33) (94% - 97%)  CAPILLARY BLOOD GLUCOSE        I&O's Summary    28 Oct 2020 07:01  -  29 Oct 2020 07:00  --------------------------------------------------------  IN: 1892 mL / OUT: 0 mL / NET: 1892 mL    29 Oct 2020 07:01  -  29 Oct 2020 14:15  --------------------------------------------------------  IN: 440 mL / OUT: 0 mL / NET: 440 mL      PHYSICAL EXAM:  GENERAL: NAD, well-developed  EYES: conjunctiva and sclera clear  CHEST/LUNG: Clear to auscultation bilaterally  HEART: +s1/S2, reg   ABDOMEN: Soft, Nontender, Nondistended  EXTREMITIES:  LUE edema   PSYCH: Alert, awake, appropriate     LABS:                        7.7    15.24 )-----------( 306      ( 29 Oct 2020 07:02 )             23.8     10-29    130<L>  |  95<L>  |  50<H>  ----------------------------<  124<H>  5.9<H>   |  22  |  1.95<H>    Ca    8.5      29 Oct 2020 07:07      PTT - ( 29 Oct 2020 08:34 )  PTT:69.9 sec

## 2020-10-29 NOTE — PROGRESS NOTE ADULT - PROBLEM SELECTOR PLAN 1
Infiltrating mass of left shoulder and possible osteomyelitis   bone scan with uptake in L femur. was on abx from 10/10-10/29, d/cing and watching off abx now   10/17 blood culture - so far negative. ESR/CRP very high  ID following  C/o persistent severe pain- IV morphine added.  CT L shoulder noted, d/w ortho/IR seems like infiltration and not primarily infectious. s/p IR biopsy, very little fluid, sent for pathology, gram stain negative.  MRI L shoulder showing:  - aggressive destruction of proximal humerus with concern for pathological fractures. Area of humeral head concerning for possible tumor infiltration vs superimposed infection. Non specific edema above scapula and glenohumeral joint effusion  -per orthopedics, no change in management for now  -biopsy results discussed with heme onc, concerning for diffuse large B cell

## 2020-10-29 NOTE — ADVANCED PRACTICE NURSE CONSULT - ASSESSMENT
Pt with day 2/2 tx, labs noted in sunrise, height, weight and bsa verified, okay to proceed with tx as per MD Elliott.  Pt with right double lumen picc + blood return noted, no pain, redness or swelling noted at site.  Pt premedicated as noted in sunrise.  Pt administered bendamustine 90 mg/m2 = 165 mg iv over 10 min via locked pump. Pt with day 2/2 tx, labs noted in sunrise, height, weight and bsa verified, okay to proceed with tx as per MD White.  Pt with right double lumen picc + blood return noted, no pain, redness or swelling noted at site.  Pt premedicated as noted in sunrise.  Pt administered bendamustine 90 mg/m2 = 165 mg iv over 10 min via locked pump.  Pt reeducated on signs and symptoms to report on to area nurse and staff, pt verbalized understanding.  Emotional support provided. Report given to area nurse.

## 2020-10-29 NOTE — PROGRESS NOTE ADULT - SUBJECTIVE AND OBJECTIVE BOX
INTERVAL HPI/OVERNIGHT EVENTS:  No overnight events. Doing well this am.     MEDICATIONS  (STANDING):  allopurinol 100 milliGRAM(s) Oral daily  amLODIPine   Tablet 2.5 milliGRAM(s) Oral daily  atorvastatin 80 milliGRAM(s) Oral at bedtime  chlorhexidine 2% Cloths 1 Application(s) Topical daily  chlorhexidine 4% Liquid 1 Application(s) Topical <User Schedule>  dexAMETHasone  IVPB 8 milliGRAM(s) IV Intermittent every 24 hours  heparin  Infusion.  Unit(s)/Hr (12 mL/Hr) IV Continuous <Continuous>  influenza   Vaccine 0.5 milliLiter(s) IntraMuscular once  lactobacillus acidophilus 1 Tablet(s) Oral daily  latanoprost 0.005% Ophthalmic Solution 1 Drop(s) Both EYES at bedtime  mirtazapine 15 milliGRAM(s) Oral at bedtime  ondansetron  IVPB 16 milliGRAM(s) IV Intermittent every 24 hours  polyethylene glycol 3350 17 Gram(s) Oral daily  rasburicase IVPB 3 milliGRAM(s) IV Intermittent once  senna 2 Tablet(s) Oral at bedtime  sodium chloride 0.9%. 1000 milliLiter(s) (75 mL/Hr) IV Continuous <Continuous>  tamsulosin 0.4 milliGRAM(s) Oral at bedtime  timolol 0.5% Solution 1 Drop(s) Both EYES two times a day    MEDICATIONS  (PRN):  acetaminophen   Tablet .. 650 milliGRAM(s) Oral every 6 hours PRN Mild Pain (1 - 3), Moderate Pain (4 - 6)  ALPRAZolam 0.5 milliGRAM(s) Oral two times a day PRN anxiety  heparin   Injectable 5500 Unit(s) IV Push every 6 hours PRN For aPTT less than 40  heparin   Injectable 2500 Unit(s) IV Push every 6 hours PRN For aPTT between 40 - 57  morphine  - Injectable 2 milliGRAM(s) IV Push every 4 hours PRN Severe Pain (7 - 10)  sodium chloride 0.9% lock flush 10 milliLiter(s) IV Push every 1 hour PRN Pre/post blood products, medications, blood draw, and to maintain line patency  traMADol 25 milliGRAM(s) Oral every 6 hours PRN Moderate Pain (4 - 6)    Allergies    No Known Allergies    Intolerances          VITAL SIGNS:  T(F): 97.5 (10-29-20 @ 15:47)  HR: 99 (10-29-20 @ 15:47)  BP: 107/47 (10-29-20 @ 15:47)  RR: 18 (10-29-20 @ 15:47)  SpO2: 96% (10-29-20 @ 15:47)  Wt(kg): --    PHYSICAL EXAM:    GENERAL: no acute distress  HEENT: EOMI, neck supple  RESPIRATORY: LCTAB/L, no rhonchi, rales, or wheezing  CARDIOVASCULAR: RRR, no murmurs, gallops, rubs  ABDOMINAL: soft, non-tender, non-distended, positive bowel sounds   EXTREMITIES: +left should swelling from lymphoma.   NEUROLOGICAL: alert and oriented x 3, non-focal  SKIN: no rashes or lesions   MUSCULOSKELETAL: no gross joint deformity    LABS:                        7.7    15.24 )-----------( 306      ( 29 Oct 2020 07:02 )             23.8     10-29    129<L>  |  92<L>  |  57<H>  ----------------------------<  183<H>  5.5<H>   |  21<L>  |  2.03<H>    Ca    8.5      29 Oct 2020 15:48  Phos  3.3     10-29      PTT - ( 29 Oct 2020 08:34 )  PTT:69.9 sec      RADIOLOGY & ADDITIONAL TESTS:  Studies reviewed.

## 2020-10-30 NOTE — PROGRESS NOTE ADULT - ASSESSMENT
85 yo male with PMH of NHL (last chemo 2017), HTN, HLD, CKD, BPH, anemia, syncope, anxiety/adjustment disorder/depression, presents here from [facility] where he was getting treated for L shoulder infection (s/p ORIF for pathologic fx) for further management of his diffuse large B cell lymphoma and L shoulder lesion

## 2020-10-30 NOTE — PROGRESS NOTE ADULT - ASSESSMENT
85 yo male with PMH of NHL (last chemo 2017), HTN, HLD, CKD, BPH, anemia, syncope, anxiety/adjustment disorder/depression, presents here from [facility] where he was getting treated for L shoulder infection (s/p ORIF for pathologic fx) for further management of his diffuse large B cell lymphoma and L shoulder lesion    {[id number]} Problem/Plan - 1:  ·  Problem: Osteomyelitis of shoulder, left.  Plan: Infiltrating mass of left shoulder and possible osteomyelitis     10/17 blood culture - so far negative. ESR/CRP very high  ID following  C/o persistent severe pain- IV morphine added.  CT L shoulder noted, d/w ortho/IR seems like infiltration and not primarily infectious. s/p IR biopsy, very little fluid, sent for pathology, gram stain negative.  MRI L shoulder showing:  - aggressive destruction of proximal humerus with concern for pathological fractures. Area of humeral head concerning for possible tumor infiltration vs superimposed infection. Non specific edema above scapula and glenohumeral joint effusion  -per orthopedics, no change in management for now  -biopsy results discussed with heme onc, concerning for diffuse large B cell.     Ffinal pathology DOES NOT describe infection or osteomyelitis  If there was a superficial cellulitis , it seems better  stable off abs    {[id number]} Problem/Plan - 2:  ·  Problem: Diffuse large B cell lymphoma.  Plan: -Patient was sent here for further management especially chemo and radiation  Heme/onc consulted appreciated, PET obtained.   CT C/A/P with abd mass, LN. MRI Brain w/ contrast- New 2.2 x 1.6 x 1.1 cm   uniformly enhancing, T1 and T2 isointense high right posterior frontal extra-axial mass. The lesion demonstrates restricted diffusion.   - Check CBC WITH DIFF and TLS labs (CMP, Phos, LDH, and Uric acid) daily  started allopurinol to prevent tumor lysis syndrome  -s/p BMB   -biopsy concerning for diffuse large b-cell  - started chemo  -ferritin is elevated, could be suggestive of cytokine storm. Monitor fibrinogen, resend ferritin, triglycerides, soluble IL2R, NK cell activity, coags in order to complete workup for HLH, however would assume this is part of the underlying lymphoma and would treat accordingly.   suggest PALLIATIVE INPUT    {[id number]} Problem/Plan - 3:  ·  Problem: Acute on chronic kidney failure.  Plan: Cr elevated   vanco level was therpeutic        Problem /Plan-4  DVT- pt is now on IV Heparin  ? IVC filter    {[id number]}

## 2020-10-30 NOTE — PROGRESS NOTE ADULT - ATTENDING COMMENTS
Agree with above. Pt seen and examined at bedside. Laboratory data reviewed by me.     Pt remains with TLS, hyperkalemia. Appreciate renal recs. cont monitor labs closely. May need HD for management of hyperkalemia.   Clinically pt appears stable. counts stable  d/w wife at bedside

## 2020-10-30 NOTE — CHART NOTE - NSCHARTNOTEFT_GEN_A_CORE
Nutrition Follow Up Note  Patient seen for: malnutrition length of stay follow up. Chart reviewed and events noted.    Pt 85 y/o male with PMH of NHL (last chemo ), HTN, HLD, CKD, BPH, anemia, syncope, anxiety/adjustment disorder/depression. Presents here from [facility] where he was getting treated for L shoulder infection (s/p ORIF for pathologic fx) for further management of his diffuse large B cell lymphoma and L shoulder lesion. PICC placed, started on chemotherapy S/p Rasburicase on 10/27. started treatment with Bendamustine/Rituxan. C1D1 on 10/28, every 28 days cycle.     Source:   Medical record and pt    Diet :   Soft  Ensure Enlive Cans or Servings Per Day:  3x Daily    Patient reports: that appetite and PO intake are now poor to fair. Pt c poor intake of meals, however is taking most of the ordered Ensure Enlive x3 daily; pt is happy c new flavor. Pt is tolerating food texture well. Pt with no known recent N/V, or diarrhea. Pt c constipation, hasn't had a BM since 10/24. Pt on bowel regimen of senna and Miralax. Reinforced increased protein-energy needs in setting of recent wt loss. Advised pt to begin meal with protein intake and drink Ensure Enlive between meals to avoid feeling full during mealtime.       PO intake :  25-50% of meals in-house  >75% of ordered Ensure Enlive      Source for PO intake:  Pt and RN flowsheet    Daily Weight in lbs: 158.9 (10-28), 154.5 (10-21), 144.4 (10-17)  Wt fluctuations noted and likely related to fluid shifts. Pt c +4 L arm edema (previously none)     Pertinent Medications: MEDICATIONS  (STANDING):  allopurinol 100 milliGRAM(s) Oral daily  amLODIPine   Tablet 2.5 milliGRAM(s) Oral daily  atorvastatin 80 milliGRAM(s) Oral at bedtime  chlorhexidine 2% Cloths 1 Application(s) Topical daily  chlorhexidine 4% Liquid 1 Application(s) Topical <User Schedule>  dexAMETHasone  IVPB 8 milliGRAM(s) IV Intermittent every 24 hours  heparin  Infusion.  Unit(s)/Hr (12 mL/Hr) IV Continuous <Continuous>  influenza   Vaccine 0.5 milliLiter(s) IntraMuscular once  lactobacillus acidophilus 1 Tablet(s) Oral daily  latanoprost 0.005% Ophthalmic Solution 1 Drop(s) Both EYES at bedtime  mirtazapine 15 milliGRAM(s) Oral at bedtime  ondansetron  IVPB 16 milliGRAM(s) IV Intermittent every 24 hours  polyethylene glycol 3350 17 Gram(s) Oral daily  rasburicase IVPB 3 milliGRAM(s) IV Intermittent once  senna 2 Tablet(s) Oral at bedtime  sodium chloride 0.9%. 1000 milliLiter(s) (75 mL/Hr) IV Continuous <Continuous>  tamsulosin 0.4 milliGRAM(s) Oral at bedtime  timolol 0.5% Solution 1 Drop(s) Both EYES two times a day    MEDICATIONS  (PRN):  acetaminophen   Tablet .. 650 milliGRAM(s) Oral every 6 hours PRN Mild Pain (1 - 3), Moderate Pain (4 - 6)  ALPRAZolam 0.5 milliGRAM(s) Oral two times a day PRN anxiety  heparin   Injectable 5500 Unit(s) IV Push every 6 hours PRN For aPTT less than 40  heparin   Injectable 2500 Unit(s) IV Push every 6 hours PRN For aPTT between 40 - 57  morphine  - Injectable 2 milliGRAM(s) IV Push every 4 hours PRN Severe Pain (7 - 10)  sodium chloride 0.9% lock flush 10 milliLiter(s) IV Push every 1 hour PRN Pre/post blood products, medications, blood draw, and to maintain line patency  traMADol 25 milliGRAM(s) Oral every 6 hours PRN Moderate Pain (4 - 6)    Pertinent Labs: 10-30 @ 10:16: Na 131<L>, BUN 71<H>, Cr 2.30<H>, <H>, K+ 5.8<H>  10-30 @ 06:32: K+ 6.0<H>  10-30 @ 04:27: Na 132<L>, BUN 65<H>, Cr 2.26<H>, <H>, K+ 6.2<HH>, Phos 3.4  10-29 @ 20:08: Na 127<L>, BUN 60<H>, Cr 2.10<H>, <H>, K+ 5.9<H>, Phos 3.1  10-29 @ 15:48: Na 129<L>, BUN 57<H>, Cr 2.03<H>, <H>, K+ 5.5<H>, Phos 3.3  -High K was noted by PA and is being treated.     Finger Sticks:  POCT Blood Glucose.: 243 mg/dL (10-30 @ 08:13)  POCT Blood Glucose.: 165 mg/dL (10-30 @ 07:35)    Skin per nursing documentation: No pressure injuries noted.  Edema per nursing documentation: +4 L arm    Estimated Needs:   [x] no change since previous assessment  Based on lowest wt in-house: 144.4 lbs (10/17)  Estimated Energy Needs: 30-35 kcals/k2304-6005 kcals  Estimated Protein Needs: 1.2-1.4 gm/k.48-91.56 gm  Estimated Fluid Needs: 30-35 cc/k6412-2311 cc    Previous Nutrition Diagnosis: Severe malnutrition   Nutrition Diagnosis is: ongoing and being addressed with encouraged PO intake and recommended nutrition supplements.     New Nutrition Diagnosis: N/A    Recommend  1) Continue current Soft diet order with no therapeutic restrictions in setting of poor PO intake; consistency deferred to SLP and provider.   2) Continue to provide Ensure Enlive 3x Daily to optimize nutrient status.   3) Reinforce Increased protein-energy needs as able.     Monitoring and Evaluation:   Continue to monitor Nutritional intake, Tolerance to diet prescription, weights, labs, skin integrity    RD remains available upon request and will follow up per protocol  Chelo Coppola MS, RD, Pager #[phone] Nutrition Follow Up Note  Patient seen for: malnutrition length of stay follow up. Chart reviewed and events noted.    Pt 85 y/o male with PMH of NHL (last chemo ), HTN, HLD, CKD, BPH, anemia, syncope, anxiety/adjustment disorder/depression. Presents here from [facility] where he was getting treated for L shoulder infection (s/p ORIF for pathologic fx) for further management of his diffuse large B cell lymphoma and L shoulder lesion. PICC placed, started on chemotherapy S/p Rasburicase on 10/27. started treatment with Bendamustine/Rituxan. C1D1 on 10/28, every 28 days cycle.     Source:   Medical record and pt    Diet :   Soft  Ensure Enlive Cans or Servings Per Day:  3x Daily    Patient reports: that appetite and PO intake are now poor to fair. Pt c poor intake of meals, however is taking most of the ordered Ensure Enlive x3 daily; pt is happy c new flavor. Pt is tolerating food texture well. Pt with no known recent N/V, or diarrhea. Pt c constipation, hasn't had a BM since 10/24. Pt on bowel regimen of senna and Miralax. Reinforced increased protein-energy needs in setting of recent wt loss. Advised pt to begin meal with protein intake and drink Ensure Enlive between meals to avoid feeling full during mealtime. Pt declined to provide additional food preferences at this time.      PO intake :  25-50% of meals in-house  >75% of ordered Ensure Enlive      Source for PO intake:  Pt and RN flowsheet    Daily Weight in lbs: 158.9 (10-28), 154.5 (10-21), 144.4 (10-17)  Wt fluctuations noted and likely related to fluid shifts. Pt c +4 L arm edema (previously none)     Pertinent Medications: MEDICATIONS  (STANDING):  allopurinol 100 milliGRAM(s) Oral daily  amLODIPine   Tablet 2.5 milliGRAM(s) Oral daily  atorvastatin 80 milliGRAM(s) Oral at bedtime  chlorhexidine 2% Cloths 1 Application(s) Topical daily  chlorhexidine 4% Liquid 1 Application(s) Topical <User Schedule>  dexAMETHasone  IVPB 8 milliGRAM(s) IV Intermittent every 24 hours  heparin  Infusion.  Unit(s)/Hr (12 mL/Hr) IV Continuous <Continuous>  influenza   Vaccine 0.5 milliLiter(s) IntraMuscular once  lactobacillus acidophilus 1 Tablet(s) Oral daily  latanoprost 0.005% Ophthalmic Solution 1 Drop(s) Both EYES at bedtime  mirtazapine 15 milliGRAM(s) Oral at bedtime  ondansetron  IVPB 16 milliGRAM(s) IV Intermittent every 24 hours  polyethylene glycol 3350 17 Gram(s) Oral daily  rasburicase IVPB 3 milliGRAM(s) IV Intermittent once  senna 2 Tablet(s) Oral at bedtime  sodium chloride 0.9%. 1000 milliLiter(s) (75 mL/Hr) IV Continuous <Continuous>  tamsulosin 0.4 milliGRAM(s) Oral at bedtime  timolol 0.5% Solution 1 Drop(s) Both EYES two times a day    MEDICATIONS  (PRN):  acetaminophen   Tablet .. 650 milliGRAM(s) Oral every 6 hours PRN Mild Pain (1 - 3), Moderate Pain (4 - 6)  ALPRAZolam 0.5 milliGRAM(s) Oral two times a day PRN anxiety  heparin   Injectable 5500 Unit(s) IV Push every 6 hours PRN For aPTT less than 40  heparin   Injectable 2500 Unit(s) IV Push every 6 hours PRN For aPTT between 40 - 57  morphine  - Injectable 2 milliGRAM(s) IV Push every 4 hours PRN Severe Pain (7 - 10)  sodium chloride 0.9% lock flush 10 milliLiter(s) IV Push every 1 hour PRN Pre/post blood products, medications, blood draw, and to maintain line patency  traMADol 25 milliGRAM(s) Oral every 6 hours PRN Moderate Pain (4 - 6)    Pertinent Labs: 10-30 @ 10:16: Na 131<L>, BUN 71<H>, Cr 2.30<H>, <H>, K+ 5.8<H>  10-30 @ 06:32: K+ 6.0<H>  10-30 @ 04:27: Na 132<L>, BUN 65<H>, Cr 2.26<H>, <H>, K+ 6.2<HH>, Phos 3.4  10-29 @ 20:08: Na 127<L>, BUN 60<H>, Cr 2.10<H>, <H>, K+ 5.9<H>, Phos 3.1  10-29 @ 15:48: Na 129<L>, BUN 57<H>, Cr 2.03<H>, <H>, K+ 5.5<H>, Phos 3.3  -High K was noted by PA and is being treated.     Finger Sticks:  POCT Blood Glucose.: 243 mg/dL (10-30 @ 08:13)  POCT Blood Glucose.: 165 mg/dL (10-30 @ 07:35)    Skin per nursing documentation: No pressure injuries noted.  Edema per nursing documentation: +4 L arm    Estimated Needs:   [x] no change since previous assessment  Based on lowest wt in-house: 144.4 lbs (10/17)  Estimated Energy Needs: 30-35 kcals/k2554-7122 kcals  Estimated Protein Needs: 1.2-1.4 gm/k.48-91.56 gm  Estimated Fluid Needs: 30-35 cc/k9069-4829 cc    Previous Nutrition Diagnosis: Severe malnutrition   Nutrition Diagnosis is: ongoing and being addressed with encouraged PO intake and recommended nutrition supplements.     New Nutrition Diagnosis: N/A    Recommend  1) Continue current Soft diet order with no therapeutic restrictions in setting of poor PO intake; consistency deferred to SLP and provider.   2) Continue to provide Ensure Enlive 3x Daily to optimize nutrient status.   3) Reinforce Increased protein-energy needs as able.   4) RD to continue to honor previous food preferences and obtain new food preferences as pt provides.     Monitoring and Evaluation:   Continue to monitor Nutritional intake, Tolerance to diet prescription, weights, labs, skin integrity    RD remains available upon request and will follow up per protocol  Chelo Coppola MS, RD, Pager #[phone] Nutrition Follow Up Note  Patient seen for: malnutrition length of stay follow up. Chart reviewed and events noted.    Pt 85 y/o male with PMH of NHL (last chemo ), HTN, HLD, CKD, BPH, anemia, syncope, anxiety/adjustment disorder/depression. Presents here from [facility] where he was getting treated for L shoulder infection (s/p ORIF for pathologic fx) for further management of his diffuse large B cell lymphoma and L shoulder lesion. PICC placed, started on chemotherapy S/p Rasburicase on 10/27. started treatment with Bendamustine/Rituxan. C1D1 on 10/28, every 28 days cycle.     Source:   Medical record, RN, and pt    Diet :   Soft  Ensure Enlive Cans or Servings Per Day:  3x Daily    Patient reports: that appetite and PO intake are now poor to fair. Pt c poor intake of meals, however is taking most of the ordered Ensure Enlive x3 daily; pt is happy c new flavor. Intake Hx confirmed by RN. Pt is tolerating food texture well. Pt with no known recent N/V, constipation, or diarrhea. Last BM 10/29. Pt on bowel regimen of senna and Miralax. Reinforced increased protein-energy needs in setting of recent wt loss. Advised pt to begin meal with protein intake and drink Ensure Enlive between meals to avoid feeling full during mealtime. Pt declined to provide additional food preferences at this time.      PO intake :  25-50% of meals in-house  >75% of ordered Ensure Enlive      Source for PO intake:  Pt and RN flowsheet    Daily Weight in lbs: 158.9 (10-28), 154.5 (10-21), 144.4 (10-17)  Wt fluctuations noted and likely related to fluid shifts. Pt c +4 L arm edema (previously none)     Pertinent Medications: MEDICATIONS  (STANDING):  allopurinol 100 milliGRAM(s) Oral daily  amLODIPine   Tablet 2.5 milliGRAM(s) Oral daily  atorvastatin 80 milliGRAM(s) Oral at bedtime  chlorhexidine 2% Cloths 1 Application(s) Topical daily  chlorhexidine 4% Liquid 1 Application(s) Topical <User Schedule>  dexAMETHasone  IVPB 8 milliGRAM(s) IV Intermittent every 24 hours  heparin  Infusion.  Unit(s)/Hr (12 mL/Hr) IV Continuous <Continuous>  influenza   Vaccine 0.5 milliLiter(s) IntraMuscular once  lactobacillus acidophilus 1 Tablet(s) Oral daily  latanoprost 0.005% Ophthalmic Solution 1 Drop(s) Both EYES at bedtime  mirtazapine 15 milliGRAM(s) Oral at bedtime  ondansetron  IVPB 16 milliGRAM(s) IV Intermittent every 24 hours  polyethylene glycol 3350 17 Gram(s) Oral daily  rasburicase IVPB 3 milliGRAM(s) IV Intermittent once  senna 2 Tablet(s) Oral at bedtime  sodium chloride 0.9%. 1000 milliLiter(s) (75 mL/Hr) IV Continuous <Continuous>  tamsulosin 0.4 milliGRAM(s) Oral at bedtime  timolol 0.5% Solution 1 Drop(s) Both EYES two times a day    MEDICATIONS  (PRN):  acetaminophen   Tablet .. 650 milliGRAM(s) Oral every 6 hours PRN Mild Pain (1 - 3), Moderate Pain (4 - 6)  ALPRAZolam 0.5 milliGRAM(s) Oral two times a day PRN anxiety  heparin   Injectable 5500 Unit(s) IV Push every 6 hours PRN For aPTT less than 40  heparin   Injectable 2500 Unit(s) IV Push every 6 hours PRN For aPTT between 40 - 57  morphine  - Injectable 2 milliGRAM(s) IV Push every 4 hours PRN Severe Pain (7 - 10)  sodium chloride 0.9% lock flush 10 milliLiter(s) IV Push every 1 hour PRN Pre/post blood products, medications, blood draw, and to maintain line patency  traMADol 25 milliGRAM(s) Oral every 6 hours PRN Moderate Pain (4 - 6)    Pertinent Labs: 10-30 @ 10:16: Na 131<L>, BUN 71<H>, Cr 2.30<H>, <H>, K+ 5.8<H>  10-30 @ 06:32: K+ 6.0<H>  10-30 @ 04:27: Na 132<L>, BUN 65<H>, Cr 2.26<H>, <H>, K+ 6.2<HH>, Phos 3.4  10-29 @ 20:08: Na 127<L>, BUN 60<H>, Cr 2.10<H>, <H>, K+ 5.9<H>, Phos 3.1  10-29 @ 15:48: Na 129<L>, BUN 57<H>, Cr 2.03<H>, <H>, K+ 5.5<H>, Phos 3.3  -High K was noted by PA and is being treated.     Finger Sticks:  POCT Blood Glucose.: 243 mg/dL (10-30 @ 08:13)  POCT Blood Glucose.: 165 mg/dL (10-30 @ 07:35)    Skin per nursing documentation: No pressure injuries noted.  Edema per nursing documentation: +4 L arm    Estimated Needs:   [x] no change since previous assessment  Based on lowest wt in-house: 144.4 lbs (10/17)  Estimated Energy Needs: 30-35 kcals/k6026-4784 kcals  Estimated Protein Needs: 1.2-1.4 gm/k.48-91.56 gm  Estimated Fluid Needs: 30-35 cc/k5518-2496 cc    Previous Nutrition Diagnosis: Severe malnutrition   Nutrition Diagnosis is: ongoing and being addressed with encouraged PO intake and recommended nutrition supplements.     New Nutrition Diagnosis: N/A    Recommend  1) Continue current Soft diet order with no therapeutic restrictions in setting of poor PO intake; consistency deferred to SLP and provider.   2) Continue to provide Ensure Enlive 3x Daily to optimize nutrient status.   3) Reinforce Increased protein-energy needs as able.   4) RD to continue to honor previous food preferences and obtain new food preferences as pt provides.     Monitoring and Evaluation:   Continue to monitor Nutritional intake, Tolerance to diet prescription, weights, labs, skin integrity    RD remains available upon request and will follow up per protocol  Chelo Coppola MS, RD, Pager #[phone]

## 2020-10-30 NOTE — PROGRESS NOTE ADULT - SUBJECTIVE AND OBJECTIVE BOX
Patient is a 86y old  Male who presents with a chief complaint of sent here for chemo and radiation (29 Oct 2020 18:23)    SUBJECTIVE / OVERNIGHT EVENTS: no acute events overnight, pt states that he has L arm pain but that it is not bothering him as much currently     MEDICATIONS  (STANDING):  allopurinol 100 milliGRAM(s) Oral daily  amLODIPine   Tablet 2.5 milliGRAM(s) Oral daily  atorvastatin 80 milliGRAM(s) Oral at bedtime  chlorhexidine 2% Cloths 1 Application(s) Topical daily  chlorhexidine 4% Liquid 1 Application(s) Topical <User Schedule>  dexAMETHasone  IVPB 8 milliGRAM(s) IV Intermittent every 24 hours  heparin  Infusion.  Unit(s)/Hr (12 mL/Hr) IV Continuous <Continuous>  influenza   Vaccine 0.5 milliLiter(s) IntraMuscular once  lactobacillus acidophilus 1 Tablet(s) Oral daily  latanoprost 0.005% Ophthalmic Solution 1 Drop(s) Both EYES at bedtime  mirtazapine 15 milliGRAM(s) Oral at bedtime  ondansetron  IVPB 16 milliGRAM(s) IV Intermittent every 24 hours  polyethylene glycol 3350 17 Gram(s) Oral daily  rasburicase IVPB 3 milliGRAM(s) IV Intermittent once  senna 2 Tablet(s) Oral at bedtime  sodium chloride 0.9%. 1000 milliLiter(s) (75 mL/Hr) IV Continuous <Continuous>  tamsulosin 0.4 milliGRAM(s) Oral at bedtime  timolol 0.5% Solution 1 Drop(s) Both EYES two times a day    MEDICATIONS  (PRN):  acetaminophen   Tablet .. 650 milliGRAM(s) Oral every 6 hours PRN Mild Pain (1 - 3), Moderate Pain (4 - 6)  ALPRAZolam 0.5 milliGRAM(s) Oral two times a day PRN anxiety  heparin   Injectable 5500 Unit(s) IV Push every 6 hours PRN For aPTT less than 40  heparin   Injectable 2500 Unit(s) IV Push every 6 hours PRN For aPTT between 40 - 57  morphine  - Injectable 2 milliGRAM(s) IV Push every 4 hours PRN Severe Pain (7 - 10)  sodium chloride 0.9% lock flush 10 milliLiter(s) IV Push every 1 hour PRN Pre/post blood products, medications, blood draw, and to maintain line patency  traMADol 25 milliGRAM(s) Oral every 6 hours PRN Moderate Pain (4 - 6)      Vital Signs Last 24 Hrs  T(C): 36.9 (30 Oct 2020 07:27), Max: 36.9 (30 Oct 2020 07:27)  T(F): 98.4 (30 Oct 2020 07:27), Max: 98.4 (30 Oct 2020 07:27)  HR: 101 (30 Oct 2020 07:27) (99 - 105)  BP: 170/70 (30 Oct 2020 07:27) (107/47 - 170/70)  BP(mean): --  RR: 18 (30 Oct 2020 07:27) (18 - 18)  SpO2: 94% (30 Oct 2020 07:27) (94% - 98%)  CAPILLARY BLOOD GLUCOSE      POCT Blood Glucose.: 243 mg/dL (30 Oct 2020 08:13)  POCT Blood Glucose.: 165 mg/dL (30 Oct 2020 07:35)    I&O's Summary    29 Oct 2020 07:01  -  30 Oct 2020 07:00  --------------------------------------------------------  IN: 856 mL / OUT: 650 mL / NET: 206 mL        PHYSICAL EXAM:  GENERAL: NAD  EYES: conjunctiva and sclera clear  CHEST/LUNG: no wheezing noted   HEART: +S1/S2, reg   ABDOMEN: Soft, Nontender   EXTREMITIES: LUE edema   PSYCH: Alert and awake, appropriate     LABS:                        8.0    16.24 )-----------( 392      ( 30 Oct 2020 04:27 )             24.1     10-30    131<L>  |  94<L>  |  71<H>  ----------------------------<  198<H>  5.8<H>   |  21<L>  |  2.30<H>    Ca    8.7      30 Oct 2020 10:16  Phos  3.4     10-30    TPro  6.0  /  Alb  2.5<L>  /  TBili  0.2  /  DBili  x   /  AST  65<H>  /  ALT  30  /  AlkPhos  81  10-30    PTT - ( 30 Oct 2020 08:37 )  PTT:63.0 sec

## 2020-10-30 NOTE — CONSULT NOTE ADULT - SUBJECTIVE AND OBJECTIVE BOX
[facility] Division of Kidney Diseases & Hypertension  INITIAL CONSULT NOTE  [phone]--------------------------------------------------------------------------------  HPI: 86 year old male with PMHx of DLBCL (s/p 6 cycles of R-CHOP and in DELANEY until 9/2019) shoulder mass with pathologic fracture s/p left proximal humerus open biopsy, radical resection of tumor, and ORIF with IMN and cementation (9/25/20),  who was transferred to [facility] from the [facility] for further management of his diffuse large B cell lymphoma. Pt admitted for possible relapse of DLBCL. Nephrology team consulted for DANIELITO on CKD possible TLS    Patient underwent CT  C/A/P with IV contrast on 10/19 with abd mass, LN. MRI Brain with contrast- New 2.2 x 1.6 x 1.1 cm   uniformly enhancing, T1 and T2 isointense high right posterior frontal extra-axial mass. The lesion demonstrates restricted diffusion. Patient also being treated for Osteomyelitis of shoulder, left and was on antibiotics from 10/10-10/29. He had an MRI of L shoulder showing aggressive destruction of proximal humerus with concern for pathological fractures. Area of humeral head concerning for possible tumor infiltration vs superimposed infection a/p IR guided biopsy (10/20) results concerning for diffuse large B cell.     Patient received Rasburicase on 10/27/20 and started treatment with Bendamustine/Rituxan (C1D1) on 10/28, every 28 days cycle. He was also started NS @ 75cc/ hour. His labs are notable for persistent hyperkalemia since 10/29 and hyponatremia. LDH elevated at 1687. Patient reports history of kidney disease. His serum creatinine appears to be within 1.5-1.7 upon review of labs on [place]. On admission, his serum creatinine was 1.7 (10/18) and appears to be increasing to 1.9 on 10/29/20 and to 2.30 today (10/3/20). Patient seen and evaluated today, her reports having poor appetite. No SOB, nausea/vomiting, diarrhea, abdominal pain. He states that he has been feeling short of breath especially on exertion. He also states that he has been taking pain relievers 2x/day (unclear if they were NSAIDs) for 4-5 weeks prior to admission from [facility].     PAST HISTORY  --------------------------------------------------------------------------------  PAST MEDICAL & SURGICAL HISTORY:  Syncope    Chronic kidney disease (CKD)    Diffuse large B cell lymphoma    HLD (hyperlipidemia)    BPH (benign prostatic hyperplasia)    HTN (hypertension)    History of left shoulder fracture    Osteomyelitis of left shoulder region      FAMILY HISTORY:  FH: brain cancer  brother    FH: lung cancer  father    PAST SOCIAL HISTORY:    ALLERGIES & MEDICATIONS  --------------------------------------------------------------------------------  Allergies    No Known Allergies    Intolerances    Standing Inpatient Medications  allopurinol 100 milliGRAM(s) Oral daily  amLODIPine   Tablet 2.5 milliGRAM(s) Oral daily  atorvastatin 80 milliGRAM(s) Oral at bedtime  chlorhexidine 2% Cloths 1 Application(s) Topical daily  chlorhexidine 4% Liquid 1 Application(s) Topical <User Schedule>  dexAMETHasone  IVPB 8 milliGRAM(s) IV Intermittent every 24 hours  heparin  Infusion.  Unit(s)/Hr IV Continuous <Continuous>  influenza   Vaccine 0.5 milliLiter(s) IntraMuscular once  lactobacillus acidophilus 1 Tablet(s) Oral daily  latanoprost 0.005% Ophthalmic Solution 1 Drop(s) Both EYES at bedtime  mirtazapine 15 milliGRAM(s) Oral at bedtime  ondansetron  IVPB 16 milliGRAM(s) IV Intermittent every 24 hours  polyethylene glycol 3350 17 Gram(s) Oral daily  rasburicase IVPB 3 milliGRAM(s) IV Intermittent once  senna 2 Tablet(s) Oral at bedtime  sodium chloride 0.9%. 1000 milliLiter(s) IV Continuous <Continuous>  tamsulosin 0.4 milliGRAM(s) Oral at bedtime  timolol 0.5% Solution 1 Drop(s) Both EYES two times a day    PRN Inpatient Medications  acetaminophen   Tablet .. 650 milliGRAM(s) Oral every 6 hours PRN  ALPRAZolam 0.5 milliGRAM(s) Oral two times a day PRN  heparin   Injectable 5500 Unit(s) IV Push every 6 hours PRN  heparin   Injectable 2500 Unit(s) IV Push every 6 hours PRN  morphine  - Injectable 2 milliGRAM(s) IV Push every 4 hours PRN  sodium chloride 0.9% lock flush 10 milliLiter(s) IV Push every 1 hour PRN  traMADol 25 milliGRAM(s) Oral every 6 hours PRN      REVIEW OF SYSTEMS  --------------------------------------------------------------------------------  Gen: No  fevers/chills, weakness  Skin: No rashes  Head/Eyes/Ears/Mouth: No headache; Normal hearing; Normal vision w/o blurriness;   Respiratory: No dyspnea, cough, wheezing, hemoptysis  CV: No chest pain,   GI: No abdominal pain, diarrhea, constipation, nausea, vomiting  : No increased frequency, dysuria, hematuria, nocturia  MSK: No joint pain/swelling;   Neuro: No dizziness/lightheadedness, weakness, seizures    All other systems were reviewed and are negative, except as noted.    VITALS/PHYSICAL EXAM  --------------------------------------------------------------------------------  T(C): 36.4 (10-30-20 @ 15:32), Max: 36.9 (10-30-20 @ 07:27)  HR: 112 (10-30-20 @ 15:32) (101 - 112)  BP: 107/74 (10-30-20 @ 15:32) (107/74 - 170/70)  RR: 18 (10-30-20 @ 15:32) (18 - 18)  SpO2: 95% (10-30-20 @ 15:32) (94% - 98%)  Wt(kg): --        10-29-20 @ 07:01  -  10-30-20 @ 07:00  --------------------------------------------------------  IN: 856 mL / OUT: 650 mL / NET: 206 mL    10-30-20 @ 07:01  -  10-30-20 @ 16:00  --------------------------------------------------------  IN: 250 mL / OUT: 0 mL / NET: 250 mL      Physical Exam:  	Gen: NAD, well-appearing  	HEENT: PERRL, supple neck  	Pulm: CTA B/L  	CV:  S1S2  	Abd: +BS, soft   	Ext: No B/L Lower ext edema  	Neuro: No focal deficits  	Skin: Warm, without rashes  	Vascular access:     LABS/STUDIES  --------------------------------------------------------------------------------              8.0    16.24 >-----------<  392      [10-30-20 @ 04:27]              24.1     131  |  94  |  71  ----------------------------<  198      [10-30-20 @ 10:16]  5.8   |  21  |  2.30        Ca     8.7     [10-30-20 @ 10:16]      Phos  3.4     [10-30-20 @ 04:27]    TPro  6.0  /  Alb  2.5  /  TBili  0.2  /  DBili  x   /  AST  65  /  ALT  30  /  AlkPhos  81  [10-30-20 @ 10:16]      PTT: 63.0       [10-30-20 @ 08:37]    Uric acid 5.5      [10-30-20 @ 10:16]  LDH 1687      [10-30-20 @ 04:27]    Creatinine Trend:  SCr 2.30 [10-30 @ 10:16]  SCr 2.26 [10-30 @ 04:27]  SCr 2.10 [10-29 @ 20:08]  SCr 2.03 [10-29 @ 15:48]  SCr 1.95 [10-29 @ 07:07]   [facility] Division of Kidney Diseases & Hypertension  INITIAL CONSULT NOTE  [phone]--------------------------------------------------------------------------------  HPI: 86 year old male with PMHx of DLBCL (s/p 6 cycles of R-CHOP and in [facility] until 9/2019) shoulder mass with pathologic fracture s/p left proximal humerus open biopsy, radical resection of tumor, and ORIF with IMN and cementation (9/25/20),  who was transferred to [facility] from the [facility] for further management of his diffuse large B cell lymphoma. Pt admitted for possible relapse of DLBCL. Nephrology team consulted for DANIELITO on CKD possible TLS/TLS monitoring.     Patient underwent CT  C/A/P with IV contrast on 10/19 with abd mass, LN. MRI Brain with contrast- New 2.2 x 1.6 x 1.1 cm   uniformly enhancing, T1 and T2 isointense high right posterior frontal extra-axial mass. The lesion demonstrates restricted diffusion. Patient also being treated for Osteomyelitis of shoulder, left and was on antibiotics from 10/10-10/29. He had an MRI of L shoulder showing aggressive destruction of proximal humerus with concern for pathological fractures. Area of humeral head concerning for possible tumor infiltration vs superimposed infection a/p IR guided biopsy (10/20) results concerning for diffuse large B cell.     Patient received Rasburicase on 10/27/20 and started treatment with Bendamustine/Rituxan (C1D1) on 10/28, every 28 days cycle. He was also started NS @ 75cc/ hour. His labs are notable for persistent hyperkalemia since 10/29 and hyponatremia. LDH elevated at 1687. Patient reports history of kidney disease. His serum creatinine appears to be within 1.5-1.7 upon review of labs on [place]. On admission, his serum creatinine was 1.7 (10/18) and appears to be increasing to 1.9 on 10/29/20 and to 2.30 today (10/3/20). Patient seen and evaluated today, her reports having poor appetite. No SOB, nausea/vomiting, diarrhea, abdominal pain. He states that he has been feeling short of breath especially on exertion. He also states that he has been taking pain relievers 2x/day (unclear if they were NSAIDs) for 4-5 weeks prior to admission from [facility].     PAST HISTORY  --------------------------------------------------------------------------------  PAST MEDICAL & SURGICAL HISTORY:  Syncope    Chronic kidney disease (CKD)    Diffuse large B cell lymphoma    HLD (hyperlipidemia)    BPH (benign prostatic hyperplasia)    HTN (hypertension)    History of left shoulder fracture    Osteomyelitis of left shoulder region      FAMILY HISTORY:  FH: brain cancer  brother    FH: lung cancer  father    PAST SOCIAL HISTORY:    ALLERGIES & MEDICATIONS  --------------------------------------------------------------------------------  Allergies    No Known Allergies    Intolerances    Standing Inpatient Medications  allopurinol 100 milliGRAM(s) Oral daily  amLODIPine   Tablet 2.5 milliGRAM(s) Oral daily  atorvastatin 80 milliGRAM(s) Oral at bedtime  chlorhexidine 2% Cloths 1 Application(s) Topical daily  chlorhexidine 4% Liquid 1 Application(s) Topical <User Schedule>  dexAMETHasone  IVPB 8 milliGRAM(s) IV Intermittent every 24 hours  heparin  Infusion.  Unit(s)/Hr IV Continuous <Continuous>  influenza   Vaccine 0.5 milliLiter(s) IntraMuscular once  lactobacillus acidophilus 1 Tablet(s) Oral daily  latanoprost 0.005% Ophthalmic Solution 1 Drop(s) Both EYES at bedtime  mirtazapine 15 milliGRAM(s) Oral at bedtime  ondansetron  IVPB 16 milliGRAM(s) IV Intermittent every 24 hours  polyethylene glycol 3350 17 Gram(s) Oral daily  rasburicase IVPB 3 milliGRAM(s) IV Intermittent once  senna 2 Tablet(s) Oral at bedtime  sodium chloride 0.9%. 1000 milliLiter(s) IV Continuous <Continuous>  tamsulosin 0.4 milliGRAM(s) Oral at bedtime  timolol 0.5% Solution 1 Drop(s) Both EYES two times a day    PRN Inpatient Medications  acetaminophen   Tablet .. 650 milliGRAM(s) Oral every 6 hours PRN  ALPRAZolam 0.5 milliGRAM(s) Oral two times a day PRN  heparin   Injectable 5500 Unit(s) IV Push every 6 hours PRN  heparin   Injectable 2500 Unit(s) IV Push every 6 hours PRN  morphine  - Injectable 2 milliGRAM(s) IV Push every 4 hours PRN  sodium chloride 0.9% lock flush 10 milliLiter(s) IV Push every 1 hour PRN  traMADol 25 milliGRAM(s) Oral every 6 hours PRN      REVIEW OF SYSTEMS  --------------------------------------------------------------------------------  Gen: No  fevers/chills, + weakness  Skin: No rashes  Head/Eyes/Ears/Mouth: No headache  Respiratory: + SOB  CV: No chest pain,   GI: No abdominal pain, diarrhea, constipation, nausea, vomiting  : No increased frequency, dysuria, hematuria, nocturia  MSK: + left shoulder masss  Neuro: No dizziness/lightheadedness, weakness,     All other systems were reviewed and are negative, except as noted.    VITALS/PHYSICAL EXAM  --------------------------------------------------------------------------------  T(C): 36.4 (10-30-20 @ 15:32), Max: 36.9 (10-30-20 @ 07:27)  HR: 112 (10-30-20 @ 15:32) (101 - 112)  BP: 107/74 (10-30-20 @ 15:32) (107/74 - 170/70)  RR: 18 (10-30-20 @ 15:32) (18 - 18)  SpO2: 95% (10-30-20 @ 15:32) (94% - 98%)  Wt(kg): --        10-29-20 @ 07:01  -  10-30-20 @ 07:00  --------------------------------------------------------  IN: 856 mL / OUT: 650 mL / NET: 206 mL    10-30-20 @ 07:01  -  10-30-20 @ 16:00  --------------------------------------------------------  IN: 250 mL / OUT: 0 mL / NET: 250 mL      Physical Exam:  	Gen: NAD, elderly male on NC  	HEENT: MMM  	Pulm: CTA B/L  	CV:  S1S2  	Abd: +BS, soft   	Ext: No B/L Lower ext edema, Left shoulder mass with varicosities LUE swelling  	Neuro: No focal deficits  	Skin: Warm, without rashes  	Vascular access:     LABS/STUDIES  --------------------------------------------------------------------------------              8.0    16.24 >-----------<  392      [10-30-20 @ 04:27]              24.1     131  |  94  |  71  ----------------------------<  198      [10-30-20 @ 10:16]  5.8   |  21  |  2.30        Ca     8.7     [10-30-20 @ 10:16]      Phos  3.4     [10-30-20 @ 04:27]    TPro  6.0  /  Alb  2.5  /  TBili  0.2  /  DBili  x   /  AST  65  /  ALT  30  /  AlkPhos  81  [10-30-20 @ 10:16]      PTT: 63.0       [10-30-20 @ 08:37]    Uric acid 5.5      [10-30-20 @ 10:16]  LDH 1687      [10-30-20 @ 04:27]    Creatinine Trend:  SCr 2.30 [10-30 @ 10:16]  SCr 2.26 [10-30 @ 04:27]  SCr 2.10 [10-29 @ 20:08]  SCr 2.03 [10-29 @ 15:48]  SCr 1.95 [10-29 @ 07:07]   [facility] Division of Kidney Diseases & Hypertension  INITIAL CONSULT NOTE  [phone]--------------------------------------------------------------------------------  HPI: 86 year old male with PMHx of HTN, HLD, CKD, BPH, DLBCL (s/p 6 cycles of R-CHOP and in [facility] until 9/2019) shoulder mass with pathologic fracture s/p left proximal humerus open biopsy, radical resection of tumor, and ORIF with IMN and cementation (9/25/20),  who was transferred to [facility] from the [facility] for further management of his diffuse large B cell lymphoma. Pt admitted for possible relapse of DLBCL. Nephrology team consulted for DANIELITO on CKD possible TLS/TLS monitoring.     Patient underwent CT  C/A/P with IV contrast on 10/19 with abd mass, LN. MRI Brain with contrast- New 2.2 x 1.6 x 1.1 cm   uniformly enhancing, T1 and T2 isointense high right posterior frontal extra-axial mass. The lesion demonstrates restricted diffusion. Patient also being treated for Osteomyelitis of shoulder, left and was on antibiotics from 10/10-10/29. He had an MRI of L shoulder showing aggressive destruction of proximal humerus with concern for pathological fractures. Area of humeral head concerning for possible tumor infiltration vs superimposed infection a/p IR guided biopsy (10/20) results concerning for diffuse large B cell.     Patient received Rasburicase on 10/27/20 and started treatment with Bendamustine/Rituxan (C1D1) on 10/28, every 28 days cycle. He was also started NS @ 75cc/ hour. His labs are notable for persistent hyperkalemia since 10/29 and hyponatremia. LDH elevated at 1687. Patient reports history of kidney disease. His serum creatinine appears to be within 1.5-1.7 upon review of labs on [place]. On admission, his serum creatinine was 1.7 (10/18) and appears to be increasing to 1.9 on 10/29/20 and to 2.30 today (10/3/20). Patient seen and evaluated today, her reports having poor appetite. No SOB, nausea/vomiting, diarrhea, abdominal pain. He states that he has been feeling short of breath especially on exertion. He also states that he has been taking pain relievers 2x/day (unclear if they were NSAIDs) for 4-5 weeks prior to admission from [facility].     PAST HISTORY  --------------------------------------------------------------------------------  PAST MEDICAL & SURGICAL HISTORY:  Syncope    Chronic kidney disease (CKD)    Diffuse large B cell lymphoma    HLD (hyperlipidemia)    BPH (benign prostatic hyperplasia)    HTN (hypertension)    History of left shoulder fracture    Osteomyelitis of left shoulder region      FAMILY HISTORY:  FH: brain cancer  brother    FH: lung cancer  father    PAST SOCIAL HISTORY:    ALLERGIES & MEDICATIONS  --------------------------------------------------------------------------------  Allergies    No Known Allergies    Intolerances    Standing Inpatient Medications  allopurinol 100 milliGRAM(s) Oral daily  amLODIPine   Tablet 2.5 milliGRAM(s) Oral daily  atorvastatin 80 milliGRAM(s) Oral at bedtime  chlorhexidine 2% Cloths 1 Application(s) Topical daily  chlorhexidine 4% Liquid 1 Application(s) Topical <User Schedule>  dexAMETHasone  IVPB 8 milliGRAM(s) IV Intermittent every 24 hours  heparin  Infusion.  Unit(s)/Hr IV Continuous <Continuous>  influenza   Vaccine 0.5 milliLiter(s) IntraMuscular once  lactobacillus acidophilus 1 Tablet(s) Oral daily  latanoprost 0.005% Ophthalmic Solution 1 Drop(s) Both EYES at bedtime  mirtazapine 15 milliGRAM(s) Oral at bedtime  ondansetron  IVPB 16 milliGRAM(s) IV Intermittent every 24 hours  polyethylene glycol 3350 17 Gram(s) Oral daily  rasburicase IVPB 3 milliGRAM(s) IV Intermittent once  senna 2 Tablet(s) Oral at bedtime  sodium chloride 0.9%. 1000 milliLiter(s) IV Continuous <Continuous>  tamsulosin 0.4 milliGRAM(s) Oral at bedtime  timolol 0.5% Solution 1 Drop(s) Both EYES two times a day    PRN Inpatient Medications  acetaminophen   Tablet .. 650 milliGRAM(s) Oral every 6 hours PRN  ALPRAZolam 0.5 milliGRAM(s) Oral two times a day PRN  heparin   Injectable 5500 Unit(s) IV Push every 6 hours PRN  heparin   Injectable 2500 Unit(s) IV Push every 6 hours PRN  morphine  - Injectable 2 milliGRAM(s) IV Push every 4 hours PRN  sodium chloride 0.9% lock flush 10 milliLiter(s) IV Push every 1 hour PRN  traMADol 25 milliGRAM(s) Oral every 6 hours PRN      REVIEW OF SYSTEMS  --------------------------------------------------------------------------------  Gen: No  fevers/chills, + weakness  Skin: No rashes  Head/Eyes/Ears/Mouth: No headache  Respiratory: + SOB  CV: No chest pain,   GI: No abdominal pain, diarrhea, constipation, nausea, vomiting  : No increased frequency, dysuria, hematuria, nocturia  MSK: + left shoulder masss  Neuro: No dizziness/lightheadedness, weakness,     All other systems were reviewed and are negative, except as noted.    VITALS/PHYSICAL EXAM  --------------------------------------------------------------------------------  T(C): 36.4 (10-30-20 @ 15:32), Max: 36.9 (10-30-20 @ 07:27)  HR: 112 (10-30-20 @ 15:32) (101 - 112)  BP: 107/74 (10-30-20 @ 15:32) (107/74 - 170/70)  RR: 18 (10-30-20 @ 15:32) (18 - 18)  SpO2: 95% (10-30-20 @ 15:32) (94% - 98%)  Wt(kg): --        10-29-20 @ 07:01  -  10-30-20 @ 07:00  --------------------------------------------------------  IN: 856 mL / OUT: 650 mL / NET: 206 mL    10-30-20 @ 07:01  -  10-30-20 @ 16:00  --------------------------------------------------------  IN: 250 mL / OUT: 0 mL / NET: 250 mL      Physical Exam:  	Gen: NAD, elderly male on NC  	HEENT: MMM  	Pulm: CTA B/L  	CV:  S1S2  	Abd: +BS, soft   	Ext: No B/L Lower ext edema, Left shoulder mass with varicosities LUE swelling  	Neuro: No focal deficits  	Skin: Warm, without rashes  	Vascular access:     LABS/STUDIES  --------------------------------------------------------------------------------              8.0    16.24 >-----------<  392      [10-30-20 @ 04:27]              24.1     131  |  94  |  71  ----------------------------<  198      [10-30-20 @ 10:16]  5.8   |  21  |  2.30        Ca     8.7     [10-30-20 @ 10:16]      Phos  3.4     [10-30-20 @ 04:27]    TPro  6.0  /  Alb  2.5  /  TBili  0.2  /  DBili  x   /  AST  65  /  ALT  30  /  AlkPhos  81  [10-30-20 @ 10:16]      PTT: 63.0       [10-30-20 @ 08:37]    Uric acid 5.5      [10-30-20 @ 10:16]  LDH 1687      [10-30-20 @ 04:27]    Creatinine Trend:  SCr 2.30 [10-30 @ 10:16]  SCr 2.26 [10-30 @ 04:27]  SCr 2.10 [10-29 @ 20:08]  SCr 2.03 [10-29 @ 15:48]  SCr 1.95 [10-29 @ 07:07]

## 2020-10-30 NOTE — PROGRESS NOTE ADULT - SUBJECTIVE AND OBJECTIVE BOX
INTERVAL EVENTS:  No acute events overnight. Pt tolerated Bendamustine D#2 yesterday. His left shoulder pain is well controlled. c/o of feeling fatigue. Denies any fever, chills, night sweat, CP, SOB, abd pain, constipation, diarrhea, dysuria    Vital Signs Last 24 Hrs  T(C): 36.4 (30 Oct 2020 15:32), Max: 36.9 (30 Oct 2020 07:27)  T(F): 97.5 (30 Oct 2020 15:32), Max: 98.4 (30 Oct 2020 07:27)  HR: 112 (30 Oct 2020 15:32) (101 - 112)  BP: 107/74 (30 Oct 2020 15:32) (107/74 - 170/70)  BP(mean): --  RR: 18 (30 Oct 2020 15:32) (18 - 18)  SpO2: 95% (30 Oct 2020 15:32) (94% - 98%)      PHYSICAL EXAM:   GENERAL: no acute distress  HEENT: EOMI, neck supple  RESPIRATORY: LCTAB/L, no rhonchi, rales, or wheezing  CARDIOVASCULAR: RRR, no murmurs, gallops, rubs  ABDOMINAL: soft, non-tender, non-distended, positive bowel sounds   EXTREMITIES: + Left shoulder swelling and pain with subsequent edema of the left forearm  NEUROLOGICAL: alert and oriented x 3, non-focal  SKIN: no rashes or lesions   MUSCULOSKELETAL: no gross joint deformity                          8.0    16.24 )-----------( 392      ( 30 Oct 2020 04:27 )             24.1     10-30    131<L>  |  94<L>  |  71<H>  ----------------------------<  198<H>  5.8<H>   |  21<L>  |  2.30<H>    Ca    8.7      30 Oct 2020 10:16  Phos  3.4     10-30    TPro  6.0  /  Alb  2.5<L>  /  TBili  0.2  /  DBili  x   /  AST  65<H>  /  ALT  30  /  AlkPhos  81  10-30    PTT - ( 30 Oct 2020 08:37 )  PTT:63.0 sec    MEDICATIONS  (STANDING):  allopurinol 100 milliGRAM(s) Oral daily  amLODIPine   Tablet 2.5 milliGRAM(s) Oral daily  atorvastatin 80 milliGRAM(s) Oral at bedtime  chlorhexidine 2% Cloths 1 Application(s) Topical daily  chlorhexidine 4% Liquid 1 Application(s) Topical <User Schedule>  heparin  Infusion.  Unit(s)/Hr (12 mL/Hr) IV Continuous <Continuous>  influenza   Vaccine 0.5 milliLiter(s) IntraMuscular once  lactobacillus acidophilus 1 Tablet(s) Oral daily  latanoprost 0.005% Ophthalmic Solution 1 Drop(s) Both EYES at bedtime  mirtazapine 15 milliGRAM(s) Oral at bedtime  polyethylene glycol 3350 17 Gram(s) Oral daily  rasburicase IVPB 3 milliGRAM(s) IV Intermittent once  senna 2 Tablet(s) Oral at bedtime  sodium bicarbonate 650 milliGRAM(s) Oral two times a day  sodium chloride 0.9%. 1000 milliLiter(s) (75 mL/Hr) IV Continuous <Continuous>  tamsulosin 0.4 milliGRAM(s) Oral at bedtime  timolol 0.5% Solution 1 Drop(s) Both EYES two times a day

## 2020-10-30 NOTE — CHART NOTE - NSCHARTNOTEFT_GEN_A_CORE
IR Event Note    Called earlier today for right upper extremity with tip in the left subclavian vein on chest radiograph.    Seen at bedside and both lumens of right upper extremity PICC flushed aggressively with 3cc and 10cc syringe.     Repeat radiograph shows PICC with tip in SVC.     Right upper extremity PICC ok to use.   No need for exchange at this time.

## 2020-10-30 NOTE — CHART NOTE - NSCHARTNOTEFT_GEN_A_CORE
Right-sided PICC line crosses midline and enters the left brachiocephalic vein----IR called for adjustment. Dr. Joanne burleson.

## 2020-10-30 NOTE — PROGRESS NOTE ADULT - PROBLEM SELECTOR PLAN 1
-Patient was sent here for further management especially chemo and radiation  Heme/onc consulted appreciated, PET obtained.   CT C/A/P with abd mass, LN. MRI Brain w/ contrast- New 2.2 x 1.6 x 1.1 cm   uniformly enhancing, T1 and T2 isointense high right posterior frontal extra-axial mass. The lesion demonstrates restricted diffusion.   - Check CBC WITH DIFF and TLS labs (CMP, Phos, LDH, and Uric acid) daily  started allopurinol to prevent tumor lysis syndrome  -s/p BMB   -biopsy concerning for diffuse large b-cell  -PICC placed  -started on chemotherapy   -LP possibly today vs next week, discussed with heme onc, to get back to us   -concern for TLS based on labwork, will c/w IV hydration, and monitor labs. Renal consult to be placed.   -Hyperkalemia noted, will treat with IV insulin   -monitor BMP Q6 hrs

## 2020-10-30 NOTE — PROGRESS NOTE ADULT - SUBJECTIVE AND OBJECTIVE BOX
Patient is a 86y old  Male who presents with a chief complaint of sent here for chemo and radiation (30 Oct 2020 12:11)    Being followed by ID for        Interval history:  No other acute events      ROS:  No cough,SOB,CP  No N/V/D  No abd pain  No urinary complaints  No HA  No joint or limb pain  No other complaints    PAST MEDICAL & SURGICAL HISTORY:  Syncope    Chronic kidney disease (CKD)    Diffuse large B cell lymphoma    HLD (hyperlipidemia)    BPH (benign prostatic hyperplasia)    HTN (hypertension)    History of left shoulder fracture    Osteomyelitis of left shoulder region      Allergies    No Known Allergies    Intolerances      Antimicrobials:      MEDICATIONS  (STANDING):  allopurinol 100 milliGRAM(s) Oral daily  amLODIPine   Tablet 2.5 milliGRAM(s) Oral daily  atorvastatin 80 milliGRAM(s) Oral at bedtime  chlorhexidine 2% Cloths 1 Application(s) Topical daily  chlorhexidine 4% Liquid 1 Application(s) Topical <User Schedule>  dexAMETHasone  IVPB 8 milliGRAM(s) IV Intermittent every 24 hours  heparin  Infusion.  Unit(s)/Hr (12 mL/Hr) IV Continuous <Continuous>  influenza   Vaccine 0.5 milliLiter(s) IntraMuscular once  lactobacillus acidophilus 1 Tablet(s) Oral daily  latanoprost 0.005% Ophthalmic Solution 1 Drop(s) Both EYES at bedtime  mirtazapine 15 milliGRAM(s) Oral at bedtime  ondansetron  IVPB 16 milliGRAM(s) IV Intermittent every 24 hours  polyethylene glycol 3350 17 Gram(s) Oral daily  rasburicase IVPB 3 milliGRAM(s) IV Intermittent once  senna 2 Tablet(s) Oral at bedtime  sodium chloride 0.9%. 1000 milliLiter(s) (75 mL/Hr) IV Continuous <Continuous>  tamsulosin 0.4 milliGRAM(s) Oral at bedtime  timolol 0.5% Solution 1 Drop(s) Both EYES two times a day      Vital Signs Last 24 Hrs  T(C): 36.9 (10-30-20 @ 07:27), Max: 36.9 (10-30-20 @ 07:27)  T(F): 98.4 (10-30-20 @ 07:27), Max: 98.4 (10-30-20 @ 07:27)  HR: 101 (10-30-20 @ 07:27) (99 - 105)  BP: 170/70 (10-30-20 @ 07:27) (107/47 - 170/70)  BP(mean): --  RR: 18 (10-30-20 @ 07:27) (18 - 18)  SpO2: 94% (10-30-20 @ 07:27) (94% - 98%)    Physical Exam:    Constitutional well preserved,comfortable,pleasant    HEENT PERRLA EOMI,No pallor or icterus    No oral exudate or erythema    Neck supple no JVD or LN    Chest Good AE,CTA    CVS RRR S1 S2 WNl No murmur or rub or gallop    Abd soft BS normal No tenderness no masses    Ext No cyanosis clubbing or edema    IV site no erythema tenderness or discharge    Joints no swelling or LOM    CNS AAO X 3 no focal    Lab Data:                          8.0    16.24 )-----------( 392      ( 30 Oct 2020 04:27 )             24.1       10-30    131<L>  |  94<L>  |  71<H>  ----------------------------<  198<H>  5.8<H>   |  21<L>  |  2.30<H>    Ca    8.7      30 Oct 2020 10:16  Phos  3.4     10-30    TPro  6.0  /  Alb  2.5<L>  /  TBili  0.2  /  DBili  x   /  AST  65<H>  /  ALT  30  /  AlkPhos  81  10-30                        WBC Count: 16.24 (10-30-20 @ 04:27)  WBC Count: 15.24 (10-29-20 @ 07:02)  WBC Count: 17.54 (10-28-20 @ 06:34)  WBC Count: 18.45 (10-27-20 @ 07:27)  WBC Count: 17.94 (10-27-20 @ 02:32)  WBC Count: 12.98 (10-26-20 @ 06:48)  WBC Count: 13.16 (10-25-20 @ 07:19)  WBC Count: 11.33 (10-24-20 @ 07:43)             Patient is a 86y old  Male who presents with a chief complaint of sent here for chemo and radiation (30 Oct 2020 12:11)    Being followed by ID for        Interval history:  pt started chemo  off abs  seems brighter  No other acute events          PAST MEDICAL & SURGICAL HISTORY:  Syncope    Chronic kidney disease (CKD)    Diffuse large B cell lymphoma    HLD (hyperlipidemia)    BPH (benign prostatic hyperplasia)    HTN (hypertension)    History of left shoulder fracture    Osteomyelitis of left shoulder region      Allergies    No Known Allergies    Intolerances      Antimicrobials:      MEDICATIONS  (STANDING):  allopurinol 100 milliGRAM(s) Oral daily  amLODIPine   Tablet 2.5 milliGRAM(s) Oral daily  atorvastatin 80 milliGRAM(s) Oral at bedtime  chlorhexidine 2% Cloths 1 Application(s) Topical daily  chlorhexidine 4% Liquid 1 Application(s) Topical <User Schedule>  dexAMETHasone  IVPB 8 milliGRAM(s) IV Intermittent every 24 hours  heparin  Infusion.  Unit(s)/Hr (12 mL/Hr) IV Continuous <Continuous>  influenza   Vaccine 0.5 milliLiter(s) IntraMuscular once  lactobacillus acidophilus 1 Tablet(s) Oral daily  latanoprost 0.005% Ophthalmic Solution 1 Drop(s) Both EYES at bedtime  mirtazapine 15 milliGRAM(s) Oral at bedtime  ondansetron  IVPB 16 milliGRAM(s) IV Intermittent every 24 hours  polyethylene glycol 3350 17 Gram(s) Oral daily  rasburicase IVPB 3 milliGRAM(s) IV Intermittent once  senna 2 Tablet(s) Oral at bedtime  sodium chloride 0.9%. 1000 milliLiter(s) (75 mL/Hr) IV Continuous <Continuous>  tamsulosin 0.4 milliGRAM(s) Oral at bedtime  timolol 0.5% Solution 1 Drop(s) Both EYES two times a day      Vital Signs Last 24 Hrs  T(C): 36.9 (10-30-20 @ 07:27), Max: 36.9 (10-30-20 @ 07:27)  T(F): 98.4 (10-30-20 @ 07:27), Max: 98.4 (10-30-20 @ 07:27)  HR: 101 (10-30-20 @ 07:27) (99 - 105)  BP: 170/70 (10-30-20 @ 07:27) (107/47 - 170/70)  BP(mean): --  RR: 18 (10-30-20 @ 07:27) (18 - 18)  SpO2: 94% (10-30-20 @ 07:27) (94% - 98%)    Physical Exam:    Constitutional well preserved,comfortable,pleasant    HEENT PERRLA EOMI,No pallor or icterus    No oral exudate or erythema    Neck supple no JVD or LN    Chest Good AE,CTA    CVS RRR S1 S2 WNl No murmur or rub or gallop    Abd soft BS normal No tenderness no masses    Ext prominent vascular on left shoulder  erythema much improved  edema stable    IV site no erythema tenderness or discharge        Lab Data:                          8.0    16.24 )-----------( 392      ( 30 Oct 2020 04:27 )             24.1       10-30    131<L>  |  94<L>  |  71<H>  ----------------------------<  198<H>  5.8<H>   |  21<L>  |  2.30<H>    Ca    8.7      30 Oct 2020 10:16  Phos  3.4     10-30    TPro  6.0  /  Alb  2.5<L>  /  TBili  0.2  /  DBili  x   /  AST  65<H>  /  ALT  30  /  AlkPhos  81  10-30                        WBC Count: 16.24 (10-30-20 @ 04:27)  WBC Count: 15.24 (10-29-20 @ 07:02)  WBC Count: 17.54 (10-28-20 @ 06:34)  WBC Count: 18.45 (10-27-20 @ 07:27)  WBC Count: 17.94 (10-27-20 @ 02:32)  WBC Count: 12.98 (10-26-20 @ 06:48)  WBC Count: 13.16 (10-25-20 @ 07:19)  WBC Count: 11.33 (10-24-20 @ 07:43)

## 2020-10-30 NOTE — PROGRESS NOTE ADULT - ATTENDING COMMENTS
Kirstin Leroy M.D. ,   Pager [phone]     after 5PM/ weekends [phone]      ID will follow the patient PRN. Please recontact ID if we can be of further assistance . [phone]

## 2020-10-30 NOTE — PROGRESS NOTE ADULT - PROBLEM SELECTOR PLAN 3
-acute DVT L axillary and brachial veins  -c/w heparin gtt for AC   -can transition to warfarin vs NOAC once all procedures are done

## 2020-10-30 NOTE — PROGRESS NOTE ADULT - ASSESSMENT
Patient is an 85 y/o M w/ a PMHx of DLBCL (s/p 6 cycles of R-CHOP and in DELANEY until 9/2019), who p/w possible relapse w/ L shoulder mass and pathologic fracture s/p left proximal humerus open biopsy, radical resection of tumor, and ORIF with IMN and cementation on 9/25/20), who was transferred to [facility] from the [facility] for further management of his diffuse large B cell lymphoma.     #Concern for TLS  - S/p Rasburicase on 10/27  - Giving NS @ 75cc/h (1L bag)  - If uric acid > 8, PLEASE redose with 3mg IV Rasburicase  - Cont. renally dosed Alloprinol  - nephrology following - appreciate input -start sodium bicarbonate tab, will have to consider HD if renal function continues to worsen    # DLBCL, relapsed  - history of DLBCL, GCB subtype (BCL2, BCL6, MYC, all negative by FISH). Per chart review, patient last received 6 cycles of R-CHOP in 2017 with DELANEY until 11/2019. Patient had mesenteric mass with size of 2.7x4.8 in 2017.   - CT CAP apparently shows enlarging mesenteric mass and new Lt pelvic renal mass concerning of relapse  - At [facility], he was found to have a L shoulder mass and pathologic fracture. His is s/p ORIF and radical resection of tumor on 9/25. PET/CT was performed revealing brain lesion with hypermetabolic activity  - Bone marrow biopsy without e/o lymphoma  - Left shoulder biopsy: most consistent with DLBCL per d/w pathologist  - started treatment with Bendamustine/Rituxan. C1D1 on 10/28, D2 on 10/29, every 28 days cycle  - Plan d/w patient's wife who is the health care proxy  - Will attempt to get LP for CNS disease some time this week; IT MTX not needed per d/w Dr. Degroot    # Left UE DVT  - On heparin gtt; cont. for now      Leoncio White MD. PGY 6  Heme-Onc fellow  [phone]; [phone]

## 2020-10-30 NOTE — CONSULT NOTE ADULT - ASSESSMENT
86 year old male with PMHx of DLBCL (s/p 6 cycles of R-CHOP and in DELANEY until 9/2019) who was transferred to [facility] from the [facility] for further management of his diffuse large B cell lymphoma. Pt admitted for possible relapse of DLBCL. Nephrology team consulted for DANIELITO on CKD possible TLS/TLS monitoring.     #Acute Kidney Injury on CKD 86 year old male with PMHx of DLBCL (s/p 6 cycles of R-CHOP and in DELANEY until 9/2019) who was transferred to [facility] from the [facility] for further management of his diffuse large B cell lymphoma. Pt admitted for possible relapse of DLBCL. Nephrology team consulted for DANIELITO on CKD possible TLS/TLS monitoring.     #Acute Kidney Injury on CKD. Patient with baseline serum creatinine at 1.5-1.7. On admission, his serum creatinine was 1.7 (10/18) and appears to be increasing to 1.9 on 10/29/20 and to 2.30 today (10/3/20). He has DANIELITO which can be pre-renal? possible obstruction with CT abdomen on showing Mild left hydronephrosis with delayed nephrogram due to an obstructing soft tissue mass at the level of the renal pelvis or ?TLS although his uric acid levels are not high, phosphorus levels are not elevated, calcium levels not low. Patient with underlying CKD, etiology is unclear but could be related to previous chemotherapies from DLBCL, also with BPH.    Recommendation  - Send for UA with urine electrolytes and spot urine TP/CR  - Recommend bladder scan and check PVR  - Obtain renal sonogram with doppler study  - Urology evaluation for Left hydronephrosis   - Consider tobar catheter placement  - Can start sodium bicarbonate tabs 650mg BID  - Agree with gently IVF   - Will need to consider HD if renal failure continues to worsen.   - Monitor labs and urine output. Avoid NSAIDs, ACEI/ARBS, RCA and nephrotoxins. Dose medications as per eGFR.      #Hyperkalemia in the setting of DANIELITO  - Serum K elevated at 5.8 today  - Continue medical management of hyperkalemia  - Can give Lokelma 10mg PO daily   - Monitor serum K  - low potassium diet    #Hyponatremia   - check urine electrolytes UOsm and serum Osm   - Monitor SNa levels while on IVF    If you have any questions, please feel free to contact me  Dao Peterson  Nephrology Fellow  Pager: [phone] / [phone]  (After 5pm or on weekends please page the on-call fellow)      If you have any questions, please feel free to contact me  Dao Peterson  Nephrology Fellow  Pager: [phone] / [phone]  (After 5pm or on weekends please page the on-call fellow)   86 year old male with PMHx of DLBCL (s/p 6 cycles of R-CHOP and in DELANEY until 9/2019) who was transferred to [facility] from the [facility] for further management of his diffuse large B cell lymphoma. Pt admitted for possible relapse of DLBCL. Nephrology team consulted for DANIELITO on CKD possible TLS/TLS monitoring.     #Acute Kidney Injury on CKD. Patient with baseline serum creatinine at 1.5-1.7. On admission, his serum creatinine was 1.7 (10/18) and appears to be increasing to 1.9 on 10/29/20 and to 2.30 today (10/3/20). He has DANIELITO which can be pre-renal? possible obstruction with CT abdomen on showing Mild left hydronephrosis with delayed nephrogram due to an obstructing soft tissue mass at the level of the renal pelvis or ?TLS although his uric acid levels are not high, phosphorus levels are not elevated, calcium levels not low. Patient with underlying CKD, etiology is unclear but could be related to previous chemotherapies from DLBCL, also with BPH.    Recommendation  - Send for UA with urine electrolytes and spot urine TP/CR  - Recommend bladder scan and check PVR  - Obtain renal sonogram with doppler study  - Urology evaluation for Left hydronephrosis   - Consider tobar catheter placement  - Can start sodium bicarbonate tabs 650mg BID  - Agree with gently IVF   - Will need to consider HD if renal failure continues to worsen.   - Monitor labs and urine output. Avoid NSAIDs, ACEI/ARBS, RCA and nephrotoxins. Dose medications as per eGFR.      #Hyperkalemia in the setting of DANIELITO  - Serum K elevated at 5.8 today  - Continue medical management of hyperkalemia  - Can give Lokelma 10mg PO daily   - Monitor serum K  - low potassium diet    #Hyponatremia   - check urine electrolytes UOsm and serum Osm   - Monitor SNa levels while on IVF    If you have any questions, please feel free to contact me  Dao Peterson  Nephrology Fellow  Pager: [phone] / [phone]  (After 5pm or on weekends please page the on-call fellow)   86 year old male with PMHx of DLBCL (s/p 6 cycles of R-CHOP and in DELANEY until 9/2019) who was transferred to [facility] from the [facility] for further management of his diffuse large B cell lymphoma. Pt admitted for possible relapse of DLBCL. Nephrology team consulted for DANIELITO on CKD possible TLS/TLS monitoring.     #Acute Kidney Injury on CKD. Patient with baseline serum creatinine at 1.5-1.7. On admission, his serum creatinine was 1.7 (10/18) and appears to be increasing to 1.9 on 10/29/20 and to 2.30 today (10/3/20). He has DANIELITO which can be pre-renal? possible obstruction with CT abdomen on showing Mild left hydronephrosis with delayed nephrogram due to an obstructing soft tissue mass at the level of the renal pelvis or ?TLS although his uric acid levels are not high, phosphorus levels are not elevated, calcium levels not low. Patient with underlying CKD, etiology is unclear but could be related to previous chemotherapies from DLBCL, also with BPH.    Recommendation  - Send for UA with urine electrolytes and spot urine TP/CR  - Recommend bladder scan and check PVR  - Obtain renal sonogram   - Consider tobar catheter placement  - Agree with IVF   - Monitor labs and urine output. Avoid NSAIDs, ACEI/ARBS, RCA and nephrotoxins. Dose medications as per eGFR.      #Hyperkalemia in the setting of DANIELITO, and recent chemo  - Serum K elevated at 5.8 today  - Continue medical management of hyperkalemia  - Can give Lokelma 10mg PO daily   - Monitor serum K  - low potassium diet  - monitor labs for TLS    #Hyponatremia   - check urine electrolytes UOsm and serum Osm   - Monitor SNa levels while on IVF    If you have any questions, please feel free to contact me  Dao Peterson  Nephrology Fellow  Pager: [phone] / [phone]  (After 5pm or on weekends please page the on-call fellow)

## 2020-10-30 NOTE — CHART NOTE - NSCHARTNOTEFT_GEN_A_CORE
Notified by RN w/ critical lab value of K 6.2 non hemolyzed. Vital signs stable.   Patient denies chest pain, palpitation, SOB, dizziness, N/V, tingling numbness.   ordered repeat potassium and ekg.     Vital Signs Last 24 Hrs  T(C): 36.5 (30 Oct 2020 00:20), Max: 36.8 (29 Oct 2020 07:33)  T(F): 97.7 (30 Oct 2020 00:20), Max: 98.2 (29 Oct 2020 07:33)  HR: 102 (30 Oct 2020 05:03) (90 - 105)  BP: 149/87 (30 Oct 2020 05:03) (107/47 - 163/68)  BP(mean): --  RR: 18 (30 Oct 2020 05:03) (18 - 18)  SpO2: 94% (30 Oct 2020 05:03) (94% - 98%)      Hardik Gonsalves  Dept of Medicine   #[id number] Notified by RN w/ critical lab value of K 6.2 non hemolyzed. Vital signs stable.   Pt seen at bedside, alert and oriented x3, NAD.   Patient denies chest pain, palpitation, SOB, dizziness, N/V, tingling, numbness.   Physical exam w/ clear lungs, S1S2 noted, no abdominal tenderness, +pulses.  Ordered repeat potassium and ekg STAT.     EKG no acute changes comparing to previous EKG.   - f/u K   - ordered insuline and D50     Vital Signs Last 24 Hrs  T(C): 36.5 (30 Oct 2020 00:20), Max: 36.8 (29 Oct 2020 07:33)  T(F): 97.7 (30 Oct 2020 00:20), Max: 98.2 (29 Oct 2020 07:33)  HR: 102 (30 Oct 2020 05:03) (90 - 105)  BP: 149/87 (30 Oct 2020 05:03) (107/47 - 163/68)  RR: 18 (30 Oct 2020 05:03) (18 - 18)  SpO2: 94% (30 Oct 2020 05:03) (94% - 98%)      Hardik Gonsalves  Dept of Medicine   #[id number]

## 2020-10-31 NOTE — PROGRESS NOTE ADULT - ATTENDING COMMENTS
Alert, conversant.  Not SOB  1.  Hyperkalemia--plasma K 5.6 jose agree with binder-->under 5  2.  ARF--lytes pre-renal although cannot exclude obstructive component.  Judicious volume supplementation, avoid 3 contribution  3.  Tumor lysis syndrome--LDH elevated.  Rasburicase if UA>6, trend PO4 and keep under 4, binder if necessary  4.  Acidosis--HCO3 supplementation with goal >21

## 2020-10-31 NOTE — CHART NOTE - NSCHARTNOTEFT_GEN_A_CORE
PA NOTE:    10.31: K+  6.4 (Not hemolyzed)  Spoke w Dr Velez, Nephrologist and will update recs after Stat ABG:  #Hyperkalemia in the setting of DANIELITO, and recent chemo  - Noted potassium of 6.4 this AM, differential includes worsening TLS, obstruction,  - Will need Lokelma 10 g TID for 48 hrs and right now with Insulin/D50, Lasix, Albuterol, Calcium Gluconate and EKG, if cannot be corrected will likely need HD/CRRT  - Repeat BMP at  AM after interventions are done  - Continue medical management of hyperkalemia  - Monitor serum K  - low potassium diet  - monitor labs for TLS PA NOTE:    10.31: K+  6.4 (Not hemolyzed)  Spoke w Dr Velez, Nephrologist and will update recs after Stat ABG:  #Hyperkalemia in the setting of DANIELITO, and recent chemo  - Noted potassium of 6.4 this AM, differential includes worsening TLS, obstruction,  - Will need Lokelma 10 g TID for 48 hrs and right now with Insulin/D50, Lasix, Albuterol, Calcium Gluconate and EKG, if cannot be corrected will likely need HD/CRRT  - Repeat BMP at  AM after interventions are done  - Continue medical management of hyperkalemia  - Monitor serum K  - low potassium diet  - monitor labs for TLS      Addendum:  10.31.2020 1142  -Spoke with Dr Velez, VBG performed and as per Nephrologist, Lokelma 10 mg TID x 48 and then Lokelma 10 mg QD.  BMP Q6hrs.    -As per Dr Rubio, will call for Palliative consult for [facility]

## 2020-10-31 NOTE — PROGRESS NOTE ADULT - ASSESSMENT
87 yo male with PMH of NHL (last chemo 2017), HTN, HLD, CKD, BPH, anemia, syncope, anxiety/adjustment disorder/depression, presents here from [facility] where he was getting treated for L shoulder infection (s/p ORIF for pathologic fx) for further management of his diffuse large B cell lymphoma and L shoulder lesion

## 2020-10-31 NOTE — PROGRESS NOTE ADULT - NUTRITIONAL ASSESSMENT
This patient has been assessed with a concern for Malnutrition and has been determined to have a diagnosis/diagnoses of Severe protein-calorie malnutrition.    This patient is being managed with:   Diet Soft-  Supplement Feeding Modality:  Oral  Ensure Enlive Cans or Servings Per Day:  3       Frequency:  Daily  Entered: Oct 22 2020 11:55AM    
recurrent DLBCL s/p rituximab and bendamustin on Oct 28    will continue to follow up

## 2020-10-31 NOTE — PROGRESS NOTE ADULT - PROBLEM SELECTOR PLAN 3
-may be from pulm edema given IV fluids   -CXR stable, monitor for now  -PE less likely, pt already on full AC

## 2020-10-31 NOTE — PROGRESS NOTE ADULT - PROBLEM SELECTOR PLAN 1
-Patient was sent here for further management especially chemo and radiation  -CT C/A/P with abd mass, LN. MRI Brain w/ contrast- New 2.2 x 1.6 x 1.1 cm   uniformly enhancing, T1 and T2 isointense high right posterior frontal extra-axial mass. The lesion demonstrates restricted diffusion.   -s/p BMB  concerning for diffuse large b-cell  -PICC placed  -started on chemotherapy   -LP possibly next week by neuroradiology   -concern for TLS based on labwork, will c/w IV hydration, and monitor labs

## 2020-10-31 NOTE — PROGRESS NOTE ADULT - SUBJECTIVE AND OBJECTIVE BOX
Patient is a 86y old  Male who presents with a chief complaint of sent here for chemo and radiation (31 Oct 2020 14:12)      Subjective: s/p RB day 4. He feels fine, denies nausea, vomiting, abdominal pain, diarrhea and fatigue.       Vital Signs Last 24 Hrs  T(C): 36.4 (31 Oct 2020 16:10), Max: 36.4 (31 Oct 2020 00:18)  T(F): 97.5 (31 Oct 2020 16:10), Max: 97.5 (31 Oct 2020 00:18)  HR: 101 (31 Oct 2020 16:10) (98 - 103)  BP: 152/76 (31 Oct 2020 16:10) (145/71 - 160/73)  BP(mean): --  RR: 16 (31 Oct 2020 16:10) (16 - 18)  SpO2: 95% (31 Oct 2020 16:10) (95% - 96%)    PHYSICAL EXAM  General: adult in NAD  HEENT: clear oropharynx, anicteric sclera, pink conjunctiva  Neck: supple  CV: normal S1/S2 with no murmur rubs or gallops  Lungs: positive air movement b/l ant lungs,clear to auscultation, no wheezes, no rales  Abdomen: soft non-tender non-distended, no hepatosplenomegaly  Ext: no clubbing cyanosis or edema  Skin: no rashes and no petechiae  Neuro: alert and oriented X 4, no focal deficits    MEDICATIONS  (STANDING):  allopurinol 100 milliGRAM(s) Oral daily  amLODIPine   Tablet 2.5 milliGRAM(s) Oral daily  atorvastatin 80 milliGRAM(s) Oral at bedtime  chlorhexidine 2% Cloths 1 Application(s) Topical daily  chlorhexidine 4% Liquid 1 Application(s) Topical <User Schedule>  heparin  Infusion.  Unit(s)/Hr (12 mL/Hr) IV Continuous <Continuous>  influenza   Vaccine 0.5 milliLiter(s) IntraMuscular once  lactobacillus acidophilus 1 Tablet(s) Oral daily  latanoprost 0.005% Ophthalmic Solution 1 Drop(s) Both EYES at bedtime  mirtazapine 15 milliGRAM(s) Oral at bedtime  polyethylene glycol 3350 17 Gram(s) Oral daily  rasburicase IVPB 3 milliGRAM(s) IV Intermittent once  senna 2 Tablet(s) Oral at bedtime  sodium bicarbonate 650 milliGRAM(s) Oral two times a day  sodium chloride 0.9%. 1000 milliLiter(s) (75 mL/Hr) IV Continuous <Continuous>  sodium zirconium cyclosilicate 10 Gram(s) Oral three times a day  tamsulosin 0.4 milliGRAM(s) Oral at bedtime  timolol 0.5% Solution 1 Drop(s) Both EYES two times a day    MEDICATIONS  (PRN):  acetaminophen   Tablet .. 650 milliGRAM(s) Oral every 6 hours PRN Mild Pain (1 - 3), Moderate Pain (4 - 6)  ALPRAZolam 0.5 milliGRAM(s) Oral two times a day PRN anxiety  heparin   Injectable 5500 Unit(s) IV Push every 6 hours PRN For aPTT less than 40  heparin   Injectable 2500 Unit(s) IV Push every 6 hours PRN For aPTT between 40 - 57  morphine  - Injectable 2 milliGRAM(s) IV Push every 4 hours PRN Severe Pain (7 - 10)  sodium chloride 0.9% lock flush 10 milliLiter(s) IV Push every 1 hour PRN Pre/post blood products, medications, blood draw, and to maintain line patency  traMADol 25 milliGRAM(s) Oral every 6 hours PRN Moderate Pain (4 - 6)      LABS:                          7.8    18.60 )-----------( 392      ( 31 Oct 2020 07:25 )             24.1         Mean Cell Volume : 85.5 fl  Mean Cell Hemoglobin : 27.7 pg  Mean Cell Hemoglobin Concentration : 32.4 gm/dL  Auto Neutrophil # : x  Auto Lymphocyte # : x  Auto Monocyte # : x  Auto Eosinophil # : x  Auto Basophil # : x  Auto Neutrophil % : x  Auto Lymphocyte % : x  Auto Monocyte % : x  Auto Eosinophil % : x  Auto Basophil % : x      Serial CBC's  10-31 @ 07:25  Hct-24.1 / Hgb-7.8 / Plat-392 / RBC-2.82 / WBC-18.60  Serial CBC's  10-30 @ 19:53  Hct-24.9 / Hgb-8.3 / Plat-427 / RBC-2.94 / WBC-19.68  Serial CBC's  10-30 @ 04:27  Hct-24.1 / Hgb-8.0 / Plat-392 / RBC-2.84 / WBC-16.24  Serial CBC's  10-29 @ 07:02  Hct-23.8 / Hgb-7.7 / Plat-306 / RBC-2.76 / WBC-15.24  Serial CBC's  10-28 @ 06:34  Hct-25.2 / Hgb-8.3 / Plat-303 / RBC-2.99 / WBC-17.54      10-31    131<L>  |  97  |  90<H>  ----------------------------<  117<H>  6.0<H>   |  18<L>  |  2.69<H>    Ca    8.1<L>      31 Oct 2020 15:58  Phos  3.0     10-31    TPro  6.0  /  Alb  2.5<L>  /  TBili  0.2  /  DBili  x   /  AST  65<H>  /  ALT  30  /  AlkPhos  81  10-30      PTT - ( 31 Oct 2020 12:48 )  PTT:91.6 sec                Urinalysis Basic - ( 30 Oct 2020 20:40 )    Color: Yellow / Appearance: Slightly Turbid / S.019 / pH: x  Gluc: x / Ketone: Negative  / Bili: Negative / Urobili: Negative   Blood: x / Protein: 30 mg/dL / Nitrite: Negative   Leuk Esterase: Negative / RBC: 1 /hpf / WBC 3 /HPF   Sq Epi: x / Non Sq Epi: 1 /hpf / Bacteria: Negative                BLOOD SMEAR INTERPRETATION:       RADIOLOGY & ADDITIONAL STUDIES:

## 2020-10-31 NOTE — PROGRESS NOTE ADULT - SUBJECTIVE AND OBJECTIVE BOX
Patient is a 86y old  Male who presents with a chief complaint of sent here for chemo and radiation (31 Oct 2020 07:30)      SUBJECTIVE / OVERNIGHT EVENTS: patient feeling tired when sitting in chair, asking to go back into the bed. Had a BM earlier today. Continues to have LUE swelling.     MEDICATIONS  (STANDING):  allopurinol 100 milliGRAM(s) Oral daily  amLODIPine   Tablet 2.5 milliGRAM(s) Oral daily  atorvastatin 80 milliGRAM(s) Oral at bedtime  chlorhexidine 2% Cloths 1 Application(s) Topical daily  chlorhexidine 4% Liquid 1 Application(s) Topical <User Schedule>  heparin  Infusion.  Unit(s)/Hr (12 mL/Hr) IV Continuous <Continuous>  influenza   Vaccine 0.5 milliLiter(s) IntraMuscular once  lactobacillus acidophilus 1 Tablet(s) Oral daily  latanoprost 0.005% Ophthalmic Solution 1 Drop(s) Both EYES at bedtime  mirtazapine 15 milliGRAM(s) Oral at bedtime  polyethylene glycol 3350 17 Gram(s) Oral daily  rasburicase IVPB 3 milliGRAM(s) IV Intermittent once  senna 2 Tablet(s) Oral at bedtime  sodium bicarbonate 650 milliGRAM(s) Oral two times a day  sodium chloride 0.9%. 1000 milliLiter(s) (75 mL/Hr) IV Continuous <Continuous>  sodium zirconium cyclosilicate 10 Gram(s) Oral three times a day  tamsulosin 0.4 milliGRAM(s) Oral at bedtime  timolol 0.5% Solution 1 Drop(s) Both EYES two times a day    MEDICATIONS  (PRN):  acetaminophen   Tablet .. 650 milliGRAM(s) Oral every 6 hours PRN Mild Pain (1 - 3), Moderate Pain (4 - 6)  ALPRAZolam 0.5 milliGRAM(s) Oral two times a day PRN anxiety  heparin   Injectable 5500 Unit(s) IV Push every 6 hours PRN For aPTT less than 40  heparin   Injectable 2500 Unit(s) IV Push every 6 hours PRN For aPTT between 40 - 57  morphine  - Injectable 2 milliGRAM(s) IV Push every 4 hours PRN Severe Pain (7 - 10)  sodium chloride 0.9% lock flush 10 milliLiter(s) IV Push every 1 hour PRN Pre/post blood products, medications, blood draw, and to maintain line patency  traMADol 25 milliGRAM(s) Oral every 6 hours PRN Moderate Pain (4 - 6)      Vital Signs Last 24 Hrs  T(C): 36.4 (31 Oct 2020 00:18), Max: 36.4 (30 Oct 2020 15:32)  T(F): 97.5 (31 Oct 2020 00:18), Max: 97.5 (30 Oct 2020 15:32)  HR: 100 (31 Oct 2020 09:30) (98 - 112)  BP: 145/71 (31 Oct 2020 09:30) (107/74 - 160/73)  BP(mean): --  RR: 18 (31 Oct 2020 00:18) (18 - 18)  SpO2: 96% (31 Oct 2020 00:18) (95% - 96%)  CAPILLARY BLOOD GLUCOSE        I&O's Summary    30 Oct 2020 07:01  -  31 Oct 2020 07:00  --------------------------------------------------------  IN: 1962 mL / OUT: 900 mL / NET: 1062 mL    31 Oct 2020 08:01  -  31 Oct 2020 14:12  --------------------------------------------------------  IN: 700 mL / OUT: 0 mL / NET: 700 mL      PHYSICAL EXAM:  GENERAL: NAD  EYES: conjunctiva and sclera clear  CHEST/LUNG: Clear to auscultation bilaterally; No wheeze  HEART: +S1/S2, reg   ABDOMEN: Soft, Nontender, Nondistended; Bowel sounds present  EXTREMITIES: LUE edema   PSYCH: Alert and awake, appropriate     LABS:                        7.8    18.60 )-----------( 392      ( 31 Oct 2020 07:25 )             24.1     10    134<L>  |  96  |  85<H>  ----------------------------<  179<H>  6.4<HH>   |  21<L>  |  2.68<H>    Ca    8.5      31 Oct 2020 07:25  Phos  3.0     10-    TPro  6.0  /  Alb  2.5<L>  /  TBili  0.2  /  DBili  x   /  AST  65<H>  /  ALT  30  /  AlkPhos  81  10-30    PTT - ( 31 Oct 2020 12:48 )  PTT:91.6 sec      Urinalysis Basic - ( 30 Oct 2020 20:40 )    Color: Yellow / Appearance: Slightly Turbid / S.019 / pH: x  Gluc: x / Ketone: Negative  / Bili: Negative / Urobili: Negative   Blood: x / Protein: 30 mg/dL / Nitrite: Negative   Leuk Esterase: Negative / RBC: 1 /hpf / WBC 3 /HPF   Sq Epi: x / Non Sq Epi: 1 /hpf / Bacteria: Negative

## 2020-10-31 NOTE — PROGRESS NOTE ADULT - ASSESSMENT
86 year old male with PMHx of DLBCL (s/p 6 cycles of R-CHOP and in DELANEY until 9/2019) who was transferred to [facility] from the [facility] for further management of his diffuse large B cell lymphoma. Pt admitted for possible relapse of DLBCL. Nephrology team consulted for DANIELITO on CKD possible TLS/TLS monitoring.     #Acute Kidney Injury on CKD. Patient with baseline serum creatinine at 1.5-1.7. On admission, his serum creatinine was 1.7 (10/18) and appears to be increasing to 1.9 on 10/29/20 and to 2.30 today (10/3/20). He has DANIELITO which can be pre-renal? possible obstruction with CT abdomen on showing Mild left hydronephrosis with delayed nephrogram due to an obstructing soft tissue mass at the level of the renal pelvis or ?TLS although his uric acid levels are not high, phosphorus levels are not elevated, calcium levels not low. Patient with underlying CKD, etiology is unclear but could be related to previous chemotherapies from DLBCL, also with BPH.    Recommendation  - Creatinine uptrended slightly to 2.48  - Gibson catheter placed, patient produced ~900 cc of urine  - May require nephrostomy tube if obstruction is more proximal?  - Patient with    #Hyperkalemia in the setting of DANIELITO, and recent chemo  - Serum K slightly improved to 5.5 today  - Continue medical management of hyperkalemia  - Continue Lokelma 10 g daily  - Monitor serum K  - low potassium diet  - monitor labs for TLS    #Hyponatremia   - Likely hypervolemic hyponatremia  - Agree with Lasix for kaluresis and volume removal which will help sodium as well  - Maintain Gibson catheter  - May require intervention on obstruction if lesion is more proximal 86 year old male with PMHx of DLBCL (s/p 6 cycles of R-CHOP and in DELANEY until 9/2019) who was transferred to [facility] from the [facility] for further management of his diffuse large B cell lymphoma. Pt admitted for possible relapse of DLBCL. Nephrology team consulted for DANIELITO on CKD possible TLS/TLS monitoring.     #Acute Kidney Injury on CKD. Patient with baseline serum creatinine at 1.5-1.7. On admission, his serum creatinine was 1.7 (10/18) and appears to be increasing to 1.9 on 10/29/20 and to 2.30 today (10/3/20). He has DANIELITO which can be pre-renal? possible obstruction with CT abdomen on showing Mild left hydronephrosis with delayed nephrogram due to an obstructing soft tissue mass at the level of the renal pelvis or ?TLS although his uric acid levels are not high, phosphorus levels are not elevated, calcium levels not low. Patient with underlying CKD, etiology is unclear but could be related to previous chemotherapies from DLBCL, also with BPH.    Recommendation  - Creatinine uptrended slightly to 2.48  - Gibson catheter placed, patient produced ~900 cc of urine  - May require nephrostomy tube if obstruction is more proximal?  - Patient with    #Hyperkalemia in the setting of DANIELITO, and recent chemo  - Noted potassium of 6.4 this AM, differential includes worsening TLS, obstruction,  - Will need Lokelma 10 g TID for 48 hrs and right now with Insulin/D50, Lasix, Albuterol, Calcium Gluconate and EKG, if cannot be corrected will likely need HD/CRRT  - Repeat BMP at  AM after interventions are done  - Continue medical management of hyperkalemia  - Monitor serum K  - low potassium diet  - monitor labs for TLS    #Hyponatremia   - Likely hypervolemic hyponatremia  - Agree with Lasix for kaluresis and volume removal which will help sodium as well  - Maintain Gibson catheter  - May require intervention on obstruction if lesion is more proximal 86 year old male with PMHx of DLBCL (s/p 6 cycles of R-CHOP and in DELANEY until 9/2019) who was transferred to [facility] from the [facility] for further management of his diffuse large B cell lymphoma. Pt admitted for possible relapse of DLBCL. Nephrology team consulted for DANIELITO on CKD possible TLS/TLS monitoring.     #Acute Kidney Injury on CKD. Patient with baseline serum creatinine at 1.5-1.7. On admission, his serum creatinine was 1.7 (10/18) and appears to be increasing to 1.9 on 10/29/20 and to 2.30 today (10/3/20). He has DANIELITO which can be pre-renal? possible obstruction with CT abdomen on showing Mild left hydronephrosis with delayed nephrogram due to an obstructing soft tissue mass at the level of the renal pelvis or ?TLS although his uric acid levels are not high, phosphorus levels are not elevated, calcium levels not low. Patient with underlying CKD, etiology is unclear but could be related to previous chemotherapies from DLBCL, also with BPH.    Recommendation  - Creatinine uptrended slightly to 2.48  - Gibson catheter placed, patient produced ~900 cc of urine  - May require nephrostomy tube if obstruction is more proximal?  - Patient with    #Hyperkalemia in the setting of DANIELITO, and recent chemo  - Noted potassium of 6.4 this AM, differential includes worsening TLS, obstruction, pseudohyperkalemia  - Obtain STAT ABG with lytes to assess potassium (drawn very slowly and put tube on ice before running)  - If true hyperkalemia, will need Lokelma 10 g TID for 48 hrs and right now with Insulin/D50, Lasix, Albuterol, Calcium Gluconate and EKG, if cannot be corrected will likely need HD/CRRT  - Repeat BMP at  AM after interventions are done  - Continue medical management of hyperkalemia  - Monitor serum K  - low potassium diet  - monitor labs for TLS    #Hyponatremia   - Likely hypervolemic hyponatremia  - Agree with Lasix for kaluresis and volume removal which will help sodium as well  - Maintain Gibson catheter  - May require intervention on obstruction if lesion is more proximal

## 2020-10-31 NOTE — PROGRESS NOTE ADULT - SUBJECTIVE AND OBJECTIVE BOX
[facility] Division of Kidney Diseases & Hypertension  FOLLOW UP NOTE  [phone]--------------------------------------------------------------------------------  Chief Complaint:Acute lymphoblastic leukemia (ALL) not having achieved remission    24 hour events/subjective: No acute events overnight. Patient had Gibson catheter placed and ~900 cc UOP in the last 24 hrs. Patient received one dose of Lokelma yesterday. Labs, vitals, medications and imaging reviewed. Vital signs stable. Labs show creatinine slightly uptrended to 2.48, potassium of 5.5, and sodium of 133.    PAST HISTORY  --------------------------------------------------------------------------------  No significant changes to PMH, PSH, FHx, SHx, unless otherwise noted    ALLERGIES & MEDICATIONS  --------------------------------------------------------------------------------  Allergies    No Known Allergies    Intolerances      Standing Inpatient Medications  allopurinol 100 milliGRAM(s) Oral daily  amLODIPine   Tablet 2.5 milliGRAM(s) Oral daily  atorvastatin 80 milliGRAM(s) Oral at bedtime  chlorhexidine 2% Cloths 1 Application(s) Topical daily  chlorhexidine 4% Liquid 1 Application(s) Topical <User Schedule>  heparin  Infusion.  Unit(s)/Hr IV Continuous <Continuous>  influenza   Vaccine 0.5 milliLiter(s) IntraMuscular once  lactobacillus acidophilus 1 Tablet(s) Oral daily  latanoprost 0.005% Ophthalmic Solution 1 Drop(s) Both EYES at bedtime  mirtazapine 15 milliGRAM(s) Oral at bedtime  polyethylene glycol 3350 17 Gram(s) Oral daily  rasburicase IVPB 3 milliGRAM(s) IV Intermittent once  senna 2 Tablet(s) Oral at bedtime  sodium bicarbonate 650 milliGRAM(s) Oral two times a day  sodium chloride 0.9%. 1000 milliLiter(s) IV Continuous <Continuous>  tamsulosin 0.4 milliGRAM(s) Oral at bedtime  timolol 0.5% Solution 1 Drop(s) Both EYES two times a day    PRN Inpatient Medications  acetaminophen   Tablet .. 650 milliGRAM(s) Oral every 6 hours PRN  ALPRAZolam 0.5 milliGRAM(s) Oral two times a day PRN  heparin   Injectable 5500 Unit(s) IV Push every 6 hours PRN  heparin   Injectable 2500 Unit(s) IV Push every 6 hours PRN  morphine  - Injectable 2 milliGRAM(s) IV Push every 4 hours PRN  sodium chloride 0.9% lock flush 10 milliLiter(s) IV Push every 1 hour PRN  traMADol 25 milliGRAM(s) Oral every 6 hours PRN      REVIEW OF SYSTEMS  --------------------------------------------------------------------------------  Gen: No  fevers/chills  Skin: No rashes  Head/Eyes/Ears/Mouth: No headache; Normal hearing; Normal vision w/o blurriness  Respiratory: No dyspnea, cough, wheezing, hemoptysis  CV: No chest pain, PND, orthopnea  GI: No abdominal pain, diarrhea, constipation, nausea, vomiting  : No increased frequency, dysuria, hematuria, nocturia  MSK: No joint pain/swelling; no back pain; no edema  Neuro: No dizziness/lightheadedness, weakness, seizures, numbness, tingling  Psych: Non-focal      All other systems were reviewed and are negative, except as noted.    VITALS/PHYSICAL EXAM  --------------------------------------------------------------------------------  T(C): 36.4 (10-31-20 @ 00:18), Max: 36.4 (10-30-20 @ 15:32)  HR: 98 (10-31-20 @ 05:22) (98 - 112)  BP: 160/73 (10-31-20 @ 05:22) (107/74 - 160/73)  RR: 18 (10-31-20 @ 00:18) (18 - 18)  SpO2: 96% (10-31-20 @ 00:18) (95% - 96%)  Wt(kg): --        10-30-20 @ 07:01  -  10-31-20 @ 07:00  --------------------------------------------------------  IN: 1962 mL / OUT: 900 mL / NET: 1062 mL      Physical Exam:  	Gen: NAD, elderly male on NC  	HEENT: MMM  	Pulm: CTA B/L  	CV:  S1S2  	Abd: +BS, soft   	Ext: No B/L Lower ext edema, Left shoulder mass with varicosities LUE swelling  	Neuro: No focal deficits  	Skin: Warm, without rashes    LABS/STUDIES  --------------------------------------------------------------------------------              8.3    19.68 >-----------<  427      [10-30-20 @ 19:53]              24.9     133  |  94  |  78  ----------------------------<  230      [10-30-20 @ 19:53]  5.5   |  21  |  2.48        Ca     8.5     [10-30-20 @ 19:53]      Phos  2.5     [10-30-20 @ 19:53]    TPro  6.0  /  Alb  2.5  /  TBili  0.2  /  DBili  x   /  AST  65  /  ALT  30  /  AlkPhos  81  [10-30-20 @ 10:16]      PTT: 63.0       [10-30-20 @ 08:37]    Uric acid 5.4      [10-30-20 @ 19:53]  LDH 1662      [10-30-20 @ 19:53]    Creatinine Trend:  SCr 2.48 [10-30 @ 19:53]  SCr 2.30 [10-30 @ 10:16]  SCr 2.26 [10-30 @ 04:27]  SCr 2.10 [10-29 @ 20:08]  SCr 2.03 [10-29 @ 15:48]    Urinalysis - [10-30-20 @ 20:40]      Color Yellow / Appearance Slightly Turbid / SG 1.019 / pH 6.0      Gluc Trace / Ketone Negative  / Bili Negative / Urobili Negative       Blood Moderate / Protein 30 mg/dL / Leuk Est Negative / Nitrite Negative      RBC 1 / WBC 3 / Hyaline 1 / Gran  / Sq Epi  / Non Sq Epi 1 / Bacteria Negative    Urine Creatinine 43      [10-30-20 @ 20:39]  Urine Sodium <35      [10-30-20 @ 20:39]  Urine Potassium 63      [10-30-20 @ 20:39]  Urine Chloride <35      [10-30-20 @ 20:39]

## 2020-11-01 NOTE — PROGRESS NOTE ADULT - ASSESSMENT
86 year old male with PMHx of DLBCL (s/p 6 cycles of R-CHOP and in DELANEY until 9/2019) who was transferred to [facility] from the [facility] for further management of his diffuse large B cell lymphoma. Pt admitted for possible relapse of DLBCL. Nephrology team consulted for DANIELITO on CKD possible TLS/TLS monitoring.     #Acute Kidney Injury on CKD. Patient with baseline serum creatinine at 1.5-1.7. On admission, his serum creatinine was 1.7 (10/18) and appears to be increasing to 1.9 on 10/29/20 and to 2.30 today (10/3/20). He has DANIELITO which can be pre-renal? possible obstruction with CT abdomen on showing Mild left hydronephrosis with delayed nephrogram due to an obstructing soft tissue mass at the level of the renal pelvis or ?TLS although his uric acid levels are not high, phosphorus levels are not elevated, calcium levels not low. Patient with underlying CKD, etiology is unclear but could be related to previous chemotherapies from DLBCL, also with BPH.    Recommendation  - Creatinine uptrended slightly to 2.71- likely that is TLS related  - Gibson catheter placed, patient produced ~900 cc of urine  - May require nephrostomy tube if obstruction is more proximal?      #Hyperkalemia in the setting of DANIELITO, and recent chemo  - Noted potassium is lower when doing VBG potassium making the serum K likely psedo. Would use VBG or plasma K for K checks for TLS labs.  - can dc lokelma for now     #Hyponatremia   - Likely hypervolemic hyponatremia  - Maintain Gibson catheter  - May require intervention on obstruction if lesion is more proximal

## 2020-11-01 NOTE — PROGRESS NOTE ADULT - PROBLEM SELECTOR PLAN 10
Previous MD d/w length with wife and also patient's daughter. There are 5 children from his first marriage and he has been  to his new wife for 25 years.  Pervious MD d/w wife - she believes this cancer is treatable, She would like to consider all options at this time.  d/w daughter (2nd daughter - Stella) - she is more realistic. Does not think her father could tolerate chemo. However, he defers to his wife.  -will need palliative consult for next week

## 2020-11-01 NOTE — CHART NOTE - NSCHARTNOTEFT_GEN_A_CORE
PA Note (episodic)     Notified by RN patient w/ sore throat and white pustules.   Patient seen at bedside, alert and oriented x3, NAD.   Denies pain upon swallowing, dysphagia, denies sore throat, cough, SOB, chest pain.   Upon examination, throat is not red, and white patches were found on the palate on right side.     1. Oral candidiasis   - Nystatin mouth wash   - Keep oral hygiene   - Will endorse day team     Hardik Gonsalves PA-C  Dept of Medicine  #[id number]

## 2020-11-01 NOTE — PROVIDER CONTACT NOTE (CRITICAL VALUE NOTIFICATION) - ACTION/TREATMENT ORDERED:
awaiting type and screen results; continue to monitor pt. for s.s of bleeding type and screen performed; occult blood specimen ordered; continue to monitor pt. for s.s of bleeding; no other orders at this time

## 2020-11-01 NOTE — PROVIDER CONTACT NOTE (CRITICAL VALUE NOTIFICATION) - ACTION/TREATMENT ORDERED:
occult blood ordered; type and screen ordered; repeat CBC ordered; continue to monitor for s/s of bleeding.

## 2020-11-01 NOTE — CHART NOTE - NSCHARTNOTEFT_GEN_A_CORE
Followed in the evening results of potassium on VBG it was 5.8. D/w nephrology, Dr. Velez. Give scheduled lokelma and recheck labs at 4 am. Continue to monitor.     Clarisa Funes NP, #[id number]

## 2020-11-01 NOTE — PROVIDER CONTACT NOTE (CRITICAL VALUE NOTIFICATION) - ACTION/TREATMENT ORDERED:
per full heparin nomogram stop infusion for 1 hour and restart at 9ml/hr; NP will put in new heparin nomogram order for the correct weight.

## 2020-11-01 NOTE — PROGRESS NOTE ADULT - SUBJECTIVE AND OBJECTIVE BOX
Patient is a 86y old  Male who presents with a chief complaint of sent here for chemo and radiation (2020 12:51)    SUBJECTIVE / OVERNIGHT EVENTS: patient states that he is feeling tired, denies any overt pain.     MEDICATIONS  (STANDING):  allopurinol 100 milliGRAM(s) Oral daily  amLODIPine   Tablet 2.5 milliGRAM(s) Oral daily  atorvastatin 80 milliGRAM(s) Oral at bedtime  chlorhexidine 2% Cloths 1 Application(s) Topical daily  chlorhexidine 4% Liquid 1 Application(s) Topical <User Schedule>  heparin  Infusion. 900 Unit(s)/Hr (9 mL/Hr) IV Continuous <Continuous>  influenza   Vaccine 0.5 milliLiter(s) IntraMuscular once  lactobacillus acidophilus 1 Tablet(s) Oral daily  latanoprost 0.005% Ophthalmic Solution 1 Drop(s) Both EYES at bedtime  mirtazapine 15 milliGRAM(s) Oral at bedtime  polyethylene glycol 3350 17 Gram(s) Oral daily  rasburicase IVPB 3 milliGRAM(s) IV Intermittent once  senna 2 Tablet(s) Oral at bedtime  sodium bicarbonate 650 milliGRAM(s) Oral two times a day  sodium chloride 0.9%. 1000 milliLiter(s) (75 mL/Hr) IV Continuous <Continuous>  tamsulosin 0.4 milliGRAM(s) Oral at bedtime  timolol 0.5% Solution 1 Drop(s) Both EYES two times a day    MEDICATIONS  (PRN):  acetaminophen   Tablet .. 650 milliGRAM(s) Oral every 6 hours PRN Mild Pain (1 - 3), Moderate Pain (4 - 6)  ALPRAZolam 0.5 milliGRAM(s) Oral two times a day PRN anxiety  heparin   Injectable 5000 Unit(s) IV Push every 6 hours PRN For aPTT less than 40  heparin   Injectable 2500 Unit(s) IV Push every 6 hours PRN For aPTT between 40 - 57  morphine  - Injectable 2 milliGRAM(s) IV Push every 4 hours PRN Severe Pain (7 - 10)  sodium chloride 0.9% lock flush 10 milliLiter(s) IV Push every 1 hour PRN Pre/post blood products, medications, blood draw, and to maintain line patency  traMADol 25 milliGRAM(s) Oral every 6 hours PRN Moderate Pain (4 - 6)      Vital Signs Last 24 Hrs  T(C): 36.7 (2020 08:08), Max: 36.7 (2020 00:14)  T(F): 98 (2020 08:08), Max: 98 (2020 00:14)  HR: 80 (2020 08:08) (80 - 101)  BP: 154/79 (2020 08:08) (152/76 - 159/81)  BP(mean): --  RR: 18 (2020 08:08) (16 - 18)  SpO2: 97% (2020 08:08) (95% - 97%)  CAPILLARY BLOOD GLUCOSE        I&O's Summary    31 Oct 2020 08:01  -  2020 07:00  --------------------------------------------------------  IN: 700 mL / OUT: 1350 mL / NET: -650 mL    2020 07:01  -  2020 14:41  --------------------------------------------------------  IN: 440 mL / OUT: 500 mL / NET: -60 mL        PHYSICAL EXAM:  GENERAL: NAD  EYES:  conjunctiva and sclera clear  CHEST/LUNG: Clear to auscultation bilaterally; No wheeze  HEART: +S1/S2, reg   ABDOMEN: Soft, Nontender, Nondistended  EXTREMITIES:  2+ Peripheral Pulses, No clubbing, cyanosis, or edema  PSYCH: AAOx3  NEUROLOGY: non-focal  SKIN: No rashes or lesions    LABS:                        7.0    15.82 )-----------( 395      ( 2020 04:47 )             22.4     11    134<L>  |  100  |  91<H>  ----------------------------<  82  5.7<H>   |  19<L>  |  2.76<H>    Ca    8.3<L>      2020 03:33  Phos  4.5     11-01      PTT - ( 2020 10:38 )  PTT:127.2 sec      Urinalysis Basic - ( 30 Oct 2020 20:40 )    Color: Yellow / Appearance: Slightly Turbid / S.019 / pH: x  Gluc: x / Ketone: Negative  / Bili: Negative / Urobili: Negative   Blood: x / Protein: 30 mg/dL / Nitrite: Negative   Leuk Esterase: Negative / RBC: 1 /hpf / WBC 3 /HPF   Sq Epi: x / Non Sq Epi: 1 /hpf / Bacteria: Negative

## 2020-11-01 NOTE — PROGRESS NOTE ADULT - ASSESSMENT
85 yo male with PMH of NHL (last chemo 2017), HTN, HLD, CKD, BPH, anemia, syncope, anxiety/adjustment disorder/depression, presents here from [facility] where he was getting treated for L shoulder infection (s/p ORIF for pathologic fx) for further management of his diffuse large B cell lymphoma and L shoulder lesion

## 2020-11-01 NOTE — PROVIDER CONTACT NOTE (OTHER) - BACKGROUND
most recent uric acid 5.9 active order for ELITEK  from 10/27/2020 has not yet been given; most recent uric acid 5.9

## 2020-11-01 NOTE — PROGRESS NOTE ADULT - SUBJECTIVE AND OBJECTIVE BOX
[facility] DIVISION OF KIDNEY DISEASES AND HYPERTENSION -- FOLLOW UP NOTE  --------------------------------------------------------------------------------  Chief Complaint:    24 hour events/subjective:        PAST HISTORY  --------------------------------------------------------------------------------  No significant changes to PMH, PSH, FHx, SHx, unless otherwise noted    ALLERGIES & MEDICATIONS  --------------------------------------------------------------------------------  Allergies    No Known Allergies    Intolerances      Standing Inpatient Medications  allopurinol 100 milliGRAM(s) Oral daily  amLODIPine   Tablet 2.5 milliGRAM(s) Oral daily  atorvastatin 80 milliGRAM(s) Oral at bedtime  chlorhexidine 2% Cloths 1 Application(s) Topical daily  chlorhexidine 4% Liquid 1 Application(s) Topical <User Schedule>  heparin  Infusion. 900 Unit(s)/Hr IV Continuous <Continuous>  influenza   Vaccine 0.5 milliLiter(s) IntraMuscular once  lactobacillus acidophilus 1 Tablet(s) Oral daily  latanoprost 0.005% Ophthalmic Solution 1 Drop(s) Both EYES at bedtime  mirtazapine 15 milliGRAM(s) Oral at bedtime  polyethylene glycol 3350 17 Gram(s) Oral daily  rasburicase IVPB 3 milliGRAM(s) IV Intermittent once  senna 2 Tablet(s) Oral at bedtime  sodium bicarbonate 650 milliGRAM(s) Oral two times a day  sodium chloride 0.9%. 1000 milliLiter(s) IV Continuous <Continuous>  sodium zirconium cyclosilicate 10 Gram(s) Oral three times a day  tamsulosin 0.4 milliGRAM(s) Oral at bedtime  timolol 0.5% Solution 1 Drop(s) Both EYES two times a day    PRN Inpatient Medications  acetaminophen   Tablet .. 650 milliGRAM(s) Oral every 6 hours PRN  ALPRAZolam 0.5 milliGRAM(s) Oral two times a day PRN  heparin   Injectable 5000 Unit(s) IV Push every 6 hours PRN  heparin   Injectable 2500 Unit(s) IV Push every 6 hours PRN  morphine  - Injectable 2 milliGRAM(s) IV Push every 4 hours PRN  sodium chloride 0.9% lock flush 10 milliLiter(s) IV Push every 1 hour PRN  traMADol 25 milliGRAM(s) Oral every 6 hours PRN      REVIEW OF SYSTEMS  --------------------------------------------------------------------------------  Gen: No weight changes, fatigue, fevers/chills, weakness  Skin: No rashes  Head/Eyes/Ears/Mouth: No headache; Normal hearing; Normal vision w/o blurriness; No sinus pain/discomfort, sore throat  Respiratory: No dyspnea, cough, wheezing, hemoptysis  CV: No chest pain, PND, orthopnea  GI: No abdominal pain, diarrhea, constipation, nausea, vomiting, melena, hematochezia  : No increased frequency, dysuria, hematuria, nocturia  MSK: No joint pain/swelling; no back pain; no edema  Neuro: No dizziness/lightheadedness, weakness, seizures, numbness, tingling  Heme: No easy bruising or bleeding  Endo: No heat/cold intolerance  Psych: No significant nervousness, anxiety, stress, depression    All other systems were reviewed and are negative, except as noted.    VITALS/PHYSICAL EXAM  --------------------------------------------------------------------------------  T(C): 36.7 (11-01-20 @ 08:08), Max: 36.7 (11-01-20 @ 00:14)  HR: 80 (11-01-20 @ 08:08) (80 - 101)  BP: 154/79 (11-01-20 @ 08:08) (152/76 - 159/81)  RR: 18 (11-01-20 @ 08:08) (16 - 18)  SpO2: 97% (11-01-20 @ 08:08) (95% - 97%)  Wt(kg): --    Weight (kg): 63.5 (10-31-20 @ 21:11)      10-31-20 @ 08:01  -  11-01-20 @ 07:00  --------------------------------------------------------  IN: 700 mL / OUT: 1350 mL / NET: -650 mL    11-01-20 @ 07:01  -  11-01-20 @ 12:51  --------------------------------------------------------  IN: 240 mL / OUT: 0 mL / NET: 240 mL      Physical Exam:  	Gen: NAD, well-appearing  	HEENT: PERRL, supple neck, clear oropharynx  	Pulm: CTA B/L  	CV: RRR, S1S2; no rub  	Back: No spinal or CVA tenderness; no sacral edema  	Abd: +BS, soft, nontender/nondistended  	: No suprapubic tenderness  	UE: Warm, FROM, no clubbing, intact strength; no edema; no asterixis  	LE: Warm, FROM, no clubbing, intact strength; no edema  	Neuro: No focal deficits, intact gait  	Psych: Normal affect and mood  	Skin: Warm, without rashes  	Vascular access:    LABS/STUDIES  --------------------------------------------------------------------------------              7.0    15.82 >-----------<  395      [11-01-20 @ 04:47]              22.4     134  |  100  |  91  ----------------------------<  82      [11-01-20 @ 03:33]  5.7   |  19  |  2.76        Ca     8.3     [11-01-20 @ 03:33]      Phos  4.5     [11-01-20 @ 03:33]        PTT: 127.2      [11-01-20 @ 10:38]    Uric acid 5.9      [11-01-20 @ 03:33]  LDH 1222      [11-01-20 @ 03:33]    Creatinine Trend:  SCr 2.76 [11-01 @ 03:33]  SCr 2.69 [10-31 @ 15:58]  SCr 2.68 [10-31 @ 07:25]  SCr 2.48 [10-30 @ 19:53]  SCr 2.30 [10-30 @ 10:16]    Urinalysis - [10-30-20 @ 20:40]      Color Yellow / Appearance Slightly Turbid / SG 1.019 / pH 6.0      Gluc Trace / Ketone Negative  / Bili Negative / Urobili Negative       Blood Moderate / Protein 30 mg/dL / Leuk Est Negative / Nitrite Negative      RBC 1 / WBC 3 / Hyaline 1 / Gran  / Sq Epi  / Non Sq Epi 1 / Bacteria Negative    Urine Creatinine 43      [10-30-20 @ 20:39]  Urine Sodium <35      [10-30-20 @ 20:39]  Urine Potassium 63      [10-30-20 @ 20:39]  Urine Chloride <35      [10-30-20 @ 20:39]  Urine Osmolality 395      [10-31-20 @ 06:32]    Ferritin 90219      [10-21-20 @ 09:44]  HbA1c 6.2      [06-17-16 @ 03:45]  Lipid: chol --, , HDL --, LDL --      [10-21-20 @ 06:25]    HBsAg Nonreact      [10-23-20 @ 09:52]  HBcAb Nonreact      [10-23-20 @ 09:52]  HCV 0.09, Nonreact      [10-23-20 @ 09:52]  HIV Nonreact      [10-23-20 @ 09:30]    Immunofixation Serum:   No Monoclonal Band Identified    Reference Range: None Detected      [10-20-20 @ 09:57]  SPEP Interpretation: Hypoalbuminemia      [10-20-20 @ 09:57]

## 2020-11-02 NOTE — PROVIDER CONTACT NOTE (CRITICAL VALUE NOTIFICATION) - PERSON GIVING RESULT:
ARSALAN BURNETT
Dipesh Obrien
LAB Bibi Serrano
LAB Daniele Knox
Lab, Nataliia Quintanilla
Lab: Dipesh Joseph
[facility]
Sav Ricketts
Sav Ricketts

## 2020-11-02 NOTE — PROGRESS NOTE ADULT - SUBJECTIVE AND OBJECTIVE BOX
[facility] Division of Hospital Medicine  Zach Espitia MD  Pager (M-F, 4Q-4H): [phone]  Other Times:  [phone]    Patient is a 86y old  Male who presents with a chief complaint of sent here for chemo and radiation (02 Nov 2020 13:51)    SUBJECTIVE / OVERNIGHT EVENTS: pt says feels lousy, mild sob, feels like he has to defecate despite attempts to go to bathroom  ADDITIONAL REVIEW OF SYSTEMS:    MEDICATIONS  (STANDING):  allopurinol 100 milliGRAM(s) Oral daily  amLODIPine   Tablet 2.5 milliGRAM(s) Oral daily  atorvastatin 80 milliGRAM(s) Oral at bedtime  chlorhexidine 2% Cloths 1 Application(s) Topical daily  chlorhexidine 4% Liquid 1 Application(s) Topical <User Schedule>  heparin  Infusion. 900 Unit(s)/Hr (9 mL/Hr) IV Continuous <Continuous>  influenza   Vaccine 0.5 milliLiter(s) IntraMuscular once  lactobacillus acidophilus 1 Tablet(s) Oral daily  latanoprost 0.005% Ophthalmic Solution 1 Drop(s) Both EYES at bedtime  mirtazapine 15 milliGRAM(s) Oral at bedtime  nystatin    Suspension 861986 Unit(s) Swish and Swallow every 6 hours  polyethylene glycol 3350 17 Gram(s) Oral daily  rasburicase IVPB 3 milliGRAM(s) IV Intermittent once  senna 2 Tablet(s) Oral at bedtime  sodium bicarbonate 650 milliGRAM(s) Oral two times a day  sodium chloride 0.9%. 1000 milliLiter(s) (75 mL/Hr) IV Continuous <Continuous>  tamsulosin 0.4 milliGRAM(s) Oral at bedtime  timolol 0.5% Solution 1 Drop(s) Both EYES two times a day    MEDICATIONS  (PRN):  acetaminophen   Tablet .. 650 milliGRAM(s) Oral every 6 hours PRN Mild Pain (1 - 3), Moderate Pain (4 - 6)  heparin   Injectable 5000 Unit(s) IV Push every 6 hours PRN For aPTT less than 40  heparin   Injectable 2500 Unit(s) IV Push every 6 hours PRN For aPTT between 40 - 57  morphine  - Injectable 2 milliGRAM(s) IV Push every 4 hours PRN Severe Pain (7 - 10)  sodium chloride 0.9% lock flush 10 milliLiter(s) IV Push every 1 hour PRN Pre/post blood products, medications, blood draw, and to maintain line patency  traMADol 25 milliGRAM(s) Oral every 6 hours PRN Moderate Pain (4 - 6)      CAPILLARY BLOOD GLUCOSE        I&O's Summary    01 Nov 2020 07:01  -  02 Nov 2020 07:00  --------------------------------------------------------  IN: 440 mL / OUT: 2200 mL / NET: -1760 mL    02 Nov 2020 07:01  -  02 Nov 2020 14:09  --------------------------------------------------------  IN: 180 mL / OUT: 600 mL / NET: -420 mL        PHYSICAL EXAM:  Vital Signs Last 24 Hrs  T(C): 36.9 (02 Nov 2020 09:14), Max: 36.9 (02 Nov 2020 09:14)  T(F): 98.4 (02 Nov 2020 09:14), Max: 98.4 (02 Nov 2020 09:14)  HR: 92 (02 Nov 2020 09:14) (92 - 99)  BP: 161/91 (02 Nov 2020 09:14) (120/88 - 171/82)  BP(mean): --  RR: 20 (02 Nov 2020 09:14) (18 - 20)  SpO2: 94% (02 Nov 2020 09:14) (94% - 98%)  CONSTITUTIONAL: NAD, well-developed, well-groomed  EYES: conjunctiva and sclera clear  ENMT: Moist oral mucosa, no pharyngeal injection or exudates; normal dentition  NECK: Supple, no palpable masses; no thyromegaly  RESPIRATORY: Normal respiratory effort; lungs are clear to auscultation bilaterally  CARDIOVASCULAR: Regular rate and rhythm, no murmur; No lower extremity edema; Peripheral pulses are 2+ bilaterally  ABDOMEN: Nontender to palpation, normoactive bowel sounds, no rebound/guarding; No hepatosplenomegaly  PSYCH: calm  NEUROLOGY: AOx3 nonfocal  SKIN: No rashes; no palpable lesions    LABS:                        7.0    12.30 )-----------( 384      ( 02 Nov 2020 00:41 )             21.7     11-01    134<L>  |  99  |  90<H>  ----------------------------<  94  5.5<H>   |  20<L>  |  2.60<H>    Ca    8.6      01 Nov 2020 18:16  Phos  4.4     11-02      PTT - ( 02 Nov 2020 07:28 )  PTT:49.9 sec            RADIOLOGY & ADDITIONAL TESTS:  Results Reviewed:   Imaging Personally Reviewed:  Electrocardiogram Personally Reviewed:    COORDINATION OF CARE:  Care Discussed with Consultants/Other Providers [Y/N]:  Prior or Outpatient Records Reviewed [Y/N]:   [facility] Division of Hospital Medicine  Zach Espitia MD  Pager (M-F, 0R-5F): [phone]  Other Times:  [phone]    Patient is a 86y old  Male who presents with a chief complaint of sent here for chemo and radiation (02 Nov 2020 13:51)    SUBJECTIVE / OVERNIGHT EVENTS: pt says feels lousy, mild sob, feels like he has to defecate despite attempts to go to bathroom  ADDITIONAL REVIEW OF SYSTEMS:    MEDICATIONS  (STANDING):  allopurinol 100 milliGRAM(s) Oral daily  amLODIPine   Tablet 2.5 milliGRAM(s) Oral daily  atorvastatin 80 milliGRAM(s) Oral at bedtime  chlorhexidine 2% Cloths 1 Application(s) Topical daily  chlorhexidine 4% Liquid 1 Application(s) Topical <User Schedule>  heparin  Infusion. 900 Unit(s)/Hr (9 mL/Hr) IV Continuous <Continuous>  influenza   Vaccine 0.5 milliLiter(s) IntraMuscular once  lactobacillus acidophilus 1 Tablet(s) Oral daily  latanoprost 0.005% Ophthalmic Solution 1 Drop(s) Both EYES at bedtime  mirtazapine 15 milliGRAM(s) Oral at bedtime  nystatin    Suspension 972128 Unit(s) Swish and Swallow every 6 hours  polyethylene glycol 3350 17 Gram(s) Oral daily  rasburicase IVPB 3 milliGRAM(s) IV Intermittent once  senna 2 Tablet(s) Oral at bedtime  sodium bicarbonate 650 milliGRAM(s) Oral two times a day  sodium chloride 0.9%. 1000 milliLiter(s) (75 mL/Hr) IV Continuous <Continuous>  tamsulosin 0.4 milliGRAM(s) Oral at bedtime  timolol 0.5% Solution 1 Drop(s) Both EYES two times a day    MEDICATIONS  (PRN):  acetaminophen   Tablet .. 650 milliGRAM(s) Oral every 6 hours PRN Mild Pain (1 - 3), Moderate Pain (4 - 6)  heparin   Injectable 5000 Unit(s) IV Push every 6 hours PRN For aPTT less than 40  heparin   Injectable 2500 Unit(s) IV Push every 6 hours PRN For aPTT between 40 - 57  morphine  - Injectable 2 milliGRAM(s) IV Push every 4 hours PRN Severe Pain (7 - 10)  sodium chloride 0.9% lock flush 10 milliLiter(s) IV Push every 1 hour PRN Pre/post blood products, medications, blood draw, and to maintain line patency  traMADol 25 milliGRAM(s) Oral every 6 hours PRN Moderate Pain (4 - 6)      CAPILLARY BLOOD GLUCOSE        I&O's Summary    01 Nov 2020 07:01  -  02 Nov 2020 07:00  --------------------------------------------------------  IN: 440 mL / OUT: 2200 mL / NET: -1760 mL    02 Nov 2020 07:01  -  02 Nov 2020 14:09  --------------------------------------------------------  IN: 180 mL / OUT: 600 mL / NET: -420 mL        PHYSICAL EXAM:  Vital Signs Last 24 Hrs  T(C): 36.9 (02 Nov 2020 09:14), Max: 36.9 (02 Nov 2020 09:14)  T(F): 98.4 (02 Nov 2020 09:14), Max: 98.4 (02 Nov 2020 09:14)  HR: 92 (02 Nov 2020 09:14) (92 - 99)  BP: 161/91 (02 Nov 2020 09:14) (120/88 - 171/82)  BP(mean): --  RR: 20 (02 Nov 2020 09:14) (18 - 20)  SpO2: 94% (02 Nov 2020 09:14) (94% - 98%)  GENERAL: mild-mod resp distress, tachypneic, receiving blood transfusion  EYES:  conjunctiva and sclera clear  CHEST/LUNG: Clear to auscultation bilaterally; No wheeze  HEART: +S1/S2, reg   ABDOMEN: Soft, Nontender, Nondistended  MSK/EXTREMITIES:  L shoulder w/ecchymosis, indurated and firm. blanching skin  PSYCH: AAOx3  NEUROLOGY: non-focal  SKIN: No rashes or lesions other than shoulder as above    LABS:                         7.0    12.30 )-----------( 384      ( 02 Nov 2020 00:41 )             21.7     11-01    134<L>  |  99  |  90<H>  ----------------------------<  94  5.5<H>   |  20<L>  |  2.60<H>    Ca    8.6      01 Nov 2020 18:16  Phos  4.4     11-02      PTT - ( 02 Nov 2020 07:28 )  PTT:49.9 sec      RADIOLOGY & ADDITIONAL TESTS:  Results Reviewed:   Imaging Personally Reviewed:  Electrocardiogram Personally Reviewed:    COORDINATION OF CARE: renal recs apprec  Care Discussed with Consultants/Other Providers [Y/N]:  Prior or Outpatient Records Reviewed [Y/N]:

## 2020-11-02 NOTE — CONSULT NOTE ADULT - PROVIDER SPECIALTY LIST ADULT
Heme/Onc
Infectious Disease
Intervent Radiology
Nephrology
Orthopedics
Intervent Radiology
Palliative Care

## 2020-11-02 NOTE — PROGRESS NOTE ADULT - SUBJECTIVE AND OBJECTIVE BOX
INTERVAL HPI/OVERNIGHT EVENTS:  No overnight events. Sitting up in chair today with wife at bedside. Reports feeling a bit stronger and is actually laughing with the team today. Shoulder still painful but slightly better than prior.     MEDICATIONS  (STANDING):  allopurinol 100 milliGRAM(s) Oral daily  amLODIPine   Tablet 2.5 milliGRAM(s) Oral daily  atorvastatin 80 milliGRAM(s) Oral at bedtime  chlorhexidine 2% Cloths 1 Application(s) Topical daily  chlorhexidine 4% Liquid 1 Application(s) Topical <User Schedule>  heparin  Infusion. 900 Unit(s)/Hr (9 mL/Hr) IV Continuous <Continuous>  influenza   Vaccine 0.5 milliLiter(s) IntraMuscular once  lactobacillus acidophilus 1 Tablet(s) Oral daily  latanoprost 0.005% Ophthalmic Solution 1 Drop(s) Both EYES at bedtime  mirtazapine 15 milliGRAM(s) Oral at bedtime  nystatin    Suspension 385075 Unit(s) Swish and Swallow every 6 hours  polyethylene glycol 3350 17 Gram(s) Oral daily  rasburicase IVPB 3 milliGRAM(s) IV Intermittent once  senna 2 Tablet(s) Oral at bedtime  sodium bicarbonate 650 milliGRAM(s) Oral two times a day  sodium chloride 0.9%. 1000 milliLiter(s) (40 mL/Hr) IV Continuous <Continuous>  tamsulosin 0.4 milliGRAM(s) Oral at bedtime  timolol 0.5% Solution 1 Drop(s) Both EYES two times a day    MEDICATIONS  (PRN):  acetaminophen   Tablet .. 650 milliGRAM(s) Oral every 6 hours PRN Mild Pain (1 - 3), Moderate Pain (4 - 6)  heparin   Injectable 5000 Unit(s) IV Push every 6 hours PRN For aPTT less than 40  heparin   Injectable 2500 Unit(s) IV Push every 6 hours PRN For aPTT between 40 - 57  morphine  - Injectable 2 milliGRAM(s) IV Push every 4 hours PRN Severe Pain (7 - 10)  sodium chloride 0.9% lock flush 10 milliLiter(s) IV Push every 1 hour PRN Pre/post blood products, medications, blood draw, and to maintain line patency  traMADol 25 milliGRAM(s) Oral every 6 hours PRN Moderate Pain (4 - 6)    Allergies    No Known Allergies    Intolerances          VITAL SIGNS:  T(F): 98 (11-02-20 @ 17:07)  HR: 101 (11-02-20 @ 17:07)  BP: 103/84 (11-02-20 @ 17:07)  RR: 18 (11-02-20 @ 17:07)  SpO2: 96% (11-02-20 @ 17:07)  Wt(kg): --    PHYSICAL EXAM:    Constitutional: NAD, lying comfortably in bed  HEEN:, EOMI, PERRLA, + thrush  Neck: supple, no masses, no JVD  Respiratory: CTAB; no r/r/w  Cardiovascular: RRR, no M/R/G  Gastrointestinal: +BS, soft, NTND, no hepatosplenomegaly  Extremities: no c/c/e  Neurological: AAOx3, nonfocal    LABS:                        9.3    15.55 )-----------( 407      ( 02 Nov 2020 16:36 )             28.9     11-02    136  |  100  |  88<H>  ----------------------------<  128<H>  5.0   |  18<L>  |  2.58<H>    Ca    8.5      02 Nov 2020 14:23  Phos  4.4     11-02      PTT - ( 02 Nov 2020 14:23 )  PTT:79.7 sec      RADIOLOGY & ADDITIONAL TESTS:  Studies reviewed.   INTERVAL HPI/OVERNIGHT EVENTS:  No overnight events. Sitting up in chair today with wife at bedside. Reports feeling a bit stronger and is actually laughing with the team today. Shoulder still painful but slightly better than prior.     MEDICATIONS  (STANDING):  allopurinol 100 milliGRAM(s) Oral daily  amLODIPine   Tablet 2.5 milliGRAM(s) Oral daily  atorvastatin 80 milliGRAM(s) Oral at bedtime  chlorhexidine 2% Cloths 1 Application(s) Topical daily  chlorhexidine 4% Liquid 1 Application(s) Topical <User Schedule>  heparin  Infusion. 900 Unit(s)/Hr (9 mL/Hr) IV Continuous <Continuous>  influenza   Vaccine 0.5 milliLiter(s) IntraMuscular once  lactobacillus acidophilus 1 Tablet(s) Oral daily  latanoprost 0.005% Ophthalmic Solution 1 Drop(s) Both EYES at bedtime  mirtazapine 15 milliGRAM(s) Oral at bedtime  nystatin    Suspension 836201 Unit(s) Swish and Swallow every 6 hours  polyethylene glycol 3350 17 Gram(s) Oral daily  rasburicase IVPB 3 milliGRAM(s) IV Intermittent once  senna 2 Tablet(s) Oral at bedtime  sodium bicarbonate 650 milliGRAM(s) Oral two times a day  sodium chloride 0.9%. 1000 milliLiter(s) (40 mL/Hr) IV Continuous <Continuous>  tamsulosin 0.4 milliGRAM(s) Oral at bedtime  timolol 0.5% Solution 1 Drop(s) Both EYES two times a day    MEDICATIONS  (PRN):  acetaminophen   Tablet .. 650 milliGRAM(s) Oral every 6 hours PRN Mild Pain (1 - 3), Moderate Pain (4 - 6)  heparin   Injectable 5000 Unit(s) IV Push every 6 hours PRN For aPTT less than 40  heparin   Injectable 2500 Unit(s) IV Push every 6 hours PRN For aPTT between 40 - 57  morphine  - Injectable 2 milliGRAM(s) IV Push every 4 hours PRN Severe Pain (7 - 10)  sodium chloride 0.9% lock flush 10 milliLiter(s) IV Push every 1 hour PRN Pre/post blood products, medications, blood draw, and to maintain line patency  traMADol 25 milliGRAM(s) Oral every 6 hours PRN Moderate Pain (4 - 6)    Allergies    No Known Allergies    Intolerances          VITAL SIGNS:  T(F): 98 (11-02-20 @ 17:07)  HR: 101 (11-02-20 @ 17:07)  BP: 103/84 (11-02-20 @ 17:07)  RR: 18 (11-02-20 @ 17:07)  SpO2: 96% (11-02-20 @ 17:07)  Wt(kg): --    PHYSICAL EXAM:    Constitutional: NAD, lying comfortably in bed  HEEN:, EOMI, PERRLA, + thrush  Neck: supple, no masses, no JVD  Respiratory: CTAB; no r/r/w  Cardiovascular: RRR, no M/R/G  Gastrointestinal: +BS, soft, NTND, no hepatosplenomegaly  Extremities: no c/c/e, L shoulder still swollen, appears slightly improved   Neurological: AAOx3, nonfocal    LABS:                        9.3    15.55 )-----------( 407      ( 02 Nov 2020 16:36 )             28.9     11-02    136  |  100  |  88<H>  ----------------------------<  128<H>  5.0   |  18<L>  |  2.58<H>    Ca    8.5      02 Nov 2020 14:23  Phos  4.4     11-02      PTT - ( 02 Nov 2020 14:23 )  PTT:79.7 sec      RADIOLOGY & ADDITIONAL TESTS:  Studies reviewed.

## 2020-11-02 NOTE — CONSULT NOTE ADULT - PROBLEM SELECTOR RECOMMENDATION 3
- history of DLBCL, GCB subtype (BCL2, BCL6, MYC, all negative by FISH). Per chart review, patient last received 6 cycles of R-CHOP in 2017 with DELANEY until 11/2019. Patient had mesenteric mass with size of 2.7x4.8 in 2017.   - CT CAP apparently shows enlarging mesenteric mass and new Lt pelvic renal mass concerning of relapse  - At [facility], he was found to have a L shoulder mass and pathologic fracture. Now s/p ORIF and radical resection of tumor on 9/25. PET/CT was performed revealing brain lesion with hypermetabolic activity  - Bone marrow biopsy without e/o lymphoma  - Left shoulder biopsy: most consistent with DLBCL   - s/p inpatient chemotherapy on 10/28 and 10/29  - Per Oncology: Will attempt to get LP for CNS disease

## 2020-11-02 NOTE — PROVIDER CONTACT NOTE (OTHER) - ASSESSMENT
Pt has no S&S of active bleeding
pt. denies chest pain; no complaints at this time.
pt is aaox2. Vitals as follows: 132/79, , Temp 97.9, RR 18, O2sat 95%. pt denies pain or discomfort. EKG done.

## 2020-11-02 NOTE — PROGRESS NOTE ADULT - PROBLEM SELECTOR PLAN 3
-may be from pulm edema given IV fluids and PRBC  -CXR stable, monitor for now  -PE less likely, pt already on full AC  - dose lasix today, monitor oxygen/respiratory status, q4h vitals

## 2020-11-02 NOTE — PROVIDER CONTACT NOTE (CRITICAL VALUE NOTIFICATION) - TEST AND RESULT REPORTED:
127.2
H/H 6.7/21.0
Hemoglobin 6.9
Hemoglobin 7
Hemoglobin 7.0
K=6.2 not hemolyzed
aptt 182.5
lactate whole blood-4.0
K 6.4

## 2020-11-02 NOTE — PROVIDER CONTACT NOTE (CRITICAL VALUE NOTIFICATION) - ASSESSMENT
Patient offers no c/o chest pain, palpatations.
Pt has no S&S of active bleeding
no s.s of bleeding present.
no s.s of active bleeding; pt denies any discomforts at this time
no s/s of active bleeding;

## 2020-11-02 NOTE — PROVIDER CONTACT NOTE (CRITICAL VALUE NOTIFICATION) - NAME OF MD/NP/PA/DO NOTIFIED:
WILMAN Blevins
Amrita CLAYTON
Clarisa Funes
Clarisa Funes
MATILDE Crowe
Stephen CLAYTON
WILMAN Gonsalves

## 2020-11-02 NOTE — PROGRESS NOTE ADULT - PROBLEM SELECTOR PLAN 10
Previous MD d/w length with wife and also patient's daughter. There are 5 children from his first marriage and he has been  to his new wife for 25 years.  Previous MD d/w wife - she believes this cancer is treatable, She would like to consider all options at this time.  d/w daughter (2nd daughter - Stella) - she is more realistic. Does not think her father could tolerate chemo. However, he defers to his wife.  -f/u palliative consult

## 2020-11-02 NOTE — CONSULT NOTE ADULT - PROBLEM SELECTOR RECOMMENDATION 2
- possibly from pulmonary edema as patient was receiving prbc transfusion   - spoke with nursing staff and NP notified and lasix IVP ordered  - continue to monitor - possibly from pulmonary edema as patient was receiving prbc transfusion   - spoke with nursing staff and NP notified and lasix IVP ordered  - appeared comfortable after lasix  - continue to monitor

## 2020-11-02 NOTE — PROGRESS NOTE ADULT - ASSESSMENT
86 year old male with PMHx of DLBCL (s/p 6 cycles of R-CHOP and in DELANEY until 9/2019) who was transferred to [facility] from the [facility] for further management of his diffuse large B cell lymphoma. Pt admitted for possible relapse of DLBCL. Nephrology team consulted for DANIELITO on CKD possible TLS/TLS monitoring.     #Acute Kidney Injury on CKD. Patient with baseline serum creatinine at 1.5-1.7. On admission, his serum creatinine was 1.7 (10/18) and appears to be increasing to 1.9 on 10/29/20 and 2.6 (11/1/20) . He has DANIELITO which can be pre-renal? possible obstruction with CT abdomen on showing Mild left hydronephrosis with delayed nephrogram due to an obstructing soft tissue mass at the level of the renal pelvis or ?TLS although his uric acid levels are not high, phosphorus levels are not elevated, calcium levels not low. Patient with underlying CKD, etiology is unclear but could be related to previous chemotherapies from DLBCL, also with BPH.    Recommendation  - Creatinine uptrended slightly to 2.6- likely that is TLS related  - Gibson catheter placed, patient produced ~ 2.2L of urine output over 24 hour   - May require nephrostomy tube if obstruction is more proximal?      #Hyperkalemia in the setting of DANIELITO, and recent chemo  - Noted potassium is lower when doing VBG potassium making the serum K likely psedo. Would use VBG or plasma K for K checks for TLS labs.  - can dc lokelma for now     #Hyponatremia   -Resolved   - Likely hypervolemic hyponatremia  - Maintain Gibson catheter  - May require intervention on obstruction if lesion is more proximal 86 year old male with PMHx of DLBCL (s/p 6 cycles of R-CHOP and in DELANEY until 9/2019) who was transferred to [facility] from the [facility] for further management of his diffuse large B cell lymphoma. Pt admitted for possible relapse of DLBCL. Received RTX and Bendamustine ( 10/28 and 10/29) Nephrology team consulted for DANIELITO on CKD possible TLS/TLS monitoring.     #Acute Kidney Injury on CKD. Patient with baseline serum creatinine at 1.5-1.7. On admission, his serum creatinine was 1.7 (10/18) and appears to be increasing to 1.9 on 10/29/20 and 2.6 (11/1/20) . He has DANIELITO which can be possibly secondary to obstruction with CT abdomen on showing  left hydronephrosis with delayed nephrogram due to an obstructing soft tissue mass at the level of the renal pelvis.  TLS is less likely  as the uric acid levels are not high, phosphorus levels are not elevated, calcium levels not low. The only finding was persistently elevated K . Patient with underlying CKD, etiology is unclear but could be related to previous chemotherapies from DLBCL, also with BPH.    Recommendation  - Renal function is overall stable  - Gibson catheter placed, patient produced ~ 2.2L of urine output over 24 hour   - May require nephrostomy tube if renal function continues to worsen with increasing hydronephrosis      #Hyperkalemia in the setting of DANIELITO, and recent chemo  - Noted potassium is lower when doing VBG potassium making the serum K likely psedo. Would use VBG or plasma K for K checks for TLS labs.  - can dc lokelma for now     #Hyponatremia   -Resolved   - Likely hypervolemic hyponatremia  - Maintain Gibson catheter

## 2020-11-02 NOTE — PROVIDER CONTACT NOTE (OTHER) - ACTION/TREATMENT ORDERED:
Notified WILMAN Gonsalves of PTT 71.9. Ordered to follow the heparin nomogram. Heparin nomogram stated to keep heparin drip infusing @ 9ml/hr. Next PTT lab slip will print @ 6:56AM. Will monitor.

## 2020-11-02 NOTE — CONSULT NOTE ADULT - PROBLEM SELECTOR RECOMMENDATION 7
- Patient not feeling well today with shortness of breath during encounter while receiving prbc transfusion. patient endorses he feels anxious because he thought he was going to be hospitalized for only a short while, but has remained hospitalized for about 2 weeks now. He knows he has cancer which is being treated with chemotherapy; patient unable to describe further as to what type of cancer.   - Patient states he lives at home with his wife Viktoria. He is a [place] for his service to the [employer]. He states he has 5 kids who live in [place].   Patient AAOx2 today; unclear baseline; will need collateral from family.   - Emotional support provided to patient   - will continue to follow with primary team

## 2020-11-02 NOTE — CONSULT NOTE ADULT - PROBLEM SELECTOR RECOMMENDATION 9
- recent L shoulder mass and pathologic fracture s/p ORIF and radical resection of tumor on 9/25. Left shoulder biopsy: most consistent with DLBCL   - pain well controlled with curreng regimen per patient ; no prn doses of tylenol, tramadol, or IV morphine in past 24 hours  - continue with PO Tylenol 650mg q6 PRN mild pain  - continue PO Tramadol 25mg q6 PRN moderate pain  - continue IV Morphine 2mg q4 PRN severe pain - recent L shoulder mass and pathologic fracture s/p ORIF and radical resection of tumor on 9/25. Left shoulder biopsy: most consistent with DLBCL   - pain well controlled with current regimen per patient ; no prn doses of tylenol, tramadol, or IV morphine in past 24 hours  - continue with PO Tylenol 650mg q6 PRN mild pain  - continue PO Tramadol 25mg q6 PRN moderate pain  - continue IV Morphine 2mg q4 PRN severe pain

## 2020-11-02 NOTE — PROGRESS NOTE ADULT - PROBLEM SELECTOR PLAN 1
-Patient was sent here for further management especially chemo and radiation  -CT C/A/P with abd mass, LN. MRI Brain w/ contrast- New 2.2 x 1.6 x 1.1 cm   uniformly enhancing, T1 and T2 isointense high right posterior frontal extra-axial mass. The lesion demonstrates restricted diffusion.   -s/p BMB demonstrated diffuse large b-cell lymphoma  -PICC placed  -started on chemotherapy   -LP possibly this week to eval for leptomeningeal dz  - monitor counts and labs

## 2020-11-02 NOTE — CONSULT NOTE ADULT - ASSESSMENT
86M w DLBCL in remission until 9/2019, HTN, HLD, CKD, BPH, anemia, syncope, anxiety/adjustment disorder/depression, presents here from [facility] where he was getting treated for L shoulder infection (s/p ORIF for pathologic fx) for further management of his recurrent diffuse large B cell lymphoma and L shoulder lesion, s/p chemo with concern for TLS with hyperkalemia & DANIELITO

## 2020-11-02 NOTE — PROVIDER CONTACT NOTE (OTHER) - SITUATION
Pt on heparin drip currently infusing @ 9ml/hr. PTT resulted 71.9 (first therapeutic PTT result). Pt on heparin drip currently infusing @ 9ml/hr. PTT resulted 71.9. PTT result is in target range of 58-99 as per order. Pt on heparin drip currently infusing @ 9ml/hr. PTT resulted 71.9. PTT result is first therapeutic result & in target range of 58-99 as per order.

## 2020-11-02 NOTE — CONSULT NOTE ADULT - SUBJECTIVE AND OBJECTIVE BOX
HPI:  87 yo male with PMH of NHL (last chemo 2017), HTN, HLD, CKD, BPH, anemia, syncope, anxiety/adjustment disorder/depression, presents here from [facility] for further management of his diffuse large B cell lymphoma.  History obtained from patient, his wife, Mayda (HCP) and chart review.  Patient was treated for NHL 5 years ago with chemotherapy and was in remission.  Earlier this year, patient had weight loss.  CT scan showed new lesion of the messentery and kidney.  He was receiving PT at home for left shoulder weakness, later found to have mass of left shoulder and pathologic fracture.  Patient underwent left proximal humerus open biopsy, radical resection of tumor, and ORIF with IMN and cementation on 9/25/20 for a recently sustained pathalogic fracture.  He was planned to undergo postop radiation therapy, which was cancelled due to having redness and swelling of left shoulder that started on 10/13/20, which prompted him to go to the hospital.  While in ED, patient was initially started on IV zosyn and IV vanc, which was then transitioned to IV cefepime, IV vanc and PO flagyl.   Patient had XR and CT scan of left shoulder, which were suggestive of osteomyelitis.  MRI could not be performed due to having metal in left shoulder.  Patient was seen by orthopedics on 10/13/20, was cleared for radiation therapy under broad spectrum IV antibiotic coverage.  On October 16, 2020, patient was seen by psychiatry for evaluation for depression and need for psychotropic treatment.  Patient did not have suicidal ideation. No indication for inpatient psych treatment.  Per wife, patient was started on antipsychotic and was given xanax .5mg bid PRN for anxiety, especially prior to any tests and blood work.  He has mild dementia and currently AAO x 2 (baseline as per wife).  Patient was also being seen by nephrology for DANIELITO.  Currently he complains of mild left shoulder pain.  He appears upset, does not want to be bothered.  Refusing any blood work or any tests at this time.  Patient otherwise denies fever, chills, nausea, vomiting, abdominal pain, diarrhea or dysuria.     (17 Oct 2020 20:23)    PERTINENT PM/SXH:   Syncope    Chronic kidney disease (CKD)    Diffuse large B cell lymphoma    HLD (hyperlipidemia)    BPH (benign prostatic hyperplasia)    HTN (hypertension)    No pertinent past medical history      History of left shoulder fracture    Osteomyelitis of left shoulder region    No significant past surgical history      FAMILY HISTORY:  FH: brain cancer  brother    FH: lung cancer  father      ITEMS NOT CHECKED ARE NOT PRESENT    SOCIAL HISTORY:   Significant other/partner[x ]  Children[ x]  Voodoo/Spirituality: Jewish   Substance hx:  [ ]   Tobacco hx:  [ ]   Alcohol hx: [ ]   Home Opioid hx:  [x ] I-Stop Reference No: [id number]   Living Situation: [x ]Home  [ ]Long term care  [ ]Rehab [ ]Other    ADVANCE DIRECTIVES:    DNR  MOLST  [ ]  Living Will  [ ]   DECISION MAKER(s):  [ ] Health Care Proxy(s)  [ x] Surrogate(s)  [ ] Guardian           Name(s): Phone Number(s):  Wife Viktoria Hernandez, [phone]    BASELINE (I)ADL(s) (prior to admission):  [place]: [ ]Total  [ x] Moderate [ ]Dependent    Allergies    No Known Allergies    Intolerances    MEDICATIONS  (STANDING):  allopurinol 100 milliGRAM(s) Oral daily  amLODIPine   Tablet 2.5 milliGRAM(s) Oral daily  atorvastatin 80 milliGRAM(s) Oral at bedtime  chlorhexidine 2% Cloths 1 Application(s) Topical daily  chlorhexidine 4% Liquid 1 Application(s) Topical <User Schedule>  heparin  Infusion. 900 Unit(s)/Hr (9 mL/Hr) IV Continuous <Continuous>  influenza   Vaccine 0.5 milliLiter(s) IntraMuscular once  lactobacillus acidophilus 1 Tablet(s) Oral daily  latanoprost 0.005% Ophthalmic Solution 1 Drop(s) Both EYES at bedtime  mirtazapine 15 milliGRAM(s) Oral at bedtime  nystatin    Suspension 977313 Unit(s) Swish and Swallow every 6 hours  polyethylene glycol 3350 17 Gram(s) Oral daily  rasburicase IVPB 3 milliGRAM(s) IV Intermittent once  senna 2 Tablet(s) Oral at bedtime  sodium bicarbonate 650 milliGRAM(s) Oral two times a day  sodium chloride 0.9%. 1000 milliLiter(s) (40 mL/Hr) IV Continuous <Continuous>  tamsulosin 0.4 milliGRAM(s) Oral at bedtime  timolol 0.5% Solution 1 Drop(s) Both EYES two times a day    MEDICATIONS  (PRN):  acetaminophen   Tablet .. 650 milliGRAM(s) Oral every 6 hours PRN Mild Pain (1 - 3), Moderate Pain (4 - 6)  heparin   Injectable 5000 Unit(s) IV Push every 6 hours PRN For aPTT less than 40  heparin   Injectable 2500 Unit(s) IV Push every 6 hours PRN For aPTT between 40 - 57  morphine  - Injectable 2 milliGRAM(s) IV Push every 4 hours PRN Severe Pain (7 - 10)  sodium chloride 0.9% lock flush 10 milliLiter(s) IV Push every 1 hour PRN Pre/post blood products, medications, blood draw, and to maintain line patency  traMADol 25 milliGRAM(s) Oral every 6 hours PRN Moderate Pain (4 - 6)    PRESENT SYMPTOMS: [ ]Unable to obtain due to poor mentation   Source if other than patient:  [ ]Family   [ ]Team     Pain: [ ]yes [x ]no  QOL impact -   Location -                    Aggravating factors -  Quality -  Radiation -  Timing-  Severity (0-10 scale):  Minimal acceptable level (0-10 scale):     CPOT:    https://www.[email or web address]/getattachment/[email or web address]/Critical-Care-Pain-Observation-Tool-(CPOT)      PAIN AD Score:     http://geriatrictoolkit.[place].[email or web address]/cog/painad.pdf (press ctrl +  left click to view)    Dyspnea:                           [ ]Mild [ x]Moderate [ ]Severe  Anxiety:                             [x ]Mild [ ]Moderate [ ]Severe  Fatigue:                             [ ]Mild [x ]Moderate [ ]Severe  Nausea:                             [ ]Mild [ ]Moderate [ ]Severe  Loss of appetite:              [ ]Mild [ ]Moderate [ ]Severe  Constipation:                    [ ]Mild [ ]Moderate [ ]Severe    Other Symptoms:  [x ]All other review of systems negative     Palliative Performance Status Version 2:  30-40   %    http://npcrc.org/files/news/palliative_performance_scale_ppsv2.pdf    PHYSICAL EXAM:  Vital Signs Last 24 Hrs  T(C): 36.7 (02 Nov 2020 17:07), Max: 36.9 (02 Nov 2020 09:14)  T(F): 98 (02 Nov 2020 17:07), Max: 98.4 (02 Nov 2020 09:14)  HR: 101 (02 Nov 2020 17:07) (92 - 125)  BP: 103/84 (02 Nov 2020 17:07) (103/84 - 171/82)  BP(mean): --  RR: 18 (02 Nov 2020 17:07) (18 - 20)  SpO2: 96% (02 Nov 2020 17:07) (94% - 99%) I&O's Summary    01 Nov 2020 07:01  -  02 Nov 2020 07:00  --------------------------------------------------------  IN: 440 mL / OUT: 2200 mL / NET: -1760 mL    02 Nov 2020 07:01  -  02 Nov 2020 17:21  --------------------------------------------------------  IN: 680 mL / OUT: 600 mL / NET: 80 mL      GENERAL:  [ ]Alert  [x ]Oriented x2 with intermittent episodes of confusion (name and place; states Navarro is the President)  [x ]Lethargic  [ ]Cachexia  [ ]Unarousable  [x ]Verbal  [ ]Non-Verbal  Behavioral:   [ ] Anxiety  [ ] Delirium [ ] Agitation [ ] Other  HEENT:  [ ]Normal   [ x]Dry mouth   [ ]ET Tube/Trach  [ ]Oral lesions  PULMONARY:   [x ]Clear [ ]Tachypnea  [ ]Audible excessive secretions   [ ]Rhonchi        [ ]Right [ ]Left [ ]Bilateral  [ ]Crackles        [ ]Right [ ]Left [ ]Bilateral  [ ]Wheezing     [ ]Right [ ]Left [ ]Bilateral  [ x]Diminished breath sounds [ ]right [ ]left [ x]bilateral  CARDIOVASCULAR:    [ x]Regular [ ]Irregular [ ]Tachy  [ ]Noble [ ]Murmur [ ]Other  GASTROINTESTINAL:  [x ]Soft  [ ]Distended   [ x]+BS  [x ]Non tender [ ]Tender  [ ]PEG [ ]OGT/ NGT  Last BM: 10/31  GENITOURINARY:  [ ]Normal [ ] Incontinent   [ ]Oliguria/Anuria   [x ]Gibson  MUSCULOSKELETAL:   [ ]Normal   [ x]Weakness  [ x]Bed/Wheelchair bound [x ] 2+ pitting edema Edema in b/l upper and lower extremities   NEUROLOGIC:   [x ]No focal deficits  [ ]Cognitive impairment  [ ]Dysphagia [ ]Dysarthria [ ]Paresis [ ]Other   SKIN: Ecchymosis at Left Shoulder   [ ]Normal    [ ]Rash  [ ]Pressure ulcer(s)       Present on admission [ ]y [ ]n    CRITICAL CARE:  [ ] Shock Present  [ ]Septic [ ]Cardiogenic [ ]Neurologic [ ]Hypovolemic  [ ]  Vasopressors [ ]  Inotropes   [ ]Respiratory failure present [ ]Mechanical ventilation [ ]Non-invasive ventilatory support [ ]High flow    [ ]Acute  [ ]Chronic [ ]Hypoxic  [ ]Hypercarbic [ ]Other  [ ]Other organ failure     LABS:                        9.3    15.55 )-----------( 407      ( 02 Nov 2020 16:36 )             28.9   11-02    136  |  100  |  88<H>  ----------------------------<  128<H>  5.0   |  18<L>  |  2.58<H>    Ca    8.5      02 Nov 2020 14:23  Phos  4.4     11-02    PTT - ( 02 Nov 2020 14:23 )  PTT:79.7 sec      RADIOLOGY & ADDITIONAL STUDIES:  CT Abd/ Pelvis 11/1  FINDINGS:  LOWER CHEST: Emphysematous changes. Bilateral dependent atelectasis.  LIVER: Within normal limits.  BILE DUCTS: Normal caliber.  GALLBLADDER: Within normal limits.  SPLEEN: Within normal limits.  PANCREAS: Within normal limits.  ADRENALS: Within normal limits.  KIDNEYS/URETERS: Large hyperdense LEFT renal hilar mass measuring 6.3 x 5.5 cm slightly enlarged when compared to the prior study. There is persistent hydronephrosis throughout the LEFT kidney. Stable RIGHT renal cysts. No hydronephrosis.  BLADDER: Gibson catheter within the bladder.  REPRODUCTIVE ORGANS: Normal prostate.  BOWEL: Partially visualized gastric mass at the level of the cardia. Is difficult to evaluate the extent of the lesion due to lack of oral and IV contrast material. Lesion appears to also involve the distal esophagus.  Mild small bowel dilatation not significantly changed from the prior study. Evaluation for small bowel obstruction is difficult due to the lack of proximal small bowel enteric contrast and the lack of IV contrast material. Large mesenteric mass in the LEFT lower quadrant remains and measures approximately 7.6 x 6.1 x 8.3 cm.  Appendix is not visualized. No evidence of inflammation in the pericecal region.  PERITONEUM: Small amount of ascites. Mesenteric mass as described above.  VESSELS: Within normal limits.  RETROPERITONEUM/LYMPH NODES: No lymphadenopathy.  ABDOMINAL WALL: Within normal limits.  BONES: There is intramedullary julianne within the LEFT humeral shaft. There is marked edema and swelling of the LEFT arm similar but increased when compared to the prior study.  IMPRESSION:  1.  No evidence of intra-abdominal abscess.  2.  Dilated small bowel without clear evidence of bowel obstruction/transition point.  3.  Stable to slightly increased multiple lymphomatous masses: LEFT lower quadrant small bowel mesenteric mass measures 6.1 x 7.6 cm; distal esophageal and gastric cardia mass is difficult to measure due to lack of IV/oral contrast; LEFT renal hilar mass measures 6.3 x 5.5 cm.  4.  LEFT hydronephrosis secondary to LEFT renal hilar mass. Evaluation for renal infection is limited due to lack of IV contrast material.  5.  Marked subcutaneous edema and swelling of the LEFT arm slightly increased when compared to the prior study.    Duplex Venous Doppler  FINDINGS: There is edema within the superficial soft tissues of the left upper extremity.  The left internal jugular and subclavian veins are patent and free of thrombus.  The left axillary and brachial veins are occluded with acute thrombus.  The left basilic and cephalic veins (superficial veins) are patent and without thrombus.  Doppler examination shows normal spontaneous and phasic flow.  IMPRESSION:  Acute, occlusive thrombus affects the left axillary and brachial veins.    CT Chest 10/19  FINDINGS:  CHEST:  LUNGS AND LARGE AIRWAYS: Patent central airways. Biapical pleural-parenchymal scarring. Peripheral reticular reticulation, most prominent at the lung bases, without honeycombing. Calcified granulomas. Few scattered noncalcified pulmonary nodules measuring up to 5 mm in the left lower lobe (3, 93).  PLEURA: No pleural effusion.  VESSELS: Atherosclerotic changes of the aorta and coronary arteries.  HEART: Heart size is normal. No pericardial effusion.  MEDIASTINUM AND MADELIN: No lymphadenopathy.  CHEST WALL AND LOWER NECK: Within normal limits.  ABDOMEN AND PELVIS:  LIVER: Few scattered hypodensities too small to characterize.  BILE DUCTS: Normal caliber.  GALLBLADDER: Within normal limits.  SPLEEN: Within normal limits.  PANCREAS: Within normal limits.  ADRENALS: Within normal limits.  KIDNEYS/URETERS: Mild left hydronephrosis with associated delayed nephrogram due to an infiltrative soft tissue mass at the level of the renal pelvis, approximately measuring 4.9 x 4.5 cm (3, 183). Bilateral renal cysts and hypodensities too small to characterize.  BLADDER: Within normal limits.  REPRODUCTIVE ORGANS: Prostate within normal limits.  BOWEL: Tiny hiatal hernia. No bowel obstruction. Appendix is normal. Colonic diverticulosis.  PERITONEUM: No ascites. 8.7 x 4.3 cm soft tissue mass in the mesentery, difficult to delineate from the adjacent small bowel.  VESSELS: Atherosclerotic changes.  RETROPERITONEUM/LYMPH NODES: Mesenteric lymphadenopathy, for example a 1.6 x 1.3 cm node in the right (3, 240).  ABDOMINAL WALL: Within normal limits.  BONES: Status post ORIF of a left proximal humerus fracture with marked adjacent soft tissue infiltration.  IMPRESSION:  Mild left hydronephrosis with delayed nephrogram due to an obstructing soft tissue mass at the level of the renal pelvis. Large mesenteric jerman mass, difficult to delineate from the small bowel in the absence of oral contrast. Findings are likely related to known lymphoma.  Few sub-6 mm pulmonary nodules are indeterminate.  Status post ORIF of a left proximal humerus fracture, with marked adjacent soft tissue infiltration. Please refer to dedicated CT report of the left shoulder.    CT Shoulder 10/19  FINDINGS:  Intramedullary julianne is present with anterolateral interlocking screws. There is marked lucency within the humeral head and neck which may represent sequela of infection and/or metastatic disease. There is lucency about the interlocking screws. There is a large low density collection along the posterior and lateral aspect of the humeral joint measuring at least 10.3 x 4.0 cm glenohumeral joint and surrounding the proximal humeral component measuring at least 11 x 10 x 11 cm. It is extensive subcutaneous edema about the visualized proximal arm. There is low density surrounding the inferior body of the the scapula, which may represent sequela of metastatic disease versus fluid.  IMPRESSION:  Status post ORIF of the humerus. Lucency within the humeral head/neck, which may represent sequela metastatic disease versus infection. Lucency is present about the interlocking screws. Extensive low density collection surrounding the glenohumeral joint and proximal humerus, concerning for infection.  Low density surrounding the body of the scapula, which may represent sequela of metastatic disease versus infection.    MRI Head 10/21  FINDINGS:  New 2.2 x 1.6 x 1.1 cm (anterior-posterior by transverse by craniocaudad) uniformly enhancing, T1 and T2 isointense high right posterior frontal extra-axial mass. The lesion demonstrates restricted diffusion. Minimal underlying mass effect. No underlying vasogenic edema.  No hydrocephalus, midline shift, or effacement of the basal cisterns. No acute intracranial hemorrhage, vasogenic edema, or acute infarction.  Nonspecific scattered foci of T2 and FLAIR hyperintense signal in the cerebral white matter. Signal voids are seen within the major intracranial vessels consistent with their patency.  No abnormal parenchymal or leptomeningeal enhancement.  Small polyp/retention cysts in the bilateral maxillary sinuses. Remaining visualized paranasal sinuses and mastoid air cells are clear. The orbits, sellar and suprasellar structures, and craniocervical junction are unremarkable.  IMPRESSION:  New 2.2 x 1.6 x 1.1 cm   uniformly enhancing, T1 and T2 isointense high right posterior frontal extra-axial mass. The lesion demonstrates restricted diffusion. Differential diagnosis includes meningioma and extra-axial lymphoma.  No parenchymal or leptomeningeal enhancement.  No acute intracranial hemorrhage, vasogenic edema, or acute origin.      PROTEIN CALORIE MALNUTRITION PRESENT: [ ]mild [ ]moderate [ ]severe [ ]underweight [ ]morbid obesity  https://www.andeal.org/vault/2440/web/files/ONC/Table_Clinical%20Characteristics%20to%20Document%20Malnutrition-White%20JV%20et%20al%508014.pdf    Height (cm): 175.3 (10-19-20 @ 09:24)  Weight (kg): 63.5 (10-31-20 @ 21:11), 77.1 (04-16-20 @ 14:06)  BMI (kg/m2): 20.7 (10-31-20 @ 21:11), 25.1 (10-19-20 @ 09:24)    [ ]PPSV2 < or = to 30% [ ]significant weight loss  [ ]poor nutritional intake  [ ]anasarca     Albumin, Serum: 2.5 g/dL (10-30-20 @ 10:16)   [ ]Artificial Nutrition      REFERRALS:   [ ]Chaplaincy  [ ]Hospice  [ ]Child Life  [ ]Social Work  [x ]Case management [ ]Holistic Therapy     Goals of Care Document: MICA Smith (10-17-20 @ 23:00)  Goals of Care Conversation:   Participants:  · Participants  Family  · Spouse  Wife, Mayda Hernandez    Advance Directives:  · Does patient have Advance Directive  Yes  · Indicate Type  Health Care Proxy (HCP)  · Agent's Name  Mayda Hernandez  · Are any of the items on the chart  No  · Caregiver:  no    Conversation Discussion:  · Conversation  Diagnosis; Prognosis; MOLST Discussed; Treatment Options  · Conversation Details  I had a 16 minute discussion with patient's wife Mayda (HCP) regarding advanced directives.  I had discussed with her the current plan of care.  I had also discussed all resuscitative measures and she verbalized understanding.  She confirmed that patient's wish is to have everything done as needed.  Currently patient is FULL CODE.    What Matters Most To Patient and Family:  · What matters most to patient and family  recovery    Personal Advance Directives Treatment Guidelines:   Treatment Guidelines:  · Decision Maker  Health Care Proxy  · Treatment Guidelines  Antibiotic trial; IV fluid trial    Location of Discussion:   Duration of Advanced Care Planning Meeting:  · Time spent (in minutes)  16    Location of Discussion:  · Location of discussion  Telephone      Electronic Signatures:  Kalee Smith)  (Signed 18-Oct-2020 04:51)  	Authored: Goals of Care Conversation, Personal Advance Directives Treatment Guidelines, Location of Discussion      Last Updated: 18-Oct-2020 04:51 by Kalee Smith)     HPI:  87 yo male with PMH of NHL (last chemo 2017), HTN, HLD, CKD, BPH, anemia, syncope, anxiety/adjustment disorder/depression, presents here from [facility] for further management of his diffuse large B cell lymphoma.  History obtained from patient, his wife, Mayda (HCP) and chart review.  Patient was treated for NHL 5 years ago with chemotherapy and was in remission.  Earlier this year, patient had weight loss.  CT scan showed new lesion of the messentery and kidney.  He was receiving PT at home for left shoulder weakness, later found to have mass of left shoulder and pathologic fracture.  Patient underwent left proximal humerus open biopsy, radical resection of tumor, and ORIF with IMN and cementation on 9/25/20 for a recently sustained pathalogic fracture.  He was planned to undergo postop radiation therapy, which was cancelled due to having redness and swelling of left shoulder that started on 10/13/20, which prompted him to go to the hospital.  While in ED, patient was initially started on IV zosyn and IV vanc, which was then transitioned to IV cefepime, IV vanc and PO flagyl.   Patient had XR and CT scan of left shoulder, which were suggestive of osteomyelitis.  MRI could not be performed due to having metal in left shoulder.  Patient was seen by orthopedics on 10/13/20, was cleared for radiation therapy under broad spectrum IV antibiotic coverage.  On October 16, 2020, patient was seen by psychiatry for evaluation for depression and need for psychotropic treatment.  Patient did not have suicidal ideation. No indication for inpatient psych treatment.  Per wife, patient was started on antipsychotic and was given xanax .5mg bid PRN for anxiety, especially prior to any tests and blood work.  He has mild dementia and currently AAO x 2 (baseline as per wife).  Patient was also being seen by nephrology for DANIELITO.  Currently he complains of mild left shoulder pain.  He appears upset, does not want to be bothered.  Refusing any blood work or any tests at this time.  Patient otherwise denies fever, chills, nausea, vomiting, abdominal pain, diarrhea or dysuria.     (17 Oct 2020 20:23)    PERTINENT PM/SXH:   Syncope    Chronic kidney disease (CKD)    Diffuse large B cell lymphoma    HLD (hyperlipidemia)    BPH (benign prostatic hyperplasia)    HTN (hypertension)    No pertinent past medical history      History of left shoulder fracture    Osteomyelitis of left shoulder region    No significant past surgical history      FAMILY HISTORY:  FH: brain cancer  brother    FH: lung cancer  father      ITEMS NOT CHECKED ARE NOT PRESENT    SOCIAL HISTORY:   Significant other/partner[x ]  Children[ x]  Mormon/Spirituality: Orthodoxy   Substance hx:  [ ]   Tobacco hx:  [ ]   Alcohol hx: [ ]   Home Opioid hx:  [x ] I-Stop Reference No: [id number]   Living Situation: [x ]Home  [ ]Long term care  [ ]Rehab [ ]Other    ADVANCE DIRECTIVES:    DNR  MOLST  [ ]  Living Will  [ ]   DECISION MAKER(s):  [ ] Health Care Proxy(s)  [ x] Surrogate(s)  [ ] Guardian           Name(s): Phone Number(s):  Wife Viktoria Hernandez, [phone]    BASELINE (I)ADL(s) (prior to admission):  [place]: [ ]Total  [ x] Moderate [ ]Dependent    Allergies    No Known Allergies    Intolerances    MEDICATIONS  (STANDING):  allopurinol 100 milliGRAM(s) Oral daily  amLODIPine   Tablet 2.5 milliGRAM(s) Oral daily  atorvastatin 80 milliGRAM(s) Oral at bedtime  chlorhexidine 2% Cloths 1 Application(s) Topical daily  chlorhexidine 4% Liquid 1 Application(s) Topical <User Schedule>  heparin  Infusion. 900 Unit(s)/Hr (9 mL/Hr) IV Continuous <Continuous>  influenza   Vaccine 0.5 milliLiter(s) IntraMuscular once  lactobacillus acidophilus 1 Tablet(s) Oral daily  latanoprost 0.005% Ophthalmic Solution 1 Drop(s) Both EYES at bedtime  mirtazapine 15 milliGRAM(s) Oral at bedtime  nystatin    Suspension 603815 Unit(s) Swish and Swallow every 6 hours  polyethylene glycol 3350 17 Gram(s) Oral daily  rasburicase IVPB 3 milliGRAM(s) IV Intermittent once  senna 2 Tablet(s) Oral at bedtime  sodium bicarbonate 650 milliGRAM(s) Oral two times a day  sodium chloride 0.9%. 1000 milliLiter(s) (40 mL/Hr) IV Continuous <Continuous>  tamsulosin 0.4 milliGRAM(s) Oral at bedtime  timolol 0.5% Solution 1 Drop(s) Both EYES two times a day    MEDICATIONS  (PRN):  acetaminophen   Tablet .. 650 milliGRAM(s) Oral every 6 hours PRN Mild Pain (1 - 3), Moderate Pain (4 - 6)  heparin   Injectable 5000 Unit(s) IV Push every 6 hours PRN For aPTT less than 40  heparin   Injectable 2500 Unit(s) IV Push every 6 hours PRN For aPTT between 40 - 57  morphine  - Injectable 2 milliGRAM(s) IV Push every 4 hours PRN Severe Pain (7 - 10)  sodium chloride 0.9% lock flush 10 milliLiter(s) IV Push every 1 hour PRN Pre/post blood products, medications, blood draw, and to maintain line patency  traMADol 25 milliGRAM(s) Oral every 6 hours PRN Moderate Pain (4 - 6)    PRESENT SYMPTOMS: [ ]Unable to obtain due to poor mentation   Source if other than patient:  [ ]Family   [ ]Team     Pain: [ ]yes [x ]no  QOL impact -   Location -                    Aggravating factors -  Quality -  Radiation -  Timing-  Severity (0-10 scale):  Minimal acceptable level (0-10 scale):     CPOT:    https://www.[facility].org/getattachment/[email or web address]/Critical-Care-Pain-Observation-Tool-(CPOT)      PAIN AD Score:     http://geriatrictoolkit.[place].[facility]/cog/painad.pdf (press ctrl +  left click to view)    Dyspnea:                           [ ]Mild [ x]Moderate [ ]Severe  Anxiety:                             [x ]Mild [ ]Moderate [ ]Severe  Fatigue:                             [ ]Mild [x ]Moderate [ ]Severe  Nausea:                             [ ]Mild [ ]Moderate [ ]Severe  Loss of appetite:              [ ]Mild [ ]Moderate [ ]Severe  Constipation:                    [ ]Mild [ ]Moderate [ ]Severe    Other Symptoms:  [x ]All other review of systems negative     Palliative Performance Status Version 2:  30-40   %    http://npcrc.org/files/news/palliative_performance_scale_ppsv2.pdf    PHYSICAL EXAM:  Vital Signs Last 24 Hrs  T(C): 36.7 (02 Nov 2020 17:07), Max: 36.9 (02 Nov 2020 09:14)  T(F): 98 (02 Nov 2020 17:07), Max: 98.4 (02 Nov 2020 09:14)  HR: 101 (02 Nov 2020 17:07) (92 - 125)  BP: 103/84 (02 Nov 2020 17:07) (103/84 - 171/82)  BP(mean): --  RR: 18 (02 Nov 2020 17:07) (18 - 20)  SpO2: 96% (02 Nov 2020 17:07) (94% - 99%) I&O's Summary    01 Nov 2020 07:01  -  02 Nov 2020 07:00  --------------------------------------------------------  IN: 440 mL / OUT: 2200 mL / NET: -1760 mL    02 Nov 2020 07:01  -  02 Nov 2020 17:21  --------------------------------------------------------  IN: 680 mL / OUT: 600 mL / NET: 80 mL      GENERAL:  [ ]Alert  [x ]Oriented x2 with intermittent episodes of confusion (name and place; states Navarro is the President)  [x ]Lethargic  [ ]Cachexia  [ ]Unarousable  [x ]Verbal  [ ]Non-Verbal  Behavioral:   [ ] Anxiety  [ ] Delirium [ ] Agitation [ ] Other  HEENT:  [ ]Normal   [ x]Dry mouth   [ ]ET Tube/Trach  [ ]Oral lesions  PULMONARY:   [x ]Clear [ ]Tachypnea  [ ]Audible excessive secretions   [ ]Rhonchi        [ ]Right [ ]Left [ ]Bilateral  [ ]Crackles        [ ]Right [ ]Left [ ]Bilateral  [ ]Wheezing     [ ]Right [ ]Left [ ]Bilateral  [ x]Diminished breath sounds [ ]right [ ]left [ x]bilateral  CARDIOVASCULAR:    [ x]Regular [ ]Irregular [ ]Tachy  [ ]Noble [ ]Murmur [ ]Other  GASTROINTESTINAL:  [x ]Soft  [ ]Distended   [ x]+BS  [x ]Non tender [ ]Tender  [ ]PEG [ ]OGT/ NGT  Last BM: 10/31  GENITOURINARY:  [ ]Normal [ ] Incontinent   [ ]Oliguria/Anuria   [x ]Gibson  MUSCULOSKELETAL:   [ ]Normal   [ x]Weakness  [ x]Bed/Wheelchair bound [x ] 2+ pitting edema Edema in b/l upper and lower extremities   NEUROLOGIC:   [x ]No focal deficits  [ ]Cognitive impairment  [ ]Dysphagia [ ]Dysarthria [ ]Paresis [ ]Other   SKIN: Ecchymosis at Left Shoulder   [ ]Normal    [ ]Rash  [ ]Pressure ulcer(s)       Present on admission [ ]y [ ]n    CRITICAL CARE:  [ ] Shock Present  [ ]Septic [ ]Cardiogenic [ ]Neurologic [ ]Hypovolemic  [ ]  Vasopressors [ ]  Inotropes   [ ]Respiratory failure present [ ]Mechanical ventilation [ ]Non-invasive ventilatory support [ ]High flow    [ ]Acute  [ ]Chronic [ ]Hypoxic  [ ]Hypercarbic [ ]Other  [ ]Other organ failure     LABS: reviewed                        9.3    15.55 )-----------( 407      ( 02 Nov 2020 16:36 )             28.9   11-02    136  |  100  |  88<H>  ----------------------------<  128<H>  5.0   |  18<L>  |  2.58<H>    Ca    8.5      02 Nov 2020 14:23  Phos  4.4     11-02    PTT - ( 02 Nov 2020 14:23 )  PTT:79.7 sec      RADIOLOGY & ADDITIONAL STUDIES:  CT Abd/ Pelvis 11/1  FINDINGS:  LOWER CHEST: Emphysematous changes. Bilateral dependent atelectasis.  LIVER: Within normal limits.  BILE DUCTS: Normal caliber.  GALLBLADDER: Within normal limits.  SPLEEN: Within normal limits.  PANCREAS: Within normal limits.  ADRENALS: Within normal limits.  KIDNEYS/URETERS: Large hyperdense LEFT renal hilar mass measuring 6.3 x 5.5 cm slightly enlarged when compared to the prior study. There is persistent hydronephrosis throughout the LEFT kidney. Stable RIGHT renal cysts. No hydronephrosis.  BLADDER: Gibson catheter within the bladder.  REPRODUCTIVE ORGANS: Normal prostate.  BOWEL: Partially visualized gastric mass at the level of the cardia. Is difficult to evaluate the extent of the lesion due to lack of oral and IV contrast material. Lesion appears to also involve the distal esophagus.  Mild small bowel dilatation not significantly changed from the prior study. Evaluation for small bowel obstruction is difficult due to the lack of proximal small bowel enteric contrast and the lack of IV contrast material. Large mesenteric mass in the LEFT lower quadrant remains and measures approximately 7.6 x 6.1 x 8.3 cm.  Appendix is not visualized. No evidence of inflammation in the pericecal region.  PERITONEUM: Small amount of ascites. Mesenteric mass as described above.  VESSELS: Within normal limits.  RETROPERITONEUM/LYMPH NODES: No lymphadenopathy.  ABDOMINAL WALL: Within normal limits.  BONES: There is intramedullary julianne within the LEFT humeral shaft. There is marked edema and swelling of the LEFT arm similar but increased when compared to the prior study.  IMPRESSION:  1.  No evidence of intra-abdominal abscess.  2.  Dilated small bowel without clear evidence of bowel obstruction/transition point.  3.  Stable to slightly increased multiple lymphomatous masses: LEFT lower quadrant small bowel mesenteric mass measures 6.1 x 7.6 cm; distal esophageal and gastric cardia mass is difficult to measure due to lack of IV/oral contrast; LEFT renal hilar mass measures 6.3 x 5.5 cm.  4.  LEFT hydronephrosis secondary to LEFT renal hilar mass. Evaluation for renal infection is limited due to lack of IV contrast material.  5.  Marked subcutaneous edema and swelling of the LEFT arm slightly increased when compared to the prior study.    Duplex Venous Doppler  FINDINGS: There is edema within the superficial soft tissues of the left upper extremity.  The left internal jugular and subclavian veins are patent and free of thrombus.  The left axillary and brachial veins are occluded with acute thrombus.  The left basilic and cephalic veins (superficial veins) are patent and without thrombus.  Doppler examination shows normal spontaneous and phasic flow.  IMPRESSION:  Acute, occlusive thrombus affects the left axillary and brachial veins.    CT Chest 10/19  FINDINGS:  CHEST:  LUNGS AND LARGE AIRWAYS: Patent central airways. Biapical pleural-parenchymal scarring. Peripheral reticular reticulation, most prominent at the lung bases, without honeycombing. Calcified granulomas. Few scattered noncalcified pulmonary nodules measuring up to 5 mm in the left lower lobe (3, 93).  PLEURA: No pleural effusion.  VESSELS: Atherosclerotic changes of the aorta and coronary arteries.  HEART: Heart size is normal. No pericardial effusion.  MEDIASTINUM AND MADELIN: No lymphadenopathy.  CHEST WALL AND LOWER NECK: Within normal limits.  ABDOMEN AND PELVIS:  LIVER: Few scattered hypodensities too small to characterize.  BILE DUCTS: Normal caliber.  GALLBLADDER: Within normal limits.  SPLEEN: Within normal limits.  PANCREAS: Within normal limits.  ADRENALS: Within normal limits.  KIDNEYS/URETERS: Mild left hydronephrosis with associated delayed nephrogram due to an infiltrative soft tissue mass at the level of the renal pelvis, approximately measuring 4.9 x 4.5 cm (3, 183). Bilateral renal cysts and hypodensities too small to characterize.  BLADDER: Within normal limits.  REPRODUCTIVE ORGANS: Prostate within normal limits.  BOWEL: Tiny hiatal hernia. No bowel obstruction. Appendix is normal. Colonic diverticulosis.  PERITONEUM: No ascites. 8.7 x 4.3 cm soft tissue mass in the mesentery, difficult to delineate from the adjacent small bowel.  VESSELS: Atherosclerotic changes.  RETROPERITONEUM/LYMPH NODES: Mesenteric lymphadenopathy, for example a 1.6 x 1.3 cm node in the right (3, 240).  ABDOMINAL WALL: Within normal limits.  BONES: Status post ORIF of a left proximal humerus fracture with marked adjacent soft tissue infiltration.  IMPRESSION:  Mild left hydronephrosis with delayed nephrogram due to an obstructing soft tissue mass at the level of the renal pelvis. Large mesenteric jerman mass, difficult to delineate from the small bowel in the absence of oral contrast. Findings are likely related to known lymphoma.  Few sub-6 mm pulmonary nodules are indeterminate.  Status post ORIF of a left proximal humerus fracture, with marked adjacent soft tissue infiltration. Please refer to dedicated CT report of the left shoulder.    CT Shoulder 10/19  FINDINGS:  Intramedullary julianne is present with anterolateral interlocking screws. There is marked lucency within the humeral head and neck which may represent sequela of infection and/or metastatic disease. There is lucency about the interlocking screws. There is a large low density collection along the posterior and lateral aspect of the humeral joint measuring at least 10.3 x 4.0 cm glenohumeral joint and surrounding the proximal humeral component measuring at least 11 x 10 x 11 cm. It is extensive subcutaneous edema about the visualized proximal arm. There is low density surrounding the inferior body of the the scapula, which may represent sequela of metastatic disease versus fluid.  IMPRESSION:  Status post ORIF of the humerus. Lucency within the humeral head/neck, which may represent sequela metastatic disease versus infection. Lucency is present about the interlocking screws. Extensive low density collection surrounding the glenohumeral joint and proximal humerus, concerning for infection.  Low density surrounding the body of the scapula, which may represent sequela of metastatic disease versus infection.    MRI Head 10/21  FINDINGS:  New 2.2 x 1.6 x 1.1 cm (anterior-posterior by transverse by craniocaudad) uniformly enhancing, T1 and T2 isointense high right posterior frontal extra-axial mass. The lesion demonstrates restricted diffusion. Minimal underlying mass effect. No underlying vasogenic edema.  No hydrocephalus, midline shift, or effacement of the basal cisterns. No acute intracranial hemorrhage, vasogenic edema, or acute infarction.  Nonspecific scattered foci of T2 and FLAIR hyperintense signal in the cerebral white matter. Signal voids are seen within the major intracranial vessels consistent with their patency.  No abnormal parenchymal or leptomeningeal enhancement.  Small polyp/retention cysts in the bilateral maxillary sinuses. Remaining visualized paranasal sinuses and mastoid air cells are clear. The orbits, sellar and suprasellar structures, and craniocervical junction are unremarkable.  IMPRESSION:  New 2.2 x 1.6 x 1.1 cm   uniformly enhancing, T1 and T2 isointense high right posterior frontal extra-axial mass. The lesion demonstrates restricted diffusion. Differential diagnosis includes meningioma and extra-axial lymphoma.  No parenchymal or leptomeningeal enhancement.  No acute intracranial hemorrhage, vasogenic edema, or acute origin.      PROTEIN CALORIE MALNUTRITION PRESENT: [ ]mild [ ]moderate [ ]severe [ ]underweight [ ]morbid obesity  https://www.andeal.org/vault/2440/web/files/ONC/Table_Clinical%20Characteristics%20to%20Document%20Malnutrition-White%20JV%20et%20al%201265.pdf    Height (cm): 175.3 (10-19-20 @ 09:24)  Weight (kg): 63.5 (10-31-20 @ 21:11), 77.1 (04-16-20 @ 14:06)  BMI (kg/m2): 20.7 (10-31-20 @ 21:11), 25.1 (10-19-20 @ 09:24)    [ ]PPSV2 < or = to 30% [ ]significant weight loss  [ ]poor nutritional intake  [ ]anasarca     Albumin, Serum: 2.5 g/dL (10-30-20 @ 10:16)   [ ]Artificial Nutrition      REFERRALS:   [ ]Chaplaincy  [ ]Hospice  [ ]Child Life  [ ]Social Work  [x ]Case management [ ]Holistic Therapy     Goals of Care Document: MICA Smith (10-17-20 @ 23:00)  Goals of Care Conversation:   Participants:  · Participants  Family  · Spouse  Wife, Mayda Hernandez    Advance Directives:  · Does patient have Advance Directive  Yes  · Indicate Type  Health Care Proxy (HCP)  · Agent's Name  Mayda Hernandez  · Are any of the items on the chart  No  · Caregiver:  no    Conversation Discussion:  · Conversation  Diagnosis; Prognosis; MOLST Discussed; Treatment Options  · Conversation Details  I had a 16 minute discussion with patient's wife Mayda (HCP) regarding advanced directives.  I had discussed with her the current plan of care.  I had also discussed all resuscitative measures and she verbalized understanding.  She confirmed that patient's wish is to have everything done as needed.  Currently patient is FULL CODE.    What Matters Most To Patient and Family:  · What matters most to patient and family  recovery    Personal Advance Directives Treatment Guidelines:   Treatment Guidelines:  · Decision Maker  Health Care Proxy  · Treatment Guidelines  Antibiotic trial; IV fluid trial    Location of Discussion:   Duration of Advanced Care Planning Meeting:  · Time spent (in minutes)  16    Location of Discussion:  · Location of discussion  Telephone      Electronic Signatures:  Kalee Smith)  (Signed 18-Oct-2020 04:51)  	Authored: Goals of Care Conversation, Personal Advance Directives Treatment Guidelines, Location of Discussion      Last Updated: 18-Oct-2020 04:51 by Kalee Smith)

## 2020-11-02 NOTE — PROGRESS NOTE ADULT - ASSESSMENT
Patient is an 85 y/o M w/ a PMHx of DLBCL (s/p 6 cycles of R-CHOP and in DELANEY until 9/2019), who p/w possible relapse w/ L shoulder mass and pathologic fracture s/p left proximal humerus open biopsy, radical resection of tumor, and ORIF with IMN and cementation on 9/25/20), who was transferred to [facility] from the [facility] for further management of his diffuse large B cell lymphoma.     #Concern for TLS  - S/p Rasburicase on 10/27  - If uric acid > 8, PLEASE redose with 3mg IV Rasburicase  - Cont. renally dosed Alloprinol  - nephrology following - appreciate input -start sodium bicarbonate tab, will have to consider HD if renal function continues to worsen    # DLBCL, relapsed  - history of DLBCL, GCB subtype (BCL2, BCL6, MYC, all negative by FISH). Per chart review, patient last received 6 cycles of R-CHOP in 2017 with DELANEY until 11/2019. Patient had mesenteric mass with size of 2.7x4.8 in 2017.   - CT CAP apparently shows enlarging mesenteric mass and new Lt pelvic renal mass concerning of relapse  - At [facility], he was found to have a L shoulder mass and pathologic fracture. His is s/p ORIF and radical resection of tumor on 9/25. PET/CT was performed revealing brain lesion with hypermetabolic activity  - Bone marrow biopsy without e/o lymphoma  - Left shoulder biopsy: most consistent with DLBCL per d/w pathologist  - started treatment with Bendamustine/Rituxan. C1D1 on 10/28, D2 on 10/29, every 28 days cycle  - Plan d/w patient's wife who is the health care proxy  - Will attempt to get LP for CNS disease some time this week; IT MTX not needed per d/w Dr. Degroot    # Left UE DVT  - On heparin gtt; cont. for now    # Thrush  - Cont. nystatin swish and swallow as you are      Brenda Elliott, PGY-4  Hematology-Oncology Fellow  [phone] ([place]) [phone] ([facility])  Please page fellow on call after 5pm and on weekends Patient is an 85 y/o M w/ a PMHx of DLBCL (s/p 6 cycles of R-CHOP and in DELANEY until 9/2019), who p/w possible relapse w/ L shoulder mass and pathologic fracture s/p left proximal humerus open biopsy, radical resection of tumor, and ORIF with IMN and cementation on 9/25/20), who was transferred to [facility] from the [facility] for further management of his diffuse large B cell lymphoma.     #Concern for TLS  - S/p Rasburicase on 10/27  - If uric acid > 8, PLEASE redose with 3mg IV Rasburicase  - Cont. renally dosed Alloprinol  - nephrology following - appreciate input -start sodium bicarbonate tab, will have to consider HD if renal function continues to worsen    # DLBCL, relapsed  - history of DLBCL, GCB subtype (BCL2, BCL6, MYC, all negative by FISH). Per chart review, patient last received 6 cycles of R-CHOP in 2017 with DELANEY until 11/2019. Patient had mesenteric mass with size of 2.7x4.8 in 2017.   - CT CAP apparently shows enlarging mesenteric mass and new Lt pelvic renal mass concerning of relapse  - At [facility], he was found to have a L shoulder mass and pathologic fracture. His is s/p ORIF and radical resection of tumor on 9/25. PET/CT was performed revealing brain lesion with hypermetabolic activity  - Bone marrow biopsy without e/o lymphoma  - Left shoulder biopsy: most consistent with DLBCL per d/w pathologist  - started treatment with Bendamustine/Rituxan. C1D1 on 10/28, D2 on 10/29, every 21-28 days cycle  - Plan d/w patient's wife who is the health care proxy  - consider LP to look for CNS disease,  -continue goals of care discussion    # Left UE DVT  - On heparin gtt; cont. for now    # Thrush  - Cont. nystatin swish and swallow as you are

## 2020-11-02 NOTE — PROGRESS NOTE ADULT - ASSESSMENT
86M w DLBCL in remission until 9/2019, HTN, HLD, CKD, BPH, anemia, syncope, anxiety/adjustment disorder/depression, presents here from [facility] where he was getting treated for L shoulder infection (s/p ORIF for pathologic fx) for further management of his recurrent diffuse large B cell lymphoma and L shoulder lesion, s/p chemo with concern for TLS with hyperkalemia & DANIELITO

## 2020-11-02 NOTE — PROVIDER CONTACT NOTE (CRITICAL VALUE NOTIFICATION) - BACKGROUND
pt on heparin drip @ 9;  admit with ALL w/o remission; hemoglobin down trending from 8.3; no goal h/h stated in documentation

## 2020-11-02 NOTE — PROGRESS NOTE ADULT - SUBJECTIVE AND OBJECTIVE BOX
[facility] DIVISION OF KIDNEY DISEASES AND HYPERTENSION -- FOLLOW UP NOTE  --------------------------------------------------------------------------------    24 hour events/subjective: urine output over 24 h is 2.2L. Patient was seen and examined at bedside. He was NAD.    Reports feeling well. Denies CP, SOB, fever, chills, nausea, dysuria.         PAST HISTORY  --------------------------------------------------------------------------------  No significant changes to PMH, PSH, FHx, SHx, unless otherwise noted    ALLERGIES & MEDICATIONS  --------------------------------------------------------------------------------  Allergies    No Known Allergies    Intolerances      Standing Inpatient Medications  allopurinol 100 milliGRAM(s) Oral daily  amLODIPine   Tablet 2.5 milliGRAM(s) Oral daily  atorvastatin 80 milliGRAM(s) Oral at bedtime  chlorhexidine 2% Cloths 1 Application(s) Topical daily  chlorhexidine 4% Liquid 1 Application(s) Topical <User Schedule>  heparin  Infusion. 900 Unit(s)/Hr IV Continuous <Continuous>  influenza   Vaccine 0.5 milliLiter(s) IntraMuscular once  lactobacillus acidophilus 1 Tablet(s) Oral daily  latanoprost 0.005% Ophthalmic Solution 1 Drop(s) Both EYES at bedtime  mirtazapine 15 milliGRAM(s) Oral at bedtime  nystatin    Suspension 355725 Unit(s) Swish and Swallow every 6 hours  polyethylene glycol 3350 17 Gram(s) Oral daily  rasburicase IVPB 3 milliGRAM(s) IV Intermittent once  senna 2 Tablet(s) Oral at bedtime  sodium bicarbonate 650 milliGRAM(s) Oral two times a day  sodium chloride 0.9%. 1000 milliLiter(s) IV Continuous <Continuous>  tamsulosin 0.4 milliGRAM(s) Oral at bedtime  timolol 0.5% Solution 1 Drop(s) Both EYES two times a day    PRN Inpatient Medications  acetaminophen   Tablet .. 650 milliGRAM(s) Oral every 6 hours PRN  heparin   Injectable 5000 Unit(s) IV Push every 6 hours PRN  heparin   Injectable 2500 Unit(s) IV Push every 6 hours PRN  morphine  - Injectable 2 milliGRAM(s) IV Push every 4 hours PRN  sodium chloride 0.9% lock flush 10 milliLiter(s) IV Push every 1 hour PRN  traMADol 25 milliGRAM(s) Oral every 6 hours PRN      REVIEW OF SYSTEMS  --------------------------------------------------------------------------------  Gen: No fevers/chills  Respiratory: No dyspnea, cough  CV: No chest pain  GI: No abdominal pain, diarrhea  : No dysuria, hematuria  MSK: No  edema      All other systems were reviewed and are negative, except as noted.    VITALS/PHYSICAL EXAM  --------------------------------------------------------------------------------  T(C): 36.9 (11-02-20 @ 09:14), Max: 36.9 (11-02-20 @ 09:14)  HR: 92 (11-02-20 @ 09:14) (92 - 99)  BP: 161/91 (11-02-20 @ 09:14) (120/88 - 171/82)  RR: 20 (11-02-20 @ 09:14) (18 - 20)  SpO2: 94% (11-02-20 @ 09:14) (94% - 98%)  Wt(kg): --    Weight (kg): 63.5 (10-31-20 @ 21:11)      11-01-20 @ 07:01  -  11-02-20 @ 07:00  --------------------------------------------------------  IN: 440 mL / OUT: 2200 mL / NET: -1760 mL    11-02-20 @ 07:01  -  11-02-20 @ 13:51  --------------------------------------------------------  IN: 180 mL / OUT: 0 mL / NET: 180 mL        Physical Exam:  	Gen: NAD  	HEENT: MMM  	Pulm: CTA B/L  	CV: S1S2  	Abd: Soft, +BS   	Ext: No LE edema B/L  	Neuro: Awake  	Skin: Warm and dry  	Vascular access: IV lines       LABS/STUDIES  --------------------------------------------------------------------------------              7.0    12.30 >-----------<  384      [11-02-20 @ 00:41]              21.7     134  |  99  |  90  ----------------------------<  94      [11-01-20 @ 18:16]  5.5   |  20  |  2.60        Ca     8.6     [11-01-20 @ 18:16]      Phos  4.4     [11-02-20 @ 00:41]        PTT: 49.9       [11-02-20 @ 07:28]    Uric acid 6.1      [11-02-20 @ 00:41]  LDH 1138      [11-02-20 @ 08:59]    Creatinine Trend:  SCr 2.60 [11-01 @ 18:16]  SCr 2.76 [11-01 @ 03:33]  SCr 2.69 [10-31 @ 15:58]  SCr 2.68 [10-31 @ 07:25]  SCr 2.48 [10-30 @ 19:53]    Urinalysis - [10-30-20 @ 20:40]      Color Yellow / Appearance Slightly Turbid / SG 1.019 / pH 6.0      Gluc Trace / Ketone Negative  / Bili Negative / Urobili Negative       Blood Moderate / Protein 30 mg/dL / Leuk Est Negative / Nitrite Negative      RBC 1 / WBC 3 / Hyaline 1 / Gran  / Sq Epi  / Non Sq Epi 1 / Bacteria Negative    Urine Creatinine 43      [10-30-20 @ 20:39]  Urine Sodium <35      [10-30-20 @ 20:39]  Urine Potassium 63      [10-30-20 @ 20:39]  Urine Chloride <35      [10-30-20 @ 20:39]  Urine Osmolality 395      [10-31-20 @ 06:32]    Ferritin 70071      [10-21-20 @ 09:44]  HbA1c 6.2      [06-17-16 @ 03:45]  Lipid: chol --, , HDL --, LDL --      [10-21-20 @ 06:25]    HBsAg Nonreact      [10-23-20 @ 09:52]  HBcAb Nonreact      [10-23-20 @ 09:52]  HCV 0.09, Nonreact      [10-23-20 @ 09:52]  HIV Nonreact      [10-23-20 @ 09:30]    Immunofixation Serum:   No Monoclonal Band Identified    Reference Range: None Detected      [10-20-20 @ 09:57]  SPEP Interpretation: Hypoalbuminemia      [10-20-20 @ 09:57]

## 2020-11-02 NOTE — PROGRESS NOTE ADULT - PROBLEM SELECTOR PLAN 5
Infiltrating mass of left shoulder and possible osteomyelitis   bone scan with uptake in L femur. was on abx from 10/10-10/29, d/cing and watching off abx now   10/17 blood culture - so far negative. ESR/CRP very high  ID following  C/o persistent severe pain- IV morphine added.  CT L shoulder noted, d/w ortho/IR seems like infiltration and not primarily infectious. s/p IR biopsy, very little fluid, sent for pathology, gram stain negative.  MRI L shoulder showing:  - aggressive destruction of proximal humerus with concern for pathological fractures. Area of humeral head concerning for possible tumor infiltration vs superimposed infection. Non specific edema above scapula and glenohumeral joint effusion  -per orthopedics, no change in management for now  -biopsy results discussed with heme onc, concerning for diffuse large B cell  serial exam

## 2020-11-02 NOTE — CONSULT NOTE ADULT - ATTENDING COMMENTS
Agree with above. This is an 87 yo male with PMH of NHL (last chemo 2017), HTN, HLD, CKD, BPH, anemia, syncope, anxiety/adjustment disorder/depression, presents from [facility] for further management of his diffuse large B cell lymphoma. He is s/p left humerus IMN (9/25/20- [facility]). Has had postop erythema/swelling, evaluated by the primary orthopaedic team who recommended observation. MRI performed showing a large collection surrounding the left proximal humerus of undetermined etiology. IR Bx/aspiration was performed. Low suspicion of infection.    Rec:  FU IR aspiration results  FU with primary orthopaedic surgeon  Chemo/XRT to be considered by hemeonc/radonc  No acute orthopaedic surgical intervention  FU PRN.
87 yo male with PMH of NHL (last chemo 2017), HTN, HLD, CKD, BPH, anemia, syncope, anxiety/adjustment disorder/depression, presents here from [facility] for further management of his diffuse large B cell lymphoma, and found to have erythema and swelling of Left shoulder concerning for OM    Left shoulder Erythema R/o Acute OM - in setting of ORIF, shoulder tumor resection, and Intramedullary nail and cementation on 9/25. [facility] and XR and CT concerning for acute OM. He received vanc/zosyn then admitted with vanc, cefepime, and flagyll since 10/13. No fevers before or during admission. No other symptoms. Pending chemo/radiation.     Recommend:   - Send vanc trough prior to next dose  - adjust vanc according to trough  - Cont Cefepime 2gqd  and flagyl 500q8  - Request CT imaging to eval with radiology here or consider repeat CT of shoulder as he was unable to do MRI due to intramedullary nail  - Consult ortho  - hard to tell if infected by just looking externally. would be helpful to obtain disc to review with radiology
Patient interviewed/examined.  Agree with history, ROS, PE, A/P as above.      Garland Rodriguez MD (Weekend Coverage)  [phone]
I was physically present for the key portions of the evaluation and management (E/M) service provided.  I agree with the above history, physical, and plan which I have reviewed and edited where appropriate. Plan discussed with team.    ______________  Sal Murray MD   of Geriatric and Palliative Medicine  [facility]     Please page the following number for clinical matters between the hours of 9AM and 5PM   from Monday through Friday : [phone]    After 5PM and on weekends, please page: [phone]. The Geriatric and Palliative Medicine consult service has 24/7 coverage for medical recommendations, including for symptom management needs.

## 2020-11-03 NOTE — PROGRESS NOTE ADULT - PROBLEM SELECTOR PLAN 1
- recent L shoulder mass and pathologic fracture s/p ORIF and radical resection of tumor on 9/25. Left shoulder biopsy: most consistent with DLBCL   - continue with PO Tylenol 650mg q6 PRN mild pain  - continue PO Tramadol 25mg q6 PRN moderate pain  - continue IV Morphine 2mg q4 PRN severe pain.

## 2020-11-03 NOTE — PROGRESS NOTE ADULT - SUBJECTIVE AND OBJECTIVE BOX
INTERVAL EVENTS:        Vital Signs Last 24 Hrs  T(C): 36.8 (03 Nov 2020 17:01), Max: 37.1 (03 Nov 2020 08:18)  T(F): 98.2 (03 Nov 2020 17:01), Max: 98.7 (03 Nov 2020 08:18)  HR: 84 (03 Nov 2020 17:29) (84 - 100)  BP: 117/64 (03 Nov 2020 17:29) (100/50 - 166/70)  BP(mean): --  RR: 18 (03 Nov 2020 17:01) (18 - 18)  SpO2: 97% (03 Nov 2020 17:01) (93% - 97%)      PHYSICAL EXAM:   GENERAL: no acute distress  HEENT: EOMI, neck supple  RESPIRATORY: LCTAB/L, no rhonchi, rales, or wheezing  CARDIOVASCULAR: RRR, no murmurs, gallops, rubs  ABDOMINAL: soft, non-tender, non-distended, positive bowel sounds   EXTREMITIES: no clubbing, cyanosis, or edema  NEUROLOGICAL: alert and oriented x 3, non-focal  SKIN: no rashes or lesions   MUSCULOSKELETAL: no gross joint deformity                          7.5    13.10 )-----------( 325      ( 03 Nov 2020 15:27 )             22.6     11-03    140  |  104  |  82<H>  ----------------------------<  154<H>  4.3   |  22  |  2.42<H>    Ca    8.0<L>      03 Nov 2020 15:27  Phos  3.7     11-03    TPro  5.0<L>  /  Alb  2.3<L>  /  TBili  0.3  /  DBili  0.1  /  AST  46<H>  /  ALT  44  /  AlkPhos  78  11-03    PTT - ( 03 Nov 2020 09:46 )  PTT:47.5 sec    MEDICATIONS  (STANDING):  allopurinol 100 milliGRAM(s) Oral daily  amLODIPine   Tablet 2.5 milliGRAM(s) Oral daily  atorvastatin 80 milliGRAM(s) Oral at bedtime  chlorhexidine 2% Cloths 1 Application(s) Topical daily  chlorhexidine 4% Liquid 1 Application(s) Topical <User Schedule>  influenza   Vaccine 0.5 milliLiter(s) IntraMuscular once  lactobacillus acidophilus 1 Tablet(s) Oral daily  latanoprost 0.005% Ophthalmic Solution 1 Drop(s) Both EYES at bedtime  mirtazapine 15 milliGRAM(s) Oral at bedtime  nystatin    Suspension 552065 Unit(s) Swish and Swallow every 6 hours  polyethylene glycol 3350 17 Gram(s) Oral daily  rasburicase IVPB 3 milliGRAM(s) IV Intermittent once  senna 2 Tablet(s) Oral at bedtime  sodium bicarbonate 650 milliGRAM(s) Oral two times a day  tamsulosin 0.4 milliGRAM(s) Oral at bedtime  timolol 0.5% Solution 1 Drop(s) Both EYES two times a day       INTERVAL EVENTS:  no complaints at bedside      Vital Signs Last 24 Hrs  T(C): 36.8 (03 Nov 2020 17:01), Max: 37.1 (03 Nov 2020 08:18)  T(F): 98.2 (03 Nov 2020 17:01), Max: 98.7 (03 Nov 2020 08:18)  HR: 84 (03 Nov 2020 17:29) (84 - 100)  BP: 117/64 (03 Nov 2020 17:29) (100/50 - 166/70)  BP(mean): --  RR: 18 (03 Nov 2020 17:01) (18 - 18)  SpO2: 97% (03 Nov 2020 17:01) (93% - 97%)      PHYSICAL EXAM:   GENERAL: no acute distress  HEENT: EOMI, neck supple  RESPIRATORY: LCTAB/L, no rhonchi, rales, or wheezing  CARDIOVASCULAR: RRR, no murmurs, gallops, rubs  ABDOMINAL: soft, non-tender, non-distended, positive bowel sounds   EXTREMITIES: no clubbing, cyanosis, or edema  NEUROLOGICAL: alert and oriented x 3, non-focal  SKIN: no rashes or lesions   MUSCULOSKELETAL: no gross joint deformity                          7.5    13.10 )-----------( 325      ( 03 Nov 2020 15:27 )             22.6     11-03    140  |  104  |  82<H>  ----------------------------<  154<H>  4.3   |  22  |  2.42<H>    Ca    8.0<L>      03 Nov 2020 15:27  Phos  3.7     11-03    TPro  5.0<L>  /  Alb  2.3<L>  /  TBili  0.3  /  DBili  0.1  /  AST  46<H>  /  ALT  44  /  AlkPhos  78  11-03    PTT - ( 03 Nov 2020 09:46 )  PTT:47.5 sec    MEDICATIONS  (STANDING):  allopurinol 100 milliGRAM(s) Oral daily  amLODIPine   Tablet 2.5 milliGRAM(s) Oral daily  atorvastatin 80 milliGRAM(s) Oral at bedtime  chlorhexidine 2% Cloths 1 Application(s) Topical daily  chlorhexidine 4% Liquid 1 Application(s) Topical <User Schedule>  influenza   Vaccine 0.5 milliLiter(s) IntraMuscular once  lactobacillus acidophilus 1 Tablet(s) Oral daily  latanoprost 0.005% Ophthalmic Solution 1 Drop(s) Both EYES at bedtime  mirtazapine 15 milliGRAM(s) Oral at bedtime  nystatin    Suspension 481244 Unit(s) Swish and Swallow every 6 hours  polyethylene glycol 3350 17 Gram(s) Oral daily  rasburicase IVPB 3 milliGRAM(s) IV Intermittent once  senna 2 Tablet(s) Oral at bedtime  sodium bicarbonate 650 milliGRAM(s) Oral two times a day  tamsulosin 0.4 milliGRAM(s) Oral at bedtime  timolol 0.5% Solution 1 Drop(s) Both EYES two times a day

## 2020-11-03 NOTE — PROGRESS NOTE ADULT - ASSESSMENT
86 year old male with PMHx of DLBCL (s/p 6 cycles of R-CHOP and in DELANEY until 9/2019) who was transferred to [facility] from the [facility] for further management of his diffuse large B cell lymphoma. Pt admitted for possible relapse of DLBCL. Received RTX and Bendamustine ( 10/28 and 10/29) Nephrology team consulted for DANIELITO on CKD possible TLS/TLS monitoring.     #Acute Kidney Injury on CKD. Patient with baseline serum creatinine at 1.5-1.7. On admission, his serum creatinine was 1.7 (10/18) and appears to be increasing to 1.9 on 10/29/20 and 2.6 (11/1/20) and 2.53 today (11/3/20) . He has DANIELITO which can be possibly secondary to obstruction with CT abdomen on showing  left hydronephrosis with delayed nephrogram due to an obstructing soft tissue mass at the level of the renal pelvis.  TLS is less likely  as the uric acid levels are not high, phosphorus levels are not elevated, calcium levels not low. The only finding was persistently elevated K . Patient with underlying CKD, etiology is unclear but could be related to previous chemotherapies from DLBCL, also with BPH.    Recommendation  - Renal function is overall stable  - Gibson catheter placed, patient produced ~ 1.5 of urine output over 24 hour   - May require nephrostomy tube if renal function continues to worsen with increasing hydronephrosis      #Hyperkalemia in the setting of DANIELITO, and recent chemo  - Noted potassium is lower when doing VBG potassium making the serum K likely psedo. Would use VBG or plasma K for K checks for TLS labs.  - can dc lokelma for now  -Low K diet      #Hyponatremia   -Resolved   - Likely hypervolemic hyponatremia  - Maintain Gibsno catheter   86 year old male with PMHx of DLBCL (s/p 6 cycles of R-CHOP and in DELANEY until 9/2019) who was transferred to [facility] from the [facility] for further management of his diffuse large B cell lymphoma. Pt admitted for possible relapse of DLBCL. Received RTX and Bendamustine ( 10/28 and 10/29) Nephrology team consulted for DANIELITO on CKD possible TLS/TLS monitoring.     #Acute Kidney Injury on CKD. Patient with baseline serum creatinine at 1.5-1.7. On admission, his serum creatinine was 1.7 (10/18) and appears to be increasing to 1.9 on 10/29/20 and 2.6 (11/1/20) and 2.53 today (11/3/20) . He has DANIELITO which can be possibly secondary to obstruction with CT abdomen on showing  left hydronephrosis with delayed nephrogram due to an obstructing soft tissue mass at the level of the renal pelvis.  TLS is less likely  as the uric acid levels are not high, phosphorus levels are not elevated, calcium levels not low. The only finding was persistently elevated K . Patient with underlying CKD, etiology is unclear but could be related to previous chemotherapies from DLBCL, also with BPH.    Recommendation  - Renal function is overall stable  - Gibson catheter placed, patient produced ~ 1.5 of urine output over 24 hour   - May require nephrostomy tube if renal function continues to worsen with increasing hydronephrosis      #Hyperkalemia in the setting of DANIELITO, and recent chemo  -resolved  -Low K diet      #Hyponatremia   -Resolved   - Maintain Gibson catheter

## 2020-11-03 NOTE — PROGRESS NOTE ADULT - SUBJECTIVE AND OBJECTIVE BOX
[facility] DIVISION OF KIDNEY DISEASES AND HYPERTENSION -- FOLLOW UP NOTE  --------------------------------------------------------------------------------    24 hour events/subjective: s/p 1 unit of pRBC overnight, also currently on NS at 40 cc/hr. Urine output from tobar was 1.5 over 24 hour.     Patient was seen and examined at bedside. Reported feeling a little bit better. Denies fever, chills, nausea, LE edema.         PAST HISTORY  --------------------------------------------------------------------------------  No significant changes to PMH, PSH, FHx, SHx, unless otherwise noted    ALLERGIES & MEDICATIONS  --------------------------------------------------------------------------------  Allergies    No Known Allergies    Intolerances      Standing Inpatient Medications  allopurinol 100 milliGRAM(s) Oral daily  amLODIPine   Tablet 2.5 milliGRAM(s) Oral daily  atorvastatin 80 milliGRAM(s) Oral at bedtime  chlorhexidine 2% Cloths 1 Application(s) Topical daily  chlorhexidine 4% Liquid 1 Application(s) Topical <User Schedule>  heparin  Infusion. 900 Unit(s)/Hr IV Continuous <Continuous>  influenza   Vaccine 0.5 milliLiter(s) IntraMuscular once  lactobacillus acidophilus 1 Tablet(s) Oral daily  latanoprost 0.005% Ophthalmic Solution 1 Drop(s) Both EYES at bedtime  mirtazapine 15 milliGRAM(s) Oral at bedtime  nystatin    Suspension 065084 Unit(s) Swish and Swallow every 6 hours  polyethylene glycol 3350 17 Gram(s) Oral daily  rasburicase IVPB 3 milliGRAM(s) IV Intermittent once  senna 2 Tablet(s) Oral at bedtime  sodium bicarbonate 650 milliGRAM(s) Oral two times a day  sodium chloride 0.9%. 1000 milliLiter(s) IV Continuous <Continuous>  tamsulosin 0.4 milliGRAM(s) Oral at bedtime  timolol 0.5% Solution 1 Drop(s) Both EYES two times a day    PRN Inpatient Medications  acetaminophen   Tablet .. 650 milliGRAM(s) Oral every 6 hours PRN  heparin   Injectable 5000 Unit(s) IV Push every 6 hours PRN  heparin   Injectable 2500 Unit(s) IV Push every 6 hours PRN  morphine  - Injectable 2 milliGRAM(s) IV Push every 4 hours PRN  sodium chloride 0.9% lock flush 10 milliLiter(s) IV Push every 1 hour PRN  traMADol 25 milliGRAM(s) Oral every 6 hours PRN      REVIEW OF SYSTEMS  --------------------------------------------------------------------------------  Gen: No fevers/chills  Respiratory: No dyspnea, cough  CV: No chest pain  GI: No abdominal pain, diarrhea  MSK: No  edema      All other systems were reviewed and are negative, except as noted.    VITALS/PHYSICAL EXAM  --------------------------------------------------------------------------------  T(C): 37.1 (11-03-20 @ 08:18), Max: 37.1 (11-03-20 @ 08:18)  HR: 84 (11-03-20 @ 08:18) (84 - 125)  BP: 160/83 (11-03-20 @ 08:18) (103/84 - 166/70)  RR: 18 (11-03-20 @ 08:18) (18 - 20)  SpO2: 94% (11-03-20 @ 08:18) (93% - 99%)  Wt(kg): --        11-02-20 @ 07:01  -  11-03-20 @ 07:00  --------------------------------------------------------  IN: 1580 mL / OUT: 2800 mL / NET: -1220 mL        Physical Exam:  	Gen: NAD  	HEENT: MMM, no crackles or wheezing   	Pulm: CTA B/L, no crackles   	CV: S1S2  	Abd: Soft, +BS   	Ext: No LE edema B/L  	Neuro: Awake  	Skin: Warm and dry  	Vascular access: IV lines       LABS/STUDIES  --------------------------------------------------------------------------------              8.0    12.79 >-----------<  337      [11-03-20 @ 05:35]              24.3     139  |  102  |  86  ----------------------------<  106      [11-03-20 @ 05:36]  4.5   |  21  |  2.53        Ca     8.4     [11-03-20 @ 05:36]      Phos  4.4     [11-03-20 @ 05:36]    TPro  5.0  /  Alb  2.3  /  TBili  0.3  /  DBili  0.1  /  AST  46  /  ALT  44  /  AlkPhos  78  [11-03-20 @ 05:36]      PTT: 67.1       [11-02-20 @ 20:47]    Uric acid 6.6      [11-03-20 @ 05:36]        [11-03-20 @ 05:36]    Creatinine Trend:  SCr 2.53 [11-03 @ 05:36]  SCr 2.50 [11-02 @ 20:47]  SCr 2.58 [11-02 @ 14:23]  SCr 2.60 [11-01 @ 18:16]  SCr 2.76 [11-01 @ 03:33]    Urinalysis - [10-30-20 @ 20:40]      Color Yellow / Appearance Slightly Turbid / SG 1.019 / pH 6.0      Gluc Trace / Ketone Negative  / Bili Negative / Urobili Negative       Blood Moderate / Protein 30 mg/dL / Leuk Est Negative / Nitrite Negative      RBC 1 / WBC 3 / Hyaline 1 / Gran  / Sq Epi  / Non Sq Epi 1 / Bacteria Negative    Urine Creatinine 43      [10-30-20 @ 20:39]  Urine Sodium <35      [10-30-20 @ 20:39]  Urine Potassium 63      [10-30-20 @ 20:39]  Urine Chloride <35      [10-30-20 @ 20:39]  Urine Osmolality 395      [10-31-20 @ 06:32]    Ferritin 38302      [10-21-20 @ 09:44]  HbA1c 6.2      [06-17-16 @ 03:45]  Lipid: chol --, , HDL --, LDL --      [10-21-20 @ 06:25]    HBsAg Nonreact      [10-23-20 @ 09:52]  HBcAb Nonreact      [10-23-20 @ 09:52]  HCV 0.09, Nonreact      [10-23-20 @ 09:52]  HIV Nonreact      [10-23-20 @ 09:30]    Immunofixation Serum:   No Monoclonal Band Identified    Reference Range: None Detected      [10-20-20 @ 09:57]  SPEP Interpretation: Hypoalbuminemia      [10-20-20 @ 09:57]

## 2020-11-03 NOTE — PROGRESS NOTE ADULT - SUBJECTIVE AND OBJECTIVE BOX
[facility] Division of Hospital Medicine  Zach Espitia MD  Pager (M-F, 8Z-8Q): [phone]  Other Times:  [phone]    Patient is a 86y old  Male who presents with a chief complaint of sent here for chemo and radiation (03 Nov 2020 09:09)      SUBJECTIVE / OVERNIGHT EVENTS: dyspnea a bit improved. pt is confused but no new complaints  ADDITIONAL REVIEW OF SYSTEMS:    MEDICATIONS  (STANDING):  allopurinol 100 milliGRAM(s) Oral daily  amLODIPine   Tablet 2.5 milliGRAM(s) Oral daily  atorvastatin 80 milliGRAM(s) Oral at bedtime  chlorhexidine 2% Cloths 1 Application(s) Topical daily  chlorhexidine 4% Liquid 1 Application(s) Topical <User Schedule>  heparin  Infusion. 900 Unit(s)/Hr (9 mL/Hr) IV Continuous <Continuous>  influenza   Vaccine 0.5 milliLiter(s) IntraMuscular once  lactobacillus acidophilus 1 Tablet(s) Oral daily  latanoprost 0.005% Ophthalmic Solution 1 Drop(s) Both EYES at bedtime  mirtazapine 15 milliGRAM(s) Oral at bedtime  nystatin    Suspension [id number] Unit(s) Swish and Swallow every 6 hours  polyethylene glycol 3350 17 Gram(s) Oral daily  rasburicase IVPB 3 milliGRAM(s) IV Intermittent once  senna 2 Tablet(s) Oral at bedtime  sodium bicarbonate 650 milliGRAM(s) Oral two times a day  tamsulosin 0.4 milliGRAM(s) Oral at bedtime  timolol 0.5% Solution 1 Drop(s) Both EYES two times a day    MEDICATIONS  (PRN):  acetaminophen   Tablet .. 650 milliGRAM(s) Oral every 6 hours PRN Mild Pain (1 - 3), Moderate Pain (4 - 6)  heparin   Injectable 5000 Unit(s) IV Push every 6 hours PRN For aPTT less than 40  heparin   Injectable 2500 Unit(s) IV Push every 6 hours PRN For aPTT between 40 - 57  morphine  - Injectable 2 milliGRAM(s) IV Push every 4 hours PRN Severe Pain (7 - 10)  sodium chloride 0.9% lock flush 10 milliLiter(s) IV Push every 1 hour PRN Pre/post blood products, medications, blood draw, and to maintain line patency  traMADol 25 milliGRAM(s) Oral every 6 hours PRN Moderate Pain (4 - 6)      CAPILLARY BLOOD GLUCOSE        I&O's Summary    02 Nov 2020 07:01  -  03 Nov 2020 07:00  --------------------------------------------------------  IN: 1580 mL / OUT: 2800 mL / NET: -1220 mL    03 Nov 2020 07:01  -  03 Nov 2020 13:26  --------------------------------------------------------  IN: 560 mL / OUT: 0 mL / NET: 560 mL        PHYSICAL EXAM:  Vital Signs Last 24 Hrs  T(C): 37.1 (03 Nov 2020 08:18), Max: 37.1 (03 Nov 2020 08:18)  T(F): 98.7 (03 Nov 2020 08:18), Max: 98.7 (03 Nov 2020 08:18)  HR: 84 (03 Nov 2020 08:18) (84 - 125)  BP: 160/83 (03 Nov 2020 08:18) (103/84 - 166/70)  BP(mean): --  RR: 18 (03 Nov 2020 08:18) (18 - 18)  SpO2: 94% (03 Nov 2020 08:18) (93% - 99%)  GENERAL: mild-mod resp distress, tachypneic, receiving blood transfusion  EYES:  conjunctiva and sclera clear  CHEST/LUNG: bibasilar crackles   HEART: +S1/S2, RRR   ABDOMEN: Soft, Nontender, Nondistended  MSK/EXTREMITIES:  L shoulder w/ecchymosis, indurated and firm. blanching skin  PSYCH: AAOx1-2  NEUROLOGY: non-focal  SKIN: generalized anasarca, no rashes or lesions other than shoulder as above    LABS:                        8.0    12.79 )-----------( 337      ( 03 Nov 2020 05:35 )             24.3     11-03    139  |  102  |  86<H>  ----------------------------<  106<H>  4.5   |  21<L>  |  2.53<H>    Ca    8.4      03 Nov 2020 05:36  Phos  4.4     11-03    TPro  5.0<L>  /  Alb  2.3<L>  /  TBili  0.3  /  DBili  0.1  /  AST  46<H>  /  ALT  44  /  AlkPhos  78  11-03    PTT - ( 03 Nov 2020 09:46 )  PTT:47.5 sec    RADIOLOGY & ADDITIONAL TESTS:  Results Reviewed:   Imaging Personally Reviewed: CT shoulder ordered  Electrocardiogram Personally Reviewed:    COORDINATION OF CARE:  Care Discussed with Consultants/Other Providers [Y/N]: renal Dr Ratna de paz TLS/DANIELITO/hyperK mgmt. Palliative Dr Terri de paz GOC Heme Dr Earl de paz plan of care  Prior or Outpatient Records Reviewed [Y/N]:

## 2020-11-03 NOTE — PROGRESS NOTE ADULT - ASSESSMENT
86M w DLBCL in remission until 9/2019, HTN, HLD, CKD, BPH, anemia, syncope, anxiety/adjustment disorder/depression, presents here from [facility] where he was getting treated for L shoulder infection (s/p ORIF for pathologic fx) for further management of his recurrent diffuse large B cell lymphoma and L shoulder lesion, s/p chemo with concern for TLS with hyperkalemia & DANIELITO

## 2020-11-03 NOTE — PROGRESS NOTE ADULT - PROBLEM SELECTOR PLAN 3
- history of DLBCL, GCB subtype (BCL2, BCL6, MYC, all negative by FISH). Per chart review, patient last received 6 cycles of R-CHOP in 2017 with DELANEY until 11/2019. Patient had mesenteric mass with size of 2.7x4.8 in 2017.   - CT CAP apparently shows enlarging mesenteric mass and new Lt pelvic renal mass concerning of relapse  - At [facility], he was found to have a L shoulder mass and pathologic fracture. Now s/p ORIF and radical resection of tumor on 9/25. PET/CT was performed revealing brain lesion with hypermetabolic activity  - Bone marrow biopsy without e/o lymphoma  - Left shoulder biopsy: most consistent with DLBCL   - s/p inpatient chemotherapy on 10/28 and 10/29  - Per Oncology: Will attempt to get LP for CNS disease.

## 2020-11-03 NOTE — PROGRESS NOTE ADULT - PROBLEM SELECTOR PLAN 1
-CT C/A/P with abd mass, LN. MRI Brain w/ contrast- New 2.2 x 1.6 x 1.1 cm   uniformly enhancing, T1 and T2 isointense high right posterior frontal extra-axial mass. The lesion demonstrates restricted diffusion.   -s/p BMB demonstrated diffuse large b-cell lymphoma  -PICC placed  -rec'd chemotherapy   -LP possibly this week to eval for leptomeningeal dz - f/u heme for final decision  - monitor counts and labs -CT C/A/P with abd mass, LN. MRI Brain w/ contrast- New 2.2 x 1.6 x 1.1 cm   uniformly enhancing, T1 and T2 isointense high right posterior frontal extra-axial mass. The lesion demonstrates restricted diffusion.   -s/p BMB demonstrated diffuse large b-cell lymphoma  -PICC placed  -received chemotherapy   -LP possibly this week to eval for leptomeningeal dz - f/u heme for final decision  - monitor counts and labs

## 2020-11-03 NOTE — PROGRESS NOTE ADULT - ASSESSMENT
86 year old man with relapsed DLBCL s/p BR with some evidence of TLS now improving.    Relapsed DLBCL: s/p BR chemotherapy, monitor for any improvement with this, LDH decreasing, Cr down today  -monitor for toxicity  -not able to communicate well today  -continue to discuss goals of care discussion    LUE DVT  -AC d/c for drop in H/H  -if confirm no bleed can restart

## 2020-11-03 NOTE — PROGRESS NOTE ADULT - ATTENDING COMMENTS
Full consult to follow shortly. Please page [phone] with any acute concerns.     ______________  Sal Murray MD   of Geriatric and Palliative Medicine  [facility]     Please page the following number for clinical matters between the hours of 9AM and 5PM   from Monday through Friday : [phone]    After 5PM and on weekends, please page: [phone]. The Geriatric and Palliative Medicine consult service has 24/7 coverage for medical recommendations, including for symptom management needs. I was physically present for the key portions of the evaluation and management (E/M) service provided.  I agree with the above history, physical, and plan which I have reviewed and edited where appropriate. Plan discussed with team.    ______________  Sal Murray MD   of Geriatric and Palliative Medicine  [facility]     Please page the following number for clinical matters between the hours of 9AM and 5PM   from Monday through Friday : [phone]    After 5PM and on weekends, please page: [phone]. The Geriatric and Palliative Medicine consult service has 24/7 coverage for medical recommendations, including for symptom management needs.

## 2020-11-03 NOTE — PROGRESS NOTE ADULT - PROBLEM SELECTOR PLAN 6
- unclear baseline mental status, but appears to have some cognitive impairment as he states he is 89 years old and thought the president was [place] yesterday.   - will need collateral information from family if having memory problems  - supportive care

## 2020-11-03 NOTE — PROGRESS NOTE ADULT - PROBLEM SELECTOR PLAN 3
-may be from pulm edema given IV fluids and PRBC  -CXR stable checked on Mon and stable, monitor for now  -PE less likely, pt already on full AC  - dosed lasix yesterday, monitor oxygen/respiratory status, q4h vitals

## 2020-11-03 NOTE — CHART NOTE - NSCHARTNOTEFT_GEN_A_CORE
Down trending h/h on hep gtt for DVT   Heparin dc'd  PT ordered for CT r/o Bleed   CBC q8   VSS   Dr Espitia aware and agreeable with above plan  Department of Medicine   AMIRA [id number]-c [id number]

## 2020-11-03 NOTE — PROGRESS NOTE ADULT - PROBLEM SELECTOR PLAN 5
Infiltrating mass of left shoulder and possible osteomyelitis   bone scan with uptake in L femur. was on abx from 10/10-10/29, d/cing and watching off abx now   10/17 blood culture - so far negative. ESR/CRP very high  ID following  C/o persistent severe pain- IV morphine added.  CT L shoulder noted, d/w ortho/IR seems like infiltration and not primarily infectious. s/p IR biopsy, very little fluid, sent for pathology, gram stain negative.  MRI L shoulder showing:  - aggressive destruction of proximal humerus with concern for pathological fractures. Area of humeral head concerning for possible tumor infiltration vs superimposed infection. Non specific edema above scapula and glenohumeral joint effusion  -per orthopedics, no change in management for now  -biopsy results discussed with heme onc, concerning for diffuse large B cell  serial exam repeat CT of shoulder due to increasing swelling  Infiltrating mass of left shoulder and possible osteomyelitis   bone scan with uptake in L femur. was on abx from 10/10-10/29, d/cing and watching off abx now   10/17 blood culture - so far negative. ESR/CRP very high  ID following  C/o persistent severe pain- IV morphine added.  CT L shoulder noted, d/w ortho/IR seems like infiltration and not primarily infectious. s/p IR biopsy, very little fluid, sent for pathology, gram stain negative.  MRI L shoulder showing:  - aggressive destruction of proximal humerus with concern for pathological fractures. Area of humeral head concerning for possible tumor infiltration vs superimposed infection. Non specific edema above scapula and glenohumeral joint effusion  -per orthopedics, no change in management for now  -biopsy results discussed with heme onc, concerning for diffuse large B cell  serial exam

## 2020-11-03 NOTE — PROGRESS NOTE ADULT - ASSESSMENT
86M w DLBCL in remission until 9/2019, HTN, HLD, CKD, BPH, anemia, syncope, anxiety/adjustment disorder/depression, presents here from [facility] where he was getting treated for L shoulder infection (s/p ORIF for pathologic fx) for further management of his recurrent diffuse large B cell lymphoma and L shoulder lesion, s/p chemo with concern for TLS with hyperkalemia & DANIELITO

## 2020-11-03 NOTE — CHART NOTE - NSCHARTNOTEFT_GEN_A_CORE
Nutrition Follow Up Note  Patient seen for: malnutrition length of stay follow up. Chart reviewed and events noted.     Pt 87 y/o male with PMH of NHL (last chemo ), HTN, HLD, CKD, BPH, anemia, syncope, anxiety/adjustment disorder/depression. Presents here from [facility] where he was getting treated for L shoulder infection (s/p ORIF for pathologic fx) for further management of his diffuse large B cell lymphoma and L shoulder lesion. PICC placed, started on chemotherapy S/p Rasburicase on 10/27. s/p chemo with concern for TLS with hyperkalemia & DANIELITO.     Source:   Medical record and pt's wife at bedside (pt noted as confused and was asleep)    Diet :   Soft:   For patients receiving Renal Replacement - No Protein Restr, No Conc K, No Conc Phos, Low Sodium (RENAL)  Ensure Enlive Cans or Servings Per Day:  3x Daily    Per wife, pt's appetite and PO intake remain poor to fair. Pt continues c poor intake of meals, however is still taking most of the ordered Ensure Enlive x3 daily. Pt with no known recent N/V, constipation, or diarrhea. Last BM 11/3. Pt on bowel regimen of senna and Miralax. Reinforced increased protein-energy needs in setting of recent wt loss c wife. Obtained pt's food preferences and RD to honor them as able.      PO intake :  25-50% of meals in-house  >75% of ordered Ensure Enlive      Source for PO intake:  Pt and RN flowsheet    Daily Weight in lbs: 158.9 (10-28), 154.5 (10-21), 144.4 (10-17)  Wt fluctuations noted and likely related to fluid shifts. Pt c +2 R arm +3 L arm and shoulder edema (previously +4 L arm)   No new daily wts since last assessment     Pertinent Medications: MEDICATIONS  (STANDING):  allopurinol 100 milliGRAM(s) Oral daily  amLODIPine   Tablet 2.5 milliGRAM(s) Oral daily  atorvastatin 80 milliGRAM(s) Oral at bedtime  chlorhexidine 2% Cloths 1 Application(s) Topical daily  chlorhexidine 4% Liquid 1 Application(s) Topical <User Schedule>  influenza   Vaccine 0.5 milliLiter(s) IntraMuscular once  lactobacillus acidophilus 1 Tablet(s) Oral daily  latanoprost 0.005% Ophthalmic Solution 1 Drop(s) Both EYES at bedtime  mirtazapine 15 milliGRAM(s) Oral at bedtime  nystatin    Suspension 652725 Unit(s) Swish and Swallow every 6 hours  polyethylene glycol 3350 17 Gram(s) Oral daily  rasburicase IVPB 3 milliGRAM(s) IV Intermittent once  senna 2 Tablet(s) Oral at bedtime  sodium bicarbonate 650 milliGRAM(s) Oral two times a day  tamsulosin 0.4 milliGRAM(s) Oral at bedtime  timolol 0.5% Solution 1 Drop(s) Both EYES two times a day    MEDICATIONS  (PRN):  acetaminophen   Tablet .. 650 milliGRAM(s) Oral every 6 hours PRN Mild Pain (1 - 3), Moderate Pain (4 - 6)  morphine  - Injectable 2 milliGRAM(s) IV Push every 4 hours PRN Severe Pain (7 - 10)  sodium chloride 0.9% lock flush 10 milliLiter(s) IV Push every 1 hour PRN Pre/post blood products, medications, blood draw, and to maintain line patency  traMADol 25 milliGRAM(s) Oral every 6 hours PRN Moderate Pain (4 - 6)    Pertinent Labs:  @ 05:36: Na 139, BUN 86<H>, Cr 2.53<H>, <H>, K+ 4.5, Phos 4.4, Mg --, Alk Phos 78, ALT/SGPT 44, AST/SGOT 46<H>   @ 20:47: Na 137, BUN 84<H>, Cr 2.50<H>, <H>, K+ 4.9, Phos 4.5    Skin per nursing documentation: No pressure injuries noted.  Edema per nursing documentation: +2 R arm +3 L arm and shoulder    Estimated Needs:   [x] no change since previous assessment  Based on lowest wt in-house: 144.4 lbs (10/17)  Estimated Energy Needs: 30-35 kcals/k7905-0717 kcals  Estimated Protein Needs: 1.2-1.4 gm/k.48-91.56 gm  Estimated Fluid Needs: 30-35 cc/k5228-7079 cc    Previous Nutrition Diagnosis: Severe malnutrition   Nutrition Diagnosis is: ongoing and being addressed with encouraged PO intake, recommended nutrition supplements, and food preferences.     New Nutrition Diagnosis: N/A    Recommend  1) Recommend discontinue renal diet restrictions and change to no concentrated K per Nephrology as pt with poor PO intake; consistency deferred to SLP and provider.   2) Continue to provide Ensure Enlive 3x Daily to optimize nutrient status.   3) Reinforce Increased protein-energy needs as able.   4) RD to continue to honor previous food preferences and obtain new food preferences as pt provides.     Monitoring and Evaluation:   Continue to monitor Nutritional intake, Tolerance to diet prescription, weights, labs, skin integrity    RD remains available upon request and will follow up per protocol  Chelo Coppola MS, RD, Pager #[phone] Nutrition Follow Up Note  Patient seen for: malnutrition length of stay follow up. Chart reviewed and events noted.     Pt 85 y/o male with PMH of NHL (last chemo ), HTN, HLD, CKD, BPH, anemia, syncope, anxiety/adjustment disorder/depression. Presents here from [facility] where he was getting treated for L shoulder infection (s/p ORIF for pathologic fx) for further management of his diffuse large B cell lymphoma and L shoulder lesion. PICC placed, started on chemotherapy S/p Rasburicase on 10/27. s/p chemo with concern for TLS with hyperkalemia & DANIELITO.     Source:   Medical record and pt's wife at bedside (pt noted as confused and was asleep)    Diet :   Soft:   For patients receiving Renal Replacement - No Protein Restr, No Conc K, No Conc Phos, Low Sodium (RENAL)  Ensure Enlive Cans or Servings Per Day:  3x Daily    Per wife, pt's appetite and PO intake remain poor to fair. Pt continues c poor intake of meals, however is still taking most of the ordered Ensure Enlive x3 daily. Pt with no known recent N/V, constipation, or diarrhea. Last BM 11/3. Pt on bowel regimen of senna and Miralax. Reinforced increased protein-energy needs in setting of recent wt loss c wife. Obtained pt's food preferences and RD to honor them as able.      PO intake :  25-50% of meals in-house  >75% of ordered Ensure Enlive      Source for PO intake:  Pt and RN flowsheet    Daily Weight in lbs: 158.9 (10-28), 154.5 (10-21), 144.4 (10-17)  Wt fluctuations noted and likely related to fluid shifts. Pt c +2 R arm +3 L arm and shoulder edema (previously +4 L arm)   No new daily wts since last assessment     Pertinent Medications: MEDICATIONS  (STANDING):  allopurinol 100 milliGRAM(s) Oral daily  amLODIPine   Tablet 2.5 milliGRAM(s) Oral daily  atorvastatin 80 milliGRAM(s) Oral at bedtime  chlorhexidine 2% Cloths 1 Application(s) Topical daily  chlorhexidine 4% Liquid 1 Application(s) Topical <User Schedule>  influenza   Vaccine 0.5 milliLiter(s) IntraMuscular once  lactobacillus acidophilus 1 Tablet(s) Oral daily  latanoprost 0.005% Ophthalmic Solution 1 Drop(s) Both EYES at bedtime  mirtazapine 15 milliGRAM(s) Oral at bedtime  nystatin    Suspension 251603 Unit(s) Swish and Swallow every 6 hours  polyethylene glycol 3350 17 Gram(s) Oral daily  rasburicase IVPB 3 milliGRAM(s) IV Intermittent once  senna 2 Tablet(s) Oral at bedtime  sodium bicarbonate 650 milliGRAM(s) Oral two times a day  tamsulosin 0.4 milliGRAM(s) Oral at bedtime  timolol 0.5% Solution 1 Drop(s) Both EYES two times a day    MEDICATIONS  (PRN):  acetaminophen   Tablet .. 650 milliGRAM(s) Oral every 6 hours PRN Mild Pain (1 - 3), Moderate Pain (4 - 6)  morphine  - Injectable 2 milliGRAM(s) IV Push every 4 hours PRN Severe Pain (7 - 10)  sodium chloride 0.9% lock flush 10 milliLiter(s) IV Push every 1 hour PRN Pre/post blood products, medications, blood draw, and to maintain line patency  traMADol 25 milliGRAM(s) Oral every 6 hours PRN Moderate Pain (4 - 6)    Pertinent Labs:  @ 05:36: Na 139, BUN 86<H>, Cr 2.53<H>, <H>, K+ 4.5, Phos 4.4, Alk Phos 78, ALT/SGPT 44, AST/SGOT 46<H>   @ 20:47: Na 137, BUN 84<H>, Cr 2.50<H>, <H>, K+ 4.9, Phos 4.5   K 5.5 <K>    Skin per nursing documentation: No pressure injuries noted.  Edema per nursing documentation: +2 R arm +3 L arm and shoulder    Estimated Needs:   [x] no change since previous assessment  Based on lowest wt in-house: 144.4 lbs (10/17)  Estimated Energy Needs: 30-35 kcals/k5210-0368 kcals  Estimated Protein Needs: 1.2-1.4 gm/k.48-91.56 gm  Estimated Fluid Needs: 30-35 cc/k2772-2971 cc    Previous Nutrition Diagnosis: Severe malnutrition   Nutrition Diagnosis is: ongoing and being addressed with encouraged PO intake, recommended nutrition supplements, and food preferences.     New Nutrition Diagnosis: N/A    Recommend  1) Recommend discontinue renal diet restrictions and change to no concentrated K per Nephrology as pt with poor PO intake; consistency deferred to SLP and provider.   -K currently WNL, previously was high. Continue to trend.   2) Continue to provide Ensure Enlive 3x Daily to optimize nutrient status.   3) Reinforce Increased protein-energy needs as able.   4) RD to continue to honor previous food preferences and obtain new food preferences as pt provides.     Monitoring and Evaluation:   Continue to monitor Nutritional intake, Tolerance to diet prescription, weights, labs, skin integrity    RD remains available upon request and will follow up per protocol  Chelo Coppola MS, RD, Pager #[phone]

## 2020-11-03 NOTE — PROGRESS NOTE ADULT - PROBLEM SELECTOR PLAN 10
Previous MD d/w length with wife and also patient's daughter. There are 5 children from his first marriage and he has been  to his new wife for 25 years.  Previous MD d/w wife - she believes this cancer is treatable, She would like to consider all options at this time.  d/w daughter (2nd daughter - Stella) - she is more realistic. Does not think her father could tolerate chemo. However, he defers to his wife.  -f/u palliative consult I followed up below conversation with wife reiterating current plan of care.     Previous MD d/w length with wife and also patient's daughter. There are 5 children from his first marriage and he has been  to his new wife for 25 years.  Previous MD d/w wife - she believes this cancer is treatable, She would like to consider all options at this time.  d/w daughter (2nd daughter - Stella) - she is more realistic. Does not think her father could tolerate chemo. However, he defers to his wife.  -f/u palliative consult

## 2020-11-03 NOTE — PROGRESS NOTE ADULT - SUBJECTIVE AND OBJECTIVE BOX
SUBJECTIVE AND OBJECTIVE:  Patient reports he has occasional pain, unable to elaborate as to where the pain is, which he takes a $10 pain medication for. Patient reports he is hospitalized for his depression; but reports his mood has been stable. When asked if he has cancer, he reports he does, but unable to further describe the cancer details or treatment plan. He states he is 89 years old; denies memory impairment, but states that he probably develop them later on in life.     INTERVAL HPI/OVERNIGHT EVENTS:  CBC repeated s/p transfusion last night.     DNR on chart:   Allergies    No Known Allergies    Intolerances    MEDICATIONS  (STANDING):  allopurinol 100 milliGRAM(s) Oral daily  amLODIPine   Tablet 2.5 milliGRAM(s) Oral daily  atorvastatin 80 milliGRAM(s) Oral at bedtime  chlorhexidine 2% Cloths 1 Application(s) Topical daily  chlorhexidine 4% Liquid 1 Application(s) Topical <User Schedule>  heparin  Infusion. 900 Unit(s)/Hr (9 mL/Hr) IV Continuous <Continuous>  influenza   Vaccine 0.5 milliLiter(s) IntraMuscular once  lactobacillus acidophilus 1 Tablet(s) Oral daily  latanoprost 0.005% Ophthalmic Solution 1 Drop(s) Both EYES at bedtime  mirtazapine 15 milliGRAM(s) Oral at bedtime  nystatin    Suspension 139486 Unit(s) Swish and Swallow every 6 hours  polyethylene glycol 3350 17 Gram(s) Oral daily  rasburicase IVPB 3 milliGRAM(s) IV Intermittent once  senna 2 Tablet(s) Oral at bedtime  sodium bicarbonate 650 milliGRAM(s) Oral two times a day  tamsulosin 0.4 milliGRAM(s) Oral at bedtime  timolol 0.5% Solution 1 Drop(s) Both EYES two times a day    MEDICATIONS  (PRN):  acetaminophen   Tablet .. 650 milliGRAM(s) Oral every 6 hours PRN Mild Pain (1 - 3), Moderate Pain (4 - 6)  heparin   Injectable 5000 Unit(s) IV Push every 6 hours PRN For aPTT less than 40  heparin   Injectable 2500 Unit(s) IV Push every 6 hours PRN For aPTT between 40 - 57  morphine  - Injectable 2 milliGRAM(s) IV Push every 4 hours PRN Severe Pain (7 - 10)  sodium chloride 0.9% lock flush 10 milliLiter(s) IV Push every 1 hour PRN Pre/post blood products, medications, blood draw, and to maintain line patency  traMADol 25 milliGRAM(s) Oral every 6 hours PRN Moderate Pain (4 - 6)    ITEMS UNCHECKED ARE NOT PRESENT    PRESENT SYMPTOMS: [ ]Unable to obtain due to poor mentation   Source if other than patient:  [ ]Family   [ ]Team     Pain:  [x ]yes [ ]no  QOL impact - unable to describe   Location -      unable to describe               Aggravating factors - unable to describe   Quality - unable to describe   Radiation - unable to describe   Timing- unable to describe   Severity (0-10 scale): unable to describe   Minimal acceptable level (0-10 scale):     Dyspnea:                           [ ]Mild [ ]Moderate [ ]Severe  Anxiety:                             [ ]Mild [ ]Moderate [ ]Severe  Fatigue:                             [ x]Mild [ ]Moderate [ ]Severe  Nausea:                             [ ]Mild [ ]Moderate [ ]Severe  Loss of appetite:              [ ]Mild [ ]Moderate [ ]Severe  Constipation:                    [ ]Mild [ ]Moderate [ ]Severe    CPOT:    https://www.[facility].org/getattachment/[email or web address]/Critical-Care-Pain-Observation-Tool-(CPOT)    PAIN AD Score:	  http://geriatrictoolkit.[facility]/cog/painad.pdf (Ctrl + left click to view)    Other Symptoms:  [ x]All other review of systems negative     Palliative Performance Status Version 2:         %      http://[facility].org/files/news/palliative_performance_scale_ppsv2.pdf    PHYSICAL EXAM:  Vital Signs Last 24 Hrs  T(C): 37.1 (03 Nov 2020 08:18), Max: 37.1 (03 Nov 2020 08:18)  T(F): 98.7 (03 Nov 2020 08:18), Max: 98.7 (03 Nov 2020 08:18)  HR: 84 (03 Nov 2020 08:18) (84 - 125)  BP: 160/83 (03 Nov 2020 08:18) (103/84 - 166/70)  BP(mean): --  RR: 18 (03 Nov 2020 08:18) (18 - 18)  SpO2: 94% (03 Nov 2020 08:18) (93% - 99%) I&O's Summary    02 Nov 2020 07:01  -  03 Nov 2020 07:00  --------------------------------------------------------  IN: 1580 mL / OUT: 2800 mL / NET: -1220 mL    03 Nov 2020 07:01  -  03 Nov 2020 15:11  --------------------------------------------------------  IN: 560 mL / OUT: 0 mL / NET: 560 mL       GENERAL:  [ ]Alert  [ ]Oriented x   [ ]Lethargic  [ ]Cachexia  [ ]Unarousable  [ ]Verbal  [ ]Non-Verbal  Behavioral:   [ ]Anxiety  [ ]Delirium [ ]Agitation [ ]Other  HEENT:  [ ]Normal   [ ]Dry mouth   [ ]ET Tube/Trach  [ ]Oral lesions  PULMONARY:   [ ]Clear [ ]Tachypnea  [ ]Audible excessive secretions   [ ]Rhonchi        [ ]Right [ ]Left [ ]Bilateral  [ ]Crackles        [ ]Right [ ]Left [ ]Bilateral  [ ]Wheezing     [ ]Right [ ]Left [ ]Bilateral  [ ]Diminished BS [ ] Right [ ]Left [ ]Bilateral  CARDIOVASCULAR:    [ ]Regular [ ]Irregular [ ]Tachy  [ ]Noble [ ]Murmur [ ]Other  GASTROINTESTINAL:  [ ]Soft  [ ]Distended   [ ]+BS  [ ]Non tender [ ]Tender  [ ]PEG [ ]OGT/ NGT   Last BM:      GENITOURINARY:  [ ]Normal [ ]Incontinent   [ ]Oliguria/Anuria   [ ]Gibson  MUSCULOSKELETAL:   [ ]Normal   [ ]Weakness  [ ]Bed/Wheelchair bound [ ]Edema  NEUROLOGIC:   [ ]No focal deficits  [ ] Cognitive impairment  [ ] Dysphagia [ ]Dysarthria [ ] Paresis [ ]Other   SKIN:   [ ]Normal  [ ]Rash   [ ]Pressure ulcer(s) [ ]y [ ]n present on admission    CRITICAL CARE:  [ ]Shock Present  [ ]Septic [ ]Cardiogenic [ ]Neurologic [ ]Hypovolemic  [ ]Vasopressors [ ]Inotropes  [ ]Respiratory failure present [ ]Mechanical Ventilation [ ]Non-invasive ventilatory support [ ]High-Flow   [ ]Acute  [ ]Chronic [ ]Hypoxic  [ ]Hypercarbic [ ]Other  [ ]Other organ failure     LABS:                        8.0    12.79 )-----------( 337      ( 03 Nov 2020 05:35 )             24.3   11-03    139  |  102  |  86<H>  ----------------------------<  106<H>  4.5   |  21<L>  |  2.53<H>    Ca    8.4      03 Nov 2020 05:36  Phos  4.4     11-03    TPro  5.0<L>  /  Alb  2.3<L>  /  TBili  0.3  /  DBili  0.1  /  AST  46<H>  /  ALT  44  /  AlkPhos  78  11-03  PTT - ( 03 Nov 2020 09:46 )  PTT:47.5 sec      RADIOLOGY & ADDITIONAL STUDIES:    Protein Calorie Malnutrition Present: [ ]mild [ ]moderate [ ]severe [ ]underweight [ ]morbid obesity  https://www.andeal.org/vault/2440/web/files/ONC/Table_Clinical%20Characteristics%20to%20Document%20Malnutrition-White%20JV%20et%20al%741155.pdf    Height (cm): 175.3 (10-19-20 @ 09:24)  Weight (kg): 63.5 (10-31-20 @ 21:11), 77.1 (04-16-20 @ 14:06)  BMI (kg/m2): 20.7 (10-31-20 @ 21:11), 25.1 (10-19-20 @ 09:24)    [ ]PPSV2 < or = 30%  [ ]significant weight loss [ ]poor nutritional intake [ ]anasarca   Albumin, Serum: 2.3 g/dL (11-03-20 @ 05:36)   [ ]Artificial Nutrition    REFERRALS:   [ ]Chaplaincy  [ ]Hospice  [ ]Child Life  [ ]Social Work  [ ]Case management [ ]Holistic Therapy     Goals of Care Document:  MICA mSith (10-17-20 @ 23:00)  Goals of Care Conversation:   Participants:  · Participants  Family  · Spouse  Wife, Mayda Mary    Advance Directives:  · Does patient have Advance Directive  Yes  · Indicate Type  Health Care Proxy (HCP)  · Agent's Name  Mayda Hernandez  · Are any of the items on the chart  No  · Caregiver:  no    Conversation Discussion:  · Conversation  Diagnosis; Prognosis; MOLST Discussed; Treatment Options  · Conversation Details  I had a 16 minute discussion with patient's wife Mayda (HCP) regarding advanced directives.  I had discussed with her the current plan of care.  I had also discussed all resuscitative measures and she verbalized understanding.  She confirmed that patient's wish is to have everything done as needed.  Currently patient is FULL CODE.    What Matters Most To Patient and Family:  · What matters most to patient and family  recovery    Personal Advance Directives Treatment Guidelines:   Treatment Guidelines:  · Decision Maker  Health Care Proxy  · Treatment Guidelines  Antibiotic trial; IV fluid trial    Location of Discussion:   Duration of Advanced Care Planning Meeting:  · Time spent (in minutes)  16    Location of Discussion:  · Location of discussion  Telephone      Electronic Signatures:  Kalee Smith)  (Signed 18-Oct-2020 04:51)  	Authored: Goals of Care Conversation, Personal Advance Directives Treatment Guidelines, Location of Discussion      Last Updated: 18-Oct-2020 04:51 by Kalee Smith)       SUBJECTIVE AND OBJECTIVE:  Patient reports he has occasional pain, unable to elaborate as to where the pain is, which he takes a $10 pain medication for. Patient reports he is hospitalized for his depression; but reports his mood has been stable. When asked if he has cancer, he reports he does, but unable to further describe the cancer details or treatment plan. He states he is 89 years old; denies memory impairment, but states that he probably develop them later on in life.     INTERVAL HPI/OVERNIGHT EVENTS:  CBC repeated s/p transfusion last night.   In past 24 hours, patient used 1 prn dose of Tramadol    DNR on chart:   Allergies    No Known Allergies    Intolerances    MEDICATIONS  (STANDING):  allopurinol 100 milliGRAM(s) Oral daily  amLODIPine   Tablet 2.5 milliGRAM(s) Oral daily  atorvastatin 80 milliGRAM(s) Oral at bedtime  chlorhexidine 2% Cloths 1 Application(s) Topical daily  chlorhexidine 4% Liquid 1 Application(s) Topical <User Schedule>  heparin  Infusion. 900 Unit(s)/Hr (9 mL/Hr) IV Continuous <Continuous>  influenza   Vaccine 0.5 milliLiter(s) IntraMuscular once  lactobacillus acidophilus 1 Tablet(s) Oral daily  latanoprost 0.005% Ophthalmic Solution 1 Drop(s) Both EYES at bedtime  mirtazapine 15 milliGRAM(s) Oral at bedtime  nystatin    Suspension 436291 Unit(s) Swish and Swallow every 6 hours  polyethylene glycol 3350 17 Gram(s) Oral daily  rasburicase IVPB 3 milliGRAM(s) IV Intermittent once  senna 2 Tablet(s) Oral at bedtime  sodium bicarbonate 650 milliGRAM(s) Oral two times a day  tamsulosin 0.4 milliGRAM(s) Oral at bedtime  timolol 0.5% Solution 1 Drop(s) Both EYES two times a day    MEDICATIONS  (PRN):  acetaminophen   Tablet .. 650 milliGRAM(s) Oral every 6 hours PRN Mild Pain (1 - 3), Moderate Pain (4 - 6)  heparin   Injectable 5000 Unit(s) IV Push every 6 hours PRN For aPTT less than 40  heparin   Injectable 2500 Unit(s) IV Push every 6 hours PRN For aPTT between 40 - 57  morphine  - Injectable 2 milliGRAM(s) IV Push every 4 hours PRN Severe Pain (7 - 10)  sodium chloride 0.9% lock flush 10 milliLiter(s) IV Push every 1 hour PRN Pre/post blood products, medications, blood draw, and to maintain line patency  traMADol 25 milliGRAM(s) Oral every 6 hours PRN Moderate Pain (4 - 6)    ITEMS UNCHECKED ARE NOT PRESENT    PRESENT SYMPTOMS: [ ]Unable to obtain due to poor mentation   Source if other than patient:  [ ]Family   [ ]Team     Pain:  [x ]yes [ ]no  QOL impact - unable to describe   Location -      unable to describe               Aggravating factors - unable to describe   Quality - unable to describe   Radiation - unable to describe   Timing- unable to describe   Severity (0-10 scale): unable to describe   Minimal acceptable level (0-10 scale):     Dyspnea:                           [ ]Mild [ ]Moderate [ ]Severe  Anxiety:                             [ ]Mild [ ]Moderate [ ]Severe  Fatigue:                             [ x]Mild [ ]Moderate [ ]Severe  Nausea:                             [ ]Mild [ ]Moderate [ ]Severe  Loss of appetite:              [ ]Mild [ ]Moderate [ ]Severe  Constipation:                    [ ]Mild [ ]Moderate [ ]Severe    CPOT:    https://www.[facility] [facility].org/getattachment/[email or web address]/Critical-Care-Pain-Observation-Tool-(CPOT)    PAIN AD Score:	  http://geriatrictoolkit.[facility]/cog/painad.pdf (Ctrl + left click to view)    Other Symptoms:  [ x]All other review of systems negative     Palliative Performance Status Version 2:    30     %      http://[facility].org/files/news/palliative_performance_scale_ppsv2.pdf    PHYSICAL EXAM:  Vital Signs Last 24 Hrs  T(C): 37.1 (03 Nov 2020 08:18), Max: 37.1 (03 Nov 2020 08:18)  T(F): 98.7 (03 Nov 2020 08:18), Max: 98.7 (03 Nov 2020 08:18)  HR: 84 (03 Nov 2020 08:18) (84 - 125)  BP: 160/83 (03 Nov 2020 08:18) (103/84 - 166/70)  BP(mean): --  RR: 18 (03 Nov 2020 08:18) (18 - 18)  SpO2: 94% (03 Nov 2020 08:18) (93% - 99%) I&O's Summary    02 Nov 2020 07:01  -  03 Nov 2020 07:00  --------------------------------------------------------  IN: 1580 mL / OUT: 2800 mL / NET: -1220 mL    03 Nov 2020 07:01  -  03 Nov 2020 15:11  --------------------------------------------------------  IN: 560 mL / OUT: 0 mL / NET: 560 mL    GENERAL:  [x ]Alert  [x ]Oriented x2 (person, place)   [x ]Lethargic  [ ]Cachexia  [ ]Unarousable  [x ]Verbal  [ ]Non-Verbal  Behavioral:   [ ] Anxiety  [ ] Delirium [ ] Agitation [ ] Other  HEENT:  [x ]Normal   [ ]Dry mouth   [ ]ET Tube/Trach  [ ]Oral lesions  PULMONARY:   [x ]Clear [ ]Tachypnea  [ ]Audible excessive secretions   [ ]Rhonchi        [ ]Right [ ]Left [ ]Bilateral  [ ]Crackles        [ ]Right [ ]Left [ ]Bilateral  [ ]Wheezing     [ ]Right [ ]Left [ ]Bilateral  [ x]Diminished breath sounds [ ]right [ ]left [ x]bilateral  CARDIOVASCULAR:    [ x]Regular [ ]Irregular [ ]Tachy  [ ]Noble [ ]Murmur [ ]Other  GASTROINTESTINAL:  [x ]Soft  [ ]Distended   [ x]+BS  [x ]Non tender [ ]Tender  [ ]PEG [ ]OGT/ NGT  Last BM: 10/31  GENITOURINARY:  [ ]Normal [ ] Incontinent   [ ]Oliguria/Anuria   [x ]Gibson  MUSCULOSKELETAL:   [ ]Normal   [ x]Weakness  [ x]Bed/Wheelchair bound [x ] 2+ pitting edema Edema in b/l upper and lower extremities   NEUROLOGIC:   [x ]No focal deficits  [ ]Cognitive impairment  [ ]Dysphagia [ ]Dysarthria [ ]Paresis [ ]Other   SKIN: Ecchymosis at Left Shoulder   [ ]Normal    [ ]Rash  [ ]Pressure ulcer(s)       Present on admission [ ]y [ ]n    CRITICAL CARE:  [ ]Shock Present  [ ]Septic [ ]Cardiogenic [ ]Neurologic [ ]Hypovolemic  [ ]Vasopressors [ ]Inotropes  [ ]Respiratory failure present [ ]Mechanical Ventilation [ ]Non-invasive ventilatory support [ ]High-Flow   [ ]Acute  [ ]Chronic [ ]Hypoxic  [ ]Hypercarbic [ ]Other  [ ]Other organ failure     LABS:                        8.0    12.79 )-----------( 337      ( 03 Nov 2020 05:35 )             24.3   11-03    139  |  102  |  86<H>  ----------------------------<  106<H>  4.5   |  21<L>  |  2.53<H>    Ca    8.4      03 Nov 2020 05:36  Phos  4.4     11-03    TPro  5.0<L>  /  Alb  2.3<L>  /  TBili  0.3  /  DBili  0.1  /  AST  46<H>  /  ALT  44  /  AlkPhos  78  11-03  PTT - ( 03 Nov 2020 09:46 )  PTT:47.5 sec      RADIOLOGY & ADDITIONAL STUDIES: N/A    Protein Calorie Malnutrition Present: [ ]mild [ ]moderate [ ]severe [ ]underweight [ ]morbid obesity  https://www.andeal.org/vault/2440/web/files/ONC/Table_Clinical%20Characteristics%20to%20Document%20Malnutrition-White%20JV%20et%20al%202012.pdf    Height (cm): 175.3 (10-19-20 @ 09:24)  Weight (kg): 63.5 (10-31-20 @ 21:11), 77.1 (04-16-20 @ 14:06)  BMI (kg/m2): 20.7 (10-31-20 @ 21:11), 25.1 (10-19-20 @ 09:24)    [x ]PPSV2 < or = 30%  [ ]significant weight loss [ ]poor nutritional intake [ ]anasarca   Albumin, Serum: 2.3 g/dL (11-03-20 @ 05:36)   [ ]Artificial Nutrition    REFERRALS:   [ ]Chaplaincy  [ ]Hospice  [ ]Child Life  [ ]Social Work  [ x]Case management [ ]Holistic Therapy     Goals of Care Document:  MICA Smith (10-17-20 @ 23:00)  Goals of Care Conversation:   Participants:  · Participants  Family  · Spouse  Wife, Mayda Hernandez    Advance Directives:  · Does patient have Advance Directive  Yes  · Indicate Type  Health Care Proxy (HCP)  · Agent's Name  Mayda Hernandez  · Are any of the items on the chart  No  · Caregiver:  no    Conversation Discussion:  · Conversation  Diagnosis; Prognosis; MOLST Discussed; Treatment Options  · Conversation Details  I had a 16 minute discussion with patient's wife Mayda (HCP) regarding advanced directives.  I had discussed with her the current plan of care.  I had also discussed all resuscitative measures and she verbalized understanding.  She confirmed that patient's wish is to have everything done as needed.  Currently patient is FULL CODE.    What Matters Most To Patient and Family:  · What matters most to patient and family  recovery    Personal Advance Directives Treatment Guidelines:   Treatment Guidelines:  · Decision Maker  Health Care Proxy  · Treatment Guidelines  Antibiotic trial; IV fluid trial    Location of Discussion:   Duration of Advanced Care Planning Meeting:  · Time spent (in minutes)  16    Location of Discussion:  · Location of discussion  Telephone      Electronic Signatures:  Kalee Smith)  (Signed 18-Oct-2020 04:51)  	Authored: Goals of Care Conversation, Personal Advance Directives Treatment Guidelines, Location of Discussion      Last Updated: 18-Oct-2020 04:51 by Kalee Smith)

## 2020-11-03 NOTE — PROGRESS NOTE ADULT - PROBLEM SELECTOR PLAN 8
- Patient may have some cognitive impairment as he states he is 89 years old and thought the president was [place] yesterday. Also believes he is in the hospital for his depression. Unable to describe his cancer and his treatment plan.   - States we can speak to his wife Viktoria for medical decisions, and alternate would be his son Conner.   - Voicemail left for wife Viktoria to arrange for family meeting.   - will need collateral information from family if having memory problems  - will continue to follow with primary team.

## 2020-11-04 NOTE — PROVIDER CONTACT NOTE (OTHER) - ACTION/TREATMENT ORDERED:
Provider made aware. evaluated. instructed to follow nomogram. nomogram follwed, 2500cc heparin bolus given increased to 12cc/h. tolerating.will cont to monitor.

## 2020-11-04 NOTE — PROGRESS NOTE ADULT - ASSESSMENT
86M w DLBCL in remission until 9/2019, HTN, HLD, CKD, BPH, anemia, syncope, anxiety/adjustment disorder/depression, presents here from [facility] where he was getting treated for L shoulder infection (s/p ORIF for pathologic fx) for further management of his recurrent diffuse large B cell lymphoma and L shoulder lesion, s/p chemo with concern for TLS with hyperkalemia & DANIELITO

## 2020-11-04 NOTE — PROGRESS NOTE ADULT - SUBJECTIVE AND OBJECTIVE BOX
[facility] Division of Hospital Medicine  Zach Espitia MD  Pager (M-F, 1U-3H): [phone]  Other Times:  [phone]    Patient is a 86y old  Male who presents with a chief complaint of sent here for chemo and radiation (04 Nov 2020 08:05)    SUBJECTIVE / OVERNIGHT EVENTS: calm, no distress, feel ok minimal shoulder pain, no sob  ADDITIONAL REVIEW OF SYSTEMS:    MEDICATIONS  (STANDING):  allopurinol 100 milliGRAM(s) Oral daily  amLODIPine   Tablet 2.5 milliGRAM(s) Oral daily  atorvastatin 80 milliGRAM(s) Oral at bedtime  chlorhexidine 2% Cloths 1 Application(s) Topical daily  chlorhexidine 4% Liquid 1 Application(s) Topical <User Schedule>  influenza   Vaccine 0.5 milliLiter(s) IntraMuscular once  lactobacillus acidophilus 1 Tablet(s) Oral daily  latanoprost 0.005% Ophthalmic Solution 1 Drop(s) Both EYES at bedtime  mirtazapine 15 milliGRAM(s) Oral at bedtime  nystatin    Suspension 567092 Unit(s) Swish and Swallow every 6 hours  polyethylene glycol 3350 17 Gram(s) Oral daily  rasburicase IVPB 3 milliGRAM(s) IV Intermittent once  senna 2 Tablet(s) Oral at bedtime  sodium bicarbonate 650 milliGRAM(s) Oral two times a day  tamsulosin 0.4 milliGRAM(s) Oral at bedtime  timolol 0.5% Solution 1 Drop(s) Both EYES two times a day    MEDICATIONS  (PRN):  acetaminophen   Tablet .. 650 milliGRAM(s) Oral every 6 hours PRN Mild Pain (1 - 3), Moderate Pain (4 - 6)  morphine  - Injectable 2 milliGRAM(s) IV Push every 4 hours PRN Severe Pain (7 - 10)  sodium chloride 0.9% lock flush 10 milliLiter(s) IV Push every 1 hour PRN Pre/post blood products, medications, blood draw, and to maintain line patency  traMADol 25 milliGRAM(s) Oral every 6 hours PRN Moderate Pain (4 - 6)      CAPILLARY BLOOD GLUCOSE        I&O's Summary    03 Nov 2020 07:01  -  04 Nov 2020 07:00  --------------------------------------------------------  IN: 660 mL / OUT: 1500 mL / NET: -840 mL    04 Nov 2020 07:01  -  04 Nov 2020 15:01  --------------------------------------------------------  IN: 550 mL / OUT: 450 mL / NET: 100 mL    PHYSICAL EXAM:  Vital Signs Last 24 Hrs  T(C): 36.8 (04 Nov 2020 07:44), Max: 36.9 (04 Nov 2020 00:59)  T(F): 98.2 (04 Nov 2020 07:44), Max: 98.5 (04 Nov 2020 00:59)  HR: 91 (04 Nov 2020 07:44) (72 - 91)  BP: 133/78 (04 Nov 2020 07:44) (100/50 - 163/80)  BP(mean): --  RR: 18 (04 Nov 2020 07:44) (18 - 18)  SpO2: 95% (04 Nov 2020 07:44) (94% - 97%)  GENERAL: NAD  EYES:  conjunctiva and sclera clear  CHEST/LUNG: bibasilar crackles   HEART: +S1/S2, RRR   ABDOMEN: Soft, Nontender, Nondistended  MSK/EXTREMITIES:  L shoulder w/ecchymosis, indurated and firm. blanching skin  PSYCH: AAOx1-2  NEUROLOGY: non-focal  SKIN: generalized anasarca, no rashes or lesions other than shoulder as above    LABS:                        7.9    11.81 )-----------( 270      ( 04 Nov 2020 14:25 )             24.5     11-04    143  |  107  |  78<H>  ----------------------------<  121<H>  4.0   |  22  |  2.19<H>    Ca    8.6      04 Nov 2020 14:25  Phos  3.5     11-04    TPro  5.0<L>  /  Alb  2.3<L>  /  TBili  0.3  /  DBili  0.1  /  AST  46<H>  /  ALT  44  /  AlkPhos  78  11-03    PTT - ( 03 Nov 2020 09:46 )  PTT:47.5 sec      RADIOLOGY & ADDITIONAL TESTS:  Results Reviewed:   Imaging Personally Reviewed: < from: CT Shoulder No Cont, Left (11.03.20 @ 20:22) >  1. Comminuted displaced humeral neck fracture is again seen with an intramedullary julianne and proximal interlocking screws.  2. Extensive soft tissue and fluid density is again seen surrounding the fracture site. Considerations include tumor extension, hematoma, and possibly infection in the appropriate setting. Appearance is relatively stable.  3. 1.6 cm left supraclavicular lymph node appears mildly increased in size. This may be reactive or neoplastic.    < end of copied text >    Electrocardiogram Personally Reviewed:    COORDINATION OF CARE: heme, renal apprec  Care Discussed with Consultants/Other Providers [Y/N]: palliative Dr Terri de paz plan of care  Prior or Outpatient Records Reviewed [Y/N]:

## 2020-11-04 NOTE — PROGRESS NOTE ADULT - PROBLEM SELECTOR PLAN 10
I followed up below conversation with wife reiterating current plan of care.     Previous MD d/w length with wife and also patient's daughter. There are 5 children from his first marriage and he has been  to his new wife for 25 years.  Previous MD d/w wife - she believes this cancer is treatable, She would like to consider all options at this time.  d/w daughter (2nd daughter - Stella) - she is more realistic. Does not think her father could tolerate chemo. However, he defers to his wife.  -f/u palliative consult and family mtg

## 2020-11-04 NOTE — PROGRESS NOTE ADULT - ASSESSMENT
86M w DLBCL in remission until 9/2019, HTN, HLD, CKD, BPH, anemia, syncope, anxiety/adjustment disorder/depression, presents here from [facility] where he was getting treated for L shoulder infection (s/p ORIF for pathologic fx) for further management of his recurrent diffuse large B cell lymphoma and L shoulder lesion, s/p chemo with concern for TLS with hyperkalemia & DANIELITO

## 2020-11-04 NOTE — PROGRESS NOTE ADULT - PROBLEM SELECTOR PLAN 8
- Spoke to wife Viktoria at bedside. Viktoria states they have been  for more than 20 years; patient has 5 children from his first marriage -2 of whom live out of state and 3 live nearby. One of his daughters works at the [employer] [facility] where patient was recently. Viktoria confirms that patient has some memory issues beginning about a year ago first noticed when he got lost for 8 hours when driving to his friends house and per their discussion with PCP recently he may have beginning of cognitive impairment; however, Viktoria has not told patient's children yet. Viktoria able to verbalize that patient was recently at [facility] for the left shoulder surgery with initial concerns for left shoulder infection but now deemed less likely but actually related to his cancer. She states he had been receiving his oncologic care at [facility], but wanted him here so that he could receive stem cell treatment, but is confused as to why it has not been offered since he's been here. She acknowledges that s/p inpatient chemotherapy he has had complications including DANIELITO. Discussed with wife that patient appears weak now with poor appetite (and only drinking Ensure), which wife agrees with.  Wife wishes to speak further with oncology team about the plan to decide next steps.   - Reached out to oncology team; awaiting callback to determine plan; will reattempt tomorrow.  - will continue to follow with primary team. - Spoke to wife Viktoria at bedside. Viktoria states they have been  for more than 20 years; patient has 5 children from his first marriage -2 of whom live out of state and 3 live nearby. One of his daughters works at the [employer] [facility] where patient was recently. Viktoria confirms that patient has some memory issues beginning about a year ago first noticed when he got lost for 8 hours when driving to his friends house and per their discussion with PCP recently he may have beginning of cognitive impairment; however, Viktoria has not told patient's children yet. Viktoria able to verbalize that patient was recently at [facility] for the left shoulder surgery with initial concerns for left shoulder infection but now deemed less likely but actually related to his cancer. She states he had been receiving his oncologic care at [facility], but wanted him here so that he could receive stem cell treatment, but is confused as to why it has not been offered since he's been here. She acknowledges that s/p inpatient chemotherapy he has had complications including DANIELITO. Discussed with wife that patient appears weak now with poor appetite (and only drinking Ensure), which wife agrees with.  Wife wishes to speak further with oncology team about the plan to decide next steps.   - Reached out to oncology team; awaiting callback to determine plan as wife would like their input on oncologic plan. Will reattempt to reach out to oncology team tomorrow.  - will continue to follow with primary team.

## 2020-11-04 NOTE — PROGRESS NOTE ADULT - SUBJECTIVE AND OBJECTIVE BOX
[facility] DIVISION OF KIDNEY DISEASES AND HYPERTENSION -- FOLLOW UP NOTE  --------------------------------------------------------------------------------      24 hour events/subjective:  urine output over 24h was 1.5L     Patient was seen and examined at bedside. Bernie CP, SOB, LE edema.         PAST HISTORY  --------------------------------------------------------------------------------  No significant changes to PMH, PSH, FHx, SHx, unless otherwise noted    ALLERGIES & MEDICATIONS  --------------------------------------------------------------------------------  Allergies    No Known Allergies    Intolerances      Standing Inpatient Medications  allopurinol 100 milliGRAM(s) Oral daily  amLODIPine   Tablet 2.5 milliGRAM(s) Oral daily  atorvastatin 80 milliGRAM(s) Oral at bedtime  chlorhexidine 2% Cloths 1 Application(s) Topical daily  chlorhexidine 4% Liquid 1 Application(s) Topical <User Schedule>  influenza   Vaccine 0.5 milliLiter(s) IntraMuscular once  lactobacillus acidophilus 1 Tablet(s) Oral daily  latanoprost 0.005% Ophthalmic Solution 1 Drop(s) Both EYES at bedtime  mirtazapine 15 milliGRAM(s) Oral at bedtime  nystatin    Suspension 386128 Unit(s) Swish and Swallow every 6 hours  polyethylene glycol 3350 17 Gram(s) Oral daily  rasburicase IVPB 3 milliGRAM(s) IV Intermittent once  senna 2 Tablet(s) Oral at bedtime  sodium bicarbonate 650 milliGRAM(s) Oral two times a day  tamsulosin 0.4 milliGRAM(s) Oral at bedtime  timolol 0.5% Solution 1 Drop(s) Both EYES two times a day    PRN Inpatient Medications  acetaminophen   Tablet .. 650 milliGRAM(s) Oral every 6 hours PRN  morphine  - Injectable 2 milliGRAM(s) IV Push every 4 hours PRN  sodium chloride 0.9% lock flush 10 milliLiter(s) IV Push every 1 hour PRN  traMADol 25 milliGRAM(s) Oral every 6 hours PRN      REVIEW OF SYSTEMS  --------------------------------------------------------------------------------  Gen: No fevers/chills  Respiratory: No dyspnea, cough  CV: No chest pain  GI: No abdominal pain, diarrhea  : + tobar  MSK: No  edema      All other systems were reviewed and are negative, except as noted.    VITALS/PHYSICAL EXAM  --------------------------------------------------------------------------------  T(C): 36.8 (11-04-20 @ 07:44), Max: 37.1 (11-03-20 @ 08:18)  HR: 91 (11-04-20 @ 07:44) (72 - 91)  BP: 133/78 (11-04-20 @ 07:44) (100/50 - 163/80)  RR: 18 (11-04-20 @ 07:44) (18 - 18)  SpO2: 95% (11-04-20 @ 07:44) (94% - 97%)  Wt(kg): --        11-03-20 @ 07:01  -  11-04-20 @ 07:00  --------------------------------------------------------  IN: 660 mL / OUT: 1500 mL / NET: -840 mL        Physical Exam:  	Gen: NAD  	HEENT: MMM  	Pulm: CTA B/L, no crackles or wheezing   	CV: S1S2  	Abd: Soft, +BS   	Ext: No LE edema B/L  	Neuro: Awake  	Skin: Warm and dry  	Vascular access: IV lines       LABS/STUDIES  --------------------------------------------------------------------------------              7.7    10.53 >-----------<  298      [11-04-20 @ 06:59]              24.1     143  |  106  |  78  ----------------------------<  82      [11-04-20 @ 06:57]  4.2   |  23  |  2.33        Ca     8.6     [11-04-20 @ 06:57]      Phos  3.5     [11-04-20 @ 06:57]    TPro  5.0  /  Alb  2.3  /  TBili  0.3  /  DBili  0.1  /  AST  46  /  ALT  44  /  AlkPhos  78  [11-03-20 @ 05:36]      PTT: 47.5       [11-03-20 @ 09:46]    Uric acid 6.6      [11-04-20 @ 06:57]        [11-04-20 @ 06:57]    Creatinine Trend:  SCr 2.33 [11-04 @ 06:57]  SCr 2.33 [11-03 @ 23:10]  SCr 2.42 [11-03 @ 15:27]  SCr 2.53 [11-03 @ 05:36]  SCr 2.50 [11-02 @ 20:47]    Urinalysis - [10-30-20 @ 20:40]      Color Yellow / Appearance Slightly Turbid / SG 1.019 / pH 6.0      Gluc Trace / Ketone Negative  / Bili Negative / Urobili Negative       Blood Moderate / Protein 30 mg/dL / Leuk Est Negative / Nitrite Negative      RBC 1 / WBC 3 / Hyaline 1 / Gran  / Sq Epi  / Non Sq Epi 1 / Bacteria Negative    Urine Creatinine 43      [10-30-20 @ 20:39]  Urine Sodium <35      [10-30-20 @ 20:39]  Urine Potassium 63      [10-30-20 @ 20:39]  Urine Chloride <35      [10-30-20 @ 20:39]  Urine Osmolality 395      [10-31-20 @ 06:32]    Ferritin [id number]      [10-21-20 @ 09:44]  HbA1c 6.2      [06-17-16 @ 03:45]  Lipid: chol --, , HDL --, LDL --      [10-21-20 @ 06:25]    HBsAg Nonreact      [10-23-20 @ 09:52]  HBcAb Nonreact      [10-23-20 @ 09:52]  HCV 0.09, Nonreact      [10-23-20 @ 09:52]  HIV Nonreact      [10-23-20 @ 09:30]    Immunofixation Serum:   No Monoclonal Band Identified    Reference Range: None Detected      [10-20-20 @ 09:57]  SPEP Interpretation: Hypoalbuminemia      [10-20-20 @ 09:57]

## 2020-11-04 NOTE — PROGRESS NOTE ADULT - PROBLEM SELECTOR PLAN 5
repeat CT of shoulder due to increasing swelling showed no hematoma - resume heparin  Infiltrating mass of left shoulder and possible osteomyelitis   bone scan with uptake in L femur. was on abx from 10/10-10/29, d/cing and watching off abx now   10/17 blood culture - so far negative. ESR/CRP very high  ID following  C/o persistent severe pain- IV morphine added.  CT L shoulder noted, d/w ortho/IR seems like infiltration and not primarily infectious. s/p IR biopsy, very little fluid, sent for pathology, gram stain negative.  MRI L shoulder showing:  - aggressive destruction of proximal humerus with concern for pathological fractures. Area of humeral head concerning for possible tumor infiltration vs superimposed infection. Non specific edema above scapula and glenohumeral joint effusion  -per orthopedics, no change in management for now  -biopsy results discussed with heme onc, concerning for diffuse large B cell  serial exam

## 2020-11-04 NOTE — PROGRESS NOTE ADULT - PROBLEM SELECTOR PLAN 1
-CT C/A/P with abd mass, LN. MRI Brain w/ contrast- high right posterior frontal extra-axial mass.  -s/p BMBx demonstrated diffuse large b-cell lymphoma  -PICC placed  -received chemotherapy   -LP possibly this week to eval for leptomeningeal dz - f/u heme for final decision  - monitor counts and labs

## 2020-11-04 NOTE — PROGRESS NOTE ADULT - ATTENDING COMMENTS
Patient seen, agree with note above. Family meeting after oncology discusses further with wife. Patient appeared comfortable today.

## 2020-11-04 NOTE — PROGRESS NOTE ADULT - PROBLEM SELECTOR PLAN 6
- spoke with wife Viktoria at bedside who reports that patient has had some memory issues beginning about a year ago first noticed when he got lost for 8 hours when driving to his friends house and per their discussion with PCP recently he may have beginning of cognitive impairment.   - discussed driving safety concern with his cognitive impairment with wife  - supportive care

## 2020-11-04 NOTE — PROGRESS NOTE ADULT - PROBLEM SELECTOR PLAN 4
-acute DVT L axillary and brachial veins  -resume heparin gtt for AC   -can transition to warfarin vs NOAC once all procedures are done

## 2020-11-04 NOTE — PROGRESS NOTE ADULT - PROBLEM SELECTOR PLAN 3
- history of DLBCL, GCB subtype (BCL2, BCL6, MYC, all negative by FISH). Per chart review, patient last received 6 cycles of R-CHOP in 2017 with DELANEY until 11/2019. Patient had mesenteric mass with size of 2.7x4.8 in 2017.   - CT CAP apparently shows enlarging mesenteric mass and new Lt pelvic renal mass concerning of relapse  - At [facility], he was found to have a L shoulder mass and pathologic fracture. Now s/p ORIF and radical resection of tumor on 9/25. PET/CT was performed revealing brain lesion with hypermetabolic activity  - Bone marrow biopsy without e/o lymphoma  - Left shoulder biopsy: most consistent with DLBCL   - s/p inpatient chemotherapy on 10/28 and 10/29  - Per Oncology: Will attempt to get LP for CNS disease.

## 2020-11-04 NOTE — PROGRESS NOTE ADULT - SUBJECTIVE AND OBJECTIVE BOX
SUBJECTIVE AND OBJECTIVE:  Patient reports feeling okay and that he likes to drink his chocolate Ensure. Denies any complaints of pain    INTERVAL HPI/OVERNIGHT EVENTS:  In past 24 hours, received no PRN doses of tylenol ,tramadol, or morphine.     DNR on chart:   Allergies    No Known Allergies    Intolerances    MEDICATIONS  (STANDING):  allopurinol 100 milliGRAM(s) Oral daily  amLODIPine   Tablet 2.5 milliGRAM(s) Oral daily  atorvastatin 80 milliGRAM(s) Oral at bedtime  chlorhexidine 2% Cloths 1 Application(s) Topical daily  chlorhexidine 4% Liquid 1 Application(s) Topical <User Schedule>  FIRST- Mouthwash  BLM 10 milliLiter(s) Swish and Spit three times a day  fluconAZOLE   Tablet 200 milliGRAM(s) Oral daily  heparin  Infusion.  Unit(s)/Hr (11 mL/Hr) IV Continuous <Continuous>  influenza   Vaccine 0.5 milliLiter(s) IntraMuscular once  lactobacillus acidophilus 1 Tablet(s) Oral daily  latanoprost 0.005% Ophthalmic Solution 1 Drop(s) Both EYES at bedtime  mirtazapine 15 milliGRAM(s) Oral at bedtime  polyethylene glycol 3350 17 Gram(s) Oral daily  rasburicase IVPB 3 milliGRAM(s) IV Intermittent once  senna 2 Tablet(s) Oral at bedtime  sodium bicarbonate 650 milliGRAM(s) Oral two times a day  tamsulosin 0.4 milliGRAM(s) Oral at bedtime  timolol 0.5% Solution 1 Drop(s) Both EYES two times a day    MEDICATIONS  (PRN):  acetaminophen   Tablet .. 650 milliGRAM(s) Oral every 6 hours PRN Mild Pain (1 - 3), Moderate Pain (4 - 6)  heparin   Injectable 5000 Unit(s) IV Push every 6 hours PRN For aPTT less than 40  heparin   Injectable 2500 Unit(s) IV Push every 6 hours PRN For aPTT between 40 - 57  morphine  - Injectable 2 milliGRAM(s) IV Push every 4 hours PRN Severe Pain (7 - 10)  sodium chloride 0.9% lock flush 10 milliLiter(s) IV Push every 1 hour PRN Pre/post blood products, medications, blood draw, and to maintain line patency  traMADol 25 milliGRAM(s) Oral every 6 hours PRN Moderate Pain (4 - 6)    ITEMS UNCHECKED ARE NOT PRESENT    PRESENT SYMPTOMS: [ ]Unable to obtain due to poor mentation   Source if other than patient:  [ ]Family   [ ]Team     Pain:  [ ]yes [x ]no  QOL impact -   Location -          Aggravating factors   Quality -  Radiation -  Timing-   Severity (0-10 scale):   Minimal acceptable level (0-10 scale):     Dyspnea:                           [ ]Mild [ ]Moderate [ ]Severe  Anxiety:                             [ ]Mild [ ]Moderate [ ]Severe  Fatigue:                             [ x]Mild [ ]Moderate [ ]Severe  Nausea:                             [ ]Mild [ ]Moderate [ ]Severe  Loss of appetite:              [ x]Mild [ ]Moderate [ ]Severe  Constipation:                    [ ]Mild [ ]Moderate [ ]Severe    CPOT:    https://www.[facility].org/getattachment/[email or web address]/Critical-Care-Pain-Observation-Tool-(CPOT)    PAIN AD Score:	  http://geriatrictoolkit.[email or web address]/cog/painad.pdf (Ctrl + left click to view)    Other Symptoms:  [ x]All other review of systems negative     Palliative Performance Status Version 2:    30     %      http://npcrc.org/files/news/palliative_performance_scale_ppsv2.pdf    PHYSICAL EXAM:  Vital Signs Last 24 Hrs  T(C): 36.7 (04 Nov 2020 16:24), Max: 36.9 (04 Nov 2020 00:59)  T(F): 98.1 (04 Nov 2020 16:24), Max: 98.5 (04 Nov 2020 00:59)  HR: 99 (04 Nov 2020 16:24) (72 - 99)  BP: 136/76 (04 Nov 2020 16:24) (113/57 - 163/80)  BP(mean): --  RR: 18 (04 Nov 2020 16:24) (18 - 18)  SpO2: 96% (04 Nov 2020 16:24) (94% - 96%)    GENERAL:  [x ]Alert  [x ]Oriented x2 (person, place)   [x ]Lethargic  [ ]Cachexia  [ ]Unarousable  [x ]Verbal  [ ]Non-Verbal  Behavioral:   [ ] Anxiety  [ ] Delirium [ ] Agitation [ ] Other  HEENT:  [x ]Normal   [ ]Dry mouth   [ ]ET Tube/Trach  [ ]Oral lesions  PULMONARY:   [x ]Clear [ ]Tachypnea  [ ]Audible excessive secretions   [ ]Rhonchi        [ ]Right [ ]Left [ ]Bilateral  [ ]Crackles        [ ]Right [ ]Left [ ]Bilateral  [ ]Wheezing     [ ]Right [ ]Left [ ]Bilateral  [ x]Diminished breath sounds [ ]right [ ]left [ x]bilateral  CARDIOVASCULAR:    [ x]Regular [ ]Irregular [ ]Tachy  [ ]Noble [ ]Murmur [ ]Other  GASTROINTESTINAL:  [x ]Soft  [ ]Distended   [ x]+BS  [x ]Non tender [ ]Tender  [ ]PEG [ ]OGT/ NGT  Last BM: 10/31  GENITOURINARY:  [ ]Normal [ ] Incontinent   [ ]Oliguria/Anuria   [x ]Gibson  MUSCULOSKELETAL:   [ ]Normal   [ x]Weakness  [ x]Bed/Wheelchair bound [x ] 2+ pitting edema Edema in b/l upper and lower extremities   NEUROLOGIC:   [x ]No focal deficits  [ ]Cognitive impairment  [ ]Dysphagia [ ]Dysarthria [ ]Paresis [ ]Other   SKIN: Ecchymosis at Left Shoulder   [ ]Normal    [ ]Rash  [ ]Pressure ulcer(s)       Present on admission [ ]y [ ]n    CRITICAL CARE:  [ ]Shock Present  [ ]Septic [ ]Cardiogenic [ ]Neurologic [ ]Hypovolemic  [ ]Vasopressors [ ]Inotropes  [ ]Respiratory failure present [ ]Mechanical Ventilation [ ]Non-invasive ventilatory support [ ]High-Flow   [ ]Acute  [ ]Chronic [ ]Hypoxic  [ ]Hypercarbic [ ]Other  [ ]Other organ failure     LABS:                                          7.9    11.81 )-----------( 270      ( 04 Nov 2020 14:25 )             24.5       11-04    143  |  107  |  78<H>  ----------------------------<  121<H>  4.0   |  22  |  2.19<H>    Ca    8.6      04 Nov 2020 14:25  Phos  3.5     11-04    TPro  5.0<L>  /  Alb  2.3<L>  /  TBili  0.3  /  DBili  0.1  /  AST  46<H>  /  ALT  44  /  AlkPhos  78  11-03      PTT - ( 03 Nov 2020 09:46 )  PTT:47.5 sec    RADIOLOGY & ADDITIONAL STUDIES: N/A    Protein Calorie Malnutrition Present: [ ]mild [ ]moderate [ ]severe [ ]underweight [ ]morbid obesity  https://www.andeal.org/vault/2440/web/files/ONC/Table_Clinical%20Characteristics%20to%20Document%20Malnutrition-White%20JV%20et%20al%202012.pdf    Height (cm): 175.3 (10-19-20 @ 09:24)  Weight (kg): 63.5 (10-31-20 @ 21:11), 77.1 (04-16-20 @ 14:06)  BMI (kg/m2): 20.7 (10-31-20 @ 21:11), 25.1 (10-19-20 @ 09:24)    [x ]PPSV2 < or = 30%  [ ]significant weight loss [ ]poor nutritional intake [ ]anasarca   Albumin, Serum: 2.3 g/dL (11-03-20 @ 05:36)   [ ]Artificial Nutrition    REFERRALS:   [ ]Chaplaincy  [ ]Hospice  [ ]Child Life  [ ]Social Work  [ x]Case management [ ]Holistic Therapy     Goals of Care Document:  MICA Smith (10-17-20 @ 23:00)  Goals of Care Conversation:   Participants:  · Participants  Family  · Spouse  Wife, Mayda Hernandez    Advance Directives:  · Does patient have Advance Directive  Yes  · Indicate Type  Health Care Proxy (HCP)  · Agent's Name  Mayda Hernandez  · Are any of the items on the chart  No  · Caregiver:  no    Conversation Discussion:  · Conversation  Diagnosis; Prognosis; MOLST Discussed; Treatment Options  · Conversation Details  I had a 16 minute discussion with patient's wife Mayda (HCP) regarding advanced directives.  I had discussed with her the current plan of care.  I had also discussed all resuscitative measures and she verbalized understanding.  She confirmed that patient's wish is to have everything done as needed.  Currently patient is FULL CODE.    What Matters Most To Patient and Family:  · What matters most to patient and family  recovery    Personal Advance Directives Treatment Guidelines:   Treatment Guidelines:  · Decision Maker  Health Care Proxy  · Treatment Guidelines  Antibiotic trial; IV fluid trial    Location of Discussion:   Duration of Advanced Care Planning Meeting:  · Time spent (in minutes)  16    Location of Discussion:  · Location of discussion  Telephone      Electronic Signatures:  Kalee Smith)  (Signed 18-Oct-2020 04:51)  	Authored: Goals of Care Conversation, Personal Advance Directives Treatment Guidelines, Location of Discussion      Last Updated: 18-Oct-2020 04:51 by Kalee Smith)   SUBJECTIVE AND OBJECTIVE:  Patient reports feeling okay and that he likes to drink his chocolate Ensure. Denies any complaints of pain    INTERVAL HPI/OVERNIGHT EVENTS:  In past 24 hours, received no PRN doses of tylenol ,tramadol, or morphine.     DNR on chart:   Allergies    No Known Allergies    Intolerances    MEDICATIONS  (STANDING):  allopurinol 100 milliGRAM(s) Oral daily  amLODIPine   Tablet 2.5 milliGRAM(s) Oral daily  atorvastatin 80 milliGRAM(s) Oral at bedtime  chlorhexidine 2% Cloths 1 Application(s) Topical daily  chlorhexidine 4% Liquid 1 Application(s) Topical <User Schedule>  FIRST- Mouthwash  BLM 10 milliLiter(s) Swish and Spit three times a day  fluconAZOLE   Tablet 200 milliGRAM(s) Oral daily  heparin  Infusion.  Unit(s)/Hr (11 mL/Hr) IV Continuous <Continuous>  influenza   Vaccine 0.5 milliLiter(s) IntraMuscular once  lactobacillus acidophilus 1 Tablet(s) Oral daily  latanoprost 0.005% Ophthalmic Solution 1 Drop(s) Both EYES at bedtime  mirtazapine 15 milliGRAM(s) Oral at bedtime  polyethylene glycol 3350 17 Gram(s) Oral daily  rasburicase IVPB 3 milliGRAM(s) IV Intermittent once  senna 2 Tablet(s) Oral at bedtime  sodium bicarbonate 650 milliGRAM(s) Oral two times a day  tamsulosin 0.4 milliGRAM(s) Oral at bedtime  timolol 0.5% Solution 1 Drop(s) Both EYES two times a day    MEDICATIONS  (PRN):  acetaminophen   Tablet .. 650 milliGRAM(s) Oral every 6 hours PRN Mild Pain (1 - 3), Moderate Pain (4 - 6)  heparin   Injectable 5000 Unit(s) IV Push every 6 hours PRN For aPTT less than 40  heparin   Injectable 2500 Unit(s) IV Push every 6 hours PRN For aPTT between 40 - 57  morphine  - Injectable 2 milliGRAM(s) IV Push every 4 hours PRN Severe Pain (7 - 10)  sodium chloride 0.9% lock flush 10 milliLiter(s) IV Push every 1 hour PRN Pre/post blood products, medications, blood draw, and to maintain line patency  traMADol 25 milliGRAM(s) Oral every 6 hours PRN Moderate Pain (4 - 6)    ITEMS UNCHECKED ARE NOT PRESENT    PRESENT SYMPTOMS: [ ]Unable to obtain due to poor mentation   Source if other than patient:  [ ]Family   [ ]Team     Pain:  [ ]yes [x ]no  QOL impact -   Location -          Aggravating factors   Quality -  Radiation -  Timing-   Severity (0-10 scale):   Minimal acceptable level (0-10 scale):     Dyspnea:                           [ ]Mild [ ]Moderate [ ]Severe  Anxiety:                             [ ]Mild [ ]Moderate [ ]Severe  Fatigue:                             [ x]Mild [ ]Moderate [ ]Severe  Nausea:                             [ ]Mild [ ]Moderate [ ]Severe  Loss of appetite:              [ x]Mild [ ]Moderate [ ]Severe  Constipation:                    [ ]Mild [ ]Moderate [ ]Severe    CPOT:    https://www.[facility].org/getattachment/[email or web address]/Critical-Care-Pain-Observation-Tool-(CPOT)    PAIN AD Score:	  http://geriatrictoolkit.[email or web address]/cog/painad.pdf (Ctrl + left click to view)    Other Symptoms:  [ x]All other review of systems negative     Palliative Performance Status Version 2:    30     %      http://npcrc.org/files/news/palliative_performance_scale_ppsv2.pdf    PHYSICAL EXAM:  Vital Signs Last 24 Hrs  T(C): 36.7 (04 Nov 2020 16:24), Max: 36.9 (04 Nov 2020 00:59)  T(F): 98.1 (04 Nov 2020 16:24), Max: 98.5 (04 Nov 2020 00:59)  HR: 99 (04 Nov 2020 16:24) (72 - 99)  BP: 136/76 (04 Nov 2020 16:24) (113/57 - 163/80)  BP(mean): --  RR: 18 (04 Nov 2020 16:24) (18 - 18)  SpO2: 96% (04 Nov 2020 16:24) (94% - 96%)    GENERAL:  [x ]Alert  [x ]Oriented x2 (person, place)   [x ]Lethargic  [ ]Cachexia  [ ]Unarousable  [x ]Verbal  [ ]Non-Verbal  Behavioral:   [ ] Anxiety  [ ] Delirium [ ] Agitation [ ] Other  HEENT:  [x ]Normal   [ ]Dry mouth   [ ]ET Tube/Trach  [ ]Oral lesions  PULMONARY:   [x ]Clear [ ]Tachypnea  [ ]Audible excessive secretions   [ ]Rhonchi        [ ]Right [ ]Left [ ]Bilateral  [ ]Crackles        [ ]Right [ ]Left [ ]Bilateral  [ ]Wheezing     [ ]Right [ ]Left [ ]Bilateral  [ x]Diminished breath sounds [ ]right [ ]left [ x]bilateral  CARDIOVASCULAR:    [ x]Regular [ ]Irregular [ ]Tachy  [ ]Noble [ ]Murmur [ ]Other  GASTROINTESTINAL:  [x ]Soft  [ ]Distended   [ x]+BS  [x ]Non tender [ ]Tender  [ ]PEG [ ]OGT/ NGT  Last BM: 10/31  GENITOURINARY:  [ ]Normal [ ] Incontinent   [ ]Oliguria/Anuria   [x ]Gibson  MUSCULOSKELETAL:   [ ]Normal   [ x]Weakness  [ x]Bed/Wheelchair bound [x ] 2+ pitting edema Edema in b/l upper and lower extremities   NEUROLOGIC:   [x ]No focal deficits  [ ]Cognitive impairment  [ ]Dysphagia [ ]Dysarthria [ ]Paresis [ ]Other   SKIN: Ecchymosis at Left Shoulder   [ ]Normal    [ ]Rash  [ ]Pressure ulcer(s)       Present on admission [ ]y [ ]n    CRITICAL CARE:  [ ]Shock Present  [ ]Septic [ ]Cardiogenic [ ]Neurologic [ ]Hypovolemic  [ ]Vasopressors [ ]Inotropes  [ ]Respiratory failure present [ ]Mechanical Ventilation [ ]Non-invasive ventilatory support [ ]High-Flow   [ ]Acute  [ ]Chronic [ ]Hypoxic  [ ]Hypercarbic [ ]Other  [ ]Other organ failure     LABS: reviewed                                          7.9    11.81 )-----------( 270      ( 04 Nov 2020 14:25 )             24.5       11-04    143  |  107  |  78<H>  ----------------------------<  121<H>  4.0   |  22  |  2.19<H>    Ca    8.6      04 Nov 2020 14:25  Phos  3.5     11-04    TPro  5.0<L>  /  Alb  2.3<L>  /  TBili  0.3  /  DBili  0.1  /  AST  46<H>  /  ALT  44  /  AlkPhos  78  11-03      PTT - ( 03 Nov 2020 09:46 )  PTT:47.5 sec    RADIOLOGY & ADDITIONAL STUDIES: N/A    Protein Calorie Malnutrition Present: [ ]mild [ ]moderate [ ]severe [ ]underweight [ ]morbid obesity  https://www.andeal.org/vault/2440/web/files/ONC/Table_Clinical%20Characteristics%20to%20Document%20Malnutrition-White%20JV%20et%20al%202012.pdf    Height (cm): 175.3 (10-19-20 @ 09:24)  Weight (kg): 63.5 (10-31-20 @ 21:11), 77.1 (04-16-20 @ 14:06)  BMI (kg/m2): 20.7 (10-31-20 @ 21:11), 25.1 (10-19-20 @ 09:24)    [x ]PPSV2 < or = 30%  [ ]significant weight loss [ ]poor nutritional intake [ ]anasarca   Albumin, Serum: 2.3 g/dL (11-03-20 @ 05:36)   [ ]Artificial Nutrition    REFERRALS:   [ ]Chaplaincy  [ ]Hospice  [ ]Child Life  [ ]Social Work  [ x]Case management [ ]Holistic Therapy     Goals of Care Document:  MICA Smith (10-17-20 @ 23:00)  Goals of Care Conversation:   Participants:  · Participants  Family  · Spouse  Wife, Mayda Hernandez    Advance Directives:  · Does patient have Advance Directive  Yes  · Indicate Type  Health Care Proxy (HCP)  · Agent's Name  Mayda Hernandez  · Are any of the items on the chart  No  · Caregiver:  no    Conversation Discussion:  · Conversation  Diagnosis; Prognosis; MOLST Discussed; Treatment Options  · Conversation Details  I had a 16 minute discussion with patient's wife Mayda (HCP) regarding advanced directives.  I had discussed with her the current plan of care.  I had also discussed all resuscitative measures and she verbalized understanding.  She confirmed that patient's wish is to have everything done as needed.  Currently patient is FULL CODE.    What Matters Most To Patient and Family:  · What matters most to patient and family  recovery    Personal Advance Directives Treatment Guidelines:   Treatment Guidelines:  · Decision Maker  Health Care Proxy  · Treatment Guidelines  Antibiotic trial; IV fluid trial    Location of Discussion:   Duration of Advanced Care Planning Meeting:  · Time spent (in minutes)  16    Location of Discussion:  · Location of discussion  Telephone      Electronic Signatures:  Kalee Smith)  (Signed 18-Oct-2020 04:51)  	Authored: Goals of Care Conversation, Personal Advance Directives Treatment Guidelines, Location of Discussion      Last Updated: 18-Oct-2020 04:51 by Kalee Smith)

## 2020-11-04 NOTE — PROGRESS NOTE ADULT - ASSESSMENT
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 Pt admitted for possible relapse of DLBCL. Received RTX and Bendamustine ( 10/28 and 10/29) Nephrology team consulted for DANIELITO on CKD possible TLS/TLS monitoring.     #Acute Kidney Injury on CKD. Patient with baseline serum creatinine at 1.5-1.7. On admission, his serum creatinine was 1.7 (10/18) and appears to be increasing to 1.9 on 10/29/20 and 2.6 (11/1/20) and 2.53 (11/3/20) and 2.33 today (11/4/20) . He has DANIELITO which can be possibly secondary to obstruction with CT abdomen on showing  left hydronephrosis with delayed nephrogram due to an obstructing soft tissue mass at the level of the renal pelvis.   Repeat kidney u /s w mild left hydronephrosis secondary to a left renal hilar mass 11/3/20, TLS is less likely  as the uric acid levels are not high, phosphorus levels are not elevated, calcium levels not low. The only finding was persistently elevated K . Patient with underlying CKD, etiology is unclear but could be related to previous chemotherapies from DLBCL, also with BPH.    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 Pt admitted for possible relapse of DLBCL. Received RTX and Bendamustine ( 10/28 and 10/29) Nephrology team consulted for DANIELITO on CKD possible TLS/TLS monitoring.     #Acute Kidney Injury on CKD. Patient with baseline serum creatinine at 1.5-1.7. On admission, his serum creatinine was 1.7 (10/18)   increasing to 1.9 on 10/29/20 and 2.6 (11/1/20) and 2.53 (11/3/20) and 2.33 today (11/4/20) . He has DANIELITO which can be possibly secondary to obstruction with CT abdomen on showing  left hydronephrosis with delayed nephrogram due to an obstructing soft tissue mass at the level of the renal pelvis.   Repeat kidney u /s w mild left hydronephrosis secondary to a left renal hilar mass 11/3/20, TLS is less likely  as the uric acid levels are not high, phosphorus levels are not elevated, calcium levels not low. The only finding was persistently elevated K . Patient with underlying CKD, etiology is unclear but could be related to previous chemotherapies from DLBCL, also with BPH.    Recommendation  - Renal function is overall stable. K in range, Phos not elevated  - Gibson catheter placed, patient produced ~ 1.5 of urine output over 24 hour   - Will require nephrostomy tube only if renal function continues to worsen with increasing hydronephrosis. Currently with no indication      #Hyperkalemia in the setting of DANIELITO, and recent chemo  -resolved  -Low K diet      #Hyponatremia   -Resolved   - Maintain Gibson catheter

## 2020-11-05 NOTE — PROVIDER CONTACT NOTE (OTHER) - ACTION/TREATMENT ORDERED:
provider made aware. evaluated. full anti coag heparin nomogram followed as instructed. pt stable and resting comfortable. will cont to monitor.

## 2020-11-05 NOTE — PROGRESS NOTE ADULT - ASSESSMENT
86M w DLBCL in remission until 9/2019, HTN, HLD, CKD, BPH, anemia, syncope, anxiety/adjustment disorder/depression, presents here from [facility] where he was getting treated for L shoulder infection (s/p ORIF for pathologic fx) for further management of his recurrent diffuse large B cell lymphoma and L shoulder lesion, s/p chemo with concern for TLS with hyperkalemia & DANIELITO now improving

## 2020-11-05 NOTE — PROGRESS NOTE ADULT - PROBLEM SELECTOR PLAN 10
I followed up below conversation with wife reiterating current plan of care - pt is now stable and can start thinking about discharge planning to rehab for subsequent heme follow up. would like to clarify advance directives prior. d/w palliative. wife is concerned with appetite - pt already on remeron. marinol was discussed - will f/u w/ heme.     Previous MD d/w length with wife and also patient's daughter. There are 5 children from his first marriage and he has been  to his new wife for 25 years.  Previous MD d/w wife - she believes this cancer is treatable, She would like to consider all options at this time.  d/w daughter (2nd daughter - Stella) - she is more realistic. Does not think her father could tolerate chemo. However, he defers to his wife.  -f/u palliative consult and family mtg

## 2020-11-05 NOTE — PROGRESS NOTE ADULT - SUBJECTIVE AND OBJECTIVE BOX
[facility] Division of Hospital Medicine  Zach Espitia MD  Pager (M-F, 4K-2A): [phone]  Other Times:  [phone]    Patient is a 86y old  Male who presents with a chief complaint of sent here for chemo and radiation (04 Nov 2020 17:12)    SUBJECTIVE / OVERNIGHT EVENTS: pt feels ok no acute complaints minimal sob no shoulder pain  ADDITIONAL REVIEW OF SYSTEMS:    MEDICATIONS  (STANDING):  allopurinol 100 milliGRAM(s) Oral daily  amLODIPine   Tablet 2.5 milliGRAM(s) Oral daily  atorvastatin 80 milliGRAM(s) Oral at bedtime  chlorhexidine 2% Cloths 1 Application(s) Topical daily  chlorhexidine 4% Liquid 1 Application(s) Topical <User Schedule>  fluconAZOLE   Tablet 200 milliGRAM(s) Oral daily  heparin  Infusion.  Unit(s)/Hr (11 mL/Hr) IV Continuous <Continuous>  influenza   Vaccine 0.5 milliLiter(s) IntraMuscular once  lactobacillus acidophilus 1 Tablet(s) Oral daily  latanoprost 0.005% Ophthalmic Solution 1 Drop(s) Both EYES at bedtime  mirtazapine 15 milliGRAM(s) Oral at bedtime  polyethylene glycol 3350 17 Gram(s) Oral daily  rasburicase IVPB 3 milliGRAM(s) IV Intermittent once  senna 2 Tablet(s) Oral at bedtime  sodium bicarbonate 650 milliGRAM(s) Oral two times a day  tamsulosin 0.4 milliGRAM(s) Oral at bedtime  timolol 0.5% Solution 1 Drop(s) Both EYES two times a day    MEDICATIONS  (PRN):  acetaminophen   Tablet .. 650 milliGRAM(s) Oral every 6 hours PRN Mild Pain (1 - 3), Moderate Pain (4 - 6)  heparin   Injectable 5000 Unit(s) IV Push every 6 hours PRN For aPTT less than 40  heparin   Injectable 2500 Unit(s) IV Push every 6 hours PRN For aPTT between 40 - 57  morphine  - Injectable 2 milliGRAM(s) IV Push every 4 hours PRN Severe Pain (7 - 10)  sodium chloride 0.9% lock flush 10 milliLiter(s) IV Push every 1 hour PRN Pre/post blood products, medications, blood draw, and to maintain line patency  traMADol 25 milliGRAM(s) Oral every 6 hours PRN Moderate Pain (4 - 6)      CAPILLARY BLOOD GLUCOSE        I&O's Summary    04 Nov 2020 07:01  -  05 Nov 2020 07:00  --------------------------------------------------------  IN: 866 mL / OUT: 1550 mL / NET: -684 mL    05 Nov 2020 07:01  -  05 Nov 2020 15:15  --------------------------------------------------------  IN: 440 mL / OUT: 700 mL / NET: -260 mL        PHYSICAL EXAM:  Vital Signs Last 24 Hrs  T(C): 36.8 (05 Nov 2020 08:32), Max: 36.9 (05 Nov 2020 00:05)  T(F): 98.2 (05 Nov 2020 08:32), Max: 98.5 (05 Nov 2020 04:33)  HR: 85 (05 Nov 2020 08:32) (85 - 105)  BP: 149/77 (05 Nov 2020 08:32) (136/76 - 160/82)  BP(mean): --  RR: 18 (05 Nov 2020 08:32) (18 - 18)  SpO2: 95% (05 Nov 2020 08:32) (94% - 97%)  GENERAL: NAD  EYES:  conjunctiva and sclera clear  CHEST/LUNG: bibasilar crackles   HEART: RRR no murmur  ABDOMEN: Soft, Nontender, Nondistended  MSK/EXTREMITIES:  L shoulder w/ecchymosis, indurated and firm. blanching skin  PSYCH: AAOx1-2  NEUROLOGY: non-focal  SKIN: generalized anasarca, no rashes or lesions other than shoulder as above    LABS:                        7.7    9.51  )-----------( 260      ( 05 Nov 2020 11:00 )             24.3     11-05    145  |  107  |  67<H>  ----------------------------<  118<H>  3.6   |  25  |  1.80<H>    Ca    8.6      05 Nov 2020 11:00  Phos  3.3     11-05      PTT - ( 05 Nov 2020 11:26 )  PTT:72.9 sec            RADIOLOGY & ADDITIONAL TESTS:  Results Reviewed:   Imaging Personally Reviewed:  Electrocardiogram Personally Reviewed:    COORDINATION OF CARE:  Care Discussed with Consultants/Other Providers [Y/N]: heme Dr Earl de paz plan of care, palliative Dr Mook de paz [facility] discussions and plan of care  Prior or Outpatient Records Reviewed [Y/N]:

## 2020-11-05 NOTE — PROGRESS NOTE ADULT - ASSESSMENT
# DLBCL    86yoM wtih a history of relapsed DLBCL with L shoulder mass s/p ORIF, given BR last week   -creatinine improving  -ldh improved but now stable  -patient and wife are very anxious about how weak Mr Hernandez has become, discussed wishes at bedside   -at this time, there is no plan for further chemo until 2-3 weeks from now,

## 2020-11-05 NOTE — PROGRESS NOTE ADULT - PROBLEM SELECTOR PLAN 4
-acute DVT L axillary and brachial veins  -resume heparin gtt for AC   -can transition to warfarin vs NOAC depending if renal fxn stabilizes

## 2020-11-05 NOTE — PROGRESS NOTE ADULT - PROBLEM SELECTOR PLAN 1
-CT C/A/P with abd mass, LN. MRI Brain w/ contrast- high right posterior frontal extra-axial mass. s/p BMBx confirming dx  -PICC placed  -received chemotherapy - Bendamustine, Rituxan  -no need for LP  - monitor counts and labs - have been stable  outpatient follow up for next chemo cycles if able to tolerate

## 2020-11-05 NOTE — PROGRESS NOTE ADULT - SUBJECTIVE AND OBJECTIVE BOX
INTERVAL HPI/OVERNIGHT EVENTS:  No overnight events.     MEDICATIONS  (STANDING):  allopurinol 100 milliGRAM(s) Oral daily  amLODIPine   Tablet 2.5 milliGRAM(s) Oral daily  atorvastatin 80 milliGRAM(s) Oral at bedtime  chlorhexidine 2% Cloths 1 Application(s) Topical daily  chlorhexidine 4% Liquid 1 Application(s) Topical <User Schedule>  fluconAZOLE   Tablet 200 milliGRAM(s) Oral daily  heparin  Infusion.  Unit(s)/Hr (11 mL/Hr) IV Continuous <Continuous>  influenza   Vaccine 0.5 milliLiter(s) IntraMuscular once  lactobacillus acidophilus 1 Tablet(s) Oral daily  latanoprost 0.005% Ophthalmic Solution 1 Drop(s) Both EYES at bedtime  mirtazapine 15 milliGRAM(s) Oral at bedtime  nystatin Powder 1 Application(s) Topical two times a day  polyethylene glycol 3350 17 Gram(s) Oral daily  rasburicase IVPB 3 milliGRAM(s) IV Intermittent once  senna 2 Tablet(s) Oral at bedtime  sodium bicarbonate 650 milliGRAM(s) Oral two times a day  tamsulosin 0.4 milliGRAM(s) Oral at bedtime  timolol 0.5% Solution 1 Drop(s) Both EYES two times a day    MEDICATIONS  (PRN):  acetaminophen   Tablet .. 650 milliGRAM(s) Oral every 6 hours PRN Mild Pain (1 - 3), Moderate Pain (4 - 6)  heparin   Injectable 5000 Unit(s) IV Push every 6 hours PRN For aPTT less than 40  heparin   Injectable 2500 Unit(s) IV Push every 6 hours PRN For aPTT between 40 - 57  morphine  - Injectable 2 milliGRAM(s) IV Push every 4 hours PRN Severe Pain (7 - 10)  sodium chloride 0.9% lock flush 10 milliLiter(s) IV Push every 1 hour PRN Pre/post blood products, medications, blood draw, and to maintain line patency  traMADol 25 milliGRAM(s) Oral every 6 hours PRN Moderate Pain (4 - 6)    Allergies    No Known Allergies    Intolerances          VITAL SIGNS:  T(F): 98 (11-05-20 @ 16:42)  HR: 100 (11-05-20 @ 16:42)  BP: 154/95 (11-05-20 @ 16:42)  RR: 18 (11-05-20 @ 16:42)  SpO2: 97% (11-05-20 @ 16:42)  Wt(kg): --    PHYSICAL EXAM:    Constitutional: NAD, lying comfortably in bed  Eyes: EOMI, PERRLA  Neck: supple, no masses, no JVD  Respiratory: CTAB; no r/r/w  Cardiovascular: RRR, no M/R/G  Gastrointestinal: +BS, soft, NTND, no hepatosplenomegaly  Extremities: no c/c/e  Neurological: AAOx3, nonfocal    LABS:                        7.5    9.38  )-----------( 251      ( 05 Nov 2020 18:54 )             23.4     11-05    147<H>  |  108  |  63<H>  ----------------------------<  128<H>  3.7   |  24  |  1.73<H>    Ca    8.5      05 Nov 2020 18:54  Phos  2.7     11-05      PTT - ( 05 Nov 2020 18:54 )  PTT:61.4 sec      RADIOLOGY & ADDITIONAL TESTS:  Studies reviewed.

## 2020-11-06 NOTE — PROGRESS NOTE ADULT - ASSESSMENT
86 year old male with PMHx of DLBCL (s/p 6 cycles of R-CHOP and in DELANEY until 9/2019) who was transferred to [facility] from the [facility] for further management of his diffuse large B cell lymphoma. Pt admitted for possible relapse of DLBCL. Received RTX and Bendamustine ( 10/28 and 10/29) Nephrology team consulted for DANIELITO on CKD possible TLS/TLS monitoring.     #Acute Kidney Injury on CKD. Patient with baseline serum creatinine at 1.5-1.7. On admission, his serum creatinine was 1.7 (10/18)   increasing to 1.9 on 10/29/20 and 2.6 (11/1/20) and 2.53 (11/3/20) and 2.33 (11/4/20) and improved to 1.76 today 11/6/20 . He has DANIELITO which can be possibly secondary to obstruction with CT abdomen on showing  left hydronephrosis with delayed nephrogram due to an obstructing soft tissue mass at the level of the renal pelvis.   Repeat kidney u /s w mild left hydronephrosis secondary to a left renal hilar mass 11/3/20, TLS is less likely  as the uric acid levels are not high, phosphorus levels are not elevated, calcium levels not low. The only finding was persistently elevated K . Patient with underlying CKD, etiology is unclear but could be related to previous chemotherapies from DLBCL, also with BPH.    Recommendation  - Renal function is overall stable. K in range, Phos not elevated  - Gibson catheter placed, patient produced ~ 1.3 of urine output over 24 hour   - Will require nephrostomy tube only if renal function continues to worsen with increasing hydronephrosis. Currently with no indication      #Hyperkalemia in the setting of DANIELITO, and recent chemo  -resolved  -Low K diet      #Hyponatremia   -Resolved   - Maintain Gibson catheter   86 year old male with PMHx of DLBCL (s/p 6 cycles of R-CHOP and in DELANEY until 9/2019) who was transferred to [facility] from the [facility] for further management of his diffuse large B cell lymphoma. Pt admitted for possible relapse of DLBCL. Received RTX and Bendamustine ( 10/28 and 10/29) Nephrology team consulted for DANIELITO on CKD possible TLS/TLS monitoring.     #Acute Kidney Injury on CKD. Patient with baseline serum creatinine at 1.5-1.7. On admission, his serum creatinine was 1.7 (10/18)   increasing to 1.9 on 10/29/20 and 2.6 (11/1/20) and 2.53 (11/3/20) and 2.33 (11/4/20) and improved to 1.76 today 11/6/20 . He has DANIELITO which can be possibly secondary to obstruction with CT abdomen on showing  left hydronephrosis with delayed nephrogram due to an obstructing soft tissue mass at the level of the renal pelvis.   Repeat kidney u /s w mild left hydronephrosis secondary to a left renal hilar mass 11/3/20, TLS is less likely  as the uric acid levels are not high, phosphorus levels are not elevated, calcium levels not low. The only finding was persistently elevated K . Patient with underlying CKD, etiology is unclear but could be related to previous chemotherapies from DLBCL, also with BPH.    Recommendation  - Renal function now back to his baseline K in range, Phos not elevated  - Gibson catheter placed, patient produced ~ 1.3 of urine output over 24 hour   - Will require nephrostomy tube only if renal function continues to worsen with increasing hydronephrosis. Currently with no indication      #Hyperkalemia in the setting of DANIELITO, and recent chemo  -resolved  -Low K diet      #Hyponatremia   -Resolved   - Maintain Gibson catheter

## 2020-11-06 NOTE — PROGRESS NOTE ADULT - PROBLEM SELECTOR PLAN 2
-Patient was sent here for further management especially chemo and radiation  Heme/onc consulted appreciated, PET obtained.   CT C/A/P with abd mass, LN. MRI Brain w/ contrast- New 2.2 x 1.6 x 1.1 cm   uniformly enhancing, T1 and T2 isointense high right posterior frontal extra-axial mass. The lesion demonstrates restricted diffusion.   - Check CBC WITH DIFF and TLS labs (CMP, Phos, LDH, and Uric acid) daily  started allopurinol to prevent tumor lysis syndrome  -s/p BMB   -Plan for chemo next week  -ferritin is elevated, could be suggestive of cytokine storm. Monitor fibrinogen, resend ferritin, triglycerides, soluble IL2R, NK cell activity, coags in order to complete workup for HLH, however would assume this is part of the underlying lymphoma and would treat accordingly
-Patient was sent here for further management especially chemo and radiation  Heme/onc consulted appreciated, PET obtained.   CT C/A/P with abd mass, LN. MRI Brain w/ contrast- New 2.2 x 1.6 x 1.1 cm   uniformly enhancing, T1 and T2 isointense high right posterior frontal extra-axial mass. The lesion demonstrates restricted diffusion.   - Check CBC WITH DIFF and TLS labs (CMP, Phos, LDH, and Uric acid) daily  started allopurinol to prevent tumor lysis syndrome  -s/p BMB   -Plan for chemo next week  -ferritin is elevated, could be suggestive of cytokine storm. Monitor fibrinogen, resend ferritin, triglycerides, soluble IL2R, NK cell activity, coags in order to complete workup for HLH, however would assume this is part of the underlying lymphoma and would treat accordingly
-Patient was sent here for further management especially chemo and radiation  Heme/onc consulted appreciated, PET obtained.   CT C/A/P with abd mass, LN. MRI Brain w/ contrast- New 2.2 x 1.6 x 1.1 cm   uniformly enhancing, T1 and T2 isointense high right posterior frontal extra-axial mass. The lesion demonstrates restricted diffusion.   - Check CBC WITH DIFF and TLS labs (CMP, Phos, LDH, and Uric acid) daily  started allopurinol to prevent tumor lysis syndrome  -s/p BMB   -biopsy concerning for diffuse large b-cell  -PICC placed  -planning for chemo this week and IT MTX by IR, will place consult   -ferritin is elevated, could be suggestive of cytokine storm. Monitor fibrinogen, resend ferritin, triglycerides, soluble IL2R, NK cell activity, coags in order to complete workup for HLH, however would assume this is part of the underlying lymphoma and would treat accordingly
-Patient was sent here for further management especially chemo and radiation  Heme/onc consulted appreciated, PET obtained.   CT C/A/P with abd mass, LN. MRI Brain w/ contrast- New 2.2 x 1.6 x 1.1 cm   uniformly enhancing, T1 and T2 isointense high right posterior frontal extra-axial mass. The lesion demonstrates restricted diffusion.   - Check CBC WITH DIFF and TLS labs (CMP, Phos, LDH, and Uric acid) daily  started allopurinol to prevent tumor lysis syndrome  -s/p BMB   -biopsy concerning for diffuse large b-cell  -PICC placed  -planning for chemo this week and IT MTX by IR, will place consult  -most likely to start chemo today as per heme onc fellow   -also awaiting final path results
-Patient was sent here for further management especially chemo and radiation  Heme/onc consulted appreciated, PET obtained.   CT C/A/P with abd mass, LN. MRI Brain w/ contrast- New 2.2 x 1.6 x 1.1 cm   uniformly enhancing, T1 and T2 isointense high right posterior frontal extra-axial mass. The lesion demonstrates restricted diffusion.   - Check CBC WITH DIFF and TLS labs (CMP, Phos, LDH, and Uric acid) daily  started allopurinol to prevent tumor lysis syndrome  -s/p BMB   -biopsy concerning for diffuse large b-cell  -PICC placed  -started on chemotherapy   -LP later this week, will place IR consult
-may be from pulm edema given IV fluids   -check CXR
-stable labs  -appreciate renal recs  -IV hydration held due to tachypnea and anasarca  -[facility] d/nita   -tobar in place, monitor output  -renal u/s is pending  -lactate is elevated on VBG, likely poor clearance, abdomen is soft on exam,   CT A/P reviewed, no obvious acute issues multiple subacute abnormalities
-stable labs  -appreciate renal recs  -IV hydration held due to tachypnea and anasarca  -[facility] d/nita   -tobar in place, monitor output  -renal u/s is pending  -lactate is elevated on VBG, likely poor clearance, abdomen is soft on exam,   CT A/P reviewed, no obvious acute issues multiple subacute abnormalities
-worsening TLS with uptrending Cr and worsening hyperkalemia   -appreciate renal recs  -c/w IV hydration  -started on lokelma   -will recheck BMP, and monitor throughout the day  -if K not corrected by pharmacological interventions, may need HD/CRRT  -tobar in place, monitor output  -renal u/s is pending
-worsening TLS with uptrending Cr, using VBG to monitor K (which has improved)   -appreciate renal recs  -c/w IV hydration  -lokelma d/nita today   -tobar in place, monitor output  -renal u/s is pending  -lactate is elevated on VBG, likely poor clearance, abdomen is soft on exam, however will get CT A/P to r/o any abdominal pathology
Patient was sent here for further management especially chemo and radiation  Heme/onc consulted appreciated - will obtain records from outpatient heme, Nurse manager will obtain records from hospital.  staging imaging CT C/A/P and MRI Brain w/ contrast.   - If the concern for secondary CNS involvement is accurate, patient will need an LP for IT chemo and CSF analysis  - Check CBC WITH DIFF and TLS labs (CMP, Phos, LDH, and Uric acid) daily  Anemia hg 7.3 - type and screen and monitor, may need transfusion
Patient was sent here for further management especially chemo and radiation  Heme/onc consulted appreciated, PET obtained.   CT C/A/P with abd mass, LN. MRI Brain w/ contrast to eval leptomeningeal dz.   - If the concern for secondary CNS involvement is accurate, patient will need an LP for IT chemo and CSF analysis  - Check CBC WITH DIFF and TLS labs (CMP, Phos, LDH, and Uric acid) daily  started allopurinol to prevent tumor lysis syndrome  Anemia hg 7.3 - transfuse 1 PRBC
Patient was sent here for further management especially chemo and radiation  Heme/onc consulted appreciated, PET obtained.   CT C/A/P with abd mass, LN. MRI Brain w/ contrast to eval leptomeningeal dz.   - If the concern for secondary CNS involvement is accurate, patient will need an LP for IT chemo and CSF analysis  - Check CBC WITH DIFF and TLS labs (CMP, Phos, LDH, and Uric acid) daily  started allopurinol to prevent tumor lysis syndrome  Transfused 1 PRBC, responded appropriately likely due to anemia of chronic dz  BM biopsy done by melisa today to eval BM involvement.  Will need to determine treatment plan - radiation/chemo. Onc to make determination once pathology obtained
Patient was sent here for further management especially chemo and radiation  Heme/onc consulted appreciated, PET obtained.   CT C/A/P with abd mass, LN. MRI Brain w/ contrast to eval leptomeningeal dz.   - If the concern for secondary CNS involvement is accurate, patient will need an LP for IT chemo and CSF analysis  - Check CBC WITH DIFF and TLS labs (CMP, Phos, LDH, and Uric acid) daily  started allopurinol to prevent tumor lysis syndrome  Transfused 1 PRBC, responded appropriately likely due to anemia of chronic dz  BM biopsy done by melisa today to eval BM involvement.  Will need to determine treatment plan - radiation/chemo. Onc to make determination once pathology obtained  ferritin is elevated, could be suggestive of cytokine storm. Monitor fibrinogen, resend ferritin, triglycerides, soluble IL2R, NK cell activity, coags in order to complete workup for HLH, however would assume this is part of the underlying lymphoma and would treat accordingly
Patient was sent here for further management especially chemo and radiation  Heme/onc consulted this morning.
resolved today. appears comfortable
resolved today. appears comfortable
stable off IVF  DANIELITO resolving
stable off IVF  DANIELITO resolving
-Patient was sent here for further management especially chemo and radiation  Heme/onc consulted appreciated, PET obtained.   CT C/A/P with abd mass, LN. MRI Brain w/ contrast to eval leptomeningeal dz.   - If the concern for secondary CNS involvement is accurate, patient will need an LP for IT chemo and CSF analysis  - Check CBC WITH DIFF and TLS labs (CMP, Phos, LDH, and Uric acid) daily  started allopurinol to prevent tumor lysis syndrome  -s/p BMB   -discussed with heme onc today, plan is for chemotherapy next week  -PICC placement today   -ferritin is elevated, could be suggestive of cytokine storm. Monitor fibrinogen, resend ferritin, triglycerides, soluble IL2R, NK cell activity, coags in order to complete workup for HLH, however would assume this is part of the underlying lymphoma and would treat accordingly
-worsening TLS with uptrending Cr, using VBG to monitor K (which has improved)   -appreciate renal recs  -IV hydration held due to tachypnea  -lokelma d/nita   -tobar in place, monitor output  -renal u/s is pending  -lactate is elevated on VBG, likely poor clearance, abdomen is soft on exam,   CT A/P reviewed, no obvious acute issues multiple subacute abnormalities
resolved today. appears comfortable

## 2020-11-06 NOTE — CHART NOTE - NSCHARTNOTEFT_GEN_A_CORE
11/6	while he is in Rehab there is no plan to give chemo per Hm/Onc and Attending.  CoVid-19 swab done today.  [place] ([place]) [facility] # [id number] 11/6	while he is in Rehab there is no plan to give chemo per Hm/Onc and Attending.  CoVid-19 swab done today.  Addendum--RN discovered some pus like material around the FC and penis, and a little swollen penis but it looks nystatin powder, and pt has no complaints, EMS was there, spoke to wife at bedside, explained to her , could be nystatine allergy, and it was removed from disc meds and ok to disc him.  [place] ([place]) [facility] # [id number]

## 2020-11-06 NOTE — DISCHARGE NOTE PROVIDER - NSDCCAREPROVSEEN_GEN_ALL_CORE_FT
Zach Espitia Amy  [facility], Team  [facility], Advance PracticeTeam

## 2020-11-06 NOTE — DISCHARGE NOTE PROVIDER - CARE PROVIDER_API CALL
Carissa Degroot  HEMATOLOGY  [address] [place] [address]  Phone: [phone]  Fax: [phone]  Follow Up Time:     Manisha Tillman  PCP  Phone: [phone]  Fax: (   )    -  Follow Up Time:

## 2020-11-06 NOTE — PROVIDER CONTACT NOTE (CHANGE IN STATUS NOTIFICATION) - ACTION/TREATMENT ORDERED:
PA at bedside to assess, thought to be allergic reaction to nystatin powder  powder discontinued per WILMAN olivia to send patient to [facility]

## 2020-11-06 NOTE — PROGRESS NOTE ADULT - PROBLEM SELECTOR PLAN 6
- spoke with wife Viktoria at bedside who reports that patient has had some memory issues beginning about a year ago first noticed when he got lost for 8 hours when driving to his friends house and per their discussion with PCP recently he may have beginning of cognitive impairment.   - discussed driving safety concern with his cognitive impairment with wife  - supportive care - mood is stable per patient  - Continue with Mirtazapine 15mg QHS.

## 2020-11-06 NOTE — PROGRESS NOTE ADULT - ASSESSMENT
86M w DLBCL in remission until 9/2019, HTN, HLD, CKD, BPH, anemia, syncope, anxiety/adjustment disorder/depression, presents here from [facility] where he was getting treated for L shoulder infection (s/p ORIF for pathologic fx) for further management of his recurrent diffuse large B cell lymphoma and L shoulder lesion, s/p chemo with concern for TLS with hyperkalemia & DANIELITO now stabilized

## 2020-11-06 NOTE — PROGRESS NOTE ADULT - PROBLEM SELECTOR PLAN 6
continue xanax 0.5mg PO BID (PRN inpatient), and remeron 15mg qHS  start marinol 2.5 pre lunch and dinner, d/w palliative

## 2020-11-06 NOTE — PROGRESS NOTE ADULT - ATTENDING COMMENTS
stable for d/c to rehab when able stable for d/c to rehab when able  35 minutes spent orchestrating discharge

## 2020-11-06 NOTE — DISCHARGE NOTE PROVIDER - CARE PROVIDERS DIRECT ADDRESSES
,[email or web address],DirectAddress_Unknown

## 2020-11-06 NOTE — PROVIDER CONTACT NOTE (CHANGE IN STATUS NOTIFICATION) - BACKGROUND
pt being discharged to [facility], upon incontinence care before leaving, pt found to have penile swelling  wife at bs

## 2020-11-06 NOTE — PROGRESS NOTE ADULT - ATTENDING COMMENTS
Renal function back to baseline  Electrolytes in range  Will sign off, please re consult as necessary   Anthony Segundo MD  O: [phone]  C: [phone]

## 2020-11-06 NOTE — PROGRESS NOTE ADULT - PROBLEM SELECTOR PROBLEM 1
Acute on chronic kidney failure
Diffuse large B cell lymphoma
Left shoulder pain
Left shoulder pain
Osteomyelitis of shoulder, left
Diffuse large B cell lymphoma
Osteomyelitis of shoulder, left
Left shoulder pain

## 2020-11-06 NOTE — DISCHARGE NOTE PROVIDER - PROVIDER TOKENS
PROVIDER:[TOKEN:[[id number]]],FREE:[LAST:[Layne],FIRST:[Manisha],PHONE:[phone]],FAX:[phone]   [phone]    -],ADDRESS:[PCP]]

## 2020-11-06 NOTE — PROGRESS NOTE ADULT - PROBLEM SELECTOR PLAN 3
- history of DLBCL, GCB subtype (BCL2, BCL6, MYC, all negative by FISH). Per chart review, patient last received 6 cycles of R-CHOP in 2017 with DELANEY until 11/2019. Patient had mesenteric mass with size of 2.7x4.8 in 2017.   - CT CAP apparently shows enlarging mesenteric mass and new Lt pelvic renal mass concerning of relapse  - At [facility], he was found to have a L shoulder mass and pathologic fracture. Now s/p ORIF and radical resection of tumor on 9/25. PET/CT was performed revealing brain lesion with hypermetabolic activity  - Bone marrow biopsy without e/o lymphoma  - Left shoulder biopsy: most consistent with DLBCL   - s/p inpatient chemotherapy on 10/28 and 10/29  - Per Oncology: Will attempt to get LP for CNS disease. - recommend dronabinol 2.5mg BID pre lunch and dinner

## 2020-11-06 NOTE — CHART NOTE - NSCHARTNOTESELECT_GEN_ALL_CORE
Malnutrition Notification
Event Note
Event Note/Hyperkalemia
Event Note/oral candidiasis
IR event note/Event Note
IR post procedure note/Event Note
IR preprocedure note/Event Note
Interventional Radiology
Nutrition Services
hyperkalemia/Event Note
nephrology

## 2020-11-06 NOTE — CHART NOTE - NSCHARTNOTEFT_GEN_A_CORE
86 year old male with PMHx of DLBCL (s/p 6 cycles of R-CHOP and in DELANEY until 9/2019) who was transferred to [facility] from the [facility] for further management of his diffuse large B cell lymphoma. Pt admitted for possible relapse of DLBCL. Received RTX and Bendamustine ( 10/28 and 10/29) Nephrology team consulted for DANIELITO on CKD possible TLS/TLS monitoring.  Scr has been stable/improving. Nephrology will sign off.    Case seen and discussed with attending     José Miguel Mccullough   Nephrology Fellow  [facility] Pager: [phone]  [facility] Pager: [phone]

## 2020-11-06 NOTE — PROGRESS NOTE ADULT - SUBJECTIVE AND OBJECTIVE BOX
SUBJECTIVE AND OBJECTIVE:  Patient reports feeling okay . Denies any complaints of pain    INTERVAL HPI/OVERNIGHT EVENTS:  In past 24 hours, received 1 PRN dose of tramadol and 1 prn dose of IV morphine     DNR on chart:   Allergies    No Known Allergies    Intolerances    MEDICATIONS  (STANDING):  allopurinol 100 milliGRAM(s) Oral daily  amLODIPine   Tablet 2.5 milliGRAM(s) Oral daily  atorvastatin 80 milliGRAM(s) Oral at bedtime  chlorhexidine 2% Cloths 1 Application(s) Topical daily  chlorhexidine 4% Liquid 1 Application(s) Topical <User Schedule>  dronabinol 2.5 milliGRAM(s) Oral two times a day before meals  enoxaparin Injectable 70 milliGRAM(s) SubCutaneous daily  fluconAZOLE   Tablet 200 milliGRAM(s) Oral daily  influenza   Vaccine 0.5 milliLiter(s) IntraMuscular once  lactobacillus acidophilus 1 Tablet(s) Oral daily  latanoprost 0.005% Ophthalmic Solution 1 Drop(s) Both EYES at bedtime  mirtazapine 15 milliGRAM(s) Oral at bedtime  nystatin Powder 1 Application(s) Topical two times a day  polyethylene glycol 3350 17 Gram(s) Oral daily  rasburicase IVPB 3 milliGRAM(s) IV Intermittent once  senna 2 Tablet(s) Oral at bedtime  sodium bicarbonate 650 milliGRAM(s) Oral two times a day  tamsulosin 0.4 milliGRAM(s) Oral at bedtime  timolol 0.5% Solution 1 Drop(s) Both EYES two times a day    MEDICATIONS  (PRN):  acetaminophen   Tablet .. 650 milliGRAM(s) Oral every 6 hours PRN Mild Pain (1 - 3), Moderate Pain (4 - 6)  morphine  - Injectable 2 milliGRAM(s) IV Push every 4 hours PRN Severe Pain (7 - 10)  sodium chloride 0.9% lock flush 10 milliLiter(s) IV Push every 1 hour PRN Pre/post blood products, medications, blood draw, and to maintain line patency  traMADol 25 milliGRAM(s) Oral every 6 hours PRN Moderate Pain (4 - 6)    ITEMS UNCHECKED ARE NOT PRESENT    PRESENT SYMPTOMS: [ ]Unable to obtain due to poor mentation   Source if other than patient:  [ ]Family   [ ]Team     Pain:  [ ]yes [x ]no  QOL impact -   Location -          Aggravating factors   Quality -  Radiation -  Timing-   Severity (0-10 scale):   Minimal acceptable level (0-10 scale):     Dyspnea:                           [ ]Mild [ ]Moderate [ ]Severe  Anxiety:                             [ ]Mild [ ]Moderate [ ]Severe  Fatigue:                             [ x]Mild [ ]Moderate [ ]Severe  Nausea:                             [ ]Mild [ ]Moderate [ ]Severe  Loss of appetite:              [ x]Mild [ ]Moderate [ ]Severe  Constipation:                    [ ]Mild [ ]Moderate [ ]Severe    CPOT:    https://www.[facility].org/getattachment/[email or web address]/Critical-Care-Pain-Observation-Tool-(CPOT)    PAIN AD Score:	  http://geriatrictoolkit.[email or web address]/cog/painad.pdf (Ctrl + left click to view)    Other Symptoms:  [ x]All other review of systems negative     Palliative Performance Status Version 2:    30     %      http://[facility].org/files/news/palliative_performance_scale_ppsv2.pdf    PHYSICAL EXAM:  Vital Signs Last 24 Hrs  T(C): 36.8 (06 Nov 2020 16:21), Max: 36.9 (06 Nov 2020 08:39)  T(F): 98.2 (06 Nov 2020 16:21), Max: 98.4 (06 Nov 2020 08:39)  HR: 100 (06 Nov 2020 16:21) (96 - 100)  BP: 143/68 (06 Nov 2020 16:21) (138/57 - 161/91)  BP(mean): --  RR: 18 (06 Nov 2020 16:21) (18 - 19)  SpO2: 98% (06 Nov 2020 16:21) (97% - 98%)    GENERAL:  [x ]Alert  [x ]Oriented x2 (person, place)   [x ]Lethargic  [ ]Cachexia  [ ]Unarousable  [x ]Verbal  [ ]Non-Verbal  Behavioral:   [ ] Anxiety  [ ] Delirium [ ] Agitation [ ] Other  HEENT:  [x ]Normal   [ ]Dry mouth   [ ]ET Tube/Trach  [ ]Oral lesions  PULMONARY:   [x ]Clear [ ]Tachypnea  [ ]Audible excessive secretions   [ ]Rhonchi        [ ]Right [ ]Left [ ]Bilateral  [ ]Crackles        [ ]Right [ ]Left [ ]Bilateral  [ ]Wheezing     [ ]Right [ ]Left [ ]Bilateral  [ x]Diminished breath sounds [ ]right [ ]left [ x]bilateral  CARDIOVASCULAR:    [ x]Regular [ ]Irregular [ ]Tachy  [ ]Noble [ ]Murmur [ ]Other  GASTROINTESTINAL:  [x ]Soft  [ ]Distended   [ x]+BS  [x ]Non tender [ ]Tender  [ ]PEG [ ]OGT/ NGT  Last BM: 10/31  GENITOURINARY:  [ ]Normal [ ] Incontinent   [ ]Oliguria/Anuria   [x ]Gibson  MUSCULOSKELETAL:   [ ]Normal   [ x]Weakness  [ x]Bed/Wheelchair bound [x ] 2+ pitting edema Edema in b/l upper and lower extremities   NEUROLOGIC:   [x ]No focal deficits  [ ]Cognitive impairment  [ ]Dysphagia [ ]Dysarthria [ ]Paresis [ ]Other   SKIN: Ecchymosis at Left Shoulder   [ ]Normal    [ ]Rash  [ ]Pressure ulcer(s)       Present on admission [ ]y [ ]n    CRITICAL CARE:  [ ]Shock Present  [ ]Septic [ ]Cardiogenic [ ]Neurologic [ ]Hypovolemic  [ ]Vasopressors [ ]Inotropes  [ ]Respiratory failure present [ ]Mechanical Ventilation [ ]Non-invasive ventilatory support [ ]High-Flow   [ ]Acute  [ ]Chronic [ ]Hypoxic  [ ]Hypercarbic [ ]Other  [ ]Other organ failure     LABS: reviewed                             7.6    8.59  )-----------( 242      ( 06 Nov 2020 06:42 )             23.9       11-06    144  |  106  |  56<H>  ----------------------------<  83  3.6   |  26  |  1.76<H>    Ca    8.7      06 Nov 2020 06:42  Phos  2.8     11-06    PTT - ( 06 Nov 2020 08:41 )  PTT:61.1 sec    RADIOLOGY & ADDITIONAL STUDIES: N/A    Protein Calorie Malnutrition Present: [ ]mild [ ]moderate [ ]severe [ ]underweight [ ]morbid obesity  https://www.andeal.org/vault/3230/web/files/ONC/Table_Clinical%20Characteristics%20to%20Document%20Malnutrition-White%20JV%20et%20al%202012.pdf    Height (cm): 175.3 (10-19-20 @ 09:24)  Weight (kg): 63.5 (10-31-20 @ 21:11), 77.1 (04-16-20 @ 14:06)  BMI (kg/m2): 20.7 (10-31-20 @ 21:11), 25.1 (10-19-20 @ 09:24)    [x ]PPSV2 < or = 30%  [ ]significant weight loss [ ]poor nutritional intake [ ]anasarca   Albumin, Serum: 2.3 g/dL (11-03-20 @ 05:36)   [ ]Artificial Nutrition    REFERRALS:   [ x]Chaplaincy  [ ]Hospice  [ ]Child Life  [ ]Social Work  [ x]Case management [ ]Holistic Therapy     Goals of Care Document:  MICA Smith (10-17-20 @ 23:00)  Goals of Care Conversation:   Participants:  · Participants  Family  · Spouse  Wife, Mayda Hernandez    Advance Directives:  · Does patient have Advance Directive  Yes  · Indicate Type  Health Care Proxy (HCP)  · Agent's Name  Mayda Hernandez  · Are any of the items on the chart  No  · Caregiver:  no    Conversation Discussion:  · Conversation  Diagnosis; Prognosis; MOLST Discussed; Treatment Options  · Conversation Details  I had a 16 minute discussion with patient's wife Mayda (HCP) regarding advanced directives.  I had discussed with her the current plan of care.  I had also discussed all resuscitative measures and she verbalized understanding.  She confirmed that patient's wish is to have everything done as needed.  Currently patient is FULL CODE.    What Matters Most To Patient and Family:  · What matters most to patient and family  recovery    Personal Advance Directives Treatment Guidelines:   Treatment Guidelines:  · Decision Maker  Health Care Proxy  · Treatment Guidelines  Antibiotic trial; IV fluid trial    Location of Discussion:   Duration of Advanced Care Planning Meeting:  · Time spent (in minutes)  16    Location of Discussion:  · Location of discussion  Telephone      Electronic Signatures:  Kalee Smith)  (Signed 18-Oct-2020 04:51)  	Authored: Goals of Care Conversation, Personal Advance Directives Treatment Guidelines, Location of Discussion      Last Updated: 18-Oct-2020 04:51 by Kalee Smith)

## 2020-11-06 NOTE — PROGRESS NOTE ADULT - PROBLEM SELECTOR PROBLEM 7
Atrial Fibrillation    Atrial fibrillation is a condition in which the heart beats in an irregular pattern. It is caused by a problem in the heart's electrical pathways. It is a sign of heart disease or other health problems that affect the heart.  Heart palpitations are the most common symptom of atrial fibrillation. This is the feeling that your heart is fluttering, beating fast, hard, or irregular. When the heart beats too fast, it doesn't pump blood very well. This can cause other symptoms like anxiety, fatigue, shortness of breath, chest pain, dizziness, or fainting. Atrial fibrillation may come and go. It can last from a few hours to a couple of days. Or, it may become chronic, lasting for months at a time or longer.  Atrial fibrillation may be caused by heart disease or other conditions in the body that affect the heart:  · Coronary artery disease (atherosclerosis)  · High blood pressure  · Disease of the heart valves  · Enlarged heart  · Congestive heart failure  Atrial fibrillation can also occur without heart disease because of:  · Overactive thyroid (hyperthyroid)  · Chronic lung disease (COPD, emphysema, bronchitis)  · Heavy alcohol use  · Cardiac stimulants like cocaine, amphetamines, diet pills, certain decongestant cold medicines, caffeine, or nicotine  · Infection  · Blood clot in the lung (pulmonary embolus)  · Diabetes  · Chronic kidney disease  · Obesity  Treating or removing these causes will help your treatment for atrial fibrillation. It will also make it less likely for the atrial fibrillation to come back.  Atrial fibrillation can alternate back and forth with another abnormal rhythm called atrial flutter. The risk for stroke goes up with either of these conditions. Proper treatment can lower your risk for stroke.  Home care  Follow these guidelines when caring for yourself at home:  · Go back to your usual activities as soon as you are feeling back to normal.  · If you smoke, stop smoking.  Contact your health care provider or a local stop-smoking program for help.  · Don't use stimulants like alcohol, cocaine, amphetamines, diet pills, certain decongestant cold medicines, caffeine, or nicotine.  · If your provider prescribed medicine to stop atrial fibrillation from coming back, take it exactly as directed. Some medicines must be taken every day, not just when you have symptoms. This will help them work as they should.  · If you were prescribed warfarin to lower your risk for stroke, have your blood tested on a regular basis as advised by your provider. This will make sure you are getting the dose that is right for you. It also lower your risk for side effects.  Follow-up care  Follow up with your health care provider, or as advised.  When to seek medical advice  Call your health care provider right away if any of these following occur:  · Shortness of breath or swelling in the legs gets worse  · Unexpected weight gain  · Chest pain or the sense that your heart is fluttering or beating fast or hard (palpitations)  · Any sign of bleeding if you are on a blood thinner   · Pain, redness, or swelling in one leg  Also call your provider right away if you have these signs of stroke:  · Weakness of an arm or leg or one side of the face  · Difficulty with speech or vision  · Extreme drowsiness, confusion, dizziness, or fainting  © 4578-1893 [employer]. [address] [place] [address], [place] [address]. All rights reserved. This information is not intended as a substitute for professional medical care. Always follow your healthcare professional's instructions.         BPH (benign prostatic hyperplasia)

## 2020-11-06 NOTE — DISCHARGE NOTE PROVIDER - NSDCCPCAREPLAN_GEN_ALL_CORE_FT
PRINCIPAL DISCHARGE DIAGNOSIS  Diagnosis: Diffuse large B cell lymphoma  Assessment and Plan of Treatment: stable, no chemo at this time, follow up with hm/onc and PCP      SECONDARY DISCHARGE DIAGNOSES  Diagnosis: Left shoulder pain  Assessment and Plan of Treatment: 2/2 pathologic Fx vs OM, vs mass, no intervention, cont pain meds    Diagnosis: DVT of upper extremity (deep vein thrombosis)  Assessment and Plan of Treatment: cont lovenox    Diagnosis: HTN (hypertension)  Assessment and Plan of Treatment: controlled, lisinopril on hold 2/2 DANIELITO, cont amlodipine    Diagnosis: Acute on chronic kidney failure  Assessment and Plan of Treatment: stable    Diagnosis: Anemia  Assessment and Plan of Treatment: stable

## 2020-11-06 NOTE — DISCHARGE NOTE PROVIDER - NSDCMRMEDTOKEN_GEN_ALL_CORE_FT
acetaminophen 325 mg oral tablet: 2 tab(s) orally every 6 hours, As needed, Mild Pain (1 - 3), Moderate Pain (4 - 6)  allopurinol 100 mg oral tablet: 1 tab(s) orally once a day  bimatoprost 0.01% ophthalmic solution: 1 drop(s) to each affected eye once a day (in the evening)  dronabinol 2.5 mg oral capsule: 1 cap(s) orally 2 times a day (before meals)  enoxaparin: 70 milligram(s) subcutaneous once a day  fluconazole 200 mg oral tablet: 1 tab(s) orally once a day  lactobacillus acidophilus oral capsule: 1 tab(s) orally 2 times a day with meals    Over the counter  mirtazapine 15 mg oral tablet: 1 tab(s) orally once a day (at bedtime)  Norvasc 2.5 mg oral tablet: 1 tab(s) orally once a day  nystatin 100,000 units/g topical powder: 1 application topically 2 times a day  polyethylene glycol 3350 oral powder for reconstitution: 17 gram(s) orally once a day  rosuvastatin 20 mg oral tablet: 1 tab(s) orally once a day  senna oral tablet: 2 tab(s) orally once a day (at bedtime)  sodium bicarbonate 650 mg oral tablet: 1 tab(s) orally 2 times a day  tamsulosin 0.4 mg oral capsule: 1 cap(s) orally once a day (at bedtime)  timolol maleate 0.5% ophthalmic solution: 1 drop(s) to each affected eye 2 times a day  traMADol 50 mg oral tablet: 0.5 tab(s) orally every 6 hours, As needed, Moderate Pain (4 - 6)   acetaminophen 325 mg oral tablet: 2 tab(s) orally every 6 hours, As needed, Mild Pain (1 - 3), Moderate Pain (4 - 6)  allopurinol 100 mg oral tablet: 1 tab(s) orally once a day  bimatoprost 0.01% ophthalmic solution: 1 drop(s) to each affected eye once a day (in the evening)  dronabinol 2.5 mg oral capsule: 1 cap(s) orally 2 times a day (before meals)  enoxaparin: 70 milligram(s) subcutaneous once a day  fluconazole 200 mg oral tablet: 1 tab(s) orally once a day  lactobacillus acidophilus oral capsule: 1 tab(s) orally 2 times a day with meals    Over the counter  mirtazapine 15 mg oral tablet: 1 tab(s) orally once a day (at bedtime)  Norvasc 2.5 mg oral tablet: 1 tab(s) orally once a day  polyethylene glycol 3350 oral powder for reconstitution: 17 gram(s) orally once a day  rosuvastatin 20 mg oral tablet: 1 tab(s) orally once a day  senna oral tablet: 2 tab(s) orally once a day (at bedtime)  sodium bicarbonate 650 mg oral tablet: 1 tab(s) orally 2 times a day  tamsulosin 0.4 mg oral capsule: 1 cap(s) orally once a day (at bedtime)  timolol maleate 0.5% ophthalmic solution: 1 drop(s) to each affected eye 2 times a day  traMADol 50 mg oral tablet: 0.5 tab(s) orally every 6 hours, As needed, Moderate Pain (4 - 6)

## 2020-11-06 NOTE — PROGRESS NOTE ADULT - SUBJECTIVE AND OBJECTIVE BOX
[facility] DIVISION OF KIDNEY DISEASES AND HYPERTENSION -- FOLLOW UP NOTE  --------------------------------------------------------------------------------    24 hour events/subjective: u/o 1.3L.    Patient was seen and examined at bedside. Reports feeling well. Denies fever, chills, CP, SOB, LE edema.         PAST HISTORY  --------------------------------------------------------------------------------  No significant changes to PMH, PSH, FHx, SHx, unless otherwise noted    ALLERGIES & MEDICATIONS  --------------------------------------------------------------------------------  Allergies    No Known Allergies    Intolerances      Standing Inpatient Medications  allopurinol 100 milliGRAM(s) Oral daily  amLODIPine   Tablet 2.5 milliGRAM(s) Oral daily  atorvastatin 80 milliGRAM(s) Oral at bedtime  chlorhexidine 2% Cloths 1 Application(s) Topical daily  chlorhexidine 4% Liquid 1 Application(s) Topical <User Schedule>  fluconAZOLE   Tablet 200 milliGRAM(s) Oral daily  heparin  Infusion.  Unit(s)/Hr IV Continuous <Continuous>  influenza   Vaccine 0.5 milliLiter(s) IntraMuscular once  lactobacillus acidophilus 1 Tablet(s) Oral daily  latanoprost 0.005% Ophthalmic Solution 1 Drop(s) Both EYES at bedtime  mirtazapine 15 milliGRAM(s) Oral at bedtime  nystatin Powder 1 Application(s) Topical two times a day  polyethylene glycol 3350 17 Gram(s) Oral daily  rasburicase IVPB 3 milliGRAM(s) IV Intermittent once  senna 2 Tablet(s) Oral at bedtime  sodium bicarbonate 650 milliGRAM(s) Oral two times a day  tamsulosin 0.4 milliGRAM(s) Oral at bedtime  timolol 0.5% Solution 1 Drop(s) Both EYES two times a day    PRN Inpatient Medications  acetaminophen   Tablet .. 650 milliGRAM(s) Oral every 6 hours PRN  heparin   Injectable 5000 Unit(s) IV Push every 6 hours PRN  heparin   Injectable 2500 Unit(s) IV Push every 6 hours PRN  morphine  - Injectable 2 milliGRAM(s) IV Push every 4 hours PRN  sodium chloride 0.9% lock flush 10 milliLiter(s) IV Push every 1 hour PRN  traMADol 25 milliGRAM(s) Oral every 6 hours PRN      REVIEW OF SYSTEMS  --------------------------------------------------------------------------------  Gen: No fevers/chills  Respiratory: No dyspnea, cough  CV: No chest pain  GI: No abdominal pain, diarrhea  MSK: No  edema      All other systems were reviewed and are negative, except as noted.    VITALS/PHYSICAL EXAM  --------------------------------------------------------------------------------  T(C): 36.9 (11-06-20 @ 08:56), Max: 36.9 (11-06-20 @ 08:39)  HR: 96 (11-06-20 @ 08:56) (96 - 100)  BP: 160/72 (11-06-20 @ 08:56) (138/57 - 161/91)  RR: 18 (11-06-20 @ 08:56) (18 - 19)  SpO2: 98% (11-06-20 @ 08:56) (97% - 98%)  Wt(kg): --        11-05-20 @ 07:01  -  11-06-20 @ 07:00  --------------------------------------------------------  IN: 932 mL / OUT: 1300 mL / NET: -368 mL        Physical Exam:  	Gen: NAD  	HEENT: MMM  	Pulm: CTA B/L, no crackles or wheezing   	CV: S1S2  	Abd: Soft, +BS   	Ext: No LE edema B/L  	Neuro: Awake  	Skin: Warm and dry  	Vascular access: IV lines       LABS/STUDIES  --------------------------------------------------------------------------------              7.6    8.59  >-----------<  242      [11-06-20 @ 06:42]              23.9     144  |  106  |  56  ----------------------------<  83      [11-06-20 @ 06:42]  3.6   |  26  |  1.76        Ca     8.7     [11-06-20 @ 06:42]      Phos  2.8     [11-06-20 @ 06:42]        PTT: 61.4       [11-05-20 @ 18:54]    Uric acid 5.5      [11-06-20 @ 06:42]        [11-06-20 @ 06:42]    Creatinine Trend:  SCr 1.76 [11-06 @ 06:42]  SCr 1.75 [11-06 @ 02:02]  SCr 1.73 [11-05 @ 18:54]  SCr 1.80 [11-05 @ 11:00]  SCr 1.94 [11-05 @ 00:29]    Urinalysis - [10-30-20 @ 20:40]      Color Yellow / Appearance Slightly Turbid / SG 1.019 / pH 6.0      Gluc Trace / Ketone Negative  / Bili Negative / Urobili Negative       Blood Moderate / Protein 30 mg/dL / Leuk Est Negative / Nitrite Negative      RBC 1 / WBC 3 / Hyaline 1 / Gran  / Sq Epi  / Non Sq Epi 1 / Bacteria Negative    Urine Creatinine 43      [10-30-20 @ 20:39]  Urine Sodium <35      [10-30-20 @ 20:39]  Urine Potassium 63      [10-30-20 @ 20:39]  Urine Chloride <35      [10-30-20 @ 20:39]  Urine Osmolality 395      [10-31-20 @ 06:32]    Ferritin 20850      [10-21-20 @ 09:44]  HbA1c 6.2      [06-17-16 @ 03:45]  Lipid: chol --, , HDL --, LDL --      [10-21-20 @ 06:25]    HBsAg Nonreact      [10-23-20 @ 09:52]  HBcAb Nonreact      [10-23-20 @ 09:52]  HCV 0.09, Nonreact      [10-23-20 @ 09:52]  HIV Nonreact      [10-23-20 @ 09:30]    Immunofixation Serum:   No Monoclonal Band Identified    Reference Range: None Detected      [10-20-20 @ 09:57]  SPEP Interpretation: Hypoalbuminemia      [10-20-20 @ 09:57]

## 2020-11-06 NOTE — PROGRESS NOTE ADULT - PROBLEM SELECTOR PROBLEM 10
Advanced care planning/counseling discussion

## 2020-11-06 NOTE — PROGRESS NOTE ADULT - ATTENDING COMMENTS
I was physically present for the key portions of the evaluation and management (E/M) service provided.  I agree with the above history, physical, and plan which I have reviewed and edited where appropriate. Plan discussed with team.    ______________  Sal Murray MD   of Geriatric and Palliative Medicine  [facility]     Please page the following number for clinical matters between the hours of 9AM and 5PM   from Monday through Friday : [phone]    After 5PM and on weekends, please page: [phone]. The Geriatric and Palliative Medicine consult service has 24/7 coverage for medical recommendations, including for symptom management needs.

## 2020-11-06 NOTE — PROGRESS NOTE ADULT - PROBLEM SELECTOR PROBLEM 9
Advanced care planning/counseling discussion
Prophylactic measure
Palliative care encounter

## 2020-11-06 NOTE — PROGRESS NOTE ADULT - PROBLEM SELECTOR PLAN 5
- mood is stable per patient  - Continue with Mirtazapine 15mg QHS. - management as per primary team.

## 2020-11-06 NOTE — PROGRESS NOTE ADULT - REASON FOR ADMISSION
sent here for chemo and radiation

## 2020-11-06 NOTE — PROGRESS NOTE ADULT - PROBLEM SELECTOR PLAN 4
-acute DVT L axillary and brachial veins  - renal function stabilized but GFR < 30 - given CKD + malignancy + elderly will dose Lovenox 1gm/kg/day

## 2020-11-06 NOTE — PROGRESS NOTE ADULT - PROBLEM SELECTOR PLAN 8
- Spoke to wife Viktoria at bedside. Viktoria states they have been  for more than 20 years; patient has 5 children from his first marriage -2 of whom live out of state and 3 live nearby. One of his daughters works at the [employer] facility where patient was recently. Viktoria confirms that patient has some memory issues beginning about a year ago first noticed when he got lost for 8 hours when driving to his friends house and per their discussion with PCP recently he may have beginning of cognitive impairment; however, Viktoria has not told patient's children yet. Viktoria able to verbalize that patient was recently at [facility] for the left shoulder surgery with initial concerns for left shoulder infection but now deemed less likely but actually related to his cancer. She states he had been receiving his oncologic care at [facility], but wanted him here so that he could receive stem cell treatment, but is confused as to why it has not been offered since he's been here. She acknowledges that s/p inpatient chemotherapy he has had complications including DANIELITO. Discussed with wife that patient appears weak now with poor appetite (and only drinking Ensure), which wife agrees with.  Wife wishes to speak further with oncology team about the plan to decide next steps.   - Reached out to oncology team; awaiting callback to determine plan as wife would like their input on oncologic plan. Will reattempt to reach out to oncology team tomorrow.  - will continue to follow with primary team. Patient requires total support for all ADL's.  PPSV2  30 %.

## 2020-11-06 NOTE — PROGRESS NOTE ADULT - PROBLEM SELECTOR PROBLEM 2
Hyperkalemia
Diffuse large B cell lymphoma
Dyspnea
Dyspnea, unspecified type
Dyspnea, unspecified type
Tumor lysis syndrome
Diffuse large B cell lymphoma
Tumor lysis syndrome
Dyspnea, unspecified type

## 2020-11-06 NOTE — PROGRESS NOTE ADULT - SUBJECTIVE AND OBJECTIVE BOX
[facility] Division of Hospital Medicine  Zach Espitia MD  Pager (M-F, 4V-2E): [phone]  Other Times:  [phone]    Patient is a 86y old  Male who presents with a chief complaint of sent here for chemo and radiation (06 Nov 2020 09:12)    SUBJECTIVE / OVERNIGHT EVENTS: no complaints feels weak  ADDITIONAL REVIEW OF SYSTEMS:    MEDICATIONS  (STANDING):  allopurinol 100 milliGRAM(s) Oral daily  amLODIPine   Tablet 2.5 milliGRAM(s) Oral daily  atorvastatin 80 milliGRAM(s) Oral at bedtime  chlorhexidine 2% Cloths 1 Application(s) Topical daily  chlorhexidine 4% Liquid 1 Application(s) Topical <User Schedule>  enoxaparin Injectable 65 milliGRAM(s) SubCutaneous daily  fluconAZOLE   Tablet 200 milliGRAM(s) Oral daily  influenza   Vaccine 0.5 milliLiter(s) IntraMuscular once  lactobacillus acidophilus 1 Tablet(s) Oral daily  latanoprost 0.005% Ophthalmic Solution 1 Drop(s) Both EYES at bedtime  mirtazapine 15 milliGRAM(s) Oral at bedtime  nystatin Powder 1 Application(s) Topical two times a day  polyethylene glycol 3350 17 Gram(s) Oral daily  rasburicase IVPB 3 milliGRAM(s) IV Intermittent once  senna 2 Tablet(s) Oral at bedtime  sodium bicarbonate 650 milliGRAM(s) Oral two times a day  tamsulosin 0.4 milliGRAM(s) Oral at bedtime  timolol 0.5% Solution 1 Drop(s) Both EYES two times a day    MEDICATIONS  (PRN):  acetaminophen   Tablet .. 650 milliGRAM(s) Oral every 6 hours PRN Mild Pain (1 - 3), Moderate Pain (4 - 6)  morphine  - Injectable 2 milliGRAM(s) IV Push every 4 hours PRN Severe Pain (7 - 10)  sodium chloride 0.9% lock flush 10 milliLiter(s) IV Push every 1 hour PRN Pre/post blood products, medications, blood draw, and to maintain line patency  traMADol 25 milliGRAM(s) Oral every 6 hours PRN Moderate Pain (4 - 6)      CAPILLARY BLOOD GLUCOSE    I&O's Summary    05 Nov 2020 07:01  -  06 Nov 2020 07:00  --------------------------------------------------------  IN: 932 mL / OUT: 1300 mL / NET: -368 mL    06 Nov 2020 07:01  -  06 Nov 2020 12:07  --------------------------------------------------------  IN: 200 mL / OUT: 400 mL / NET: -200 mL    PHYSICAL EXAM:  Vital Signs Last 24 Hrs  T(C): 36.9 (06 Nov 2020 08:56), Max: 36.9 (06 Nov 2020 08:39)  T(F): 98.4 (06 Nov 2020 08:56), Max: 98.4 (06 Nov 2020 08:39)  HR: 96 (06 Nov 2020 08:56) (96 - 100)  BP: 160/72 (06 Nov 2020 08:56) (138/57 - 161/91)  BP(mean): --  RR: 18 (06 Nov 2020 08:56) (18 - 19)  SpO2: 98% (06 Nov 2020 08:56) (97% - 98%)  GENERAL: NAD  EYES:  conjunctiva and sclera clear  CHEST/LUNG: bibasilar crackles   HEART: RRR no murmur  ABDOMEN: Soft, Nontender, Nondistended  MSK/EXTREMITIES:  L shoulder w/ecchymosis, indurated and firm. blanching skin  PSYCH: AAOx1-2  NEUROLOGY: non-focal  SKIN: generalized anasarca, no rashes or lesions other than shoulder as above    LABS:                        7.6    8.59  )-----------( 242      ( 06 Nov 2020 06:42 )             23.9     11-06    144  |  106  |  56<H>  ----------------------------<  83  3.6   |  26  |  1.76<H>    Ca    8.7      06 Nov 2020 06:42  Phos  2.8     11-06      PTT - ( 06 Nov 2020 08:41 )  PTT:61.1 sec    RADIOLOGY & ADDITIONAL TESTS:  Results Reviewed:   Imaging Personally Reviewed:  Electrocardiogram Personally Reviewed:    COORDINATION OF CARE:  Care Discussed with Consultants/Other Providers [Y/N]: palliative re marinol  Prior or Outpatient Records Reviewed [Y/N]:

## 2020-11-06 NOTE — PROGRESS NOTE ADULT - PROBLEM SELECTOR PLAN 4
- management as per primary team. - history of DLBCL, GCB subtype (BCL2, BCL6, MYC, all negative by FISH). Per chart review, patient last received 6 cycles of R-CHOP in 2017 with DELANEY until 11/2019. Patient had mesenteric mass with size of 2.7x4.8 in 2017.   - CT CAP apparently shows enlarging mesenteric mass and new Lt pelvic renal mass concerning of relapse  - At [facility], he was found to have a L shoulder mass and pathologic fracture. Now s/p ORIF and radical resection of tumor on 9/25. PET/CT was performed revealing brain lesion with hypermetabolic activity  - Bone marrow biopsy without e/o lymphoma  - Left shoulder biopsy: most consistent with DLBCL   - s/p inpatient chemotherapy on 10/28 and 10/29  - per discussion with oncology, will consider chemotherapy cycle and further chemotherapy is palliative in nature rather than curative.

## 2020-11-06 NOTE — DISCHARGE NOTE NURSING/CASE MANAGEMENT/SOCIAL WORK - PATIENT PORTAL LINK FT
You can access the FollowMyHealth Patient Portal offered by [facility] by registering at the following website: [email or web address]. By joining [employer]s FollowMyHealth portal, you will also be able to view your health information using other applications (apps) compatible with our system.

## 2020-11-06 NOTE — PROGRESS NOTE ADULT - PROBLEM SELECTOR PLAN 9
- Goals of care discussion today (as above)   - will continue to follow with primary team. - Goals of care discussion today (as above)   - Chaplaincy referral  - will continue to follow with primary team. - Goals of care discussion today (as above)   - Chaplaincy referral  - will sign off for now, please reconsult PRN

## 2020-11-06 NOTE — DISCHARGE NOTE PROVIDER - HOSPITAL COURSE
87 y/o M w/ a PMHx of DLBCL (s/p 6 cycles of R-CHOP and in DELANEY until 9/2019), who p/w possible relapse w/ L shoulder mass and pathologic fracture s/p left proximal humerus open biopsy, radical resection of tumor, and ORIF with IMN and cementation on 9/25/20), HTN, HLD, CKD, BPH, anemia, syncope, and anxiety/adjustment disorder/depression who was transferred to [facility] from the [facility] for further management of his diffuse large B cell lymphoma.  He was seen by Hm/Onc, ID, Renal, IR, Palliative care and PT/OT. He was given chemo and was closely followed by hm/onc team. He was also started with iv heparin. He is hemodynamically stable to be discharged to rehab to, spoke to Attending. 87 y/o M w/ a PMHx of DLBCL (s/p 6 cycles of R-CHOP and in DELANEY until 9/2019), who p/w possible relapse w/ L shoulder mass and pathologic fracture s/p left proximal humerus open biopsy, radical resection of tumor, and ORIF with IMN and cementation on 9/25/20), HTN, HLD, CKD, BPH, anemia, syncope, and anxiety/adjustment disorder/depression who was transferred to [facility] from the [facility] for further management of his diffuse large B cell lymphoma.  He was seen by Hm/Onc, ID, Renal, IR, Palliative care and PT/OT. He was given chemo and was closely followed by hm/onc team. He was also started with iv heparin. He is hemodynamically stable to be discharged to rehab to, spoke to Attending.    Attending Addendum  Discharge Diagnoses  1) DLBCL   2) L shoulder mass, pathologic fracture, infiltration of lymphoma  3) CKD  4) HTN  5) Dementia  6) Tumor Lysis Syndrome  7) DVT of L upper extremity

## 2020-11-06 NOTE — PROGRESS NOTE ADULT - CONVERSATION DETAILS
Viktoria states that she had spoke with both oncology team as well as primary team and has received mixed messages about patient's prognosis. Patient's understanding from oncology is the prognosis is poor but discussion with primary team appears optimistic.     Discussed with patient that currently patient has become debilitated after recent chemotherapy cycle as well as experienced complications afterwards of tumor lysis syndrome and DANIELITO. Discussed that per my discussion with oncology, if patient becomes stronger can consider another cycle of chemotherapy which would be palliative in nature and not curative. Discussed should take into account patient's experience after recent chemotherapy and if further chemotherapy should be pursued with the alternative being focusing on symptoms. Wife became tearful and understands the situation but upset as she states that a few weeks ago patient's daughter stated that she does not think patient would tolerate further chemotherapy and did not want to pursue further chemotherapy; wife upset that patient's daughter stated this as she states then patient would die which she was not ready for. Emotional support provided to wife and acknowledged her emotions and fear of losing her .     Wife states that patient would not want to be on a life support for life, but in event of an emergency would like CPR.   She states that plan is for patient to go to rehab and rebuild his strength and is hopeful that chemotherapy can be considered then.

## 2020-11-06 NOTE — PROGRESS NOTE ADULT - ASSESSMENT
86M w DLBCL in remission until 9/2019, HTN, HLD, CKD, BPH, anemia, syncope, anxiety/adjustment disorder/depression, presents here from [facility] where he was getting treated for L shoulder infection (s/p ORIF for pathologic fx) for further management of his recurrent diffuse large B cell lymphoma and L shoulder lesion, s/p chemo with concern for TLS with hyperkalemia & DANIELITO

## 2020-11-12 PROBLEM — R55 SYNCOPE AND COLLAPSE: Chronic | Status: ACTIVE | Noted: 2020-01-01

## 2020-11-12 PROBLEM — C83.30 DIFFUSE LARGE B-CELL LYMPHOMA, UNSPECIFIED SITE: Chronic | Status: ACTIVE | Noted: 2020-01-01

## 2020-11-12 PROBLEM — N18.9 CHRONIC KIDNEY DISEASE, UNSPECIFIED: Chronic | Status: ACTIVE | Noted: 2020-01-01

## 2020-11-12 PROBLEM — E78.5 HYPERLIPIDEMIA, UNSPECIFIED: Chronic | Status: ACTIVE | Noted: 2020-01-01

## 2020-11-16 PROBLEM — C85.10 B-CELL LYMPHOMA: Status: ACTIVE | Noted: 2020-01-01

## 2020-11-17 NOTE — ED PROVIDER NOTE - PHYSICAL EXAMINATION
GENERAL: elderly male, lying in bed, acute distress. tachypneic, tachycardic, febrile, normotensive. on 2L NC.  HEENT: NC/AT, conjunctiva noninjected, sclera anicteric, dry mucous membranes,  NECK: Supple, trachea midline  LUNG: CTAB, no w/r/r appreciated  CV: tachycardic, no m/r/g appreciated, Pulses- Radial: 2+ right arm, difficult to palpate left arm 2/2 swelling but arm with cap refil 3seconds; posterior tibial: 2+ b/l; dorsalis pedis: 2+ b/l  ABDOMEN: Soft, NTND, no rebound or guarding  BACK: No midline spinal tenderness or step-offs. No paraspinal tenderness to palpation. No CVA tenderness. Stage 2 sacral decub ulcer.  MSK: No edema, no visible deformities, full range of motion UE/LE except left arm. Left shoulder swelling and redness.  NEURO: AAOx2 (to person, place), sensation grossly intact  SKIN: left shoulder and arm markedly edematous with weeping  PSYCH: Normal mood and affect GENERAL: elderly male, lying in bed, acute distress. tachypneic, tachycardic, febrile, normotensive. on 2L NC.  HEENT: NC/AT, conjunctiva noninjected, sclera anicteric, dry mucous membranes,  NECK: Supple, trachea midline  LUNG: CTAB, no w/r/r appreciated  CV: tachycardic, no m/r/g appreciated, Pulses- Radial: 2+ right arm, difficult to palpate left arm 2/2 swelling but arm with cap refil 3seconds; posterior tibial: 2+ b/l; dorsalis pedis: 2+ bilateral. dopplerable left radial and ulnar pulses  ABDOMEN: Soft, NTND, no rebound or guarding  BACK: No midline spinal tenderness or step-offs. No paraspinal tenderness to palpation. No CVA tenderness. Stage 2 sacral decub ulcer.  MSK: No edema, no visible deformities, full range of motion UE/LE except left arm. Left shoulder swelling and redness.  NEURO: AAOx2 (to person, place), sensation grossly intact  SKIN: left shoulder and arm markedly edematous with weeping  PSYCH: Normal mood and affect

## 2020-11-17 NOTE — ED ADULT NURSE NOTE - ED CARDIAC RHYTHM
----- Message from SAVANNA Mena sent at 4/8/2019 12:17 PM CDT -----  Regarding: Med questions  Kade Lawler,    Mom-Michelle wanted a call when you have time to learn more about Eloy's meds.    Thank you!    Keisha   regular

## 2020-11-17 NOTE — ED PROVIDER NOTE - NS ED ROS FT
CONSTITUTIONAL: FEVERS. No chills, fatigue, dizziness, weakness, unexpected weight change  HEENT: No loss of vision, double vision, blurry vision, nasal congestion, runny nose, sore throat  CV: No chest pain, palpitations  PULM: No cough, shortness of breath  GI: No abdominal pain, nausea, vomiting, diarrhea, constipation  : No dysuria, polyuria, hematuria  SKIN: LEFT ARM SWELLING  MSK: LEFT SHOULDER PAIN  NEURO: No headache, paresthesias

## 2020-11-17 NOTE — ED ADULT NURSE REASSESSMENT NOTE - NS ED NURSE REASSESS COMMENT FT1
Pt 16 Fr Gibson in place from nursing facility removed. New 16 fr Gibson catheter inserted under sterile technique with 2 RN's at the bedside as per MD order. Pt tolerated well.  Safety and comfort maintained. Call bell within reach.

## 2020-11-17 NOTE — ED ADULT NURSE NOTE - NSIMPLEMENTINTERV_GEN_ALL_ED
Implemented All Fall Risk Interventions:  [place] to call system. Call bell, personal items and telephone within reach. Instruct patient to call for assistance. Room bathroom lighting operational. Non-slip footwear when patient is off stretcher. Physically safe environment: no spills, clutter or unnecessary equipment. Stretcher in lowest position, wheels locked, appropriate side rails in place. Provide visual cue, wrist band, yellow gown, etc. Monitor gait and stability. Monitor for mental status changes and reorient to person, place, and time. Review medications for side effects contributing to fall risk. Reinforce activity limits and safety measures with patient and family.

## 2020-11-17 NOTE — CHART NOTE - NSCHARTNOTEFT_GEN_A_CORE
Marty Rey PGY-3  MAR [id number]  Dept. Internal Medicine    TO BE COMPLETED WITHIN 6 HOURS OF INITIAL ASSESSMENT:    For use in patients that have 2 sepsis criteria and new organ dysfunction   •	New or increased oxygen requirement  •	Creatinine >2mg/dL  •	Bilirubin>2mg/dL  •	Platelet <100,00/mm3  •	INR >1.5, PTT>60  •	Lactate >2    If patient persistent hypotension (SBP<90) or any lactate >4 then provider evaluation (Physician/PA/NP) within 30 minutes of bolus completion is required.    Vital Signs Last 24 Hrs  T(C): 36.4 (17 Nov 2020 16:05), Max: 38.7 (17 Nov 2020 11:45)  T(F): 97.5 (17 Nov 2020 16:05), Max: 101.6 (17 Nov 2020 11:45)  HR: 96 (17 Nov 2020 17:22) (96 - 130)  BP: 124/63 (17 Nov 2020 17:22) (94/60 - 124/63)  BP(mean): 83 (17 Nov 2020 17:22) (70 - 95)  RR: 22 (17 Nov 2020 17:22) (16 - 36)  SpO2: 99% (17 Nov 2020 17:22) (96% - 100%)  		  LUNGS:  [ x ] Clear bilaterally [  ] Wheeze [  ] Rhonchi [  ] Rales [  ] Crackles; Other:  HEART: [ x ]RRR [x  ] No murmur[  ]  Normal S1S2 [x] Tachycardia;  Other:  CAPILLARY REFiLL:  	Fingers: [ x ] less than 2 seconds [  ] more than 2 seconds                                           Toes: [x  ]  less than 2 seconds [  ] more than 2 seconds   PERIPHERAL PULSES:  Radial: [x  ] Palpable  [  ]  non-palpable                                         Dorsalis Pedis: [x ] Palpable  [  ] non-palpable                                         Posterior Tibial: [ x ] Palpable  [  ] non-palpable                                          Other:  SKIN:   [  ]  Diaphoretic  [  ]  mottling  [  ]  Cold extremities  [x ]  Warm [ x ]  Dry                      Other:    BEDSIDE ULTRASOUND FINDINGS (IF APPLICABLE):    Labs:  17 Nov 2020 12:17    141    |  98     |  47     ----------------------------<  134    4.4     |  19     |  1.58     Ca    8.8        17 Nov 2020 12:17  Phos  4.9       17 Nov 2020 12:17  Mg     2.0       17 Nov 2020 12:17    TPro  5.8    /  Alb  2.3    /  TBili  0.7    /  DBili  x      /  AST  72     /  ALT  40     /  AlkPhos  116    17 Nov 2020 12:17                          7.9    1.49  )-----------( 173      ( 17 Nov 2020 12:17 )             25.3     PT/INR - ( 17 Nov 2020 12:17 )   PT: 11.2 sec;   INR: 0.93 ratio         PTT - ( 17 Nov 2020 12:17 )  PTT:33.2 sec  Lactate:    Patient received a modified total of fluid resuscitation for the following reason:  [ ] obesity BMI > 30, patient received 30 cc/kg according to Ideal Body Weight   [ ] acute, decompensated CHF   [ ] pulmonary edema    [ ] ESRD with signs of fluid overload  [ ] presence of LVAD     Plan (orders must be placed in EMR):     [ x ]  Check Repeat Lactate   [  ]  No change in current plan  [  ]  Start Vasopressors:  [  ]  Repeat Fluid Bolus:  [  ] other:    Care Discussed with Consultants/Other Providers [ ] YES  [ ] NO

## 2020-11-17 NOTE — ED PROVIDER NOTE - CLINICAL SUMMARY MEDICAL DECISION MAKING FREE TEXT BOX
86 y.o. male hx of DLBCL hx of DVT L upper extremity. 86 y.o. male hx of DLBCL, DVT L upper extremity, CKD here for tachycardia and left arm swelling and pain. Tachycardia, tachypneia, hypoxia, febrile. Exam with LUE swelling. No murmurs or crackles on exam. Concern for sepsis vs PE vs infection of LUE. Possible tumor lysis picture. Will eval with sepsis labs, trop, bnp, CTA chest, CT upper extremity Left, abxs, fluids, antipyretics, reassess.

## 2020-11-17 NOTE — H&P ADULT - ATTENDING COMMENTS
agree w plan as above  trend lactate   broad spectrum abx until cultures return  CT left arm without abscess  CXR clear, urine neg  not a candidate for DMT  need GOC

## 2020-11-17 NOTE — H&P ADULT - ASSESSMENT
Patient is a 86 year old gentlemen with a pmh of DLBCL (s/p 6 cycles of R-CHOP and in DELANEY until 9/2019), L shoulder mass and pathologic fracture ( s/p left proximal humerus open biopsy, radical resection of tumor, and ORIF with IMN and cementation on 9/25/20),Left arm DVT, HTN, HLD, and CKD who is presenting from rehabilitation facility for tachycardia and fever most likely due to sepsis secondary to L should cellulitis. Differential includes Cellulitis vs. Osteomyelitis vs. LUE DVT. Less likely urosepsis given lack of urinary tract symptoms. UA demonstrated blood, WBC 8, RBC 6, Moderate LE and proteinuria. Patient is being treated w/ Cefepime and vancomycin for management of sepsis.

## 2020-11-17 NOTE — H&P ADULT - EXTREMITIES COMMENTS
Left Upper extremity: Severe L arm swelling, erythema, warmth. There is large area of erythema surrounding the entire L shoulder. +3-4 edema is present from the proximal humerus to the distal phalanges. 0/5 strength in the left upper extremity.  Right Upper extremity: Normal strength and , No edema   Left lower extremity: no edema 5/5 dorsi/plantar flexion   Right lower extremity: no edema 5/5 dorsi/planta flexion

## 2020-11-17 NOTE — H&P ADULT - PROBLEM SELECTOR PLAN 8
DVT/PE: Lovenox 70meq daily  Code status DNR/DNI   GOC: Discussion for home hospice was ongoing with the family while patient was in rehabilitation prior to this event occurring and is still ongoing. Further discussion on PCU and comfort care is ongoing.

## 2020-11-17 NOTE — ED ADULT NURSE NOTE - OBJECTIVE STATEMENT
Pt is a 86 Y A&O x3 M with hx of CKD, B cell lymphoma, HLD, BPH, HTN, L shoulder mass and pathologic fracture, L arm DVT presenting to ED via EMS from rehab facility with c/o fever this AM. Pt endorses SOB, pain and swelling to L arm and shoulder worsening x 2 weeks. Pt arrives to ED tachypneic, breathing labored on 2 L NC. Dry mucous membranes noted. Abd soft, non-tender, non-distended. Pt sinus tachycardic on cardiac monitor, EKG completed. Swelling noted to left upper extremity from shoulder to hand, redness noted to L shoulder. L upper extremity warm to touch. Cap refill <3 seconds. Pt unable to move left arm due to swelling and pain. Un-stageable pressure ulcer noted to sacrum. Skin is warm to touch, no diaphoresis noted. 16 Fr Gibson catheter in place from facility. Pt febrile rectally. 2 large bore IV's established. IVF initiated. MD at bedside for assessment. Pt is a 86 Y A&O x3 M with hx of CKD, B cell lymphoma, HLD, BPH, HTN, L shoulder mass and pathologic fracture, L arm DVT presenting to ED via EMS from rehab facility with c/o fever this AM. Pt endorses SOB, pain and swelling to L arm and shoulder worsening x 2 weeks. Pt arrives to ED tachypneic, breathing labored on 2 L NC. Dry mucous membranes noted. Abd soft, non-tender, non-distended. Pt sinus tachycardic on cardiac monitor, EKG completed. Swelling noted to left upper extremity from shoulder to hand, redness noted to L shoulder. L upper extremity warm to touch. Cap refill <3 seconds. Pt unable to move left arm due to swelling and pain. Un-stageable pressure ulcer noted to sacrum. Skin is warm to touch, no diaphoresis noted. 16 Fr Gibson catheter in place from facility, unknown when last changed. Pt febrile rectally. 2 large bore IV's established. IVF initiated. MD at bedside for assessment.

## 2020-11-17 NOTE — ED CLERICAL - NS ED CLERK NOTE PRE-ARRIVAL INFORMATION; ADDITIONAL PRE-ARRIVAL INFORMATION
CC/Reason For referral: recently admitted for humerus fx and surgery , now  with red, swollen left arm  and fever of 101.1, Tylenol given 0900  Preferred Consultant(if applicable): na  Who admits for you (if needed): na  Do you have documents you would like to fax over? no  Would you still like to speak to an ED attending? no

## 2020-11-17 NOTE — ED PROVIDER NOTE - PROGRESS NOTE DETAILS
Wayne Nunes D.O., PGY2: ED Provider Sepsis Reassessment Note:   VS: 117/63 BP, 108 HR, 26 RR, 98.2 T, 99 POx.  Patient evaluated after fluid administration:  -LUNGS: CTA b/l; CV: tachycardic, DP pulse 2+ b/l; SKIN- Cap. refill < 2s; EXTREMITIES: LE warm, no edema  Patient responding well to resuscitation.

## 2020-11-17 NOTE — ED PROVIDER NOTE - PMH
BPH (benign prostatic hyperplasia)    Chronic kidney disease (CKD)    Diffuse large B cell lymphoma    HLD (hyperlipidemia)    HTN (hypertension)    Syncope

## 2020-11-17 NOTE — H&P ADULT - HISTORY OF PRESENT ILLNESS
Patient is a 86 year old gentlemen with a pmh of DLBCL (s/p 6 cycles of R-CHOP and in DELANEY until 9/2019), L shoulder mass and pathologic fracture s/p left proximal humerus open biopsy, radical resection of tumor, and ORIF with IMN and cementation on 9/25/20),Left arm DVT, HTN, HLD, CKD, BPH, anemia, syncope, and anxiety/adjustment disorder/depression who is presenting from rehabilitation facility for tachycardia. Per EMS, patient was complaining of L shoulder pain and swelling that had been worsening for 2 weeks since being discharged from the hospital. It is unknown if patient is taking anticoagulation at this time.     The patient reports that he had a period of difficulty breathing, but no longer feels this. He denies any chest pain, palpitations, nausea, vomiting and diarrhea.        Patient is a 86 year old gentlemen with a pmh of DLBCL (s/p 6 cycles of R-CHOP and in DELANEY until 9/2019), L shoulder mass and pathologic fracture s/p left proximal humerus open biopsy, radical resection of tumor, and ORIF with IMN and cementation on 9/25/20),Left arm DVT, HTN, HLD, CKD, BPH, anemia, syncope, and anxiety/adjustment disorder/depression who is presenting from rehabilitation facility for tachycardia. Per EMS, patient was complaining of L shoulder pain and swelling that had been worsening for 2 weeks since being discharged from the hospital. It is unknown if patient is taking anticoagulation at this time.     With regards to the patients cancer history, he was diagnosed with NHL 5 years ago and was treated w/ chemotherapy and was in remission. Earlier in 2020 the patient was found to have persistent weight loss and subsequent workup demonstrated new malignant lesions in the mesentery and kidney. The patient was found to have a new mass w/ a pathologic fracture of the L proximal humerus which required radical resection, ORIF and IMN with cementation in 9/25/20. The patient was unable to undergo post operation radiation due to development of redness and swelling of the shoulder concerning for infection which began on 10/13/20. The patient was treated w/ IV abx and workup was suggestive of osteomyelitis but it couldn't be confirmed due to the patient having metal in his shoulder and not being able to undergo MRI study. During this admission the patient was found to have DANIELITO on CKD which resolved. He was discharged from the hospital to subacute rehabilitation prior to his current presentation.     In the ED, the patients vitals showed bp 110/83, HR of 130, temp 101.6, RR 35 and O2 saturations of 98% on 2L NC. Labs notable for a lactate of 7.9, leukopenia 1.49 (Neutropenia 0.54) , hemoglobin 7.9 and BNP 4911.

## 2020-11-17 NOTE — H&P ADULT - PROBLEM SELECTOR PLAN 1
The patient has severe L shoulder swelling with erythema, edema and warmth on exam. 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   - s/p 2 L fluids   - F/u blood and urine cultures   - s/p 1 dose of zosyn and vancomycin   - C/w vancomycin 1 g q24 w/ bilevel dosing   - Cefepime 2 g q12 The patient has severe L shoulder swelling with erythema, edema and warmth on exam. 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 Unable to confirm Osteo with MRI.   - s/p 2 L fluids   - F/u blood and urine cultures   - s/p 1 dose of zosyn and vancomycin   - C/w vancomycin 1 g q24 w/ bilevel dosing   - Cefepime 2 g q12

## 2020-11-17 NOTE — H&P ADULT - NSHPLABSRESULTS_GEN_ALL_CORE
7.9    1.49  )-----------( 173      ( 17 Nov 2020 12:17 )             25.3       11-17-20 @ 12:17    141  |  98  |  47<H>             --------------------------< 134<H>     4.4  |  19<L>  | 1.58<H>    eGFR AA: 45<L>  eGFR N-AA: 39<L>    Calcium: 8.8  Phosphorus: 4.9<H>  Magnesium: 2.0    AST: 72<H>    ALT: 40  AlkPhos: 116  Protein: 5.8<L>  Albumin: 2.3<L>  TBili: 0.7  D-Bili: --      < from: CT Upper Extremity No Cont, Left (11.17.20 @ 16:45) >    IMPRESSION:  1.  As on prior CT scan, there is an extensive soft tissue/fluid density aggressive appearing lesion surrounding the left proximal humerus. Differential for this appearance includes tumor extension, and to a less likely degree infection. Consider biopsy/sampling of the tissue for further evaluation.  2.  Unchanged appearance of the left humerus with comminuted proximal humeral fracture and ORIF hardware.        CHEYENNE MARTINEZ MD; Attending Radiologist  This document has been electronically signed. Nov 17 2020  5:16PM    < end of copied text >

## 2020-11-17 NOTE — H&P ADULT - PROBLEM SELECTOR PLAN 5
The patient has known CKD w/ a recent DANIELITO at last admission in October secondary to infection.

## 2020-11-17 NOTE — H&P ADULT - PROBLEM SELECTOR PLAN 2
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 Treated with 6 cycles of R-CHOP protocol. No recent chemotherapy.   - Consult Palliative care for further establishment of goals of care   - Consult Hematology for any possible palliative treatment of malignancy  - Tramdol and acetaminophen for pain management as PRN   - allopurinol 100mg for TLS prophylaxis   - Drobinol for appetite stimulation   - Bowel regimen w/ senna, Miralax and polyethylene glycol  - Fluconazole 200mg for prophylaxis

## 2020-11-17 NOTE — ED PROVIDER NOTE - ATTENDING CONTRIBUTION TO CARE
86 y.o. male hx of DLBCL (s/p 6 cycles of R-CHOP and in DELANEY until 9/2019), L shoulder mass and pathologic fracture s/p left proximal humerus open biopsy, radical resection of tumor, and ORIF with IMN and cementation on 9/25/20), Left arm DVT, HTN, HLD, CKD, BPH, anemia here for l arm redness, swelling and fever meets Severe sepsis on arrival and septic shock by lactate, 30cc/kg bolus, abx ordered, septic work up also ct chest and l arm r/o nec fasc, plus 2 pulses to arm, very swollen ecchmotic, check labs, inr, r/o pe.

## 2020-11-17 NOTE — CHART NOTE - NSCHARTNOTEFT_GEN_A_CORE
Spoke to patient's wife, Viktoria, regarding patient's clinical status. As per wife, patient's family was in the discussion of arranging home with home hospice for the patient. Patient was made DNR/DNI at that time. Confirmed with family that patient is confused and altered at baseline and cannot make decisions for himself. Palliative Care c/s placed to finish discussion for home with home hospice. Will treat medically for now.     Hyeokchan Kwon , PGY2  Pager: [phone] Spoke to patient's wife, Viktoria, regarding patient's clinical status. As per wife, patient's family was in the discussion of arranging home with home hospice for the patient. As per wife, she reported that there was no further plans for chemotherapy for pt's DLBCL. Patient was made DNR/DNI at that time. Confirmed with family that patient is confused and altered at baseline and cannot make decisions for himself. Palliative Care c/s placed to finish discussion for home with home hospice. Will treat medically for now.     Hyeokchan Kwon , PGY2  Pager: [phone]

## 2020-11-18 NOTE — CONSULT NOTE ADULT - ASSESSMENT
86M with PMH including DLBCL (s/p 6 cycles of R-CHOP), L shoulder mass and pathologic fracture (s/p L proximal humerus open biopsy, radical resection of tumor, and ORIF with IMN and cementation on 9/25/20), L arm DVT, HTN, HLD, and CKD here from [facility] with fever and tachycardia attributed to L shoulder cellulitis, on cefepime and vanco. Per wife, they have decided on hospice care at home, but they would like to transfer to PCU. She agreed to DNR, and also would not want a feeding tube placed. She also states he would want to die at home. Patient denies pain currently. Palliative care called to discuss GOC.    
Impression:    Sacral stage 3 pressure ulcer present on admission  Incontinence dermatitis  Incontinence of bowel    Recommend:  1.) topical therapy: sacral wound - cleanse with NS, pat dry, apply medihoney cover with allevyn foam dressing every other day  2.) Maintain on an alternating air with low air loss surface  3.) turn and reposition Q 2 hours  4.) Nutrition optimization  5.) Incontinence management - incontinence pads, cleanser, pericare BID  6.) Offload heels/feet with complete care air fluidized boots  7.) Emollient therapy: Moisturize intact skin w/ SWEEN cream daily  8.) Chair cushion for chair sitting, limit chair sitting to eating and transfers  9.)  If discharged home, patient requires a semi-electric hospital bed with a low air loss surface. She has a stage 3 sacral pressure ulcer and is incontinent of stool. Therefore, she requires immediate and frequent turning and positioning and incontinence and wound care. Please arrange prior to discharge.    Care as per medicine will follow w/ you  Upon discharge [facility] [address] [place], [place] [phone]  Seen with Dr. Queen.  Thank you for this consult  Patricia Hardy, MATILDE-C, CWOCN [id number]  
85 yo M PMH DLBCL (s/p 6 cycles of R-CHOP and in DELANEY until 9/2019), L shoulder mass and pathologic fracture s/p left proximal humerus open biopsy, radical resection of tumor, and ORIF with IMN and cementation on 9/25/20),Left arm DVT, HTN, HLD, CKD, BPH, anemia, syncope, and anxiety/adjustment disorder/depression who presented to [facility] on 11/17/20 with worsening LUE and shoulder edema, pain and fever.  In the ED febrile to 101.6, relatively hypotensive to SBP in the 90's, tachycardic to > 120.     On presentation WBC of 1.49 with ANC of 540.   U/A with 8 WBC.   RVP (11/17) negative.   COVID19 PCR (11/17) negative.    CTA Chest (11/17) No pulmonary embolus  CT RUE (11/17) with extensive soft tissue/fluid density aggressive appearing lesion surrounding the left proximal humerus. Differential for this appearance includes tumor extension, and to a less likely degree infection.     Unclear if edema and fever is secondary to thrombus and soft tissue/joint invasion of L shoulder by tumor or if there is component of superinfection.    At this point, especially given neutropenia agree with covering with Vancomycin and Cefepime    Would check MRSA nasal PCR    Overall, Neutropenic Fever, L Shoulder Swelling, Lactic Acidosis, DLBCL, Therapeutic Drug Monitoring    --Recommend MRSA/MSSA Nasal PCR (ordered)  --Continue Vancomycin 1g IV Q24H. Check trough prior to third dose. Target trough of 10-15  --Continue to monitor renal function and vancomycin levels to avoid nephrotoxicity / otoxicity secondary to vancomycin  --Continue Cefepime 2g IV Q12H.   --Continue to follow CBC with diff  --Continue to follow renal function (Cr/BUN)  --Continue to follow temperature curve  --Follow up on preliminary blood cultures    I will continue to follow. Please feel free to contact me with any further questions.    Luke Garcia M.D.  [facility] Division of Infectious Disease  8AM-5PM: Pager Number [phone]  After Hours (or if no response): Please contact the Infectious Diseases Office at [phone]     The above assessment and plan were discussed with medicine resident

## 2020-11-18 NOTE — CONSULT NOTE ADULT - SUBJECTIVE AND OBJECTIVE BOX
HPI: 86M with PMH including DLBCL (s/p 6 cycles of R-CHOP), L shoulder mass and pathologic fracture (s/p L proximal humerus open biopsy, radical resection of tumor, and ORIF with IMN and cementation on 20), L arm DVT, HTN, HLD, and CKD here from [facility] with fever and tachycardia attributed to L shoulder cellulitis, on cefepime and vanco. Per wife, they have decided on hospice care at home, but they would like to transfer to PCU. She agreed to DNR, and also would not want a feeding tube placed. She also states he would want to die at home. Patient denies pain currently. Palliative care called to discuss GOC.    PERTINENT PM/SXH:   Syncope  Chronic kidney disease (CKD)  Diffuse large B cell lymphoma  HLD (hyperlipidemia)  BPH (benign prostatic hyperplasia)  HTN (hypertension)    No pertinent past medical history      History of left shoulder fracture    Osteomyelitis of left shoulder region    No significant past surgical history      FAMILY HISTORY:  FH: brain cancer  brother    FH: lung cancer  father      ITEMS NOT CHECKED ARE NOT PRESENT    SOCIAL HISTORY:   Significant other/partner[X ]  Children[X ]  Christian/Spirituality:  Substance hx:  [ ]   Tobacco hx:  [ ]   Alcohol hx: [ ]   Home Opioid hx:  [ ] I-Stop Reference No: [id number]    2020	dronabinol 2.5 mg capsule	40	20	Win, Sein ( M D)	[place]	[facility]  2020	tramadol hcl 50 mg tablet	40	20	Win, Sein ( M D)	[place]	[facility]  10/01/2020	10/01/2020	tramadol hcl 50 mg tablet	40	10	Win, Sein ( M D)	[place]	[facility]  2020	tramadol hcl 50 mg tablet	40	20	Win, Sein ( M D)	[place]	[facility]    Living Situation: [ ]Home  [ ]Long term care  [X ]Rehab [ ]Other  Home Services: [ ] HHA [ ] Visting RN [ ] Hospice    ADVANCE DIRECTIVES:    DNR  Yes    Yes    MOLST  [ ]  Living Will  [ ]   DECISION MAKER(s):  [ ] Health Care Proxy(s)  [ ] Surrogate(s)  [ ] Guardian           Name(s): Phone Number(s):    BASELINE (I)ADL(s) (prior to admission):  [place]: [ ]Total  [ ] Moderate [ ]Dependent    Allergies    No Known Allergies    Intolerances    MEDICATIONS  (STANDING):  acetaminophen   Tablet .. 650 milliGRAM(s) Oral every 6 hours  allopurinol 100 milliGRAM(s) Oral daily  amLODIPine   Tablet 2.5 milliGRAM(s) Oral daily  atorvastatin 80 milliGRAM(s) Oral at bedtime  cefepime   IVPB 2000 milliGRAM(s) IV Intermittent every 12 hours  dronabinol 2.5 milliGRAM(s) Oral two times a day before meals  enoxaparin Injectable 70 milliGRAM(s) SubCutaneous daily  fluconAZOLE   Tablet 200 milliGRAM(s) Oral daily  lactobacillus acidophilus 1 Tablet(s) Oral daily  latanoprost 0.005% Ophthalmic Solution 1 Drop(s) Both EYES at bedtime  mirtazapine 15 milliGRAM(s) Oral at bedtime  polyethylene glycol 3350 17 Gram(s) Oral daily  senna 2 Tablet(s) Oral at bedtime  sodium bicarbonate 650 milliGRAM(s) Oral two times a day  tamsulosin 0.4 milliGRAM(s) Oral at bedtime  timolol 0.5% Solution 1 Drop(s) Both EYES two times a day  vancomycin  IVPB 1000 milliGRAM(s) IV Intermittent every 24 hours    MEDICATIONS  (PRN):  traMADol 25 milliGRAM(s) Oral every 6 hours PRN Moderate Pain (4 - 6)    PRESENT SYMPTOMS: [ ]Unable to obtain due to poor mentation   Source if other than patient:  [ ]Family   [ ]Team     Pain: [ ]yes [X ]no  QOL impact -   Location -                    Aggravating factors -  Quality -  Radiation -  Timing-  Severity (0-10 scale):  Minimal acceptable level (0-10 scale):     CPOT:    https://www.sccm.org/getattachment/isf05v25-1y3k-8n9f-8d7g-3243a3942d4c/Critical-Care-Pain-Observation-Tool-(CPOT)      PAIN AD Score:     http://geriatrictoolkit.[email or web address]/cog/painad.pdf (press ctrl +  left click to view)    Dyspnea:                           [ ]Mild [ ]Moderate [ ]Severe  Anxiety:                             [ ]Mild [ ]Moderate [ ]Severe  Fatigue:                             [ ]Mild [ ]Moderate [ ]Severe  Nausea:                             [ ]Mild [ ]Moderate [ ]Severe  Loss of appetite:              [ ]Mild [ ]Moderate [ ]Severe  Constipation:                    [ ]Mild [ ]Moderate [ ]Severe    Other Symptoms:  [X ]All other review of systems negative     Palliative Performance Status Version 2:         %    http://[facility].org/files/news/palliative_performance_scale_ppsv2.pdf  PHYSICAL EXAM:  Vital Signs Last 24 Hrs  T(C): 36.7 (2020 14:53), Max: 36.9 (2020 22:54)  T(F): 98.1 (2020 14:53), Max: 98.4 (2020 22:54)  HR: 95 (2020 14:53) (95 - 105)  BP: 106/70 (2020 14:53) (92/60 - 126/77)  BP(mean): 83 (2020 17:22) (83 - 83)  RR: 20 (2020 14:53) (18 - 22)  SpO2: 100% (2020 14:53) (96% - 100%) I&O's Summary    2020 07:01  -  2020 07:00  --------------------------------------------------------  IN: 50 mL / OUT: 525 mL / NET: -475 mL    2020 07:01  -  2020 16:40  --------------------------------------------------------  IN: 240 mL / OUT: 300 mL / NET: -60 mL      GENERAL:  [X ]Alert  [ ]Oriented x   [ ]Lethargic  [ ]Cachexia  [ ]Unarousable  [ ]Verbal  [ X]Non-Verbal  Behavioral:   [ ] Anxiety  [ ] Delirium [ ] Agitation [ ] Other  HEENT:  [ ]Normal   [ ]Dry mouth   [ ]ET Tube/Trach  [ ]Oral lesions  PULMONARY:   [ X]Clear [ ]Tachypnea  [ ]Audible excessive secretions   [ ]Rhonchi        [ ]Right [ ]Left [ ]Bilateral  [ ]Crackles        [ ]Right [ ]Left [ ]Bilateral  [ ]Wheezing     [ ]Right [ ]Left [ ]Bilateral  [ ]Diminished breath sounds [ ]right [ ]left [ ]bilateral  CARDIOVASCULAR:    [ X]Regular [ ]Irregular [ ]Tachy  [ ]Noble [ ]Murmur [ ]Other  GASTROINTESTINAL:  [ X]Soft  [ ]Distended   [ X]+BS  [ X]Non tender [ ]Tender  [ ]PEG [ ]OGT/ NGT  Last BM:     GENITOURINARY:  [ ]Normal [ ] Incontinent   [ ]Oliguria/Anuria   [ ]Gibson  MUSCULOSKELETAL:   [ ]Normal   [ ]Weakness  [ ]Bed/Wheelchair bound [ ]Edema  NEUROLOGIC:   [ ]No focal deficits  [X ]Cognitive impairment  [ ]Dysphagia [ ]Dysarthria [ ]Paresis [ ]Other   SKIN:   [ ]Normal    [ ]Rash  [ ]Pressure ulcer(s)       Present on admission [ ]y [ ]n    CRITICAL CARE:  [ ] Shock Present  [ ]Septic [ ]Cardiogenic [ ]Neurologic [ ]Hypovolemic  [ ]  Vasopressors [ ]  Inotropes   [ ]Respiratory failure present [ ]Mechanical ventilation [ ]Non-invasive ventilatory support [ ]High flow  [ ]Acute  [ ]Chronic [ ]Hypoxic  [ ]Hypercarbic [ ]Other  [ ]Other organ failure     LABS: reviewed                        6.4    0.95  )-----------( 124      ( 2020 07:23 )             20.4       143  |  105  |  47<H>  ----------------------------<  86  3.9   |  22  |  1.53<H>    Ca    8.1<L>      2020 07:20  Phos  4.6       Mg     2.0         TPro  4.9<L>  /  Alb  1.9<L>  /  TBili  0.4  /  DBili  x   /  AST  56<H>  /  ALT  32  /  AlkPhos  91    PT/INR - ( 2020 12:17 )   PT: 11.2 sec;   INR: 0.93 ratio         PTT - ( 2020 12:17 )  PTT:33.2 sec    Urinalysis Basic - ( 2020 12:41 )    Color: Yellow / Appearance: Slightly Turbid / S.029 / pH: x  Gluc: x / Ketone: Negative  / Bili: Negative / Urobili: Negative   Blood: x / Protein: 100 mg/dL / Nitrite: Negative   Leuk Esterase: Moderate / RBC: 5 /hpf / WBC 8 /HPF   Sq Epi: x / Non Sq Epi: 2 / Bacteria: Few      RADIOLOGY & ADDITIONAL STUDIES:  CT upper extremity  1.  As on prior CT scan, there is an extensive soft tissue/fluid density aggressive appearing lesion surrounding the left proximal humerus. Differential for this appearance includes tumor extension, and to a less likely degree infection. Consider biopsy/sampling of the tissue for further evaluation.  2.  Unchanged appearance of the left humerus with comminuted proximal humeral fracture and ORIF hardware.    PROTEIN CALORIE MALNUTRITION PRESENT: [ ]mild [ ]moderate [ ]severe [ ]underweight [ ]morbid obesity  https://www.andeal.org/vault/2440/web/files/ONC/Table_Clinical%20Characteristics%20to%20Document%20Malnutrition-White%20JV%20et%20al%030750.pdf    Height (cm): 182.9 (20 @ 22:54), 175.3 (10-19-20 @ 09:24)  Weight (kg): 69.3 (20 @ 22:54), 63.5 (10-31-20 @ 21:11), 77.1 (20 @ 14:06)  BMI (kg/m2): 20.7 (20 @ 22:54), 20.7 (10-31-20 @ 21:11), 25.1 (10-19-20 @ 09:24)    [ ]PPSV2 < or = to 30% [ ]significant weight loss  [ ]poor nutritional intake  [ ]anasarca     Albumin, Serum: 1.9 g/dL (20 @ 07:20)   [ ]Artificial Nutrition      REFERRALS:   [ ]Chaplaincy  [ ]Hospice  [ ]Child Life  [ ]Social Work  [ ]Case management [ ]Holistic Therapy     Goals of Care Document: MICA Smith (10-17-20 @ 23:00)          ______________  Sal Murray MD   of Geriatric and Palliative Medicine  [facility]     Please page the following number for clinical matters between the hours of 9AM and 5PM   from Monday through Friday : [phone]    After 5PM and on weekends, please page: [phone]. The Geriatric and Palliative Medicine consult service has  coverage for medical recommendations, including for symptom management needs.

## 2020-11-18 NOTE — CONSULT NOTE ADULT - SUBJECTIVE AND OBJECTIVE BOX
Wound Surgery Consult Note:    HPI:  Patient is a 86 year old gentlemen with a pmh of DLBCL (s/p 6 cycles of R-CHOP and in [facility] until 2019), L shoulder mass and pathologic fracture s/p left proximal humerus open biopsy, radical resection of tumor, and ORIF with IMN and cementation on 20),Left arm DVT, HTN, HLD, CKD, BPH, anemia, syncope, and anxiety/adjustment disorder/depression who is presenting from rehabilitation facility for tachycardia. Per EMS, patient was complaining of L shoulder pain and swelling that had been worsening for 2 weeks since being discharged from the hospital. It is unknown if patient is taking anticoagulation at this time.     With regards to the patients cancer history, he was diagnosed with NHL 5 years ago and was treated w/ chemotherapy and was in remission. Earlier in  the patient was found to have persistent weight loss and subsequent workup demonstrated new malignant lesions in the mesentery and kidney. The patient was found to have a new mass w/ a pathologic fracture of the L proximal humerus which required radical resection, ORIF and IMN with cementation in 20. The patient was unable to undergo post operation radiation due to development of redness and swelling of the shoulder concerning for infection which began on 10/13/20. The patient was treated w/ IV abx and workup was suggestive of osteomyelitis but it couldn't be confirmed due to the patient having metal in his shoulder and not being able to undergo MRI study. During this admission the patient was found to have DANIELITO on CKD which resolved. He was discharged from the hospital to subacute rehabilitation prior to his current presentation.     In the ED, the patients vitals showed bp 110/83, HR of 130, temp 101.6, RR 35 and O2 saturations of 98% on 2L NC. Labs notable for a lactate of 7.9, leukopenia 1.49 (Neutropenia 0.54) , hemoglobin 7.9 and BNP 4911.  (2020 19:08)    Request for wound care consult for sacral wound received. Mr. Hernandez was encountered on an alternating air with low air loss surface resting comfortably. He was seen with Dr. Queen.    PAST MEDICAL & SURGICAL HISTORY:  Syncope  Chronic kidney disease (CKD)  Diffuse large B cell lymphoma  HLD (hyperlipidemia)  BPH (benign prostatic hyperplasia)  HTN (hypertension)  History of left shoulder fracture  Osteomyelitis of left shoulder region    REVIEW OF SYSTEMS  Unable to obtain due to patient's condition    MEDICATIONS  (STANDING):  acetaminophen   Tablet .. 650 milliGRAM(s) Oral every 6 hours  allopurinol 100 milliGRAM(s) Oral daily  amLODIPine   Tablet 2.5 milliGRAM(s) Oral daily  atorvastatin 80 milliGRAM(s) Oral at bedtime  cefepime   IVPB 2000 milliGRAM(s) IV Intermittent every 12 hours  dronabinol 2.5 milliGRAM(s) Oral two times a day before meals  enoxaparin Injectable 70 milliGRAM(s) SubCutaneous daily  fluconAZOLE   Tablet 200 milliGRAM(s) Oral daily  lactobacillus acidophilus 1 Tablet(s) Oral daily  latanoprost 0.005% Ophthalmic Solution 1 Drop(s) Both EYES at bedtime  mirtazapine 15 milliGRAM(s) Oral at bedtime  polyethylene glycol 3350 17 Gram(s) Oral daily  senna 2 Tablet(s) Oral at bedtime  sodium bicarbonate 650 milliGRAM(s) Oral two times a day  tamsulosin 0.4 milliGRAM(s) Oral at bedtime  timolol 0.5% Solution 1 Drop(s) Both EYES two times a day  vancomycin  IVPB 1000 milliGRAM(s) IV Intermittent every 24 hours    MEDICATIONS  (PRN):  traMADol 25 milliGRAM(s) Oral every 6 hours PRN Moderate Pain (4 - 6)    Allergies    No Known Allergies    Intolerances    SOCIAL HISTORY:  unable to obtain due to patient condition    FAMILY HISTORY:  FH: brain cancer brother  FH: lung cancer father    Vital Signs Last 24 Hrs  T(C): 36.7 (2020 14:53), Max: 36.9 (2020 22:54)  T(F): 98.1 (2020 14:53), Max: 98.4 (2020 22:54)  HR: 95 (2020 14:53) (95 - 107)  BP: 106/70 (2020 14:53) (92/60 - 126/77)  BP(mean): 83 (2020 17:22) (83 - 86)  RR: 20 (2020 14:53) (16 - 22)  SpO2: 100% (2020 14:53) (96% - 100%)    Physical Exam:  General: Confused, frail  Respiratory: no SOB on room air  Gastrointestinal: soft NT/ND   Neurology: weakened strength & sensation grossly intact  Musculoskeletal: no contractures, Left shoulder swollen, limited ROM due to edema, turning elicits grimace indicative of pain  Vascular: no BLE edema, BUE edema, L>R, DP/PT pulses palpable, BLE equally warm, no acute ischemia noted  Skin:  Sacral wound - L 4cm x W 7cm x D 0.2cm - wound with isaac wound tissue, and 50% covered with layer of yellow slough, moderate serosanguinous drainage, +TTP, No odor, erythema, increased warmth, induration, fluctuance    LABS:      143  |  105  |  47<H>  ----------------------------<  86  3.9   |  22  |  1.53<H>    Ca    8.1<L>      2020 07:20  Phos  4.6       Mg     2.0         TPro  4.9<L>  /  Alb  1.9<L>  /  TBili  0.4  /  DBili  x   /  AST  56<H>  /  ALT  32  /  AlkPhos  91                            6.4    0.95  )-----------( 124      ( 2020 07:23 )             20.4     PT/INR - ( 2020 12:17 )   PT: 11.2 sec;   INR: 0.93 ratio         PTT - ( 2020 12:17 )  PTT:33.2 sec  Urinalysis Basic - ( 2020 12:41 )    Color: Yellow / Appearance: Slightly Turbid / S.029 / pH: x  Gluc: x / Ketone: Negative  / Bili: Negative / Urobili: Negative   Blood: x / Protein: 100 mg/dL / Nitrite: Negative   Leuk Esterase: Moderate / RBC: 5 /hpf / WBC 8 /HPF   Sq Epi: x / Non Sq Epi: 2 / Bacteria: Few

## 2020-11-18 NOTE — PROGRESS NOTE ADULT - PROBLEM SELECTOR PLAN 1
The patient has severe L shoulder swelling with erythema, edema and warmth on exam. 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 Unable to confirm Osteo with MRI.   - s/p 2 L fluids   - F/u blood and urine cultures   - s/p 1 dose of zosyn and vancomycin   - C/w vancomycin 1 g q24 w/ bilevel dosing   - Cefepime 2 g q12 The patient has severe L shoulder swelling with erythema, edema and warmth on exam. 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 Unable to confirm Osteo with MRI.   - s/p 2 L fluids   - F/u blood and urine cultures   - s/p 1 dose of zosyn and vancomycin   - C/w vancomycin 1 g q24 w/ bilevel dosing   - Cefepime 2 g q12  - F/u Palliative care recommendations and possible PCU transfer

## 2020-11-18 NOTE — PROGRESS NOTE ADULT - FAMILY/SPOUSE
Wife Viktoria Hernandez: Decided they would like to pursue PCU for inpatient hospice. Would like to continue with IV antibiotics and blood transfusions to prolong life so the patients children could come to say good bye. Patient is made DNR/DNI.

## 2020-11-18 NOTE — CONSULT NOTE ADULT - PROBLEM SELECTOR RECOMMENDATION 3
- c/w abx for now, awaiting more family to come, and may help with goal of stabilizing him to go home if possible

## 2020-11-18 NOTE — CONSULT NOTE ADULT - SUBJECTIVE AND OBJECTIVE BOX
Patient is a 86y old  Male who presents with a chief complaint of     HPI:    86 year old gentlemen with a pmh of DLBCL (s/p 6 cycles of R-CHOP and in [facility] until 2019), L shoulder mass and pathologic fracture s/p left proximal humerus open biopsy, radical resection of tumor, and ORIF with IMN and cementation on 20),Left arm DVT, HTN, HLD, CKD, BPH, anemia, syncope, and anxiety/adjustment disorder/depression who is presenting from rehabilitation facility for tachycardia. Per EMS, patient was complaining of L shoulder pain and swelling that had been worsening for 2 weeks since being discharged from the hospital. It is unknown if patient is taking anticoagulation at this time.     With regards to the patients cancer history, he was diagnosed with NHL 5 years ago and was treated w/ chemotherapy and was in remission. Earlier in  the patient was found to have persistent weight loss and subsequent workup demonstrated new malignant lesions in the mesentery and kidney. The patient was found to have a new mass w/ a pathologic fracture of the L proximal humerus which required radical resection, ORIF and IMN with cementation in 20. The patient was unable to undergo post operation radiation due to development of redness and swelling of the shoulder concerning for infection which began on 10/13/20. The patient was treated w/ IV abx and workup was suggestive of osteomyelitis but it couldn't be confirmed due to the patient having metal in his shoulder and not being able to undergo MRI study. During this admission the patient was found to have DANIELITO on CKD which resolved. He was discharged from the hospital to subacute rehabilitation prior to his current presentation.     In the ED, the patients vitals showed bp 110/83, HR of 130, temp 101.6, RR 35 and O2 saturations of 98% on 2L NC. Labs notable for a lactate of 7.9, leukopenia 1.49 (Neutropenia 0.54) , hemoglobin 7.9 and BNP 4911.  (2020 19:08)     prior hospital charts reviewed [  ]  primary team notes reviewed [  ]  other consultant notes reviewed [  ]    PAST MEDICAL & SURGICAL HISTORY:  Syncope    Chronic kidney disease (CKD)    Diffuse large B cell lymphoma    HLD (hyperlipidemia)    BPH (benign prostatic hyperplasia)    HTN (hypertension)    History of left shoulder fracture    Osteomyelitis of left shoulder region        Allergies  No Known Allergies    ANTIMICROBIALS (past 90 days)  MEDICATIONS  (STANDING):  cefepime   IVPB   100 mL/Hr IV Intermittent (20 @ 05:54)    piperacillin/tazobactam IVPB...   200 mL/Hr IV Intermittent (20 @ 11:58)    vancomycin  IVPB.   250 mL/Hr IV Intermittent (20 @ 12:30)      ANTIMICROBIALS:    cefepime   IVPB 2000 every 12 hours  fluconAZOLE   Tablet 200 daily  vancomycin  IVPB 1000 every 24 hours    OTHER MEDS: MEDICATIONS  (STANDING):  acetaminophen   Tablet .. 650 every 6 hours  allopurinol 100 daily  amLODIPine   Tablet 2.5 daily  atorvastatin 80 at bedtime  dronabinol 2.5 two times a day before meals  enoxaparin Injectable 70 daily  mirtazapine 15 at bedtime  polyethylene glycol 3350 17 daily  senna 2 at bedtime  tamsulosin 0.4 at bedtime  traMADol 25 every 6 hours PRN    SOCIAL HISTORY:   hx smoking  non-smoker    FAMILY HISTORY:  FH: brain cancer  brother    FH: lung cancer  father      REVIEW OF SYSTEMS  [  ] ROS unobtainable because:    [  ] All other systems negative except as noted below:	    Constitutional:  [ ] fever [ ] chills  [ ] weight loss  [ ] weakness  Skin:  [ ] rash [ ] phlebitis	  Eyes: [ ] icterus [ ] pain  [ ] discharge	  ENMT: [ ] sore throat  [ ] thrush [ ] ulcers [ ] exudates  Respiratory: [ ] dyspnea [ ] hemoptysis [ ] cough [ ] sputum	  Cardiovascular:  [ ] chest pain [ ] palpitations [ ] edema	  Gastrointestinal:  [ ] nausea [ ] vomiting [ ] diarrhea [ ] constipation [ ] pain	  Genitourinary:  [ ] dysuria [ ] frequency [ ] hematuria [ ] discharge [ ] flank pain  [ ] incontinence  Musculoskeletal:  [ ] myalgias [ ] arthralgias [ ] arthritis  [ ] back pain  Neurological:  [ ] headache [ ] seizures  [ ] confusion/altered mental status  Psychiatric:  [ ] anxiety [ ] depression	  Hematology/Lymphatics:  [ ] lymphadenopathy  Endocrine:  [ ] adrenal [ ] thyroid  Allergic/Immunologic:	 [ ] transplant [ ] seasonal    Vital Signs Last 24 Hrs  T(F): 98.3 (20 @ 05:15), Max: 101.6 (20 @ 11:45)  Vital Signs Last 24 Hrs  HR: 105 (20 @ 05:15) (96 - 130)  BP: 114/72 (20 @ 05:15) (94/60 - 126/77)  RR: 18 (20 @ 05:15)  SpO2: 96% (20 @ 05:15) (96% - 100%)  Wt(kg): --    PHYSICAL EXAMINATION:  General: Alert and Awake, NAD  HEENT: PERRL, EOMI, No subconjunctival hemorrhages, Oropharynx Clear, MMM  Neck: Supple, No MAURA  Cardiac: RRR, No M/R/G  Resp: CTAB, No Wh/Rh/Ra  Abdomen: NBS, NT/ND, No HSM, No rigidity or guarding  MSK: No LE edema. No stigmata of IE. No evidence of phlebitis. No evidence of synovitis.  : No tobar  Skin: No rashes or lesions. Skin is warm and dry to the touch.   Neuro: Alert and Awake. CN 2-12 Grossly intact. Moves all four extremities spontaneously.  Psych: Calm, Pleasant, Cooperative                          6.4    0.95  )-----------( 124      ( 2020 07:23 )             20.4         143  |  105  |  47<H>  ----------------------------<  86  3.9   |  22  |  1.53<H>    Ca    8.1<L>      2020 07:20  Phos  4.6       Mg     2.0         TPro  4.9<L>  /  Alb  1.9<L>  /  TBili  0.4  /  DBili  x   /  AST  56<H>  /  ALT  32  /  AlkPhos  91      Urinalysis Basic - ( 2020 12:41 )    Color: Yellow / Appearance: Slightly Turbid / S.029 / pH: x  Gluc: x / Ketone: Negative  / Bili: Negative / Urobili: Negative   Blood: x / Protein: 100 mg/dL / Nitrite: Negative   Leuk Esterase: Moderate / RBC: 5 /hpf / WBC 8 /HPF   Sq Epi: x / Non Sq Epi: 2 / Bacteria: Few    MICROBIOLOGY:          Rapid RVP Result: NotDetec ( @ 12:41)        RADIOLOGY:    <The imaging below has been reviewed and visualized by me independently. Findings as detailed in report below>     Patient is a 86y old  Male who presents with a chief complaint of     HPI:    86 year old gentlemen with a pmh of DLBCL (s/p 6 cycles of R-CHOP and in [facility] until 2019), L shoulder mass and pathologic fracture s/p left proximal humerus open biopsy, radical resection of tumor, and ORIF with IMN and cementation on 20),Left arm DVT, HTN, HLD, CKD, BPH, anemia, syncope, and anxiety/adjustment disorder/depression who is presenting from rehabilitation facility for tachycardia. Per EMS, patient was complaining of L shoulder pain and swelling that had been worsening for 2 weeks since being discharged from the hospital. It is unknown if patient is taking anticoagulation at this time.     With regards to the patients cancer history, he was diagnosed with NHL 5 years ago and was treated w/ chemotherapy and was in remission. Earlier in  the patient was found to have persistent weight loss and subsequent workup demonstrated new malignant lesions in the mesentery and kidney. The patient was found to have a new mass w/ a pathologic fracture of the L proximal humerus which required radical resection, ORIF and IMN with cementation in 20. The patient was unable to undergo post operation radiation due to development of redness and swelling of the shoulder concerning for infection which began on 10/13/20. The patient was treated w/ IV abx and workup was suggestive of osteomyelitis but it couldn't be confirmed due to the patient having metal in his shoulder and not being able to undergo MRI study. During this admission the patient was found to have DANIELITO on CKD which resolved. He was discharged from the hospital to subacute rehabilitation prior to his current presentation.     In the ED febrile to 101.6, relatively hypotensive to SBP in the 90's, tachycardic to > 120. On presentation WBC of 1.49 with ANC of 540. U/A with 8 WBC. RVP () negative. COVID19 PCR () negative. CT RUE () with extensive soft tissue/fluid density aggressive appearing lesion surrounding the left proximal humerus. Differential for this appearance includes tumor extension, and to a less likely degree infection. CTA Chest () No pulmonary embolus     prior hospital charts reviewed [  ]  primary team notes reviewed [  ]  other consultant notes reviewed [  ]    PAST MEDICAL & SURGICAL HISTORY:  Syncope    Chronic kidney disease (CKD)    Diffuse large B cell lymphoma    HLD (hyperlipidemia)    BPH (benign prostatic hyperplasia)    HTN (hypertension)    History of left shoulder fracture    Osteomyelitis of left shoulder region        Allergies  No Known Allergies    ANTIMICROBIALS (past 90 days)  MEDICATIONS  (STANDING):  cefepime   IVPB   100 mL/Hr IV Intermittent (20 @ 05:54)    piperacillin/tazobactam IVPB...   200 mL/Hr IV Intermittent (20 @ 11:58)    vancomycin  IVPB.   250 mL/Hr IV Intermittent (20 @ 12:30)      ANTIMICROBIALS:    cefepime   IVPB 2000 every 12 hours  fluconAZOLE   Tablet 200 daily  vancomycin  IVPB 1000 every 24 hours    OTHER MEDS: MEDICATIONS  (STANDING):  acetaminophen   Tablet .. 650 every 6 hours  allopurinol 100 daily  amLODIPine   Tablet 2.5 daily  atorvastatin 80 at bedtime  dronabinol 2.5 two times a day before meals  enoxaparin Injectable 70 daily  mirtazapine 15 at bedtime  polyethylene glycol 3350 17 daily  senna 2 at bedtime  tamsulosin 0.4 at bedtime  traMADol 25 every 6 hours PRN    SOCIAL HISTORY:   hx smoking  non-smoker    FAMILY HISTORY:  FH: brain cancer  brother    FH: lung cancer  father      REVIEW OF SYSTEMS  [  ] ROS unobtainable because:    [  ] All other systems negative except as noted below:	    Constitutional:  [ ] fever [ ] chills  [ ] weight loss  [ ] weakness  Skin:  [ ] rash [ ] phlebitis	  Eyes: [ ] icterus [ ] pain  [ ] discharge	  ENMT: [ ] sore throat  [ ] thrush [ ] ulcers [ ] exudates  Respiratory: [ ] dyspnea [ ] hemoptysis [ ] cough [ ] sputum	  Cardiovascular:  [ ] chest pain [ ] palpitations [ ] edema	  Gastrointestinal:  [ ] nausea [ ] vomiting [ ] diarrhea [ ] constipation [ ] pain	  Genitourinary:  [ ] dysuria [ ] frequency [ ] hematuria [ ] discharge [ ] flank pain  [ ] incontinence  Musculoskeletal:  [ ] myalgias [ ] arthralgias [ ] arthritis  [ ] back pain  Neurological:  [ ] headache [ ] seizures  [ ] confusion/altered mental status  Psychiatric:  [ ] anxiety [ ] depression	  Hematology/Lymphatics:  [ ] lymphadenopathy  Endocrine:  [ ] adrenal [ ] thyroid  Allergic/Immunologic:	 [ ] transplant [ ] seasonal    Vital Signs Last 24 Hrs  T(F): 98.3 (20 @ 05:15), Max: 101.6 (20 @ 11:45)  Vital Signs Last 24 Hrs  HR: 105 (20 @ 05:15) (96 - 130)  BP: 114/72 (20 @ 05:15) (94/60 - 126/77)  RR: 18 (20 @ 05:15)  SpO2: 96% (20 @ 05:15) (96% - 100%)  Wt(kg): --    PHYSICAL EXAMINATION:  General: Alert and Awake, NAD  HEENT: PERRL, EOMI, No subconjunctival hemorrhages, Oropharynx Clear, MMM  Neck: Supple, No MAURA  Cardiac: RRR, No M/R/G  Resp: CTAB, No Wh/Rh/Ra  Abdomen: NBS, NT/ND, No HSM, No rigidity or guarding  MSK: No LE edema. No stigmata of IE. No evidence of phlebitis. No evidence of synovitis.  : No tobar  Skin: No rashes or lesions. Skin is warm and dry to the touch.   Neuro: Alert and Awake. CN 2-12 Grossly intact. Moves all four extremities spontaneously.  Psych: Calm, Pleasant, Cooperative                          6.4    0.95  )-----------( 124      ( 2020 07:23 )             20.4         143  |  105  |  47<H>  ----------------------------<  86  3.9   |  22  |  1.53<H>    Ca    8.1<L>      2020 07:20  Phos  4.6       Mg     2.0         TPro  4.9<L>  /  Alb  1.9<L>  /  TBili  0.4  /  DBili  x   /  AST  56<H>  /  ALT  32  /  AlkPhos  91      Urinalysis Basic - ( 2020 12:41 )    Color: Yellow / Appearance: Slightly Turbid / S.029 / pH: x  Gluc: x / Ketone: Negative  / Bili: Negative / Urobili: Negative   Blood: x / Protein: 100 mg/dL / Nitrite: Negative   Leuk Esterase: Moderate / RBC: 5 /hpf / WBC 8 /HPF   Sq Epi: x / Non Sq Epi: 2 / Bacteria: Few    MICROBIOLOGY:    Rapid RVP Result: NotDetec ( @ 12:41)    RADIOLOGY:    <The imaging below has been reviewed and visualized by me independently. Findings as detailed in report below>    EXAM:  CT UPR EXT LT                        PROCEDURE DATE:  2020    1.  As on prior CT scan, there is an extensive soft tissue/fluid density aggressive appearing lesion surrounding the left proximal humerus. Differential for this appearance includes tumor extension, and to a less likely degree infection. Consider biopsy/sampling of the tissue for further evaluation.  2.  Unchanged appearance of the left humerus with comminuted proximal humeral fracture and ORIF hardware.    EXAM:  CT ANGIO CHEST (W)AW IC                        PROCEDURE DATE:  2020    Remaining incidental findings as above  No pulmonary embolus.     Patient is a 86y old  Male who presents with a chief complaint of     HPI:    86 year old gentlemen with a pmh of DLBCL (s/p 6 cycles of R-CHOP and in [facility] until 2019), L shoulder mass and pathologic fracture s/p left proximal humerus open biopsy, radical resection of tumor, and ORIF with IMN and cementation on 20),Left arm DVT, HTN, HLD, CKD, BPH, anemia, syncope, and anxiety/adjustment disorder/depression who is presenting from rehabilitation facility for tachycardia. Per EMS, patient was complaining of L shoulder pain and swelling that had been worsening for 2 weeks since being discharged from the hospital. It is unknown if patient is taking anticoagulation at this time.     With regards to the patients cancer history, he was diagnosed with NHL 5 years ago and was treated w/ chemotherapy and was in remission. Earlier in  the patient was found to have persistent weight loss and subsequent workup demonstrated new malignant lesions in the mesentery and kidney. The patient was found to have a new mass w/ a pathologic fracture of the L proximal humerus which required radical resection, ORIF and IMN with cementation in 20. The patient was unable to undergo post operation radiation due to development of redness and swelling of the shoulder concerning for infection which began on 10/13/20. The patient was treated w/ IV abx and workup was suggestive of osteomyelitis but it couldn't be confirmed due to the patient having metal in his shoulder and not being able to undergo MRI study. During this admission the patient was found to have DANIELITO on CKD which resolved. He was discharged from the hospital to subacute rehabilitation prior to his current presentation.     In the ED febrile to 101.6, relatively hypotensive to SBP in the 90's, tachycardic to > 120. On presentation WBC of 1.49 with ANC of 540. U/A with 8 WBC. RVP () negative. COVID19 PCR () negative. CT RUE () with extensive soft tissue/fluid density aggressive appearing lesion surrounding the left proximal humerus. Differential for this appearance includes tumor extension, and to a less likely degree infection. CTA Chest () No pulmonary embolus     prior hospital charts reviewed [ x ]  primary team notes reviewed [ x ]  other consultant notes reviewed [ x ]    PAST MEDICAL & SURGICAL HISTORY:  Syncope    Chronic kidney disease (CKD)    Diffuse large B cell lymphoma    HLD (hyperlipidemia)    BPH (benign prostatic hyperplasia)    HTN (hypertension)    History of left shoulder fracture    Osteomyelitis of left shoulder region        Allergies  No Known Allergies    ANTIMICROBIALS (past 90 days)  MEDICATIONS  (STANDING):  cefepime   IVPB   100 mL/Hr IV Intermittent (20 @ 05:54)    piperacillin/tazobactam IVPB...   200 mL/Hr IV Intermittent (20 @ 11:58)    vancomycin  IVPB.   250 mL/Hr IV Intermittent (20 @ 12:30)      ANTIMICROBIALS:    cefepime   IVPB 2000 every 12 hours  fluconAZOLE   Tablet 200 daily  vancomycin  IVPB 1000 every 24 hours    OTHER MEDS: MEDICATIONS  (STANDING):  acetaminophen   Tablet .. 650 every 6 hours  allopurinol 100 daily  amLODIPine   Tablet 2.5 daily  atorvastatin 80 at bedtime  dronabinol 2.5 two times a day before meals  enoxaparin Injectable 70 daily  mirtazapine 15 at bedtime  polyethylene glycol 3350 17 daily  senna 2 at bedtime  tamsulosin 0.4 at bedtime  traMADol 25 every 6 hours PRN    SOCIAL HISTORY: no smoking or etoh use.     FAMILY HISTORY:  FH: brain cancer  brother    FH: lung cancer  father      REVIEW OF SYSTEMS  [  ] ROS unobtainable because:    [ x ] All other systems negative except as noted below:	    Constitutional:  [x ] fever [ ] chills  [ ] weight loss  [ ] weakness  Skin:  [ ] rash [ ] phlebitis	  Eyes: [ ] icterus [ ] pain  [ ] discharge	  ENMT: [ ] sore throat  [ ] thrush [ ] ulcers [ ] exudates  Respiratory: [ ] dyspnea [ ] hemoptysis [ ] cough [ ] sputum	  Cardiovascular:  [ ] chest pain [ ] palpitations [ ] edema	  Gastrointestinal:  [ ] nausea [ ] vomiting [ ] diarrhea [ ] constipation [ ] pain	  Genitourinary:  [ ] dysuria [ ] frequency [ ] hematuria [ ] discharge [ ] flank pain  [ ] incontinence  Musculoskeletal:  [ ] myalgias [ ] arthralgias [ ] arthritis  [ ] back pain +Left shoulder swelling and pain  Neurological:  [ ] headache [ ] seizures  [ ] confusion/altered mental status  Psychiatric:  [ ] anxiety [ ] depression	  Hematology/Lymphatics:  [ ] lymphadenopathy  Endocrine:  [ ] adrenal [ ] thyroid  Allergic/Immunologic:	 [ ] transplant [ ] seasonal    Vital Signs Last 24 Hrs  T(F): 98.3 (20 @ 05:15), Max: 101.6 (20 @ 11:45)  Vital Signs Last 24 Hrs  HR: 105 (20 @ 05:15) (96 - 130)  BP: 114/72 (20 @ 05:15) (94/60 - 126/77)  RR: 18 (20 @ 05:15)  SpO2: 96% (20 @ 05:15) (96% - 100%)  Wt(kg): --    PHYSICAL EXAMINATION:  General: Alert and Awake, NAD  HEENT: PERRL, EOMI, No subconjunctival hemorrhages, Oropharynx Clear, MMM  Neck: Supple, No MAURA  Cardiac: RRR, No M/R/G  Resp: CTAB, No Wh/Rh/Ra  Abdomen: NBS, NT/ND, No HSM, No rigidity or guarding  MSK: Erythema and swelling of the left shoulder - most of the overlying changes appear chronic. ?erythema of the proximal arm. 2-3+ edema of the LUE and the hand. No LE edema. No stigmata of IE. No evidence of phlebitis. No evidence of synovitis.  : No tobar  Skin: MSK findings as above. no other rashes or lesions. Skin is warm and dry to the touch.   Neuro: Alert and Awake. CN 2-12 Grossly intact. Moves all four extremities spontaneously.  Psych: Calm, Pleasant, Cooperative                          6.4    0.95  )-----------( 124      ( 2020 07:23 )             20.4     -    143  |  105  |  47<H>  ----------------------------<  86  3.9   |  22  |  1.53<H>    Ca    8.1<L>      2020 07:20  Phos  4.6       Mg     2.0         TPro  4.9<L>  /  Alb  1.9<L>  /  TBili  0.4  /  DBili  x   /  AST  56<H>  /  ALT  32  /  AlkPhos  91  -18    Urinalysis Basic - ( 2020 12:41 )    Color: Yellow / Appearance: Slightly Turbid / S.029 / pH: x  Gluc: x / Ketone: Negative  / Bili: Negative / Urobili: Negative   Blood: x / Protein: 100 mg/dL / Nitrite: Negative   Leuk Esterase: Moderate / RBC: 5 /hpf / WBC 8 /HPF   Sq Epi: x / Non Sq Epi: 2 / Bacteria: Few    MICROBIOLOGY:    Rapid RVP Result: [employer] ( @ 12:41)    RADIOLOGY:    <The imaging below has been reviewed and visualized by me independently. Findings as detailed in report below>    EXAM:  CT UPR EXT LT                        PROCEDURE DATE:  2020    1.  As on prior CT scan, there is an extensive soft tissue/fluid density aggressive appearing lesion surrounding the left proximal humerus. Differential for this appearance includes tumor extension, and to a less likely degree infection. Consider biopsy/sampling of the tissue for further evaluation.  2.  Unchanged appearance of the left humerus with comminuted proximal humeral fracture and ORIF hardware.    EXAM:  CT ANGIO CHEST (W)AW IC                        PROCEDURE DATE:  2020    Remaining incidental findings as above  No pulmonary embolus.

## 2020-11-18 NOTE — PROGRESS NOTE ADULT - ASSESSMENT
Patient is a 86 year old gentlemen with a pmh of DLBCL (s/p 6 cycles of R-CHOP and in [facility] until 9/2019), L shoulder mass and pathologic fracture ( s/p left proximal humerus open biopsy, radical resection of tumor, and ORIF with IMN and cementation on 9/25/20),Left arm DVT, HTN, HLD, and CKD who is presenting from rehabilitation facility for tachycardia and fever most likely due to sepsis secondary to L should cellulitis. Differential includes Cellulitis vs. Osteomyelitis vs. LUE DVT. Less likely urosepsis given lack of urinary tract symptoms. UA demonstrated blood, WBC 8, RBC 6, Moderate LE and proteinuria. Patient is being treated w/ Cefepime and vancomycin for management of sepsis. Patient is a 86 year old gentlemen with a pmh of DLBCL (s/p 6 cycles of R-CHOP and in [facility] until 9/2019), L shoulder mass and pathologic fracture ( s/p left proximal humerus open biopsy, radical resection of tumor, and ORIF with IMN and cementation on 9/25/20),Left arm DVT, HTN, HLD, and CKD who is presenting from rehabilitation facility for tachycardia and fever most likely due to sepsis secondary to L should cellulitis. Differential includes Cellulitis vs. Osteomyelitis vs. LUE DVT. Less likely urosepsis given lack of urinary tract symptoms. UA demonstrated blood, WBC 8, RBC 6, Moderate LE and proteinuria. Patient is being treated w/ Cefepime and vancomycin for management of sepsis. Given the poor prognosis and family preference the family would like to move towards hospice care.

## 2020-11-18 NOTE — PROGRESS NOTE ADULT - SUBJECTIVE AND OBJECTIVE BOX
Amado Sehth, PGY-1  Internal Medicine  Pager: [phone] and [phone]     PROGRESS NOTE:     Patient is a 86y old  Male who presents with a chief complaint of     SUBJECTIVE / OVERNIGHT EVENTS: No acute overnight events.     REVIEW OF SYSTEMS:  CONSTITUTIONAL: No weakness, fevers or chills  EYES/ENT: No visual changes;  No vertigo or throat pain   NECK: No pain or stiffness  RESPIRATORY: No cough, wheezing, hemoptysis; No shortness of breath  CARDIOVASCULAR: No chest pain or palpitations  GASTROINTESTINAL: No abdominal or epigastric pain. No nausea, vomiting, or hematemesis; No diarrhea or constipation. No melena or hematochezia.  GENITOURINARY: No dysuria, frequency or hematuria  NEUROLOGICAL: No numbness or weakness  SKIN: No itching, burning, rashes, or lesions   All other review of systems is negative unless indicated above.    MEDICATIONS  (STANDING):  acetaminophen   Tablet .. 650 milliGRAM(s) Oral every 6 hours  allopurinol 100 milliGRAM(s) Oral daily  amLODIPine   Tablet 2.5 milliGRAM(s) Oral daily  atorvastatin 80 milliGRAM(s) Oral at bedtime  cefepime   IVPB 2000 milliGRAM(s) IV Intermittent every 12 hours  dronabinol 2.5 milliGRAM(s) Oral two times a day before meals  enoxaparin Injectable 70 milliGRAM(s) SubCutaneous daily  fluconAZOLE   Tablet 200 milliGRAM(s) Oral daily  lactobacillus acidophilus 1 Tablet(s) Oral daily  latanoprost 0.005% Ophthalmic Solution 1 Drop(s) Both EYES at bedtime  mirtazapine 15 milliGRAM(s) Oral at bedtime  polyethylene glycol 3350 17 Gram(s) Oral daily  senna 2 Tablet(s) Oral at bedtime  sodium bicarbonate 650 milliGRAM(s) Oral two times a day  tamsulosin 0.4 milliGRAM(s) Oral at bedtime  timolol 0.5% Solution 1 Drop(s) Both EYES two times a day  vancomycin  IVPB 1000 milliGRAM(s) IV Intermittent every 24 hours    MEDICATIONS  (PRN):  traMADol 25 milliGRAM(s) Oral every 6 hours PRN Moderate Pain (4 - 6)      CAPILLARY BLOOD GLUCOSE        I&O's Summary    2020 07:01  -  2020 07:00  --------------------------------------------------------  IN: 0 mL / OUT: 525 mL / NET: -525 mL        PHYSICAL EXAM:  Vital Signs Last 24 Hrs  T(C): 36.8 (2020 05:15), Max: 38.7 (2020 11:45)  T(F): 98.3 (2020 05:15), Max: 101.6 (2020 11:45)  HR: 105 (2020 05:15) (96 - 130)  BP: 114/72 (2020 05:15) (94/60 - 126/77)  BP(mean): 83 (2020 17:22) (70 - 95)  RR: 18 (2020 05:15) (16 - 36)  SpO2: 96% (2020 05:15) (96% - 100%)    CONSTITUTIONAL: Ill appearing, Mildly confused   EYES/ENT: EOMI, PERRL, anicteric no conjunctival injection  NECK: Supple   RESPIRATORY: CTA bilaterally without wheezing, rales or rhonchi  CARDIOVASCULAR: Normal s1 and s2 without murmur, rubs, gallops, RRR, No peripheral edema   GASTROINTESTINAL: NT, ND, +BS, No rebound/guarding, no hepatosplenomegaly  GENITOURINARY: No suprapubic tenderness or CVA  NEUROLOGICAL: A and O x1, answering questions minimally, unable to use LUE at all   EXTREMITIES: Swolled, edematous, erythematous and painful LUE w/ +3-4 pitting edema from humerus to distal phalanges   SKIN: No rash, ulcers or skin lesions        LABS:                        7.9    1.49  )-----------( 173      ( 2020 12:17 )             25.3     11-17    141  |  98  |  47<H>  ----------------------------<  134<H>  4.4   |  19<L>  |  1.58<H>    Ca    8.8      2020 12:17  Phos  4.9       Mg     2.0         TPro  5.8<L>  /  Alb  2.3<L>  /  TBili  0.7  /  DBili  x   /  AST  72<H>  /  ALT  40  /  AlkPhos  116  11-17    PT/INR - ( 2020 12:17 )   PT: 11.2 sec;   INR: 0.93 ratio         PTT - ( 2020 12:17 )  PTT:33.2 sec      Urinalysis Basic - ( 2020 12:41 )    Color: Yellow / Appearance: Slightly Turbid / S.029 / pH: x  Gluc: x / Ketone: Negative  / Bili: Negative / Urobili: Negative   Blood: x / Protein: 100 mg/dL / Nitrite: Negative   Leuk Esterase: Moderate / RBC: 5 /hpf / WBC 8 /HPF   Sq Epi: x / Non Sq Epi: 2 / Bacteria: Few          RADIOLOGY & ADDITIONAL TESTS:  Results Reviewed:   Imaging Personally Reviewed:  Electrocardiogram Personally Reviewed:    COORDINATION OF CARE:  Care Discussed with Consultants/Other Providers [Y/N]:  Prior or Outpatient Records Reviewed [Y/N]:   Amado Sheth, PGY-1  Internal Medicine  Pager: DR - [phone] and [phone] - [phone]     PROGRESS NOTE:     Patient is a 86y old  Male who presents with a chief complaint of     SUBJECTIVE / OVERNIGHT EVENTS: No acute overnight events. Reports dysuria and mild SOB. Otherwise no acute physical complaints.     REVIEW OF SYSTEMS:  CONSTITUTIONAL: No weakness, fevers or chills  EYES/ENT: No visual changes;  No vertigo or throat pain   NECK: No pain or stiffness  RESPIRATORY: No cough, wheezing, hemoptysis; Intermittent shortness of breath  CARDIOVASCULAR: No chest pain or palpitations  GASTROINTESTINAL: No abdominal or epigastric pain. No nausea, vomiting, or hematemesis; No diarrhea or constipation. No melena or hematochezia.  GENITOURINARY: + dysuria, No frequency or hematuria  NEUROLOGICAL: No numbness or weakness  SKIN: No itching, burning, rashes, or lesions   All other review of systems is negative unless indicated above.    MEDICATIONS  (STANDING):  acetaminophen   Tablet .. 650 milliGRAM(s) Oral every 6 hours  allopurinol 100 milliGRAM(s) Oral daily  amLODIPine   Tablet 2.5 milliGRAM(s) Oral daily  atorvastatin 80 milliGRAM(s) Oral at bedtime  cefepime   IVPB 2000 milliGRAM(s) IV Intermittent every 12 hours  dronabinol 2.5 milliGRAM(s) Oral two times a day before meals  enoxaparin Injectable 70 milliGRAM(s) SubCutaneous daily  fluconAZOLE   Tablet 200 milliGRAM(s) Oral daily  lactobacillus acidophilus 1 Tablet(s) Oral daily  latanoprost 0.005% Ophthalmic Solution 1 Drop(s) Both EYES at bedtime  mirtazapine 15 milliGRAM(s) Oral at bedtime  polyethylene glycol 3350 17 Gram(s) Oral daily  senna 2 Tablet(s) Oral at bedtime  sodium bicarbonate 650 milliGRAM(s) Oral two times a day  tamsulosin 0.4 milliGRAM(s) Oral at bedtime  timolol 0.5% Solution 1 Drop(s) Both EYES two times a day  vancomycin  IVPB 1000 milliGRAM(s) IV Intermittent every 24 hours    MEDICATIONS  (PRN):  traMADol 25 milliGRAM(s) Oral every 6 hours PRN Moderate Pain (4 - 6)      CAPILLARY BLOOD GLUCOSE        I&O's Summary    2020 07:01  -  2020 07:00  --------------------------------------------------------  IN: 0 mL / OUT: 525 mL / NET: -525 mL        PHYSICAL EXAM:  Vital Signs Last 24 Hrs  T(C): 36.8 (2020 05:15), Max: 38.7 (2020 11:45)  T(F): 98.3 (2020 05:15), Max: 101.6 (2020 11:45)  HR: 105 (2020 05:15) (96 - 130)  BP: 114/72 (2020 05:15) (94/60 - 126/77)  BP(mean): 83 (2020 17:22) (70 - 95)  RR: 18 (2020 05:15) (16 - 36)  SpO2: 96% (2020 05:15) (96% - 100%)    CONSTITUTIONAL: Ill appearing, Mildly confused   EYES/ENT: EOMI, PERRL, anicteric no conjunctival injection  NECK: Supple   RESPIRATORY: CTA bilaterally without wheezing, rales or rhonchi  CARDIOVASCULAR: Normal s1 and s2 without murmur, rubs, gallops, RRR, No peripheral edema   GASTROINTESTINAL: NT, ND, +BS, No rebound/guarding, no hepatosplenomegaly  GENITOURINARY: No suprapubic tenderness or CVA  NEUROLOGICAL: A and O x1, answering questions minimally, unable to use LUE at all   EXTREMITIES: Swolled, edematous, erythematous and painful LUE w/ +3-4 pitting edema from humerus to distal phalanges   SKIN: No rash, ulcers or skin lesions        LABS:                        7.9    1.49  )-----------( 173      ( 2020 12:17 )             25.3         141  |  98  |  47<H>  ----------------------------<  134<H>  4.4   |  19<L>  |  1.58<H>    Ca    8.8      2020 12:17  Phos  4.9       Mg     2.0         TPro  5.8<L>  /  Alb  2.3<L>  /  TBili  0.7  /  DBili  x   /  AST  72<H>  /  ALT  40  /  AlkPhos  116      PT/INR - ( 2020 12:17 )   PT: 11.2 sec;   INR: 0.93 ratio         PTT - ( 2020 12:17 )  PTT:33.2 sec      Urinalysis Basic - ( 2020 12:41 )    Color: Yellow / Appearance: Slightly Turbid / S.029 / pH: x  Gluc: x / Ketone: Negative  / Bili: Negative / Urobili: Negative   Blood: x / Protein: 100 mg/dL / Nitrite: Negative   Leuk Esterase: Moderate / RBC: 5 /hpf / WBC 8 /HPF   Sq Epi: x / Non Sq Epi: 2 / Bacteria: Few          RADIOLOGY & ADDITIONAL TESTS:  Results Reviewed:   Imaging Personally Reviewed:  Electrocardiogram Personally Reviewed:    COORDINATION OF CARE:  Care Discussed with Consultants/Other Providers [Y/N]:  Prior or Outpatient Records Reviewed [Y/N]:

## 2020-11-18 NOTE — CONSULT NOTE ADULT - PROBLEM SELECTOR RECOMMENDATION 2
- likely from lymphoma, getting pRBC today  - d/w wife, she has not comitted to no further pRBC, but goal is for patient to return home; stated that we can choose to transfuse based on symptoms, but will need further GOC discussions

## 2020-11-19 NOTE — DIETITIAN INITIAL EVALUATION ADULT. - FACTORS AFF FOOD INTAKE
as per team, pt c need for thickened liquids at this time; pt did express he would like thin liquids, made team aware; lunch tray observed, pt had only a few spoon fulls of cream soup

## 2020-11-19 NOTE — PROGRESS NOTE ADULT - SUBJECTIVE AND OBJECTIVE BOX
Follow Up:  neutropenic fever    Interval History: afebrile overnight. feels fatigued. denies left shoulder pain.     REVIEW OF SYSTEMS  [  ] ROS unobtainable because:    [ x ] All other systems negative except as noted below:	    Constitutional:  [x ] fever [ ] chills  [ ] weight loss  [ ] weakness  Skin:  [ ] rash [ ] phlebitis	  Eyes: [ ] icterus [ ] pain  [ ] discharge	  ENMT: [ ] sore throat  [ ] thrush [ ] ulcers [ ] exudates  Respiratory: [ ] dyspnea [ ] hemoptysis [ ] cough [ ] sputum	  Cardiovascular:  [ ] chest pain [ ] palpitations [ ] edema	  Gastrointestinal:  [ ] nausea [ ] vomiting [ ] diarrhea [ ] constipation [ ] pain	  Genitourinary:  [ ] dysuria [ ] frequency [ ] hematuria [ ] discharge [ ] flank pain  [ ] incontinence  Musculoskeletal:  [ ] myalgias [ ] arthralgias [ ] arthritis  [ ] back pain +Left shoulder swelling and pain  Neurological:  [ ] headache [ ] seizures  [ ] confusion/altered mental status  Psychiatric:  [ ] anxiety [ ] depression	  Hematology/Lymphatics:  [ ] lymphadenopathy  Endocrine:  [ ] adrenal [ ] thyroid  Allergic/Immunologic:	 [ ] transplant [ ] seasonal    Allergies  No Known Allergies        ANTIMICROBIALS:  cefepime   IVPB 2000 every 12 hours  fluconAZOLE   Tablet 200 daily  vancomycin  IVPB 1000 every 24 hours      OTHER MEDS:  MEDICATIONS  (STANDING):  acetaminophen   Tablet .. 650 every 6 hours  allopurinol 100 daily  amLODIPine   Tablet 2.5 daily  atorvastatin 80 at bedtime  dronabinol 2.5 two times a day before meals  enoxaparin Injectable 70 daily  mirtazapine 15 at bedtime  polyethylene glycol 3350 17 daily  senna 2 at bedtime  tamsulosin 0.4 at bedtime  traMADol 25 every 6 hours PRN      Vital Signs Last 24 Hrs  T(C): 36.6 (19 Nov 2020 03:05), Max: 36.8 (18 Nov 2020 13:22)  T(F): 97.9 (19 Nov 2020 03:05), Max: 98.3 (18 Nov 2020 13:22)  HR: 89 (19 Nov 2020 03:05) (89 - 103)  BP: 99/61 (19 Nov 2020 03:05) (92/60 - 109/56)  BP(mean): --  RR: 18 (19 Nov 2020 03:05) (18 - 20)  SpO2: 95% (19 Nov 2020 03:05) (95% - 100%)    PHYSICAL EXAMINATION:  General: Alert and Awake, NAD  HEENT: PERRL, EOMI  Neck: Supple, No MAURA  Cardiac: RRR, No M/R/G  Resp: CTAB, No Wh/Rh/Ra  Abdomen: NBS, NT/ND, No HSM, No rigidity or guarding  MSK: Erythema and swelling of the left shoulder - most of the overlying changes appear chronic. ?erythema of the proximal arm. 2-3+ edema of the LUE and the hand. No LE edema. No stigmata of IE. No evidence of phlebitis. No evidence of synovitis.  : + tobar  Skin: MSK findings as above. no other rashes or lesions. Skin is warm and dry to the touch.   Neuro: Alert and Awake. CN 2-12 Grossly intact. Moves all four extremities spontaneously.  Psych: Calm, Pleasant, Cooperative                          7.8    1.84  )-----------( 114      ( 19 Nov 2020 11:40 )             24.2       11-18    143  |  105  |  47<H>  ----------------------------<  86  3.9   |  22  |  1.53<H>    Ca    8.1<L>      18 Nov 2020 07:20  Phos  4.6     11-18  Mg     2.0     11-18    TPro  4.9<L>  /  Alb  1.9<L>  /  TBili  0.4  /  DBili  x   /  AST  56<H>  /  ALT  32  /  AlkPhos  91  11-18          MICROBIOLOGY:  v  .Urine Clean Catch (Midstream)  11-17-20   >100,000 CFU/ml Pseudomonas aeruginosa  --  --      .Blood Blood-Peripheral  11-17-20   No growth to date.  --  --      .Tissue left shoulder mass  10-20-20   No growth at 14 days.  --    Rare polymorphonuclear leukocytes seen per low power field  No organisms seen per oil power field          Rapid RVP Result: NotDetec (11-17 @ 12:41)        RADIOLOGY:    <The imaging below has been reviewed and visualized by me independently. Findings as detailed in report below>    EXAM:  CT UPR EXT LT                        PROCEDURE DATE:  11/17/2020    1.  As on prior CT scan, there is an extensive soft tissue/fluid density aggressive appearing lesion surrounding the left proximal humerus. Differential for this appearance includes tumor extension, and to a less likely degree infection. Consider biopsy/sampling of the tissue for further evaluation.  2.  Unchanged appearance of the left humerus with comminuted proximal humeral fracture and ORIF hardware.    EXAM:  CT ANGIO CHEST (W)AW IC                        PROCEDURE DATE:  11/17/2020    Remaining incidental findings as above  No pulmonary embolus.

## 2020-11-19 NOTE — DIETITIAN INITIAL EVALUATION ADULT. - PROBLEM SELECTOR PLAN 1
The patient has severe L shoulder swelling with erythema, edema and warmth on exam. 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 Unable to confirm Osteo with MRI.   - s/p 2 L fluids   - F/u blood and urine cultures   - s/p 1 dose of zosyn and vancomycin   - C/w vancomycin 1 g q24 w/ bilevel dosing   - Cefepime 2 g q12

## 2020-11-19 NOTE — DIETITIAN INITIAL EVALUATION ADULT. - OTHER INFO
Noted per previous RD note, UBW of 165 pounds, wt on 10/28 of 158.9 pounds and wt in house of 152.7 pounds noted.     Pt agreeable to Ensure Enlive in house, discussed c MD, order added.     Noted as per previous RD note, pt c mild/moderate fat and muscle loss. Nutrition focused physical exam deferred at this time as pt wanted to rest.

## 2020-11-19 NOTE — CHART NOTE - NSCHARTNOTEFT_GEN_A_CORE
Paged by RNWillow, as patient complaining of severe pain, which was not relieved after receiving Ultram around 6PM and ATC tylenol. Patient's wife explained to RN that patient was getting ATC ultram at his previous facility, which was relieving his pain at the time.   - Will continue with PRN ultram for moderate pain   - Will order 0.2mg IV Dilaudid q1hr prn severe pain   - Continue ATC tylenol

## 2020-11-19 NOTE — PROGRESS NOTE ADULT - SUBJECTIVE AND OBJECTIVE BOX
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 Family opting to try hospice care at home, currently transferred to the PCU for further care. Wife has opted for patient to be DNR, does not currently want a feeding tube placed.       DNR on chart: Yes    Yes      Allergies    No Known Allergies    Intolerances    MEDICATIONS  (STANDING):  acetaminophen   Tablet .. 650 milliGRAM(s) Oral every 6 hours  allopurinol 100 milliGRAM(s) Oral daily  amLODIPine   Tablet 2.5 milliGRAM(s) Oral daily  atorvastatin 80 milliGRAM(s) Oral at bedtime  cefepime   IVPB 2000 milliGRAM(s) IV Intermittent every 12 hours  dronabinol 2.5 milliGRAM(s) Oral two times a day before meals  enoxaparin Injectable 70 milliGRAM(s) SubCutaneous daily  fluconAZOLE   Tablet 200 milliGRAM(s) Oral daily  lactobacillus acidophilus 1 Tablet(s) Oral daily  latanoprost 0.005% Ophthalmic Solution 1 Drop(s) Both EYES at bedtime  mirtazapine 15 milliGRAM(s) Oral at bedtime  polyethylene glycol 3350 17 Gram(s) Oral daily  senna 2 Tablet(s) Oral at bedtime  sodium bicarbonate 650 milliGRAM(s) Oral two times a day  tamsulosin 0.4 milliGRAM(s) Oral at bedtime  timolol 0.5% Solution 1 Drop(s) Both EYES two times a day  vancomycin  IVPB 1000 milliGRAM(s) IV Intermittent every 24 hours    MEDICATIONS  (PRN):  traMADol 25 milliGRAM(s) Oral every 6 hours PRN Moderate Pain (4 - 6)      ITEMS UNCHECKED ARE NOT PRESENT    PRESENT SYMPTOMS: [ ]Unable to obtain due to poor mentation   Source if other than patient:  [ ]Family   [ ]Team     Pain:  [ ]yes [ ]no  QOL impact -   Location -                    Aggravating factors -  Quality -  Radiation -  Timing-  Severity (0-10 scale):  Minimal acceptable level (0-10 scale):     Dyspnea:                           [ ]Mild [ ]Moderate [ ]Severe  Anxiety:                             [ ]Mild [ ]Moderate [ ]Severe  Fatigue:                             [ ]Mild [ ]Moderate [ ]Severe  Nausea:                             [ ]Mild [ ]Moderate [ ]Severe  Loss of appetite:              [ ]Mild [ ]Moderate [ ]Severe  Constipation:                    [ ]Mild [ ]Moderate [ ]Severe    PAIN AD Score:	  [email or web address] (Ctrl + left click to view)    Other Symptoms:  [ ]All other review of systems negative     Palliative Performance Status Version 2:         %      http://[email or web address]/files/news/[email or web address]_performance_scale_[email or web address].pdf  PHYSICAL EXAM:  Vital Signs Last 24 Hrs  T(C): 36.6 (2020 03:05), Max: 36.8 (2020 13:22)  T(F): 97.9 (2020 03:05), Max: 98.3 (2020 13:22)  HR: 89 (2020 03:05) (89 - 103)  BP: 99/61 (2020 03:05) (92/60 - 109/56)  BP(mean): --  RR: 18 (2020 03:05) (18 - 20)  SpO2: 95% (2020 03:05) (95% - 100%) I&O's Summary    2020 07:01  -  2020 07:00  --------------------------------------------------------  IN: 1290 mL / OUT: 450 mL / NET: 840 mL       GENERAL:  [ ]Alert  [ ]Oriented x   [ ]Lethargic  [ ]Cachexia  [ ]Unarousable  [ ]Verbal  [ ]Non-Verbal  Behavioral:   [ ]Anxiety  [ ]Delirium [ ]Agitation [ ]Other  HEENT:  [ ]Normal   [ ]Dry mouth   [ ]ET Tube/Trach  [ ]Oral lesions  PULMONARY:   [ ]Clear [ ]Tachypnea  [ ]Audible excessive secretions   [ ]Rhonchi        [ ]Right [ ]Left [ ]Bilateral  [ ]Crackles        [ ]Right [ ]Left [ ]Bilateral  [ ]Wheezing     [ ]Right [ ]Left [ ]Bilateral  [ ]Diminished BS [ ] Right [ ]Left [ ]Bilateral  CARDIOVASCULAR:    [ ]Regular [ ]Irregular [ ]Tachy  [ ]Noble [ ]Murmur [ ]Other  GASTROINTESTINAL:  [ ]Soft  [ ]Distended   [ ]+BS  [ ]Non tender [ ]Tender  [ ]PEG [ ]OGT/ NGT   Last BM:      GENITOURINARY:  [ ]Normal [ ]Incontinent   [ ]Oliguria/Anuria   [ ]Gibson  MUSCULOSKELETAL:   [ ]Normal   [ ]Weakness  [ ]Bed/Wheelchair bound [ ]Edema  NEUROLOGIC:   [ ]No focal deficits  [ ] Cognitive impairment  [ ] Dysphagia [ ]Dysarthria [ ] Paresis [ ]Other   SKIN:   [ ]Normal  [ ]Rash   [ ]Pressure ulcer(s) [ ]y [ ]n present on admission    CRITICAL CARE:  [ ]Shock Present  [ ]Septic [ ]Cardiogenic [ ]Neurologic [ ]Hypovolemic  [ ]Vasopressors [ ]Inotropes  [ ]Respiratory failure present [ ]Mechanical Ventilation [ ]Non-invasive ventilatory support [ ]High-Flow  [ ]Acute  [ ]Chronic [ ]Hypoxic  [ ]Hypercarbic [ ]Other  [ ]Other organ failure     LABS:                        6.4    0.95  )-----------( 124      ( 2020 07:23 )             20.4   11-18    143  |  105  |  47<H>  ----------------------------<  86  3.9   |  22  |  1.53<H>    Ca    8.1<L>      2020 07:20  Phos  4.6     18  Mg     2.0         TPro  4.9<L>  /  Alb  1.9<L>  /  TBili  0.4  /  DBili  x   /  AST  56<H>  /  ALT  32  /  AlkPhos  91  11-18  PT/INR - ( 2020 12:17 )   PT: 11.2 sec;   INR: 0.93 ratio         PTT - ( 2020 12:17 )  PTT:33.2 sec    Urinalysis Basic - ( 2020 12:41 )    Color: Yellow / Appearance: Slightly Turbid / S.029 / pH: x  Gluc: x / Ketone: Negative  / Bili: Negative / Urobili: Negative   Blood: x / Protein: 100 mg/dL / Nitrite: Negative   Leuk Esterase: Moderate / RBC: 5 /hpf / WBC 8 /HPF   Sq Epi: x / Non Sq Epi: 2 / Bacteria: Few      RADIOLOGY & ADDITIONAL STUDIES:    Protein Calorie Malnutrition Present: [ ]mild [ ]moderate [ ]severe [ ]underweight [ ]morbid obesity  https://www.andeal.org/vault/0480/web/files/ONC/Table_Clinical%20Characteristics%20to%20Document%20Malnutrition-White%20JV%20et%20al%2020.pdf    Height (cm): 182.9 (20 @ 22:54), 175.3 (10-19-20 @ 09:24)  Weight (kg): 69.3 (20 @ 22:54), 63.5 (10-31-20 @ 21:11), 77.1 (20 @ 14:06)  BMI (kg/m2): 20.7 (20 @ 22:54), 20.7 (10-31-20 @ 21:11), 25.1 (10-19-20 @ 09:24)    [ ]PPSV2 < or = 30%  [ ]significant weight loss [ ]poor nutritional intake [ ]anasarca   Albumin, Serum: 1.9 g/dL (20 @ 07:20)   [ ]Artificial Nutrition    REFERRALS:   [ ]Chaplaincy  [ ]Hospice  [ ]Child Life  [ ]Social Work  [ ]Case management [ ]Holistic Therapy    SUBJECTIVE AND OBJECTIVE:  INTERVAL HPI/OVERNIGHT EVENTS:    86 year old male with 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 Family opting to try hospice care at home, currently transferred to the PCU for further care. Wife has opted for patient to be DNR, does not currently want a feeding tube placed. Patient comfortable lying in bed in no acute distress, no overnight events. Family comfortable with symptom led management and care for blood draws. Will recheck the blood cultures while on IV abx . Patient to be transitioned to po antibiotics on discharge .       DNR on chart: Yes    Yes      Allergies    No Known Allergies    Intolerances    MEDICATIONS  (STANDING):  acetaminophen   Tablet .. 650 milliGRAM(s) Oral every 6 hours  allopurinol 100 milliGRAM(s) Oral daily  amLODIPine   Tablet 2.5 milliGRAM(s) Oral daily  atorvastatin 80 milliGRAM(s) Oral at bedtime  cefepime   IVPB 2000 milliGRAM(s) IV Intermittent every 12 hours  dronabinol 2.5 milliGRAM(s) Oral two times a day before meals  enoxaparin Injectable 70 milliGRAM(s) SubCutaneous daily  fluconAZOLE   Tablet 200 milliGRAM(s) Oral daily  lactobacillus acidophilus 1 Tablet(s) Oral daily  latanoprost 0.005% Ophthalmic Solution 1 Drop(s) Both EYES at bedtime  mirtazapine 15 milliGRAM(s) Oral at bedtime  polyethylene glycol 3350 17 Gram(s) Oral daily  senna 2 Tablet(s) Oral at bedtime  sodium bicarbonate 650 milliGRAM(s) Oral two times a day  tamsulosin 0.4 milliGRAM(s) Oral at bedtime  timolol 0.5% Solution 1 Drop(s) Both EYES two times a day  vancomycin  IVPB 1000 milliGRAM(s) IV Intermittent every 24 hours    MEDICATIONS  (PRN):  traMADol 25 milliGRAM(s) Oral every 6 hours PRN Moderate Pain (4 - 6)      ITEMS UNCHECKED ARE NOT PRESENT    PRESENT SYMPTOMS: [ ]Unable to obtain due to poor mentation   Source if other than patient:  [ ]Family   [ ]Team     Pain:  [ ]yes [ ]no  QOL impact -   Location -                    Aggravating factors -  Quality -  Radiation -  Timing-  Severity (0-10 scale):  Minimal acceptable level (0-10 scale):     Dyspnea:                           [ ]Mild [ ]Moderate [ ]Severe  Anxiety:                             [ ]Mild [ ]Moderate [ ]Severe  Fatigue:                             [ ]Mild [ ]Moderate [ ]Severe  Nausea:                             [ ]Mild [ ]Moderate [ ]Severe  Loss of appetite:              [ ]Mild [ ]Moderate [ ]Severe  Constipation:                    [ ]Mild [ ]Moderate [ ]Severe    PAIN AD Score:	  [email or web address] (Ctrl + left click to view)    Other Symptoms:  [ ]All other review of systems negative     Palliative Performance Status Version 2:         %      http://npcrc.org/files/news/palliative_performance_scale_[email or web address].pdf  PHYSICAL EXAM:  Vital Signs Last 24 Hrs  T(C): 36.6 (2020 03:05), Max: 36.8 (2020 13:22)  T(F): 97.9 (2020 03:05), Max: 98.3 (2020 13:22)  HR: 89 (2020 03:05) (89 - 103)  BP: 99/61 (2020 03:05) (92/60 - 109/56)  BP(mean): --  RR: 18 (2020 03:05) (18 - 20)  SpO2: 95% (2020 03:05) (95% - 100%) I&O's Summary    2020 07:01  -  2020 07:00  --------------------------------------------------------  IN: 1290 mL / OUT: 450 mL / NET: 840 mL       GENERAL:  [ X]Alert  [ X]Oriented x3   [ ]Lethargic  [ ]Cachexia  [ ]Unarousable  [ ]Verbal  [ ]Non-Verbal  Behavioral:   [ ]Anxiety  [ ]Delirium [ ]Agitation [ X]Other  HEENT:  [ X]Normal   [ ]Dry mouth   [ ]ET Tube/Trach  [ ]Oral lesions  PULMONARY:   [ X]Clear [ ]Tachypnea  [ ]Audible excessive secretions   [ ]Rhonchi        [ ]Right [ ]Left [ ]Bilateral  [ ]Crackles        [ ]Right [ ]Left [ ]Bilateral  [ ]Wheezing     [ ]Right [ ]Left [ ]Bilateral  [ ]Diminished BS [ ] Right [ ]Left [ ]Bilateral  CARDIOVASCULAR:    [X ]Regular [ ]Irregular [ ]Tachy  [ ]Noble [ ]Murmur [ ]Other  GASTROINTESTINAL:  [X ]Soft  [ ]Distended   [ x]+BS  [ X]Non tender [ ]Tender  [ ]PEG [ ]OGT/ NGT   Last BM:      GENITOURINARY:  [ ]Normal [ ]Incontinent   [ ]Oliguria/Anuria   [X ]Gibson  MUSCULOSKELETAL:   [ X]Normal   [X ]Weakness  left shoulder hyperpigmented, edematous, non tender to touch with no warmth to palpation . LLE with dependent edema   NEUROLOGIC:   [X ]No focal deficits  [ ] Cognitive impairment  [ ] Dysphagia [ ]Dysarthria [ ] Paresis [ ]Other   SKIN:   [ ] left shoulder hyperpigmented violaceous in hue     CRITICAL CARE:  [ ]Shock Present  [ ]Septic [ ]Cardiogenic [ ]Neurologic [ ]Hypovolemic  [ ]Vasopressors [ ]Inotropes  [ ]Respiratory failure present [ ]Mechanical Ventilation [ ]Non-invasive ventilatory support [ ]High-Flow  [ ]Acute  [ ]Chronic [ ]Hypoxic  [ ]Hypercarbic [ ]Other  [ ]Other organ failure     LABS:                        6.4    0.95  )-----------( 124      ( 2020 07:23 )             20.4   11-18    143  |  105  |  47<H>  ----------------------------<  86  3.9   |  22  |  1.53<H>    Ca    8.1<L>      2020 07:20  Phos  4.6     -18  Mg     2.0     18    TPro  4.9<L>  /  Alb  1.9<L>  /  TBili  0.4  /  DBili  x   /  AST  56<H>  /  ALT  32  /  AlkPhos  91  11-18  PT/INR - ( 2020 12:17 )   PT: 11.2 sec;   INR: 0.93 ratio         PTT - ( 2020 12:17 )  PTT:33.2 sec    Urinalysis Basic - ( 2020 12:41 )    Color: Yellow / Appearance: Slightly Turbid / S.029 / pH: x  Gluc: x / Ketone: Negative  / Bili: Negative / Urobili: Negative   Blood: x / Protein: 100 mg/dL / Nitrite: Negative   Leuk Esterase: Moderate / RBC: 5 /hpf / WBC 8 /HPF   Sq Epi: x / Non Sq Epi: 2 / Bacteria: Few      RADIOLOGY & ADDITIONAL STUDIES:    Protein Calorie Malnutrition Present: [ ]mild [ ]moderate [ ]severe [ ]underweight [ ]morbid obesity  https://www.andeal.org/vault/2440/web/files/ONC/Table_Clinical%20Characteristics%20to%20Document%20Malnutrition-White%20JV%20et%20al%658531.pdf    Height (cm): 182.9 (20 @ 22:54), 175.3 (10-19-20 @ 09:24)  Weight (kg): 69.3 (20 @ 22:54), 63.5 (10-31-20 @ 21:11), 77.1 (20 @ 14:06)  BMI (kg/m2): 20.7 (20 @ 22:54), 20.7 (10-31-20 @ 21:11), 25.1 (10-19-20 @ 09:24)    [ ]PPSV2 < or = 30%  [ ]significant weight loss [ ]poor nutritional intake [ ]anasarca   Albumin, Serum: 1.9 g/dL (20 @ 07:20)   [ ]Artificial Nutrition    REFERRALS:   [ ]Chaplaincy  [ ]Hospice  [ ]Child Life  [ ]Social Work  [ ]Case management [ ]Holistic Therapy    GAP TEAM PALLIATIVE CARE UNIT PROGRESS NOTE:      [  ] Patient on hospice program.    INDICATION FOR PALLIATIVE CARE UNIT SERVICES: Sx management     INTERVAL HPI/OVERNIGHT EVENTS: Patient transferred to PCU overnight. Over the past 24 hours, did not require PRN medications. Patient comfortable lying in bed in no acute distress, no overnight events. Family comfortable with symptom led management and care for blood draws. Will recheck the blood cultures while on IV abx . Patient to be transitioned to po antibiotics on discharge .     DNR on chart: Yes    Allergies    No Known Allergies    Intolerances    MEDICATIONS  (STANDING):  acetaminophen   Tablet .. 650 milliGRAM(s) Oral every 6 hours  allopurinol 100 milliGRAM(s) Oral daily  amLODIPine   Tablet 2.5 milliGRAM(s) Oral daily  atorvastatin 80 milliGRAM(s) Oral at bedtime  cefepime   IVPB 2000 milliGRAM(s) IV Intermittent every 12 hours  dronabinol 2.5 milliGRAM(s) Oral two times a day before meals  enoxaparin Injectable 70 milliGRAM(s) SubCutaneous daily  fluconAZOLE   Tablet 200 milliGRAM(s) Oral daily  lactobacillus acidophilus 1 Tablet(s) Oral daily  latanoprost 0.005% Ophthalmic Solution 1 Drop(s) Both EYES at bedtime  mirtazapine 15 milliGRAM(s) Oral at bedtime  polyethylene glycol 3350 17 Gram(s) Oral daily  senna 2 Tablet(s) Oral at bedtime  sodium bicarbonate 650 milliGRAM(s) Oral two times a day  tamsulosin 0.4 milliGRAM(s) Oral at bedtime  timolol 0.5% Solution 1 Drop(s) Both EYES two times a day  vancomycin  IVPB 1000 milliGRAM(s) IV Intermittent every 24 hours    MEDICATIONS  (PRN):  traMADol 25 milliGRAM(s) Oral every 6 hours PRN Moderate Pain (4 - 6)    ITEMS UNCHECKED ARE NOT PRESENT    PRESENT SYMPTOMS: [ ]Unable to obtain due to poor mentation   Source if other than patient:  [ ]Family   [ ]Team     Pain: [ ] yes [ x] no  QOL impact -   Location -                    Aggravating factors -  Quality -  Radiation -  Timing-  Severity (0-10 scale):  Minimal acceptable level (0-10 scale):     Dyspnea:                           [ ]Mild [ ]Moderate [ ]Severe  Anxiety:                             [ ]Mild [ ]Moderate [ ]Severe  Fatigue:                             [ ]Mild [ ]Moderate [ ]Severe  Nausea:                             [ ]Mild [ ]Moderate [ ]Severe  Loss of appetite:              [ ]Mild [ ]Moderate [ ]Severe  Constipation:                    [ ]Mild [ ]Moderate [ ]Severe    PAINAD Score:    [email or web address]/[email or web address] (Ctrl +  left click to view)  		  Other Symptoms:  [ ]All other review of systems negative     Palliative Performance Status Version 2:    50     %         [email or web address]    PHYSICAL EXAM:  Vital Signs Last 24 Hrs  T(C): 36.6 (2020 03:05), Max: 36.8 (2020 13:22)  T(F): 97.9 (2020 03:05), Max: 98.3 (2020 13:22)  HR: 89 (2020 03:05) (89 - 103)  BP: 99/61 (2020 03:05) (92/60 - 109/56)  BP(mean): --  RR: 18 (2020 03:05) (18 - 20)  SpO2: 95% (2020 03:05) (95% - 100%) I&O's Summary    2020 07:01  -  2020 07:00  --------------------------------------------------------  IN: 1290 mL / OUT: 450 mL / NET: 840 mL    GENERAL:  [ X]Alert  [ X]Oriented x3   [ ]Lethargic  [ ]Cachexia  [ ]Unarousable  [ ]Verbal  [ ]Non-Verbal  Behavioral:   [ ]Anxiety  [ ]Delirium [ ]Agitation [ X]Other  HEENT:  [ X]Normal   [ ]Dry mouth   [ ]ET Tube/Trach  [ ]Oral lesions  PULMONARY:   [ X]Clear [ ]Tachypnea  [ ]Audible excessive secretions   [ ]Rhonchi        [ ]Right [ ]Left [ ]Bilateral  [ ]Crackles        [ ]Right [ ]Left [ ]Bilateral  [ ]Wheezing     [ ]Right [ ]Left [ ]Bilateral  [ ]Diminished BS [ ] Right [ ]Left [ ]Bilateral  CARDIOVASCULAR:    [X ]Regular [ ]Irregular [ ]Tachy  [ ]Noble [ ]Murmur [ ]Other  GASTROINTESTINAL:  [X ]Soft  [ ]Distended   [ x]+BS  [ X]Non tender [ ]Tender  [ ]PEG [ ]OGT/ NGT   Last BM:      GENITOURINARY:  [ ]Normal [ ]Incontinent   [ ]Oliguria/Anuria   [X ]Gibson  MUSCULOSKELETAL:   [ X]Normal   [X ]Weakness  left shoulder hyperpigmented, edematous, non tender to touch with no warmth to palpation . LLE with dependent edema   NEUROLOGIC:   [X ]No focal deficits  [ ] Cognitive impairment  [ ] Dysphagia [ ]Dysarthria [ ] Paresis [ ]Other   SKIN:   [ ] left shoulder hyperpigmented violaceous in hue     CRITICAL CARE:  [ ]Shock Present  [ ]Septic [ ]Cardiogenic [ ]Neurologic [ ]Hypovolemic  [ ]Vasopressors [ ]Inotropes  [ ]Respiratory failure present [ ]Mechanical Ventilation [ ]Non-invasive ventilatory support [ ]High-Flow  [ ]Acute  [ ]Chronic [ ]Hypoxic  [ ]Hypercarbic [ ]Other  [ ]Other organ failure     LABS:                        6.4    0.95  )-----------( 124      ( 2020 07:23 )             20.4   11-18    143  |  105  |  47<H>  ----------------------------<  86  3.9   |  22  |  1.53<H>    Ca    8.1<L>      2020 07:20  Phos  4.6     -18  Mg     2.0     -18    TPro  4.9<L>  /  Alb  1.9<L>  /  TBili  0.4  /  DBili  x   /  AST  56<H>  /  ALT  32  /  AlkPhos  91  11-18  PT/INR - ( 2020 12:17 )   PT: 11.2 sec;   INR: 0.93 ratio         PTT - ( 2020 12:17 )  PTT:33.2 sec    Urinalysis Basic - ( 2020 12:41 )    Color: Yellow / Appearance: Slightly Turbid / S.029 / pH: x  Gluc: x / Ketone: Negative  / Bili: Negative / Urobili: Negative   Blood: x / Protein: 100 mg/dL / Nitrite: Negative   Leuk Esterase: Moderate / RBC: 5 /hpf / WBC 8 /HPF   Sq Epi: x / Non Sq Epi: 2 / Bacteria: Few      RADIOLOGY & ADDITIONAL STUDIES: no new imaging    Protein Calorie Malnutrition Present: [ ]mild [x ]moderate [ ]severe [ ]underweight [ ]morbid obesity  https://www.andeal.org/vault/9310/web/files/ONC/Table_Clinical%20Characteristics%20to%20Document%20Malnutrition-White%20JV%20et%20al%2020.pdf    Height (cm): 182.9 (20 @ 22:54), 175.3 (10-19-20 @ 09:24)  Weight (kg): 69.3 (20 @ 22:54), 63.5 (10-31-20 @ 21:11), 77.1 (20 @ 14:06)  BMI (kg/m2): 20.7 (20 @ 22:54), 20.7 (10-31-20 @ 21:11), 25.1 (10-19-20 @ 09:24)    [ ]PPSV2 < or = 30%  [ ]significant weight loss [ ]poor nutritional intake [ ]anasarca   Albumin, Serum: 1.9 g/dL (20 @ 07:20)   [ ]Artificial Nutrition    REFERRALS:   [ ]Chaplaincy  [ ]Hospice  [ ]Child Life  [x ]Social Work  [ ]Case management [ ]Holistic Therapy     Goals of Care Document: MICA Smith (10-17-20 @ 23:00)  Goals of Care Conversation:   Participants:  · Participants  Family  · Spouse  Wife, Mayda Hernandez    Advance Directives:  · Does patient have Advance Directive  Yes  · Indicate Type  Health Care Proxy (HCP)  · Agent's Name  Mayda Hernandez  · Are any of the items on the chart  No  · Caregiver:  no    Conversation Discussion:  · Conversation  Diagnosis; Prognosis; MOLST Discussed; Treatment Options  · Conversation Details  I had a 16 minute discussion with patient's wife Mayda (HCP) regarding advanced directives.  I had discussed with her the current plan of care.  I had also discussed all resuscitative measures and she verbalized understanding.  She confirmed that patient's wish is to have everything done as needed.  Currently patient is FULL CODE.    What Matters Most To Patient and Family:  · What matters most to patient and family  recovery    Personal Advance Directives Treatment Guidelines:   Treatment Guidelines:  · Decision Maker  Health Care Proxy  · Treatment Guidelines  Antibiotic trial; IV fluid trial    Location of Discussion:   Duration of Advanced Care Planning Meeting:  · Time spent (in minutes)  16    Location of Discussion:  · Location of discussion  Telephone      Electronic Signatures:  Kalee Smith)  (Signed 18-Oct-2020 04:51)  	Authored: Goals of Care Conversation, Personal Advance Directives Treatment Guidelines, Location of Discussion      Last Updated: 18-Oct-2020 04:51 by Kalee Smith)         GAP TEAM PALLIATIVE CARE UNIT PROGRESS NOTE:      [  ] Patient on hospice program.    INDICATION FOR PALLIATIVE CARE UNIT SERVICES: Sx management and disposition planning in the setting of DBCL, no further DDT      INTERVAL HPI/OVERNIGHT EVENTS: Patient transferred to PCU overnight. Over the past 24 hours, did not require PRN medications. Patient comfortable lying in bed in no acute distress. Family comfortable with symptom led management and care for blood draws. Will recheck the blood cultures while on IV abx . Patient to be transitioned to po antibiotics on discharge.     DNR on chart: Yes    Allergies    No Known Allergies    Intolerances    MEDICATIONS  (STANDING):  acetaminophen   Tablet .. 650 milliGRAM(s) Oral every 6 hours  allopurinol 100 milliGRAM(s) Oral daily  amLODIPine   Tablet 2.5 milliGRAM(s) Oral daily  atorvastatin 80 milliGRAM(s) Oral at bedtime  cefepime   IVPB 2000 milliGRAM(s) IV Intermittent every 12 hours  dronabinol 2.5 milliGRAM(s) Oral two times a day before meals  enoxaparin Injectable 70 milliGRAM(s) SubCutaneous daily  fluconAZOLE   Tablet 200 milliGRAM(s) Oral daily  lactobacillus acidophilus 1 Tablet(s) Oral daily  latanoprost 0.005% Ophthalmic Solution 1 Drop(s) Both EYES at bedtime  mirtazapine 15 milliGRAM(s) Oral at bedtime  polyethylene glycol 3350 17 Gram(s) Oral daily  senna 2 Tablet(s) Oral at bedtime  sodium bicarbonate 650 milliGRAM(s) Oral two times a day  tamsulosin 0.4 milliGRAM(s) Oral at bedtime  timolol 0.5% Solution 1 Drop(s) Both EYES two times a day  vancomycin  IVPB 1000 milliGRAM(s) IV Intermittent every 24 hours    MEDICATIONS  (PRN):  traMADol 25 milliGRAM(s) Oral every 6 hours PRN Moderate Pain (4 - 6)    ITEMS UNCHECKED ARE NOT PRESENT    PRESENT SYMPTOMS: [ ]Unable to obtain due to poor mentation   Source if other than patient:  [ ]Family   [ ]Team     Pain: [ ] yes [ x] no  QOL impact -   Location -                    Aggravating factors -  Quality -  Radiation -  Timing-  Severity (0-10 scale):  Minimal acceptable level (0-10 scale):     Dyspnea:                           [ ]Mild [ ]Moderate [ ]Severe  Anxiety:                             [ ]Mild [ ]Moderate [ ]Severe  Fatigue:                             [ ]Mild [ ]Moderate [ ]Severe  Nausea:                             [ ]Mild [ ]Moderate [ ]Severe  Loss of appetite:              [ ]Mild [ ]Moderate [ ]Severe  Constipation:                    [ ]Mild [ ]Moderate [ ]Severe    PAINAD Score:    [email or web address]/[email or web address] (Ctrl +  left click to view)  		  Other Symptoms:  [ ]All other review of systems negative     Palliative Performance Status Version 2:    30     %         [email or web address]    PHYSICAL EXAM:  Vital Signs Last 24 Hrs  T(C): 36.6 (2020 03:05), Max: 36.8 (2020 13:22)  T(F): 97.9 (2020 03:05), Max: 98.3 (2020 13:22)  HR: 89 (2020 03:05) (89 - 103)  BP: 99/61 (2020 03:05) (92/60 - 109/56)  BP(mean): --  RR: 18 (2020 03:05) (18 - 20)  SpO2: 95% (2020 03:05) (95% - 100%) I&O's Summary    2020 07:01  -  2020 07:00  --------------------------------------------------------  IN: 1290 mL / OUT: 450 mL / NET: 840 mL    GENERAL:  [ X]Alert  [ X]Oriented x3   [ ]Lethargic  [ ]Cachexia  [ ]Unarousable  [x ]Verbal  [ ]Non-Verbal  Behavioral:   [ ]Anxiety  [ ]Delirium [ ]Agitation [ ]Other  HEENT:  [ X]Normal   [ ]Dry mouth   [ ]ET Tube/Trach  [ ]Oral lesions  PULMONARY:   [ X]Clear [ ]Tachypnea  [ ]Audible excessive secretions   [ ]Rhonchi        [ ]Right [ ]Left [ ]Bilateral  [ ]Crackles        [ ]Right [ ]Left [ ]Bilateral  [ ]Wheezing     [ ]Right [ ]Left [ ]Bilateral  [ ]Diminished BS [ ] Right [ ]Left [ ]Bilateral  CARDIOVASCULAR:    [X ]Regular [ ]Irregular [ ]Tachy  [ ]Noble [ ]Murmur [ ]Other  GASTROINTESTINAL:  [X ]Soft  [ ]Distended   [ x]+BS  [ X]Non tender [ ]Tender  [ ]PEG [ ]OGT/ NGT   Last BM:      GENITOURINARY:  [ ]Normal [x ]Incontinent   [ ]Oliguria/Anuria   [X ]Gibson   MUSCULOSKELETAL:   [ X]Normal   [X ]Weakness  left shoulder hyperpigmented, large mass, edematous, non tender to touch with no warmth to palpation . LLE with dependent edema   NEUROLOGIC:   [X ]No focal deficits  [ ] Cognitive impairment  [ ] Dysphagia [ ]Dysarthria [ ] Paresis [ ]Other   SKIN:   [ ] left shoulder hyperpigmented violaceous in hue -mass    CRITICAL CARE:  [ ]Shock Present  [ ]Septic [ ]Cardiogenic [ ]Neurologic [ ]Hypovolemic  [ ]Vasopressors [ ]Inotropes  [ ]Respiratory failure present [ ]Mechanical Ventilation [ ]Non-invasive ventilatory support [ ]High-Flow  [ ]Acute  [ ]Chronic [ ]Hypoxic  [ ]Hypercarbic [ ]Other  [x ]Other organ failure kidney, lymph/bone marrow    LABS:                        6.4    0.95  )-----------( 124      ( 2020 07:23 )             20.4   11-18    143  |  105  |  47<H>  ----------------------------<  86  3.9   |  22  |  1.53<H>    Ca    8.1<L>      2020 07:20  Phos  4.6       Mg     2.0         TPro  4.9<L>  /  Alb  1.9<L>  /  TBili  0.4  /  DBili  x   /  AST  56<H>  /  ALT  32  /  AlkPhos  91  -18  PT/INR - ( 2020 12:17 )   PT: 11.2 sec;   INR: 0.93 ratio         PTT - ( 2020 12:17 )  PTT:33.2 sec    Urinalysis Basic - ( 2020 12:41 )    Color: Yellow / Appearance: Slightly Turbid / S.029 / pH: x  Gluc: x / Ketone: Negative  / Bili: Negative / Urobili: Negative   Blood: x / Protein: 100 mg/dL / Nitrite: Negative   Leuk Esterase: Moderate / RBC: 5 /hpf / WBC 8 /HPF   Sq Epi: x / Non Sq Epi: 2 / Bacteria: Few      RADIOLOGY & ADDITIONAL STUDIES: no new imaging    Protein Calorie Malnutrition Present: [ ]mild [x ]moderate [ ]severe [ ]underweight [ ]morbid obesity  https://www.andeal.org/vault/2440/web/files/ONC/Table_Clinical%20Characteristics%20to%20Document%20Malnutrition-White%20JV%20et%20al%556531.pdf    Height (cm): 182.9 (20 @ 22:54), 175.3 (10-19-20 @ 09:24)  Weight (kg): 69.3 (20 @ 22:54), 63.5 (10-31-20 @ 21:11), 77.1 (20 @ 14:06)  BMI (kg/m2): 20.7 (20 @ 22:54), 20.7 (10-31-20 @ 21:11), 25.1 (10-19-20 @ 09:24)    [x ]PPSV2 < or = 30%  [ ]significant weight loss [x ]poor nutritional intake [ ]anasarca   Albumin, Serum: 1.9 g/dL (20 @ 07:20)   [ ]Artificial Nutrition    REFERRALS:   [ ]Chaplaincy  [x ]Hospice  [ ]Child Life  [x ]Social Work  [ ]Case management [ ]Holistic Therapy     Goals of Care Document: MICA Smith (10-17-20 @ 23:00)  Goals of Care Conversation:   Participants:  · Participants  Family  · Spouse  Wife, Mayda Hernandez    Advance Directives:  · Does patient have Advance Directive  Yes  · Indicate Type  Health Care Proxy (HCP)  · Agent's Name  Mayda Hernandez  · Are any of the items on the chart  No  · Caregiver:  no    Conversation Discussion:  · Conversation  Diagnosis; Prognosis; MOLST Discussed; Treatment Options  · Conversation Details  I had a 16 minute discussion with patient's wife Mayda (HCP) regarding advanced directives.  I had discussed with her the current plan of care.  I had also discussed all resuscitative measures and she verbalized understanding.  She confirmed that patient's wish is to have everything done as needed.  Currently patient is FULL CODE.    What Matters Most To Patient and Family:  · What matters most to patient and family  recovery    Personal Advance Directives Treatment Guidelines:   Treatment Guidelines:  · Decision Maker  Health Care Proxy  · Treatment Guidelines  Antibiotic trial; IV fluid trial    Location of Discussion:   Duration of Advanced Care Planning Meeting:  · Time spent (in minutes)  16    Location of Discussion:  · Location of discussion  Telephone      Electronic Signatures:  Kalee Smith)  (Signed 18-Oct-2020 04:51)  	Authored: Goals of Care Conversation, Personal Advance Directives Treatment Guidelines, Location of Discussion      Last Updated: 18-Oct-2020 04:51 by Kalee Smith)

## 2020-11-19 NOTE — PROGRESS NOTE ADULT - PROBLEM SELECTOR PLAN 2
S/p 1 unit of prbc yesterday, recheck post transfusion cbc   Patient opting for symptom triggered management for blood draws and transfusions

## 2020-11-19 NOTE — DIETITIAN INITIAL EVALUATION ADULT. - PHYSCIAL ASSESSMENT
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

## 2020-11-19 NOTE — PROGRESS NOTE ADULT - ASSESSMENT
87 yo M PMH DLBCL (s/p 6 cycles of R-CHOP and in [facility] until 9/2019), L shoulder mass and pathologic fracture s/p left proximal humerus open biopsy, radical resection of tumor, and ORIF with IMN and cementation on 9/25/20),Left arm DVT, HTN, HLD, CKD, BPH, anemia, syncope, and anxiety/adjustment disorder/depression who presented to [facility] on 11/17/20 with worsening LUE and shoulder edema, pain and fever.  In the ED febrile to 101.6, relatively hypotensive to SBP in the 90's, tachycardic to > 120.     On presentation WBC of 1.49 with ANC of 540.   U/A with 8 WBC.   RVP (11/17) negative.   COVID19 PCR (11/17) negative.    CTA Chest (11/17) No pulmonary embolus  CT RUE (11/17) with extensive soft tissue/fluid density aggressive appearing lesion surrounding the left proximal humerus. Differential for this appearance includes tumor extension, and to a less likely degree infection.     Unclear if edema and fever is secondary to thrombus and soft tissue/joint invasion of L shoulder by tumor or if there is component of superinfection.  At this point, especially given neutropenia agree with covering with Vancomycin and Cefepime (ANC is recovering)    Pseudomonas on UCx (no pyuria but U/A obtained in context of neutropenia)  Urinary source theoretically possible as well.  Fever curve and lactic acidosis is improving.     Overall, Neutropenic Fever, L Shoulder Swelling, Lactic Acidosis, DLBCL, Therapeutic Drug Monitoring    --Continue Vancomycin 1g IV Q24H. Check trough prior to third dose. Target trough of 10-15  --Continue to monitor renal function and vancomycin levels to avoid nephrotoxicity / otoxicity secondary to vancomycin  --Continue Cefepime 2g IV Q12H.   --Continue to follow CBC with diff  --Continue to follow renal function (Cr/BUN)  --Continue to follow temperature curve  --Follow up on preliminary blood cultures  --Follow up on MRSA/MSSA Nasal PCR    I will continue to follow. Please feel free to contact me with any further questions.    Luke Garcia M.D.  [facility] Division of Infectious Disease  8AM-5PM: Pager Number [phone]  After Hours (or if no response): Please contact the Infectious Diseases Office at [phone]     The above assessment and plan were discussed with PCU Resident

## 2020-11-19 NOTE — DIETITIAN INITIAL EVALUATION ADULT. - ADD RECOMMEND
Continue c Ensure Enlive. Encouraged intake as tolerated. RD to provide food preferences (willing to try Magic Cup).

## 2020-11-19 NOTE — PROGRESS NOTE ADULT - ASSESSMENT
Patient is a 86 year old gentlemen with a pmh of DLBCL (s/p 6 cycles of R-CHOP and in [facility] until 9/2019), L shoulder mass and pathologic fracture ( s/p left proximal humerus open biopsy, radical resection of tumor, and ORIF with IMN and cementation on 9/25/20),Left arm DVT, HTN, HLD, and CKD who is presenting from rehabilitation facility for tachycardia and fever now being treated empirically with  Cefepime and vancomycin for left upper extremity cellulitis.  Given the poor prognosis and family preference the family would like to move towards hospice care. Referral sent out, patient currently cared for in PCU

## 2020-11-19 NOTE — PROGRESS NOTE ADULT - PROBLEM SELECTOR PLAN 3
will treat with empiric course of abx for left should/arm cellulitis  Negative growth to date of blood cultures from 11/17, will recheck blood cultures today 11/19  ID inclined to transition patient to po regimen upon discharge

## 2020-11-19 NOTE — DIETITIAN INITIAL EVALUATION ADULT. - PERTINENT MEDS FT
MEDICATIONS  (STANDING):  acetaminophen   Tablet .. 650 milliGRAM(s) Oral every 6 hours  allopurinol 100 milliGRAM(s) Oral daily  amLODIPine   Tablet 2.5 milliGRAM(s) Oral daily  atorvastatin 80 milliGRAM(s) Oral at bedtime  cefepime   IVPB 2000 milliGRAM(s) IV Intermittent every 12 hours  dronabinol 2.5 milliGRAM(s) Oral two times a day before meals  enoxaparin Injectable 70 milliGRAM(s) SubCutaneous daily  fluconAZOLE   Tablet 200 milliGRAM(s) Oral daily  lactobacillus acidophilus 1 Tablet(s) Oral daily  latanoprost 0.005% Ophthalmic Solution 1 Drop(s) Both EYES at bedtime  mirtazapine 15 milliGRAM(s) Oral at bedtime  polyethylene glycol 3350 17 Gram(s) Oral daily  senna 2 Tablet(s) Oral at bedtime  sodium bicarbonate 650 milliGRAM(s) Oral two times a day  tamsulosin 0.4 milliGRAM(s) Oral at bedtime  timolol 0.5% Solution 1 Drop(s) Both EYES two times a day  vancomycin  IVPB 1000 milliGRAM(s) IV Intermittent every 24 hours    MEDICATIONS  (PRN):  traMADol 25 milliGRAM(s) Oral every 6 hours PRN Moderate Pain (4 - 6)

## 2020-11-19 NOTE — DIETITIAN INITIAL EVALUATION ADULT. - REASON FOR ADMISSION
pt admitted c left shoulder cellulitis, pt c DLBCL, noted family was exploring hospice PTA, now in PCU, GOC continues, pt comfort measures/symptom management.

## 2020-11-19 NOTE — DIETITIAN INITIAL EVALUATION ADULT. - ORAL INTAKE PTA/DIET HISTORY
Pt reports decreased intake PTA. Noted RD note from 10/23/2020 and follow ups after that, pt c suboptimal intake at that time, was taking Ensure Enlive.

## 2020-11-20 NOTE — PROGRESS NOTE ADULT - ASSESSMENT
87 yo M PMH DLBCL (s/p 6 cycles of R-CHOP and in [facility] until 9/2019), L shoulder mass and pathologic fracture s/p left proximal humerus open biopsy, radical resection of tumor, and ORIF with IMN and cementation on 9/25/20),Left arm DVT, HTN, HLD, CKD, BPH, anemia, syncope, and anxiety/adjustment disorder/depression who presented to [facility] on 11/17/20 with worsening LUE and shoulder edema, pain and fever.  In the ED febrile to 101.6, relatively hypotensive to SBP in the 90's, tachycardic to > 120.     On presentation WBC of 1.49 with ANC of 540.   U/A with 8 WBC.   RVP (11/17) negative.   COVID19 PCR (11/17) negative.    CTA Chest (11/17) No pulmonary embolus  CT RUE (11/17) with extensive soft tissue/fluid density aggressive appearing lesion surrounding the left proximal humerus. Differential for this appearance includes tumor extension, and to a less likely degree infection.     Unclear if edema and fever is secondary to thrombus and soft tissue/joint invasion of L shoulder by tumor or if there is component of superinfection.  Pseudomonas on UCx (no pyuria but U/A obtained in context of neutropenia)  Urinary source theoretically possible as well.    Erythema extending from shoulder to proximal arm is stable (not changed) after Vancomycin and Cefepime  MRSA/MSSA Nasal PCR also negative  I believe the rash extending down from the shoulder may be consistent with changes of chronic venous stasis  Would have anticipate a cellulitis to improve on Vancomycin and Cefepime  Would discontinue Vancomycin at this point.    If remains afebrile and without progression of erythema in LUE off of Vancomycin anticipate we can ultimately switch to PO  Tentative plan going forward would be Cipro (through       Overall, Neutropenic Fever, L Shoulder Swelling, Lactic Acidosis, DLBCL, Therapeutic Drug Monitoring    --Continue Vancomycin 1g IV Q24H. Check trough prior to third dose. Target trough of 10-15  --Continue to monitor renal function and vancomycin levels to avoid nephrotoxicity / otoxicity secondary to vancomycin  --Continue Cefepime 2g IV Q12H.   --Continue to follow CBC with diff  --Continue to follow renal function (Cr/BUN)  --Continue to follow temperature curve  --Follow up on preliminary blood cultures  --Follow up on MRSA/MSSA Nasal PCR   87 yo M PMH DLBCL (s/p 6 cycles of R-CHOP and in [facility] until 9/2019), L shoulder mass and pathologic fracture s/p left proximal humerus open biopsy, radical resection of tumor, and ORIF with IMN and cementation on 9/25/20),Left arm DVT, HTN, HLD, CKD, BPH, anemia, syncope, and anxiety/adjustment disorder/depression who presented to [facility] on 11/17/20 with worsening LUE and shoulder edema, pain and fever.  In the ED febrile to 101.6, relatively hypotensive to SBP in the 90's, tachycardic to > 120.     On presentation WBC of 1.49 with ANC of 540.   U/A with 8 WBC.   RVP (11/17) negative.   COVID19 PCR (11/17) negative.    CTA Chest (11/17) No pulmonary embolus  CT RUE (11/17) with extensive soft tissue/fluid density aggressive appearing lesion surrounding the left proximal humerus. Differential for this appearance includes tumor extension, and to a less likely degree infection.     Unclear if edema and fever is secondary to thrombus and soft tissue/joint invasion of L shoulder by tumor or if there is component of superinfection.  Pseudomonas on UCx (no pyuria but U/A obtained in context of neutropenia)  Urinary source theoretically possible as well.    Erythema extending from shoulder to proximal arm is stable (not changed) after Vancomycin and Cefepime  MRSA/MSSA Nasal PCR also negative  I believe the rash extending down from the shoulder may be consistent with changes of chronic venous stasis  Would have anticipated cellulitis to improve on Vancomycin and Cefepime  Would discontinue Vancomycin at this point.    If remains afebrile and without progression of erythema in LUE off of Vancomycin anticipate we can ultimately switch to PO  Tentative plan (if patient is to be discharged home) is to utilize Cipro (through 11/24) and Keflex (through 11/27)  Would continue Cefepime while admitted    Overall, Neutropenic Fever, L Shoulder Swelling, Lactic Acidosis, DLBCL, Therapeutic Drug Monitoring    --Stop Vancomycin  --Continue Cefepime 2g IV Q12H (while admitted - discharge antibiotic plan as above)  --Continue to follow CBC with diff  --Continue to follow renal function (Cr/BUN)  --Continue to follow temperature curve  --Follow up on preliminary blood cultures    I will be away over this upcoming weekend. Please contact the Infectious Diseases Office with any further questions or concerns.     Luke Garcia M.D.  [facility] Division of Infectious Disease  8AM-5PM: Pager Number [phone]  After Hours (or if no response): Please contact the Infectious Diseases Office at [phone]     The above assessment and plan were discussed with PCU Team

## 2020-11-20 NOTE — PROGRESS NOTE ADULT - PROBLEM SELECTOR PLAN 3
will treat with empiric course of abx for left should/arm cellulitis  Negative growth to date of blood cultures from 11/17, will recheck blood cultures today 11/19  ID inclined to transition patient to po regimen upon discharge will treat with empiric course of abx for left should/arm cellulitis  Negative growth to date of blood cultures from 11/17, will recheck blood cultures today 11/19, f/u blood cultures   ID inclined to transition patient to po regimen upon discharge

## 2020-11-20 NOTE — PROGRESS NOTE ADULT - PROBLEM SELECTOR PLAN 1
Wife nataliya opting for hospice  Referral to sent out Wife nataliya opting for hospice  Referral to sent out  No further DDT

## 2020-11-20 NOTE — PROGRESS NOTE ADULT - SUBJECTIVE AND OBJECTIVE BOX
SUBJECTIVE AND OBJECTIVE:  INTERVAL HPI/OVERNIGHT EVENTS:    DNR on chart: Yes    Yes      Allergies    No Known Allergies    Intolerances    MEDICATIONS  (STANDING):  acetaminophen   Tablet .. 650 milliGRAM(s) Oral every 6 hours  allopurinol 100 milliGRAM(s) Oral daily  amLODIPine   Tablet 2.5 milliGRAM(s) Oral daily  atorvastatin 80 milliGRAM(s) Oral at bedtime  cefepime   IVPB 2000 milliGRAM(s) IV Intermittent every 12 hours  dronabinol 2.5 milliGRAM(s) Oral two times a day before meals  enoxaparin Injectable 70 milliGRAM(s) SubCutaneous daily  fluconAZOLE   Tablet 200 milliGRAM(s) Oral daily  lactobacillus acidophilus 1 Tablet(s) Oral daily  latanoprost 0.005% Ophthalmic Solution 1 Drop(s) Both EYES at bedtime  mirtazapine 15 milliGRAM(s) Oral at bedtime  polyethylene glycol 3350 17 Gram(s) Oral daily  senna 2 Tablet(s) Oral at bedtime  sodium bicarbonate 650 milliGRAM(s) Oral two times a day  tamsulosin 0.4 milliGRAM(s) Oral at bedtime  timolol 0.5% Solution 1 Drop(s) Both EYES two times a day  vancomycin  IVPB 1000 milliGRAM(s) IV Intermittent every 24 hours    MEDICATIONS  (PRN):  HYDROmorphone  Injectable 0.2 milliGRAM(s) IV Push every 1 hour PRN Severe Pain (7 - 10)  traMADol 25 milliGRAM(s) Oral every 6 hours PRN Moderate Pain (4 - 6)      ITEMS UNCHECKED ARE NOT PRESENT    PRESENT SYMPTOMS: [ ]Unable to obtain due to poor mentation   Source if other than patient:  [ ]Family   [ ]Team     Pain:  [ ]yes [ ]no  QOL impact -   Location -                    Aggravating factors -  Quality -  Radiation -  Timing-  Severity (0-10 scale):  Minimal acceptable level (0-10 scale):     Dyspnea:                           [ ]Mild [ ]Moderate [ ]Severe  Anxiety:                             [ ]Mild [ ]Moderate [ ]Severe  Fatigue:                             [ ]Mild [ ]Moderate [ ]Severe  Nausea:                             [ ]Mild [ ]Moderate [ ]Severe  Loss of appetite:              [ ]Mild [ ]Moderate [ ]Severe  Constipation:                    [ ]Mild [ ]Moderate [ ]Severe    PAIN AD Score:	  [email or web address]/[email or web address] (Ctrl + left click to view)    Other Symptoms:  [ ]All other review of systems negative     Palliative Performance Status Version 2:         %      http://npcrc.org/files/news/palliative_performance_scale_ppsv2.pdf  PHYSICAL EXAM:  Vital Signs Last 24 Hrs  T(C): 36.7 (20 Nov 2020 06:04), Max: 36.7 (20 Nov 2020 06:04)  T(F): 98.1 (20 Nov 2020 06:04), Max: 98.1 (20 Nov 2020 06:04)  HR: 97 (20 Nov 2020 06:04) (97 - 97)  BP: 102/62 (20 Nov 2020 06:04) (102/62 - 102/62)  BP(mean): --  RR: 18 (20 Nov 2020 06:04) (18 - 18)  SpO2: 98% (20 Nov 2020 06:04) (98% - 98%) I&O's Summary    19 Nov 2020 07:01  -  20 Nov 2020 07:00  --------------------------------------------------------  IN: 0 mL / OUT: 350 mL / NET: -350 mL       GENERAL:  [ ]Alert  [ ]Oriented x   [ ]Lethargic  [ ]Cachexia  [ ]Unarousable  [ ]Verbal  [ ]Non-Verbal  Behavioral:   [ ]Anxiety  [ ]Delirium [ ]Agitation [ ]Other  HEENT:  [ ]Normal   [ ]Dry mouth   [ ]ET Tube/Trach  [ ]Oral lesions  PULMONARY:   [ ]Clear [ ]Tachypnea  [ ]Audible excessive secretions   [ ]Rhonchi        [ ]Right [ ]Left [ ]Bilateral  [ ]Crackles        [ ]Right [ ]Left [ ]Bilateral  [ ]Wheezing     [ ]Right [ ]Left [ ]Bilateral  [ ]Diminished BS [ ] Right [ ]Left [ ]Bilateral  CARDIOVASCULAR:    [ ]Regular [ ]Irregular [ ]Tachy  [ ]Noble [ ]Murmur [ ]Other  GASTROINTESTINAL:  [ ]Soft  [ ]Distended   [ ]+BS  [ ]Non tender [ ]Tender  [ ]PEG [ ]OGT/ NGT   Last BM:      GENITOURINARY:  [ ]Normal [ ]Incontinent   [ ]Oliguria/Anuria   [ ]Gibson  MUSCULOSKELETAL:   [ ]Normal   [ ]Weakness  [ ]Bed/Wheelchair bound [ ]Edema  NEUROLOGIC:   [ ]No focal deficits  [ ] Cognitive impairment  [ ] Dysphagia [ ]Dysarthria [ ] Paresis [ ]Other   SKIN:   [ ]Normal  [ ]Rash   [ ]Pressure ulcer(s) [ ]y [ ]n present on admission    CRITICAL CARE:  [ ]Shock Present  [ ]Septic [ ]Cardiogenic [ ]Neurologic [ ]Hypovolemic  [ ]Vasopressors [ ]Inotropes  [ ]Respiratory failure present [ ]Mechanical Ventilation [ ]Non-invasive ventilatory support [ ]High-Flow  [ ]Acute  [ ]Chronic [ ]Hypoxic  [ ]Hypercarbic [ ]Other  [ ]Other organ failure     LABS:                        7.8    1.84  )-----------( 114      ( 19 Nov 2020 11:40 )             24.2             RADIOLOGY & ADDITIONAL STUDIES:    Protein Calorie Malnutrition Present: [ ]mild [ ]moderate [ ]severe [ ]underweight [ ]morbid obesity  https://www.andeal.org/vault/2440/web/files/ONC/Table_Clinical%20Characteristics%20to%20Document%20Malnutrition-White%20JV%20et%20al%139522.pdf    Height (cm): 182.9 (11-17-20 @ 22:54), 175.3 (10-19-20 @ 09:24)  Weight (kg): 69.3 (11-17-20 @ 22:54), 63.5 (10-31-20 @ 21:11), 77.1 (04-16-20 @ 14:06)  BMI (kg/m2): 20.7 (11-17-20 @ 22:54), 20.7 (10-31-20 @ 21:11), 25.1 (10-19-20 @ 09:24)    [ ]PPSV2 < or = 30%  [ ]significant weight loss [ ]poor nutritional intake [ ]anasarca   Albumin, Serum: 1.9 g/dL (11-18-20 @ 07:20)   [ ]Artificial Nutrition    REFERRALS:   [ ]Chaplaincy  [ ]Hospice  [ ]Child Life  [ ]Social Work  [ ]Case management [ ]Holistic Therapy     Goals of Care Document:  MICA Smith (10-17-20 @ 23:00)  Goals of Care Conversation:   Participants:  · Participants  Family  · Spouse  Wife, Mayda Hernandez    Advance Directives:  · Does patient have Advance Directive  Yes  · Indicate Type  Health Care Proxy (HCP)  · Agent's Name  Mayda Hernandez  · Are any of the items on the chart  No  · Caregiver:  no    Conversation Discussion:  · Conversation  Diagnosis; Prognosis; MOLST Discussed; Treatment Options  · Conversation Details  I had a 16 minute discussion with patient's wife Mayda (HCP) regarding advanced directives.  I had discussed with her the current plan of care.  I had also discussed all resuscitative measures and she verbalized understanding.  She confirmed that patient's wish is to have everything done as needed.  Currently patient is FULL CODE.    What Matters Most To Patient and Family:  · What matters most to patient and family  recovery    Personal Advance Directives Treatment Guidelines:   Treatment Guidelines:  · Decision Maker  Health Care Proxy  · Treatment Guidelines  Antibiotic trial; IV fluid trial    Location of Discussion:   Duration of Advanced Care Planning Meeting:  · Time spent (in minutes)  16    Location of Discussion:  · Location of discussion  Telephone   SUBJECTIVE AND OBJECTIVE:  INTERVAL HPI/OVERNIGHT EVENTS:    Overnight patient was complaining of dry mouth and dry eyes. He additionally experienced severe pain in his left shoulder. He was given tramadol X2 without significant relief. Following that received .2 mg of Dilaudid which alleviated his pain and discomfort. This morning he denies pain, just reports dry eyes and blurry vision . He appeared comfortable and in no acute distress.  Has been urinating well with tobar in place. Last BM documented on 11/18.     DNR on chart: Yes    Yes      Allergies    No Known Allergies    Intolerances    MEDICATIONS  (STANDING):  acetaminophen   Tablet .. 650 milliGRAM(s) Oral every 6 hours  allopurinol 100 milliGRAM(s) Oral daily  amLODIPine   Tablet 2.5 milliGRAM(s) Oral daily  atorvastatin 80 milliGRAM(s) Oral at bedtime  cefepime   IVPB 2000 milliGRAM(s) IV Intermittent every 12 hours  dronabinol 2.5 milliGRAM(s) Oral two times a day before meals  enoxaparin Injectable 70 milliGRAM(s) SubCutaneous daily  fluconAZOLE   Tablet 200 milliGRAM(s) Oral daily  lactobacillus acidophilus 1 Tablet(s) Oral daily  latanoprost 0.005% Ophthalmic Solution 1 Drop(s) Both EYES at bedtime  mirtazapine 15 milliGRAM(s) Oral at bedtime  polyethylene glycol 3350 17 Gram(s) Oral daily  senna 2 Tablet(s) Oral at bedtime  sodium bicarbonate 650 milliGRAM(s) Oral two times a day  tamsulosin 0.4 milliGRAM(s) Oral at bedtime  timolol 0.5% Solution 1 Drop(s) Both EYES two times a day  vancomycin  IVPB 1000 milliGRAM(s) IV Intermittent every 24 hours    MEDICATIONS  (PRN):  HYDROmorphone  Injectable 0.2 milliGRAM(s) IV Push every 1 hour PRN Severe Pain (7 - 10)  traMADol 25 milliGRAM(s) Oral every 6 hours PRN Moderate Pain (4 - 6)      ITEMS UNCHECKED ARE NOT PRESENT    PRESENT SYMPTOMS: [ ]Unable to obtain due to poor mentation   Source if other than patient:  [ ]Family   [ ]Team     Pain:  [ ]yes [ ]no  QOL impact -   Location -                    Aggravating factors -  Quality -  Radiation -  Timing-  Severity (0-10 scale):  Minimal acceptable level (0-10 scale):     Dyspnea:                           [ ]Mild [ ]Moderate [ ]Severe  Anxiety:                             [ ]Mild [ ]Moderate [ ]Severe  Fatigue:                             [ ]Mild [ ]Moderate [ ]Severe  Nausea:                             [ ]Mild [ ]Moderate [ ]Severe  Loss of appetite:              [ ]Mild [ ]Moderate [ ]Severe  Constipation:                    [ ]Mild [ ]Moderate [ ]Severe    PAIN AD Score:	  [email or web address] (Ctrl + left click to view)    Other Symptoms:  [ ]All other review of systems negative     Palliative Performance Status Version 2:         %      http://npcrc.org/files/news/palliative_performance_scale_ppsv2.pdf  PHYSICAL EXAM:  Vital Signs Last 24 Hrs  T(C): 36.7 (20 Nov 2020 06:04), Max: 36.7 (20 Nov 2020 06:04)  T(F): 98.1 (20 Nov 2020 06:04), Max: 98.1 (20 Nov 2020 06:04)  HR: 97 (20 Nov 2020 06:04) (97 - 97)  BP: 102/62 (20 Nov 2020 06:04) (102/62 - 102/62)  BP(mean): --  RR: 18 (20 Nov 2020 06:04) (18 - 18)  SpO2: 98% (20 Nov 2020 06:04) (98% - 98%) I&O's Summary    19 Nov 2020 07:01  -  20 Nov 2020 07:00  --------------------------------------------------------  IN: 0 mL / OUT: 350 mL / NET: -350 mL       GENERAL:  [ ]Alert  [ ]Oriented x   [ ]Lethargic  [ ]Cachexia  [ ]Unarousable  [ ]Verbal  [ ]Non-Verbal  Behavioral:   [ ]Anxiety  [ ]Delirium [ ]Agitation [ ]Other  HEENT:  [ ]Normal   [ ]Dry mouth   [ ]ET Tube/Trach  [ ]Oral lesions  PULMONARY:   [ ]Clear [ ]Tachypnea  [ ]Audible excessive secretions   [ ]Rhonchi        [ ]Right [ ]Left [ ]Bilateral  [ ]Crackles        [ ]Right [ ]Left [ ]Bilateral  [ ]Wheezing     [ ]Right [ ]Left [ ]Bilateral  [ ]Diminished BS [ ] Right [ ]Left [ ]Bilateral  CARDIOVASCULAR:    [ ]Regular [ ]Irregular [ ]Tachy  [ ]Noble [ ]Murmur [ ]Other  GASTROINTESTINAL:  [ ]Soft  [ ]Distended   [ ]+BS  [ ]Non tender [ ]Tender  [ ]PEG [ ]OGT/ NGT   Last BM:      GENITOURINARY:  [ ]Normal [ ]Incontinent   [ ]Oliguria/Anuria   [ ]Tobar  MUSCULOSKELETAL:   [ ]Normal   [ ]Weakness  [ ]Bed/Wheelchair bound [ ]Edema  NEUROLOGIC:   [ ]No focal deficits  [ ] Cognitive impairment  [ ] Dysphagia [ ]Dysarthria [ ] Paresis [ ]Other   SKIN:   [ ]Normal  [ ]Rash   [ ]Pressure ulcer(s) [ ]y [ ]n present on admission    CRITICAL CARE:  [ ]Shock Present  [ ]Septic [ ]Cardiogenic [ ]Neurologic [ ]Hypovolemic  [ ]Vasopressors [ ]Inotropes  [ ]Respiratory failure present [ ]Mechanical Ventilation [ ]Non-invasive ventilatory support [ ]High-Flow  [ ]Acute  [ ]Chronic [ ]Hypoxic  [ ]Hypercarbic [ ]Other  [ ]Other organ failure     LABS:                        7.8    1.84  )-----------( 114      ( 19 Nov 2020 11:40 )             24.2             RADIOLOGY & ADDITIONAL STUDIES:    Protein Calorie Malnutrition Present: [ ]mild [ ]moderate [ ]severe [ ]underweight [ ]morbid obesity  https://www.andeal.org/vault/2440/web/files/ONC/Table_Clinical%20Characteristics%20to%20Document%20Malnutrition-White%20JV%20et%20al%077614.pdf    Height (cm): 182.9 (11-17-20 @ 22:54), 175.3 (10-19-20 @ 09:24)  Weight (kg): 69.3 (11-17-20 @ 22:54), 63.5 (10-31-20 @ 21:11), 77.1 (04-16-20 @ 14:06)  BMI (kg/m2): 20.7 (11-17-20 @ 22:54), 20.7 (10-31-20 @ 21:11), 25.1 (10-19-20 @ 09:24)    [ ]PPSV2 < or = 30%  [ ]significant weight loss [ ]poor nutritional intake [ ]anasarca   Albumin, Serum: 1.9 g/dL (11-18-20 @ 07:20)   [ ]Artificial Nutrition    REFERRALS:   [ ]Chaplaincy  [ ]Hospice  [ ]Child Life  [ ]Social Work  [ ]Case management [ ]Holistic Therapy     Goals of Care Document:  MICA Smith (10-17-20 @ 23:00)  Goals of Care Conversation:   Participants:  · Participants  Family  · Spouse  Wife, Mayda Hernandez    Advance Directives:  · Does patient have Advance Directive  Yes  · Indicate Type  Health Care Proxy (HCP)  · Agent's Name  Mayda Hernandez  · Are any of the items on the chart  No  · Caregiver:  no    Conversation Discussion:  · Conversation  Diagnosis; Prognosis; MOLST Discussed; Treatment Options  · Conversation Details  I had a 16 minute discussion with patient's wife Mayda (HCP) regarding advanced directives.  I had discussed with her the current plan of care.  I had also discussed all resuscitative measures and she verbalized understanding.  She confirmed that patient's wish is to have everything done as needed.  Currently patient is FULL CODE.    What Matters Most To Patient and Family:  · What matters most to patient and family  recovery    Personal Advance Directives Treatment Guidelines:   Treatment Guidelines:  · Decision Maker  Health Care Proxy  · Treatment Guidelines  Antibiotic trial; IV fluid trial    Location of Discussion:   Duration of Advanced Care Planning Meeting:  · Time spent (in minutes)  16    Location of Discussion:  · Location of discussion  Telephone   SUBJECTIVE AND OBJECTIVE:  INTERVAL HPI/OVERNIGHT EVENTS:    Overnight patient was complaining of dry mouth and dry eyes. He additionally experienced severe pain in his left shoulder. He was given tramadol X2 without significant relief. Following that received .2 mg of Dilaudid which alleviated his pain and discomfort. This morning he denies pain, just reports dry eyes and blurry vision . He appeared comfortable and in no acute distress.  Has been urinating well with tobar in place. Last BM documented on 11/18.     DNR on chart: Yes    Yes    Allergies    No Known Allergies    Intolerances    MEDICATIONS  (STANDING):  acetaminophen   Tablet .. 650 milliGRAM(s) Oral every 6 hours  allopurinol 100 milliGRAM(s) Oral daily  amLODIPine   Tablet 2.5 milliGRAM(s) Oral daily  atorvastatin 80 milliGRAM(s) Oral at bedtime  cefepime   IVPB 2000 milliGRAM(s) IV Intermittent every 12 hours  dronabinol 2.5 milliGRAM(s) Oral two times a day before meals  enoxaparin Injectable 70 milliGRAM(s) SubCutaneous daily  fluconAZOLE   Tablet 200 milliGRAM(s) Oral daily  lactobacillus acidophilus 1 Tablet(s) Oral daily  latanoprost 0.005% Ophthalmic Solution 1 Drop(s) Both EYES at bedtime  mirtazapine 15 milliGRAM(s) Oral at bedtime  polyethylene glycol 3350 17 Gram(s) Oral daily  senna 2 Tablet(s) Oral at bedtime  sodium bicarbonate 650 milliGRAM(s) Oral two times a day  tamsulosin 0.4 milliGRAM(s) Oral at bedtime  timolol 0.5% Solution 1 Drop(s) Both EYES two times a day  vancomycin  IVPB 1000 milliGRAM(s) IV Intermittent every 24 hours    MEDICATIONS  (PRN):  HYDROmorphone  Injectable 0.2 milliGRAM(s) IV Push every 1 hour PRN Severe Pain (7 - 10)  traMADol 25 milliGRAM(s) Oral every 6 hours PRN Moderate Pain (4 - 6)      ITEMS UNCHECKED ARE NOT PRESENT    PRESENT SYMPTOMS: [ ]Unable to obtain due to poor mentation   Source if other than patient:  [ ]Family   [ ]Team     Pain:  [ ]yes [ ]no  QOL impact -   Location -                    Aggravating factors -  Quality -  Radiation -  Timing-  Severity (0-10 scale):  Minimal acceptable level (0-10 scale):     Dyspnea:                           [ ]Mild [ ]Moderate [ ]Severe  Anxiety:                             [ ]Mild [ ]Moderate [ ]Severe  Fatigue:                             [ ]Mild [ ]Moderate [ ]Severe  Nausea:                             [ ]Mild [ ]Moderate [ ]Severe  Loss of appetite:              [ ]Mild [ ]Moderate [ ]Severe  Constipation:                    [ ]Mild [ ]Moderate [ ]Severe    PAIN AD Score:	  [email or web address] (Ctrl + left click to view)    Other Symptoms:  [ ]All other review of systems negative     Palliative Performance Status Version 2:         %      http://npcrc.org/files/news/palliative_performance_scale_ppsv2.pdf  PHYSICAL EXAM:  Vital Signs Last 24 Hrs  T(C): 36.7 (20 Nov 2020 06:04), Max: 36.7 (20 Nov 2020 06:04)  T(F): 98.1 (20 Nov 2020 06:04), Max: 98.1 (20 Nov 2020 06:04)  HR: 97 (20 Nov 2020 06:04) (97 - 97)  BP: 102/62 (20 Nov 2020 06:04) (102/62 - 102/62)  BP(mean): --  RR: 18 (20 Nov 2020 06:04) (18 - 18)  SpO2: 98% (20 Nov 2020 06:04) (98% - 98%) I&O's Summary    19 Nov 2020 07:01  -  20 Nov 2020 07:00  --------------------------------------------------------  IN: 0 mL / OUT: 350 mL / NET: -350 mL       GENERAL:  [ ]Alert  [ ]Oriented x   [ ]Lethargic  [ ]Cachexia  [ ]Unarousable  [ ]Verbal  [ ]Non-Verbal  Behavioral:   [ ]Anxiety  [ ]Delirium [ ]Agitation [ ]Other  HEENT:  [ ]Normal   [ ]Dry mouth   [ ]ET Tube/Trach  [ ]Oral lesions  PULMONARY:   [ ]Clear [ ]Tachypnea  [ ]Audible excessive secretions   [ ]Rhonchi        [ ]Right [ ]Left [ ]Bilateral  [ ]Crackles        [ ]Right [ ]Left [ ]Bilateral  [ ]Wheezing     [ ]Right [ ]Left [ ]Bilateral  [ ]Diminished BS [ ] Right [ ]Left [ ]Bilateral  CARDIOVASCULAR:    [ ]Regular [ ]Irregular [ ]Tachy  [ ]Noble [ ]Murmur [ ]Other  GASTROINTESTINAL:  [ ]Soft  [ ]Distended   [ ]+BS  [ ]Non tender [ ]Tender  [ ]PEG [ ]OGT/ NGT   Last BM:      GENITOURINARY:  [ ]Normal [ ]Incontinent   [ ]Oliguria/Anuria   [ ]Tobar  MUSCULOSKELETAL:   [ ]Normal   [ ]Weakness  [ ]Bed/Wheelchair bound [ ]Edema  NEUROLOGIC:   [ ]No focal deficits  [ ] Cognitive impairment  [ ] Dysphagia [ ]Dysarthria [ ] Paresis [ ]Other   SKIN:   [ ]Normal  [ ]Rash   [ ]Pressure ulcer(s) [ ]y [ ]n present on admission    CRITICAL CARE:  [ ]Shock Present  [ ]Septic [ ]Cardiogenic [ ]Neurologic [ ]Hypovolemic  [ ]Vasopressors [ ]Inotropes  [ ]Respiratory failure present [ ]Mechanical Ventilation [ ]Non-invasive ventilatory support [ ]High-Flow  [ ]Acute  [ ]Chronic [ ]Hypoxic  [ ]Hypercarbic [ ]Other  [ ]Other organ failure     LABS:                        7.8    1.84  )-----------( 114      ( 19 Nov 2020 11:40 )             24.2             RADIOLOGY & ADDITIONAL STUDIES:    Protein Calorie Malnutrition Present: [ ]mild [ ]moderate [ ]severe [ ]underweight [ ]morbid obesity  https://www.andeal.org/vault/2440/web/files/ONC/Table_Clinical%20Characteristics%20to%20Document%20Malnutrition-White%20JV%20et%20al%400788.pdf    Height (cm): 182.9 (11-17-20 @ 22:54), 175.3 (10-19-20 @ 09:24)  Weight (kg): 69.3 (11-17-20 @ 22:54), 63.5 (10-31-20 @ 21:11), 77.1 (04-16-20 @ 14:06)  BMI (kg/m2): 20.7 (11-17-20 @ 22:54), 20.7 (10-31-20 @ 21:11), 25.1 (10-19-20 @ 09:24)    [ ]PPSV2 < or = 30%  [ ]significant weight loss [ ]poor nutritional intake [ ]anasarca   Albumin, Serum: 1.9 g/dL (11-18-20 @ 07:20)   [ ]Artificial Nutrition    REFERRALS:   [ ]Chaplaincy  [ ]Hospice  [ ]Child Life  [ ]Social Work  [ ]Case management [ ]Holistic Therapy     Goals of Care Document:  MICA Smith (10-17-20 @ 23:00)  Goals of Care Conversation:   Participants:  · Participants  Family  · Spouse  Wife, Mayda Hernandez    Advance Directives:  · Does patient have Advance Directive  Yes  · Indicate Type  Health Care Proxy (HCP)  · Agent's Name  Mayda Hernandez  · Are any of the items on the chart  No  · Caregiver:  no    Conversation Discussion:  · Conversation  Diagnosis; Prognosis; MOLST Discussed; Treatment Options  · Conversation Details  I had a 16 minute discussion with patient's wife Mayda (HCP) regarding advanced directives.  I had discussed with her the current plan of care.  I had also discussed all resuscitative measures and she verbalized understanding.  She confirmed that patient's wish is to have everything done as needed.  Currently patient is FULL CODE.    What Matters Most To Patient and Family:  · What matters most to patient and family  recovery    Personal Advance Directives Treatment Guidelines:   Treatment Guidelines:  · Decision Maker  Health Care Proxy  · Treatment Guidelines  Antibiotic trial; IV fluid trial    Location of Discussion:   Duration of Advanced Care Planning Meeting:  · Time spent (in minutes)  16    Location of Discussion:  · Location of discussion  Telephone   GAP TEAM PALLIATIVE CARE UNIT PROGRESS NOTE:      [  ] Patient on hospice program.    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 He additionally experienced severe pain in his left shoulder. He was given tramadol X2 without significant relief. Following that received .2 mg of Dilaudid which alleviated his pain and discomfort. This morning he denies pain, just reports dry eyes and blurry vision . He appeared comfortable and in no acute distress.  Has been urinating well with tobar in place. Last BM documented on 11/18.     DNR on chart: Yes    Allergies    No Known Allergies    Intolerances    MEDICATIONS  (STANDING):  acetaminophen   Tablet .. 650 milliGRAM(s) Oral every 6 hours  allopurinol 100 milliGRAM(s) Oral daily  amLODIPine   Tablet 2.5 milliGRAM(s) Oral daily  atorvastatin 80 milliGRAM(s) Oral at bedtime  cefepime   IVPB 2000 milliGRAM(s) IV Intermittent every 12 hours  dronabinol 2.5 milliGRAM(s) Oral two times a day before meals  enoxaparin Injectable 70 milliGRAM(s) SubCutaneous daily  fluconAZOLE   Tablet 200 milliGRAM(s) Oral daily  lactobacillus acidophilus 1 Tablet(s) Oral daily  latanoprost 0.005% Ophthalmic Solution 1 Drop(s) Both EYES at bedtime  mirtazapine 15 milliGRAM(s) Oral at bedtime  polyethylene glycol 3350 17 Gram(s) Oral daily  senna 2 Tablet(s) Oral at bedtime  sodium bicarbonate 650 milliGRAM(s) Oral two times a day  tamsulosin 0.4 milliGRAM(s) Oral at bedtime  timolol 0.5% Solution 1 Drop(s) Both EYES two times a day  vancomycin  IVPB 1000 milliGRAM(s) IV Intermittent every 24 hours    MEDICATIONS  (PRN):  HYDROmorphone  Injectable 0.2 milliGRAM(s) IV Push every 1 hour PRN Severe Pain (7 - 10)  traMADol 25 milliGRAM(s) Oral every 6 hours PRN Moderate Pain (4 - 6)      ITEMS UNCHECKED ARE NOT PRESENT    PRESENT SYMPTOMS: [ ]Unable to obtain due to poor mentation   Source if other than patient:  [ ]Family   [ ]Team     Pain:  [ x]yes [ ]no  QOL impact -   Location -  Left shoulder pain  Aggravating factors - unable to describe  Quality - unable to describe  Radiation - localized  Timing- constant  Severity (0-10 scale): unable to describe  Minimal acceptable level (0-10 scale):  unable to describe    Dyspnea:                           [ ]Mild [ ]Moderate [ ]Severe  Anxiety:                             [ ]Mild [ ]Moderate [ ]Severe  Fatigue:                             [ ]Mild [ ]Moderate [ ]Severe  Nausea:                             [ ]Mild [ ]Moderate [ ]Severe  Loss of appetite:              [ ]Mild [ ]Moderate [ ]Severe  Constipation:                    [ ]Mild [ ]Moderate [ ]Severe    PAIN AD Score:	  [email or web address] (Ctrl + left click to view)    Other Symptoms: dry eyes  [x ]All other review of systems negative     Palliative Performance Status Version 2:    30     %      http://npcrc.org/files/news/palliative_performance_scale_[email or web address].pdf  PHYSICAL EXAM:  Vital Signs Last 24 Hrs  T(C): 36.7 (20 Nov 2020 06:04), Max: 36.7 (20 Nov 2020 06:04)  T(F): 98.1 (20 Nov 2020 06:04), Max: 98.1 (20 Nov 2020 06:04)  HR: 97 (20 Nov 2020 06:04) (97 - 97)  BP: 102/62 (20 Nov 2020 06:04) (102/62 - 102/62)  BP(mean): --  RR: 18 (20 Nov 2020 06:04) (18 - 18)  SpO2: 98% (20 Nov 2020 06:04) (98% - 98%) I&O's Summary    19 Nov 2020 07:01  -  20 Nov 2020 07:00  --------------------------------------------------------  IN: 0 mL / OUT: 350 mL / NET: -350 mL       GENERAL:  [ X]Alert  [ X]Oriented x3   [ ]Lethargic  [ ]Cachexia  [ ]Unarousable  [x ]Verbal  [ ]Non-Verbal  Behavioral:   [ ]Anxiety  [ ]Delirium [ ]Agitation [ ]Other  HEENT:  [ X]Normal   [ ]Dry mouth   [ ]ET Tube/Trach  [ ]Oral lesions  PULMONARY:   [ X]Clear [ ]Tachypnea  [ ]Audible excessive secretions   [ ]Rhonchi        [ ]Right [ ]Left [ ]Bilateral  [ ]Crackles        [ ]Right [ ]Left [ ]Bilateral  [ ]Wheezing     [ ]Right [ ]Left [ ]Bilateral  [ ]Diminished BS [ ] Right [ ]Left [ ]Bilateral  CARDIOVASCULAR:    [X ]Regular [ ]Irregular [ ]Tachy  [ ]Noble [ ]Murmur [ ]Other  GASTROINTESTINAL:  [X ]Soft  [ ]Distended   [ x]+BS  [ X]Non tender [ ]Tender  [ ]PEG [ ]OGT/ NGT   Last BM: 11/19     GENITOURINARY:  [ ]Normal [x ]Incontinent   [ ]Oliguria/Anuria   [X ]Tobar   MUSCULOSKELETAL:   [ X]Normal   [X ]Weakness  left shoulder hyperpigmented, large mass, edematous, non tender to touch with no warmth to palpation . LLE with dependent edema   NEUROLOGIC:   [X ]No focal deficits  [ ] Cognitive impairment  [ ] Dysphagia [ ]Dysarthria [ ] Paresis [ ]Other   SKIN:   [x ] left shoulder hyperpigmented violaceous in hue -mass    CRITICAL CARE:  [ ]Shock Present  [ ]Septic [ ]Cardiogenic [ ]Neurologic [ ]Hypovolemic  [ ]Vasopressors [ ]Inotropes  [ ]Respiratory failure present [ ]Mechanical Ventilation [ ]Non-invasive ventilatory support [ ]High-Flow  [ ]Acute  [ ]Chronic [ ]Hypoxic  [ ]Hypercarbic [ ]Other  [x ]Other organ failure kidney, lymph/bone marrow  LABS:                        7.8    1.84  )-----------( 114      ( 19 Nov 2020 11:40 )             24.2     RADIOLOGY & ADDITIONAL STUDIES: no new imaging    Protein Calorie Malnutrition Present: [ ]mild [x ]moderate [ ]severe [ ]underweight [ ]morbid obesity  https://www.andeal.org/vault/2440/web/files/ONC/Table_Clinical%20Characteristics%20to%20Document%20Malnutrition-White%20JV%20et%20al%202012.pdf    Height (cm): 182.9 (11-17-20 @ 22:54), 175.3 (10-19-20 @ 09:24)  Weight (kg): 69.3 (11-17-20 @ 22:54), 63.5 (10-31-20 @ 21:11), 77.1 (04-16-20 @ 14:06)  BMI (kg/m2): 20.7 (11-17-20 @ 22:54), 20.7 (10-31-20 @ 21:11), 25.1 (10-19-20 @ 09:24)    [ x]PPSV2 < or = 30%  [ ]significant weight loss [ ]poor nutritional intake [ ]anasarca   Albumin, Serum: 1.9 g/dL (11-18-20 @ 07:20)   [ ]Artificial Nutrition    REFERRALS:   [ ]Chaplaincy  [ ]Hospice  [ ]Child Life  [x ]Social Work  [ ]Case management [ ]Holistic Therapy     Goals of Care Document:  MICA Smith (10-17-20 @ 23:00)  Goals of Care Conversation:   Participants:  · Participants  Family  · Spouse  Wife, Mayda Hernandez    Advance Directives:  · Does patient have Advance Directive  Yes  · Indicate Type  Health Care Proxy (HCP)  · Agent's Name  Mayda Hernandez  · Are any of the items on the chart  No  · Caregiver:  no    Conversation Discussion:  · Conversation  Diagnosis; Prognosis; MOLST Discussed; Treatment Options  · Conversation Details  I had a 16 minute discussion with patient's wife Mayda (HCP) regarding advanced directives.  I had discussed with her the current plan of care.  I had also discussed all resuscitative measures and she verbalized understanding.  She confirmed that patient's wish is to have everything done as needed.  Currently patient is FULL CODE.    What Matters Most To Patient and Family:  · What matters most to patient and family  recovery    Personal Advance Directives Treatment Guidelines:   Treatment Guidelines:  · Decision Maker  Health Care Proxy  · Treatment Guidelines  Antibiotic trial; IV fluid trial    Location of Discussion:   Duration of Advanced Care Planning Meeting:  · Time spent (in minutes)  16    Location of Discussion:  · Location of discussion  Telephone     GAP TEAM PALLIATIVE CARE UNIT PROGRESS NOTE:      [  ] Patient on hospice program.    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 He additionally experienced severe pain in his left shoulder. He was given tramadol X2 without significant relief. Following that received .2 mg of Dilaudid which alleviated his pain and discomfort. This morning he denies pain, just reports dry eyes and blurry vision . He appeared comfortable and in no acute distress.  Has been urinating well with tobar in place. Last BM documented on 11/18.     DNR on chart: Yes    Allergies    No Known Allergies    Intolerances    MEDICATIONS  (STANDING):  acetaminophen   Tablet .. 650 milliGRAM(s) Oral every 6 hours  allopurinol 100 milliGRAM(s) Oral daily  amLODIPine   Tablet 2.5 milliGRAM(s) Oral daily  atorvastatin 80 milliGRAM(s) Oral at bedtime  cefepime   IVPB 2000 milliGRAM(s) IV Intermittent every 12 hours  dronabinol 2.5 milliGRAM(s) Oral two times a day before meals  enoxaparin Injectable 70 milliGRAM(s) SubCutaneous daily  fluconAZOLE   Tablet 200 milliGRAM(s) Oral daily  lactobacillus acidophilus 1 Tablet(s) Oral daily  latanoprost 0.005% Ophthalmic Solution 1 Drop(s) Both EYES at bedtime  mirtazapine 15 milliGRAM(s) Oral at bedtime  polyethylene glycol 3350 17 Gram(s) Oral daily  senna 2 Tablet(s) Oral at bedtime  sodium bicarbonate 650 milliGRAM(s) Oral two times a day  tamsulosin 0.4 milliGRAM(s) Oral at bedtime  timolol 0.5% Solution 1 Drop(s) Both EYES two times a day  vancomycin  IVPB 1000 milliGRAM(s) IV Intermittent every 24 hours    MEDICATIONS  (PRN):  HYDROmorphone  Injectable 0.2 milliGRAM(s) IV Push every 1 hour PRN Severe Pain (7 - 10)  traMADol 25 milliGRAM(s) Oral every 6 hours PRN Moderate Pain (4 - 6)      ITEMS UNCHECKED ARE NOT PRESENT    PRESENT SYMPTOMS: [ ]Unable to obtain due to poor mentation   Source if other than patient:  [ ]Family   [ ]Team     Pain:  [ x]yes [ ]no  QOL impact -   Location -  Left shoulder pain  Aggravating factors - unable to describe  Quality - unable to describe  Radiation - localized  Timing- constant  Severity (0-10 scale): unable to describe  Minimal acceptable level (0-10 scale):  unable to describe    Dyspnea:                           [ ]Mild [ ]Moderate [ ]Severe  Anxiety:                             [ ]Mild [ ]Moderate [ ]Severe  Fatigue:                             [ ]Mild [ ]Moderate [ ]Severe  Nausea:                             [ ]Mild [ ]Moderate [ ]Severe  Loss of appetite:              [ ]Mild [ ]Moderate [ ]Severe  Constipation:                    [ ]Mild [ ]Moderate [ ]Severe    PAIN AD Score:	  http://geriatrictoolkit.[facility]/cog/painad.pdf (Ctrl + left click to view)    Other Symptoms: dry eyes  [x ]All other review of systems negative     Palliative Performance Status Version 2:    30     %      http://npcrc.org/files/news/palliative_performance_scale_ppsv2.pdf  PHYSICAL EXAM:  Vital Signs Last 24 Hrs  T(C): 36.7 (20 Nov 2020 06:04), Max: 36.7 (20 Nov 2020 06:04)  T(F): 98.1 (20 Nov 2020 06:04), Max: 98.1 (20 Nov 2020 06:04)  HR: 97 (20 Nov 2020 06:04) (97 - 97)  BP: 102/62 (20 Nov 2020 06:04) (102/62 - 102/62)  BP(mean): --  RR: 18 (20 Nov 2020 06:04) (18 - 18)  SpO2: 98% (20 Nov 2020 06:04) (98% - 98%) I&O's Summary    19 Nov 2020 07:01  -  20 Nov 2020 07:00  --------------------------------------------------------  IN: 0 mL / OUT: 350 mL / NET: -350 mL       GENERAL:  [ X]Alert  [ X]Oriented x3   [ ]Lethargic  [ ]Cachexia  [ ]Unarousable  [x ]Verbal  [ ]Non-Verbal  Behavioral:   [ ]Anxiety  [ ]Delirium [ ]Agitation [ ]Other  HEENT:  [ X]Normal   [ ]Dry mouth   [ ]ET Tube/Trach  [ ]Oral lesions  PULMONARY:   [ X]Clear [ ]Tachypnea  [ ]Audible excessive secretions   [ ]Rhonchi        [ ]Right [ ]Left [ ]Bilateral  [ ]Crackles        [ ]Right [ ]Left [ ]Bilateral  [ ]Wheezing     [ ]Right [ ]Left [ ]Bilateral  [ ]Diminished BS [ ] Right [ ]Left [ ]Bilateral  CARDIOVASCULAR:    [X ]Regular [ ]Irregular [ ]Tachy  [ ]Noble [ ]Murmur [ ]Other  GASTROINTESTINAL:  [X ]Soft  [ ]Distended   [ x]+BS  [ X]Non tender [ ]Tender  [ ]PEG [ ]OGT/ NGT   Last BM: 11/19     GENITOURINARY:  [ ]Normal [x ]Incontinent   [ ]Oliguria/Anuria   [X ]Tobar   MUSCULOSKELETAL:   [ X]Normal   [X ]Weakness  left shoulder hyperpigmented, large mass, edematous, non tender to touch with no warmth to palpation . LLE with dependent edema   NEUROLOGIC:   [X ]No focal deficits  [ ] Cognitive impairment  [ ] Dysphagia [ ]Dysarthria [ ] Paresis [ ]Other   SKIN:   [x ] left shoulder hyperpigmented violaceous in hue -mass    CRITICAL CARE:  [ ]Shock Present  [ ]Septic [ ]Cardiogenic [ ]Neurologic [ ]Hypovolemic  [ ]Vasopressors [ ]Inotropes  [ ]Respiratory failure present [ ]Mechanical Ventilation [ ]Non-invasive ventilatory support [ ]High-Flow  [ ]Acute  [ ]Chronic [ ]Hypoxic  [ ]Hypercarbic [ ]Other  [x ]Other organ failure kidney, lymph/bone marrow  LABS:                        7.8    1.84  )-----------( 114      ( 19 Nov 2020 11:40 )             24.2     RADIOLOGY & ADDITIONAL STUDIES: no new imaging    Protein Calorie Malnutrition Present: [ ]mild [x ]moderate [ ]severe [ ]underweight [ ]morbid obesity  https://www.andeal.org/vault/2440/web/files/ONC/Table_Clinical%20Characteristics%20to%20Document%20Malnutrition-White%20JV%20et%20al%202012.pdf    Height (cm): 182.9 (11-17-20 @ 22:54), 175.3 (10-19-20 @ 09:24)  Weight (kg): 69.3 (11-17-20 @ 22:54), 63.5 (10-31-20 @ 21:11), 77.1 (04-16-20 @ 14:06)  BMI (kg/m2): 20.7 (11-17-20 @ 22:54), 20.7 (10-31-20 @ 21:11), 25.1 (10-19-20 @ 09:24)    [ x]PPSV2 < or = 30%  [ ]significant weight loss [ ]poor nutritional intake [ ]anasarca   Albumin, Serum: 1.9 g/dL (11-18-20 @ 07:20)   [ ]Artificial Nutrition    REFERRALS:   [ ]Chaplaincy  [ x]Hospice  [ ]Child Life  [x ]Social Work  [ ]Case management [ ]Holistic Therapy     Goals of Care Document:  MICA Smith (10-17-20 @ 23:00)  Goals of Care Conversation:   Participants:  · Participants  Family  · Spouse  Wife, Mayda Hernandez    Advance Directives:  · Does patient have Advance Directive  Yes  · Indicate Type  Health Care Proxy (HCP)  · Agent's Name  Mayda Hernandez  · Are any of the items on the chart  No  · Caregiver:  no    Conversation Discussion:  · Conversation  Diagnosis; Prognosis; MOLST Discussed; Treatment Options  · Conversation Details  I had a 16 minute discussion with patient's wife Mayda (HCP) regarding advanced directives.  I had discussed with her the current plan of care.  I had also discussed all resuscitative measures and she verbalized understanding.  She confirmed that patient's wish is to have everything done as needed.  Currently patient is FULL CODE.    What Matters Most To Patient and Family:  · What matters most to patient and family  recovery    Personal Advance Directives Treatment Guidelines:   Treatment Guidelines:  · Decision Maker  Health Care Proxy  · Treatment Guidelines  Antibiotic trial; IV fluid trial    Location of Discussion:   Duration of Advanced Care Planning Meeting:  · Time spent (in minutes)  16    Location of Discussion:  · Location of discussion  Telephone

## 2020-11-20 NOTE — PROGRESS NOTE ADULT - PROBLEM SELECTOR PLAN 2
S/p 1 unit of prbc yesterday, recheck post transfusion cbc   Patient opting for symptom triggered management for blood draws and transfusions S/p 1 unit of prbc 11/18, recheck post transfusion cbc   Patient opting for symptom triggered management for blood draws and transfusions

## 2020-11-20 NOTE — PROGRESS NOTE ADULT - PROBLEM SELECTOR PLAN 4
Patient DNR / DNI  Home hospice referral sent out Tylenol 650 mg po q6 hours , scheduled   Tramadol 25 mg q6 hours, scheduled   Oxycodone elixir 2.5 mg po solution q3 hours for severe pain

## 2020-11-20 NOTE — PROGRESS NOTE ADULT - SUBJECTIVE AND OBJECTIVE BOX
Follow Up:  neutropenic fever, ?L Shoulder Cellulitis     Interval History: afebrile. denies pain in the left shoulder.     REVIEW OF SYSTEMS  [  ] ROS unobtainable because:    [ x ] All other systems negative except as noted below:	    Constitutional:  [x ] fever [ ] chills  [ ] weight loss  [ ] weakness  Skin:  [ ] rash [ ] phlebitis	  Eyes: [ ] icterus [ ] pain  [ ] discharge	  ENMT: [ ] sore throat  [ ] thrush [ ] ulcers [ ] exudates  Respiratory: [ ] dyspnea [ ] hemoptysis [ ] cough [ ] sputum	  Cardiovascular:  [ ] chest pain [ ] palpitations [ ] edema	  Gastrointestinal:  [ ] nausea [ ] vomiting [ ] diarrhea [ ] constipation [ ] pain	  Genitourinary:  [ ] dysuria [ ] frequency [ ] hematuria [ ] discharge [ ] flank pain  [ ] incontinence  Musculoskeletal:  [ ] myalgias [ ] arthralgias [ ] arthritis  [ ] back pain +Left shoulder swelling  Neurological:  [ ] headache [ ] seizures  [ ] confusion/altered mental status  Psychiatric:  [ ] anxiety [ ] depression	  Hematology/Lymphatics:  [ ] lymphadenopathy  Endocrine:  [ ] adrenal [ ] thyroid  Allergic/Immunologic:	 [ ] transplant [ ] seasonal      Allergies  No Known Allergies        ANTIMICROBIALS:  cefepime   IVPB 2000 every 12 hours      OTHER MEDS:  MEDICATIONS  (STANDING):  acetaminophen   Tablet .. 650 every 6 hours  allopurinol 100 daily  amLODIPine   Tablet 2.5 daily  atorvastatin 80 at bedtime  dronabinol 2.5 two times a day before meals  enoxaparin Injectable 70 daily  mirtazapine 15 at bedtime  oxyCODONE    Solution 2.5 every 3 hours PRN  polyethylene glycol 3350 17 daily  senna 2 at bedtime  tamsulosin 0.4 at bedtime  traMADol 25 every 6 hours      Vital Signs Last 24 Hrs  T(C): 36.7 (20 Nov 2020 06:04), Max: 36.7 (20 Nov 2020 06:04)  T(F): 98.1 (20 Nov 2020 06:04), Max: 98.1 (20 Nov 2020 06:04)  HR: 97 (20 Nov 2020 06:04) (97 - 97)  BP: 102/62 (20 Nov 2020 06:04) (102/62 - 102/62)  BP(mean): --  RR: 18 (20 Nov 2020 06:04) (18 - 18)  SpO2: 98% (20 Nov 2020 06:04) (98% - 98%)    PHYSICAL EXAMINATION:  General: Alert and Awake, NAD  HEENT: PERRL, EOMI  Neck: Supple, No MAURA  Cardiac: RRR, No M/R/G  Resp: CTAB, No Wh/Rh/Ra  Abdomen: NBS, NT/ND, No HSM, No rigidity or guarding  MSK: Erythema and swelling of the left shoulder - most of the overlying changes appear chronic. erythema of the proximal arm. 2-3+ edema of the LUE and the hand. No LE edema. No stigmata of IE. No evidence of phlebitis. No evidence of synovitis.  : + tobar  Skin: MSK findings as above. no other rashes or lesions. Skin is warm and dry to the touch.   Neuro: Alert and Awake. CN 2-12 Grossly intact. Moves all four extremities spontaneously.  Psych: Calm, Pleasant, Cooperative                          7.8    1.84  )-----------( 114      ( 19 Nov 2020 11:40 )             24.2                   MICROBIOLOGY:  v  .Blood Blood-Peripheral  11-19-20   No growth to date.  --  --      .Urine Clean Catch (Midstream)  11-17-20   >100,000 CFU/ml Pseudomonas aeruginosa  <10,000 CFU/ml Normal Urogenital sedrick present  --  Pseudomonas aeruginosa      .Blood Blood-Peripheral  11-17-20   No growth to date.  --  --          Rapid RVP Result: NotDetec (11-17 @ 12:41)        RADIOLOGY:    <The imaging below has been reviewed and visualized by me independently. Findings as detailed in report below>    EXAM:  CT UPR EXT LT                        PROCEDURE DATE:  11/17/2020    1.  As on prior CT scan, there is an extensive soft tissue/fluid density aggressive appearing lesion surrounding the left proximal humerus. Differential for this appearance includes tumor extension, and to a less likely degree infection. Consider biopsy/sampling of the tissue for further evaluation.  2.  Unchanged appearance of the left humerus with comminuted proximal humeral fracture and ORIF hardware.    EXAM:  CT ANGIO CHEST (W)AW IC                        PROCEDURE DATE:  11/17/2020    Remaining incidental findings as above  No pulmonary embolus.

## 2020-11-21 NOTE — PROGRESS NOTE ADULT - SUBJECTIVE AND OBJECTIVE BOX
GAP TEAM PALLIATIVE CARE UNIT PROGRESS NOTE:      [  ] Patient on hospice program.    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 Unable to engage in conversation. Last BM 11/19.     DNR on chart: Yes    Allergies    No Known Allergies    Intolerances    MEDICATIONS  (STANDING):  acetaminophen   Tablet .. 650 milliGRAM(s) Oral every 6 hours  allopurinol 100 milliGRAM(s) Oral daily  amLODIPine   Tablet 2.5 milliGRAM(s) Oral daily  atorvastatin 80 milliGRAM(s) Oral at bedtime  cefepime   IVPB 2000 milliGRAM(s) IV Intermittent every 12 hours  dronabinol 2.5 milliGRAM(s) Oral two times a day before meals  enoxaparin Injectable 70 milliGRAM(s) SubCutaneous daily  fluconAZOLE   Tablet 200 milliGRAM(s) Oral daily  lactobacillus acidophilus 1 Tablet(s) Oral daily  latanoprost 0.005% Ophthalmic Solution 1 Drop(s) Both EYES at bedtime  mirtazapine 15 milliGRAM(s) Oral at bedtime  polyethylene glycol 3350 17 Gram(s) Oral daily  senna 2 Tablet(s) Oral at bedtime  sodium bicarbonate 650 milliGRAM(s) Oral two times a day  tamsulosin 0.4 milliGRAM(s) Oral at bedtime  timolol 0.5% Solution 1 Drop(s) Both EYES two times a day  vancomycin  IVPB 1000 milliGRAM(s) IV Intermittent every 24 hours    MEDICATIONS  (PRN):  HYDROmorphone  Injectable 0.2 milliGRAM(s) IV Push every 1 hour PRN Severe Pain (7 - 10)  traMADol 25 milliGRAM(s) Oral every 6 hours PRN Moderate Pain (4 - 6)      ITEMS UNCHECKED ARE NOT PRESENT    PRESENT SYMPTOMS: [ ]Unable to obtain due to poor mentation   Source if other than patient:  [ ]Family   [ ]Team     Pain:  [ x]yes [ ]no  QOL impact -   Location -  Left shoulder pain  Aggravating factors - unable to describe  Quality - unable to describe  Radiation - localized  Timing- constant  Severity (0-10 scale): unable to describe  Minimal acceptable level (0-10 scale):  unable to describe    Dyspnea:                           [ ]Mild [ ]Moderate [ ]Severe  Anxiety:                             [ ]Mild [ ]Moderate [ ]Severe  Fatigue:                             [ ]Mild [ ]Moderate [ ]Severe  Nausea:                             [ ]Mild [ ]Moderate [ ]Severe  Loss of appetite:              [ ]Mild [ ]Moderate [ ]Severe  Constipation:                    [ ]Mild [ ]Moderate [ ]Severe    PAIN AD Score:	  [email or web address]/painad.pdf (Ctrl + left click to view)    Other Symptoms: dry eyes  [x ]All other review of systems negative     Palliative Performance Status Version 2:    30     %      [email or web address]  PHYSICAL EXAM:  Vital Signs Last 24 Hrs  T(C): 37.1 (21 Nov 2020 07:44), Max: 37.1 (21 Nov 2020 07:44)  T(F): 98.8 (21 Nov 2020 07:44), Max: 98.8 (21 Nov 2020 07:44)  HR: 91 (21 Nov 2020 07:44) (91 - 91)  BP: 103/62 (21 Nov 2020 07:44) (103/62 - 103/62)  BP(mean): --  RR: 18 (21 Nov 2020 07:44) (18 - 18)  SpO2: 95% (21 Nov 2020 07:44) (95% - 95%)       GENERAL:  [ ]Alert  [ ]Oriented x   [x]Lethargic  [ ]Cachexia  [ ]Unarousable  [ ]Verbal  [x]Non-Verbal  Behavioral:   [ ]Anxiety  [ ]Delirium [ ]Agitation [ ]Other  HEENT:  [ X]Normal   [ ]Dry mouth   [ ]ET Tube/Trach  [ ]Oral lesions  PULMONARY:   [ X]Clear [ ]Tachypnea  [ ]Audible excessive secretions   [ ]Rhonchi        [ ]Right [ ]Left [ ]Bilateral  [ ]Crackles        [ ]Right [ ]Left [ ]Bilateral  [ ]Wheezing     [ ]Right [ ]Left [ ]Bilateral  [ ]Diminished BS [ ] Right [ ]Left [ ]Bilateral  CARDIOVASCULAR:    [X ]Regular [ ]Irregular [ ]Tachy  [ ]Noble [ ]Murmur [ ]Other  GASTROINTESTINAL:  [X ]Soft  [ ]Distended   [ x]+BS  [ X]Non tender [ ]Tender  [ ]PEG [ ]OGT/ NGT   Last BM: 11/19     GENITOURINARY:  [ ]Normal [x ]Incontinent   [ ]Oliguria/Anuria   [X ]Gibson   MUSCULOSKELETAL:   [ X]Normal   [X ]Weakness  left shoulder hyperpigmented, large mass, edematous, non tender to touch with no warmth to palpation . LLE with dependent edema   NEUROLOGIC:   [X ]No focal deficits  [ ] Cognitive impairment  [ ] Dysphagia [ ]Dysarthria [ ] Paresis [ ]Other   SKIN:   [x ] left shoulder hyperpigmented violaceous in hue -mass    CRITICAL CARE:  [ ]Shock Present  [ ]Septic [ ]Cardiogenic [ ]Neurologic [ ]Hypovolemic  [ ]Vasopressors [ ]Inotropes  [ ]Respiratory failure present [ ]Mechanical Ventilation [ ]Non-invasive ventilatory support [ ]High-Flow  [ ]Acute  [ ]Chronic [ ]Hypoxic  [ ]Hypercarbic [ ]Other  [x ]Other organ failure kidney, lymph/bone marrow  LABS:                        7.8    1.84  )-----------( 114      ( 19 Nov 2020 11:40 )             24.2     RADIOLOGY & ADDITIONAL STUDIES: no new imaging    Protein Calorie Malnutrition Present: [ ]mild [x ]moderate [ ]severe [ ]underweight [ ]morbid obesity  https://www.andeal.org/vault/2440/web/files/ONC/Table_Clinical%20Characteristics%20to%20Document%20Malnutrition-White%20JV%20et%20al%505477.pdf    Height (cm): 182.9 (11-17-20 @ 22:54), 175.3 (10-19-20 @ 09:24)  Weight (kg): 69.3 (11-17-20 @ 22:54), 63.5 (10-31-20 @ 21:11), 77.1 (04-16-20 @ 14:06)  BMI (kg/m2): 20.7 (11-17-20 @ 22:54), 20.7 (10-31-20 @ 21:11), 25.1 (10-19-20 @ 09:24)    [ x]PPSV2 < or = 30%  [ ]significant weight loss [ ]poor nutritional intake [ ]anasarca   Albumin, Serum: 1.9 g/dL (11-18-20 @ 07:20)   [ ]Artificial Nutrition    REFERRALS:   [ ]Chaplaincy  [ x]Hospice  [ ]Child Life  [x ]Social Work  [ ]Case management [ ]Holistic Therapy     Goals of Care Document:  AZUCENALe Smith (10-17-20 @ 23:00)  Goals of Care Conversation:   Participants:  · Participants  Family  · Spouse  Wife, Mayda Hernandez    Advance Directives:  · Does patient have Advance Directive  Yes  · Indicate Type  Health Care Proxy (HCP)  · Agent's Name  Mayda Hernandez  · Are any of the items on the chart  No  · Caregiver:  no    Conversation Discussion:  · Conversation  Diagnosis; Prognosis; MOLST Discussed; Treatment Options  · Conversation Details  I had a 16 minute discussion with patient's wife Mayda (HCP) regarding advanced directives.  I had discussed with her the current plan of care.  I had also discussed all resuscitative measures and she verbalized understanding.  She confirmed that patient's wish is to have everything done as needed.  Currently patient is FULL CODE.    What Matters Most To Patient and Family:  · What matters most to patient and family  recovery    Personal Advance Directives Treatment Guidelines:   Treatment Guidelines:  · Decision Maker  Health Care Proxy  · Treatment Guidelines  Antibiotic trial; IV fluid trial    Location of Discussion:   Duration of Advanced Care Planning Meeting:  · Time spent (in minutes)  16    Location of Discussion:  · Location of discussion  Telephone     GAP TEAM PALLIATIVE CARE UNIT PROGRESS NOTE:      [  ] Patient on hospice program.    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 Unable to engage in conversation. Last BM 11/19.     DNR on chart: Yes    Allergies    No Known Allergies    Intolerances    MEDICATIONS  (STANDING):  acetaminophen   Tablet .. 650 milliGRAM(s) Oral every 6 hours  allopurinol 100 milliGRAM(s) Oral daily  amLODIPine   Tablet 2.5 milliGRAM(s) Oral daily  atorvastatin 80 milliGRAM(s) Oral at bedtime  cefepime   IVPB 2000 milliGRAM(s) IV Intermittent every 12 hours  dronabinol 2.5 milliGRAM(s) Oral two times a day before meals  enoxaparin Injectable 70 milliGRAM(s) SubCutaneous daily  fluconAZOLE   Tablet 200 milliGRAM(s) Oral daily  lactobacillus acidophilus 1 Tablet(s) Oral daily  latanoprost 0.005% Ophthalmic Solution 1 Drop(s) Both EYES at bedtime  mirtazapine 15 milliGRAM(s) Oral at bedtime  polyethylene glycol 3350 17 Gram(s) Oral daily  senna 2 Tablet(s) Oral at bedtime  sodium bicarbonate 650 milliGRAM(s) Oral two times a day  tamsulosin 0.4 milliGRAM(s) Oral at bedtime  timolol 0.5% Solution 1 Drop(s) Both EYES two times a day  vancomycin  IVPB 1000 milliGRAM(s) IV Intermittent every 24 hours    MEDICATIONS  (PRN):  HYDROmorphone  Injectable 0.2 milliGRAM(s) IV Push every 1 hour PRN Severe Pain (7 - 10)  traMADol 25 milliGRAM(s) Oral every 6 hours PRN Moderate Pain (4 - 6)      ITEMS UNCHECKED ARE NOT PRESENT    PRESENT SYMPTOMS: [x ]Unable to obtain due to poor mentation   Source if other than patient:  [ ]Family   [ ]Team     Pain:  [ x]yes [ ]no  QOL impact -   Location -  Left shoulder pain  Aggravating factors - unable to describe  Quality - unable to describe  Radiation - localized  Timing- constant  Severity (0-10 scale): unable to describe  Minimal acceptable level (0-10 scale):  unable to describe    Dyspnea:                           [ ]Mild [ ]Moderate [ ]Severe  Anxiety:                             [ ]Mild [ ]Moderate [ ]Severe  Fatigue:                             [ ]Mild [ ]Moderate [ ]Severe  Nausea:                             [ ]Mild [ ]Moderate [ ]Severe  Loss of appetite:              [ ]Mild [ ]Moderate [ ]Severe  Constipation:                    [ ]Mild [ ]Moderate [ ]Severe    PAIN AD Score:	  http://geriatrictoolkit.[place].[email or web address]/cog/painad.pdf (Ctrl + left click to view)    Other Symptoms: dry eyes  [ ]All other review of systems negative     Palliative Performance Status Version 2:    30     %      [email or web address]  PHYSICAL EXAM:  Vital Signs Last 24 Hrs  T(C): 37.1 (21 Nov 2020 07:44), Max: 37.1 (21 Nov 2020 07:44)  T(F): 98.8 (21 Nov 2020 07:44), Max: 98.8 (21 Nov 2020 07:44)  HR: 91 (21 Nov 2020 07:44) (91 - 91)  BP: 103/62 (21 Nov 2020 07:44) (103/62 - 103/62)  BP(mean): --  RR: 18 (21 Nov 2020 07:44) (18 - 18)  SpO2: 95% (21 Nov 2020 07:44) (95% - 95%)       GENERAL:  [ ]Alert  [ ]Oriented x   [x]Lethargic  [ ]Cachexia  [ ]Unarousable  [ ]Verbal  [x]Non-Verbal  Behavioral:   [ ]Anxiety  [ ]Delirium [ ]Agitation [ ]Other  HEENT:  [ X]Normal   [ ]Dry mouth   [ ]ET Tube/Trach  [ ]Oral lesions  PULMONARY:   [ X]Clear [ ]Tachypnea  [ ]Audible excessive secretions   [ ]Rhonchi        [ ]Right [ ]Left [ ]Bilateral  [ ]Crackles        [ ]Right [ ]Left [ ]Bilateral  [ ]Wheezing     [ ]Right [ ]Left [ ]Bilateral  [ ]Diminished BS [ ] Right [ ]Left [ ]Bilateral  CARDIOVASCULAR:    [X ]Regular [ ]Irregular [ ]Tachy  [ ]Noble [ ]Murmur [ ]Other  GASTROINTESTINAL:  [X ]Soft  [ ]Distended   [ x]+BS  [ X]Non tender [ ]Tender  [ ]PEG [ ]OGT/ NGT   Last BM: 11/19     GENITOURINARY:  [ ]Normal [x ]Incontinent   [ ]Oliguria/Anuria   [X ]Gibson   MUSCULOSKELETAL:   [ X]Normal   [X ]Weakness  left shoulder hyperpigmented, large mass, edematous, non tender to touch with no warmth to palpation . LLE with dependent edema   NEUROLOGIC:   [X ]No focal deficits  [ ] Cognitive impairment  [ ] Dysphagia [ ]Dysarthria [ ] Paresis [ ]Other   SKIN:   [x ] left shoulder hyperpigmented violaceous in hue -mass    CRITICAL CARE:  [ ]Shock Present  [ ]Septic [ ]Cardiogenic [ ]Neurologic [ ]Hypovolemic  [ ]Vasopressors [ ]Inotropes  [ ]Respiratory failure present [ ]Mechanical Ventilation [ ]Non-invasive ventilatory support [ ]High-Flow  [ ]Acute  [ ]Chronic [ ]Hypoxic  [ ]Hypercarbic [ ]Other  [x ]Other organ failure kidney, lymph/bone marrow  LABS:                        7.8    1.84  )-----------( 114      ( 19 Nov 2020 11:40 )             24.2     RADIOLOGY & ADDITIONAL STUDIES: no new imaging    Protein Calorie Malnutrition Present: [ ]mild [x ]moderate [ ]severe [ ]underweight [ ]morbid obesity  https://www.andeal.org/vault/2440/web/files/ONC/Table_Clinical%20Characteristics%20to%20Document%20Malnutrition-White%20JV%20et%20al%569687.pdf    Height (cm): 182.9 (11-17-20 @ 22:54), 175.3 (10-19-20 @ 09:24)  Weight (kg): 69.3 (11-17-20 @ 22:54), 63.5 (10-31-20 @ 21:11), 77.1 (04-16-20 @ 14:06)  BMI (kg/m2): 20.7 (11-17-20 @ 22:54), 20.7 (10-31-20 @ 21:11), 25.1 (10-19-20 @ 09:24)    [ x]PPSV2 < or = 30%  [ ]significant weight loss [ ]poor nutritional intake [ ]anasarca   Albumin, Serum: 1.9 g/dL (11-18-20 @ 07:20)   [ ]Artificial Nutrition    REFERRALS:   [ ]Chaplaincy  [ x]Hospice  [ ]Child Life  [x ]Social Work  [ ]Case management [ ]Holistic Therapy     Goals of Care Document:  AZUCENALe Smith (10-17-20 @ 23:00)  Goals of Care Conversation:   Participants:  · Participants  Family  · Spouse  Wife, Mayda Hernandez    Advance Directives:  · Does patient have Advance Directive  Yes  · Indicate Type  Health Care Proxy (HCP)  · Agent's Name  Mayda Hernandez  · Are any of the items on the chart  No  · Caregiver:  no    Conversation Discussion:  · Conversation  Diagnosis; Prognosis; MOLST Discussed; Treatment Options  · Conversation Details  I had a 16 minute discussion with patient's wife Mayda (HCP) regarding advanced directives.  I had discussed with her the current plan of care.  I had also discussed all resuscitative measures and she verbalized understanding.  She confirmed that patient's wish is to have everything done as needed.  Currently patient is FULL CODE.    What Matters Most To Patient and Family:  · What matters most to patient and family  recovery    Personal Advance Directives Treatment Guidelines:   Treatment Guidelines:  · Decision Maker  Health Care Proxy  · Treatment Guidelines  Antibiotic trial; IV fluid trial    Location of Discussion:   Duration of Advanced Care Planning Meeting:  · Time spent (in minutes)  16    Location of Discussion:  · Location of discussion  Telephone

## 2020-11-21 NOTE — PROGRESS NOTE ADULT - PROBLEM SELECTOR PLAN 2
S/p 1 unit of prbc 11/18, recheck post transfusion cbc   Patient opting for symptom triggered management for blood draws and transfusions

## 2020-11-21 NOTE — PROGRESS NOTE ADULT - ASSESSMENT
Patient is a 86 year old gentlemen with a pmh of DLBCL (s/p 6 cycles of R-CHOP and in [facility] until 9/2019), L shoulder mass and pathologic fracture ( s/p left proximal humerus open biopsy, radical resection of tumor, and ORIF with IMN and cementation on 9/25/20),Left arm DVT, HTN, HLD, and CKD who is presenting from rehabilitation facility for tachycardia and fever now being treated empirically with  Cefepime and vancomycin for left upper extremity cellulitis.  Given the poor prognosis and family preference the family would like to move towards hospice care. Referral sent out, patient currently cared for in PCU.

## 2020-11-21 NOTE — PROGRESS NOTE ADULT - PROBLEM SELECTOR PLAN 3
Continue IV cefepime abx for eft shoulder/arm cellulitis  Negative growth to date of blood cultures from 11/17  Repeat blood cultures drawn on 11/19- NGTD   ID inclined to transition patient to po regimen upon discharge, will continue to follow recommendations

## 2020-11-21 NOTE — PROGRESS NOTE ADULT - PROBLEM SELECTOR PLAN 6
Patient DNR / DNI  Home hospice referral sent out  Will continue to monitor, pt may require inpatient hospice based on symptoms and unreliable oral route. Wife Viktoria update by phone. She understands situation and is planning to visit patient this afternoon. Patient DNR / DNI

## 2020-11-22 NOTE — PROGRESS NOTE ADULT - PROBLEM SELECTOR PLAN 4
Unable to safely take PO medications for pain  Continue IV dilaudid 0.2mg q8h ATC and IV dilaudid 0.2mg q2h PRN for pain control

## 2020-11-22 NOTE — PROGRESS NOTE ADULT - SUBJECTIVE AND OBJECTIVE BOX
GAP TEAM PALLIATIVE CARE UNIT PROGRESS NOTE:      [  ] Patient on hospice program.    INDICATION FOR PALLIATIVE CARE UNIT SERVICES: Sx management and disposition planning in the setting of DBCL, no further DDT      INTERVAL HPI/OVERNIGHT EVENTS:  Pt lethargic, unable to arouse. Required IV dilaudid 0.2mg x1 in last 24 hours.     DNR on chart: Yes    Allergies    No Known Allergies    Intolerances    MEDICATIONS  (STANDING):  acetaminophen   Tablet .. 650 milliGRAM(s) Oral every 6 hours  allopurinol 100 milliGRAM(s) Oral daily  amLODIPine   Tablet 2.5 milliGRAM(s) Oral daily  atorvastatin 80 milliGRAM(s) Oral at bedtime  cefepime   IVPB 2000 milliGRAM(s) IV Intermittent every 12 hours  dronabinol 2.5 milliGRAM(s) Oral two times a day before meals  enoxaparin Injectable 70 milliGRAM(s) SubCutaneous daily  fluconAZOLE   Tablet 200 milliGRAM(s) Oral daily  lactobacillus acidophilus 1 Tablet(s) Oral daily  latanoprost 0.005% Ophthalmic Solution 1 Drop(s) Both EYES at bedtime  mirtazapine 15 milliGRAM(s) Oral at bedtime  polyethylene glycol 3350 17 Gram(s) Oral daily  senna 2 Tablet(s) Oral at bedtime  sodium bicarbonate 650 milliGRAM(s) Oral two times a day  tamsulosin 0.4 milliGRAM(s) Oral at bedtime  timolol 0.5% Solution 1 Drop(s) Both EYES two times a day  vancomycin  IVPB 1000 milliGRAM(s) IV Intermittent every 24 hours    MEDICATIONS  (PRN):  HYDROmorphone  Injectable 0.2 milliGRAM(s) IV Push every 1 hour PRN Severe Pain (7 - 10)  traMADol 25 milliGRAM(s) Oral every 6 hours PRN Moderate Pain (4 - 6)      ITEMS UNCHECKED ARE NOT PRESENT    PRESENT SYMPTOMS: [x ]Unable to obtain due to poor mentation   Source if other than patient:  [ ]Family   [ ]Team     Pain:  [ x]yes [ ]no  QOL impact -   Location -  Left shoulder pain  Aggravating factors - unable to describe  Quality - unable to describe  Radiation - localized  Timing- constant  Severity (0-10 scale): unable to describe  Minimal acceptable level (0-10 scale):  unable to describe    Dyspnea:                           [ ]Mild [ ]Moderate [ ]Severe  Anxiety:                             [ ]Mild [ ]Moderate [ ]Severe  Fatigue:                             [ ]Mild [ ]Moderate [ ]Severe  Nausea:                             [ ]Mild [ ]Moderate [ ]Severe  Loss of appetite:              [ ]Mild [ ]Moderate [ ]Severe  Constipation:                    [ ]Mild [ ]Moderate [ ]Severe    PAIN AD Score:	  http://geriatrictoolkit.[place].[facility]/cog/painad.pdf (Ctrl + left click to view)    Other Symptoms: dry eyes  [ ]All other review of systems negative     Palliative Performance Status Version 2:    30     %      [email or web address]/[email or web address]_[email or web address]    PHYSICAL EXAM:  Vital Signs Last 24 Hrs  T(C): 35.6 (22 Nov 2020 07:16), Max: 35.6 (22 Nov 2020 07:16)  T(F): 96.1 (22 Nov 2020 07:16), Max: 96.1 (22 Nov 2020 07:16)  HR: 105 (22 Nov 2020 07:16) (105 - 105)  BP: 108/69 (22 Nov 2020 07:16) (108/69 - 108/69)  RR: 12 (22 Nov 2020 07:16) (12 - 12)  SpO2: 94% (22 Nov 2020 07:16) (94% - 94%)     GENERAL:  [ ]Alert  [ ]Oriented x   [x]Lethargic  [ ]Cachexia  [x]Unarousable  [ ]Verbal  [ ]Non-Verbal  Behavioral:   [ ]Anxiety  [ ]Delirium [ ]Agitation [ ]Other  HEENT:  [ X]Normal   [ ]Dry mouth   [ ]ET Tube/Trach  [ ]Oral lesions  PULMONARY:   [ X]Clear [ ]Tachypnea  [ ]Audible excessive secretions   [ ]Rhonchi        [ ]Right [ ]Left [ ]Bilateral  [ ]Crackles        [ ]Right [ ]Left [ ]Bilateral  [ ]Wheezing     [ ]Right [ ]Left [ ]Bilateral  [ ]Diminished BS [ ] Right [ ]Left [ ]Bilateral  CARDIOVASCULAR:    [X ]Regular [ ]Irregular [ ]Tachy  [ ]Noble [ ]Murmur [ ]Other  GASTROINTESTINAL:  [X ]Soft  [ ]Distended   [ x]+BS  [ X]Non tender [ ]Tender  [ ]PEG [ ]OGT/ NGT   Last BM: 11/19     GENITOURINARY:  [ ]Normal [x ]Incontinent   [ ]Oliguria/Anuria   [X ]Gibson   MUSCULOSKELETAL:   [ X]Normal   [X ]Weakness  left shoulder hyperpigmented, large mass, edematous, non tender to touch with no warmth to palpation . LLE with dependent edema   NEUROLOGIC:   [X ]No focal deficits  [ ] Cognitive impairment  [ ] Dysphagia [ ]Dysarthria [ ] Paresis [ ]Other   SKIN:   [x ] left shoulder hyperpigmented violaceous in hue -mass    CRITICAL CARE:  [ ]Shock Present  [ ]Septic [ ]Cardiogenic [ ]Neurologic [ ]Hypovolemic  [ ]Vasopressors [ ]Inotropes  [ ]Respiratory failure present [ ]Mechanical Ventilation [ ]Non-invasive ventilatory support [ ]High-Flow  [ ]Acute  [ ]Chronic [ ]Hypoxic  [ ]Hypercarbic [ ]Other  [x ]Other organ failure kidney, lymph/bone marrow  LABS:                        7.8    1.84  )-----------( 114      ( 19 Nov 2020 11:40 )             24.2     RADIOLOGY & ADDITIONAL STUDIES: no new imaging    Protein Calorie Malnutrition Present: [ ]mild [x ]moderate [ ]severe [ ]underweight [ ]morbid obesity  https://www.andeal.org/vault/2440/web/files/ONC/Table_Clinical%20Characteristics%20to%20Document%20Malnutrition-White%20JV%20et%20al%089212.pdf    Height (cm): 182.9 (11-17-20 @ 22:54), 175.3 (10-19-20 @ 09:24)  Weight (kg): 69.3 (11-17-20 @ 22:54), 63.5 (10-31-20 @ 21:11), 77.1 (04-16-20 @ 14:06)  BMI (kg/m2): 20.7 (11-17-20 @ 22:54), 20.7 (10-31-20 @ 21:11), 25.1 (10-19-20 @ 09:24)    [ x]PPSV2 < or = 30%  [ ]significant weight loss [ ]poor nutritional intake [ ]anasarca   Albumin, Serum: 1.9 g/dL (11-18-20 @ 07:20)   [ ]Artificial Nutrition    REFERRALS:   [ ]Chaplaincy  [ x]Hospice  [ ]Child Life  [x ]Social Work  [ ]Case management [ ]Holistic Therapy     Goals of Care Document:  MICA Smith (10-17-20 @ 23:00)  Goals of Care Conversation:   Participants:  · Participants  Family  · Spouse  Wife, Mayda Hernandez    Advance Directives:  · Does patient have Advance Directive  Yes  · Indicate Type  Health Care Proxy (HCP)  · Agent's Name  Mayda Hernandez  · Are any of the items on the chart  No  · Caregiver:  no    Conversation Discussion:  · Conversation  Diagnosis; Prognosis; MOLST Discussed; Treatment Options  · Conversation Details  I had a 16 minute discussion with patient's wife Mayda (HCP) regarding advanced directives.  I had discussed with her the current plan of care.  I had also discussed all resuscitative measures and she verbalized understanding.  She confirmed that patient's wish is to have everything done as needed.  Currently patient is FULL CODE.    What Matters Most To Patient and Family:  · What matters most to patient and family  recovery    Personal Advance Directives Treatment Guidelines:   Treatment Guidelines:  · Decision Maker  Health Care Proxy  · Treatment Guidelines  Antibiotic trial; IV fluid trial    Location of Discussion:   Duration of Advanced Care Planning Meeting:  · Time spent (in minutes)  16    Location of Discussion:  · Location of discussion  Telephone

## 2020-11-22 NOTE — PROGRESS NOTE ADULT - PROBLEM SELECTOR PLAN 2
S/p 1 unit of prbc 11/18, recheck post transfusion cbc   Symptom triggered management for blood draws and transfusions

## 2020-11-22 NOTE — PROGRESS NOTE ADULT - PROBLEM SELECTOR PLAN 3
Continue IV cefepime abx for left shoulder/arm cellulitis  Negative growth to date of blood cultures from 11/17  Repeat blood cultures drawn on 11/19- NGTD   ID inclined to transition patient to po regimen upon discharge, will continue to follow recommendations and clinical progression

## 2020-11-23 NOTE — PROGRESS NOTE ADULT - PROBLEM SELECTOR PLAN 4
Unable to safely take PO medications for pain  Required IV dilaudid 0.2mg x2 in last 24 hours  Will increase IV dilaudid to 0.5mg q6h ATC and IV dilaudid 0.5mg q1h PRN

## 2020-11-23 NOTE — PROGRESS NOTE ADULT - PROBLEM SELECTOR PLAN 3
Continue IV cefepime abx for left shoulder/arm cellulitis until 11/24 to complete 7 day course-per discussion with ID  Negative growth to date of blood cultures from 11/17  Repeat blood cultures drawn on 11/19- NGTD

## 2020-11-23 NOTE — PROGRESS NOTE ADULT - PROBLEM SELECTOR PLAN 2
S/p 1 unit of prbc 11/18, recheck post transfusion cbc   Symptom triggered management for blood draws and transfusions S/p 1 unit of prbc 11/18, now symptom triggered management for blood draws and transfusions

## 2020-11-23 NOTE — CHART NOTE - NSCHARTNOTEFT_GEN_A_CORE
Nutrition Follow Up     As per MD, pt inappropriate for nutrition follow up at this time.     RD remains available.   Laurel Watson MS RD CDN [facility],  #[phone]

## 2020-11-23 NOTE — PROGRESS NOTE ADULT - SUBJECTIVE AND OBJECTIVE BOX
GAP TEAM PALLIATIVE CARE UNIT PROGRESS NOTE:      [  ] Patient on hospice program.    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 Required IV dilaudid 0.2mg x2 and ativan 0.5mg x1 in last 24 hours.     DNR on chart: Yes    Allergies    No Known Allergies    Intolerances    MEDICATIONS  (STANDING):  acetaminophen   Tablet .. 650 milliGRAM(s) Oral every 6 hours  allopurinol 100 milliGRAM(s) Oral daily  amLODIPine   Tablet 2.5 milliGRAM(s) Oral daily  atorvastatin 80 milliGRAM(s) Oral at bedtime  cefepime   IVPB 2000 milliGRAM(s) IV Intermittent every 12 hours  dronabinol 2.5 milliGRAM(s) Oral two times a day before meals  enoxaparin Injectable 70 milliGRAM(s) SubCutaneous daily  fluconAZOLE   Tablet 200 milliGRAM(s) Oral daily  lactobacillus acidophilus 1 Tablet(s) Oral daily  latanoprost 0.005% Ophthalmic Solution 1 Drop(s) Both EYES at bedtime  mirtazapine 15 milliGRAM(s) Oral at bedtime  polyethylene glycol 3350 17 Gram(s) Oral daily  senna 2 Tablet(s) Oral at bedtime  sodium bicarbonate 650 milliGRAM(s) Oral two times a day  tamsulosin 0.4 milliGRAM(s) Oral at bedtime  timolol 0.5% Solution 1 Drop(s) Both EYES two times a day  vancomycin  IVPB 1000 milliGRAM(s) IV Intermittent every 24 hours    MEDICATIONS  (PRN):  HYDROmorphone  Injectable 0.2 milliGRAM(s) IV Push every 1 hour PRN Severe Pain (7 - 10)  traMADol 25 milliGRAM(s) Oral every 6 hours PRN Moderate Pain (4 - 6)      ITEMS UNCHECKED ARE NOT PRESENT    PRESENT SYMPTOMS: [x ]Unable to obtain due to poor mentation   Source if other than patient:  [ ]Family   [ ]Team     Pain:  [ x]yes [ ]no  QOL impact -   Location -  Left shoulder pain  Aggravating factors - unable to describe  Quality - unable to describe  Radiation - localized  Timing- constant  Severity (0-10 scale): unable to describe  Minimal acceptable level (0-10 scale):  unable to describe    Dyspnea:                           [ ]Mild [ ]Moderate [ ]Severe  Anxiety:                             [ ]Mild [ ]Moderate [ ]Severe  Fatigue:                             [ ]Mild [ ]Moderate [ ]Severe  Nausea:                             [ ]Mild [ ]Moderate [ ]Severe  Loss of appetite:              [ ]Mild [ ]Moderate [ ]Severe  Constipation:                    [ ]Mild [ ]Moderate [ ]Severe    PAIN AD Score:	  [email or web address]/[email or web address].pdf (Ctrl + left click to view)    Other Symptoms: dry eyes  [ ]All other review of systems negative     Palliative Performance Status Version 2:    30     %      http://npcrc.org/files/news/palliative_performance_scale_ppsv2.pdf    PHYSICAL EXAM:  Vital Signs Last 24 Hrs  T(C): 36.1 (23 Nov 2020 08:31), Max: 36.1 (23 Nov 2020 08:31)  T(F): 97 (23 Nov 2020 08:31), Max: 97 (23 Nov 2020 08:31)  HR: 87 (23 Nov 2020 08:31) (87 - 87)  BP: 89/60 (23 Nov 2020 08:31) (89/60 - 89/60)  BP(mean): --  RR: 20 (23 Nov 2020 08:31) (20 - 20)  SpO2: 94% (23 Nov 2020 08:31) (94% - 94%)    GENERAL:  [ ]Alert  [ ]Oriented x   [x]Lethargic  [ ]Cachexia  [x]Unarousable  [ ]Verbal  [x]Non-Verbal  Behavioral:   [ ]Anxiety  [ ]Delirium [x] Agitation [ ]Other  HEENT:  [ X]Normal   [ ]Dry mouth   [ ]ET Tube/Trach  [ ]Oral lesions  PULMONARY:   [ X]Clear [ ]Tachypnea  [ ]Audible excessive secretions   [ ]Rhonchi        [ ]Right [ ]Left [ ]Bilateral  [ ]Crackles        [ ]Right [ ]Left [ ]Bilateral  [ ]Wheezing     [ ]Right [ ]Left [ ]Bilateral  [ ]Diminished BS [ ] Right [ ]Left [ ]Bilateral  CARDIOVASCULAR:    [X ]Regular [ ]Irregular [ ]Tachy  [ ]Noble [ ]Murmur [ ]Other  GASTROINTESTINAL:  [X ]Soft  [ ]Distended   [ x]+BS  [ ]Non tender [ ]Tender  [ ]PEG [ ]OGT/ NGT   Last BM: 11/19  GENITOURINARY:  [ ]Normal [x ]Incontinent   [ ]Oliguria/Anuria   [X ]Gibson   MUSCULOSKELETAL:   [ X]Normal   [X ]Weakness  left shoulder hyperpigmented, large mass, edematous, non tender to touch with no warmth to palpation . LLE with dependent edema   NEUROLOGIC:   [X ]No focal deficits  [ ] Cognitive impairment  [ ] Dysphagia [ ]Dysarthria [ ] Paresis [ ]Other   SKIN:   [x ] left shoulder hyperpigmented violaceous in hue -mass    CRITICAL CARE:  [ ]Shock Present  [ ]Septic [ ]Cardiogenic [ ]Neurologic [ ]Hypovolemic  [ ]Vasopressors [ ]Inotropes  [ ]Respiratory failure present [ ]Mechanical Ventilation [ ]Non-invasive ventilatory support [ ]High-Flow  [ ]Acute  [ ]Chronic [ ]Hypoxic  [ ]Hypercarbic [ ]Other  [x ]Other organ failure kidney, lymph/bone marrow  LABS:                        7.8    1.84  )-----------( 114      ( 19 Nov 2020 11:40 )             24.2     RADIOLOGY & ADDITIONAL STUDIES: no new imaging    Protein Calorie Malnutrition Present: [ ]mild [x ]moderate [ ]severe [ ]underweight [ ]morbid obesity  https://www.andeal.org/vault/2440/web/files/ONC/Table_Clinical%20Characteristics%20to%20Document%20Malnutrition-White%20JV%20et%20al%149894.pdf    Height (cm): 182.9 (11-17-20 @ 22:54), 175.3 (10-19-20 @ 09:24)  Weight (kg): 69.3 (11-17-20 @ 22:54), 63.5 (10-31-20 @ 21:11), 77.1 (04-16-20 @ 14:06)  BMI (kg/m2): 20.7 (11-17-20 @ 22:54), 20.7 (10-31-20 @ 21:11), 25.1 (10-19-20 @ 09:24)    [ x]PPSV2 < or = 30%  [ ]significant weight loss [ ]poor nutritional intake [ ]anasarca   Albumin, Serum: 1.9 g/dL (11-18-20 @ 07:20)   [ ]Artificial Nutrition    REFERRALS:   [ ]Chaplaincy  [ x]Hospice  [ ]Child Life  [x ]Social Work  [ ]Case management [ ]Holistic Therapy     Goals of Care Document:  MICA Smith (10-17-20 @ 23:00)  Goals of Care Conversation:   Participants:  · Participants  Family  · Spouse  Wife, Mayda Hernandez    Advance Directives:  · Does patient have Advance Directive  Yes  · Indicate Type  Health Care Proxy (HCP)  · Agent's Name  Mayda Hernandez  · Are any of the items on the chart  No  · Caregiver:  no    Conversation Discussion:  · Conversation  Diagnosis; Prognosis; MOLST Discussed; Treatment Options  · Conversation Details  I had a 16 minute discussion with patient's wife Mayda (HCP) regarding advanced directives.  I had discussed with her the current plan of care.  I had also discussed all resuscitative measures and she verbalized understanding.  She confirmed that patient's wish is to have everything done as needed.  Currently patient is FULL CODE.    What Matters Most To Patient and Family:  · What matters most to patient and family  recovery    Personal Advance Directives Treatment Guidelines:   Treatment Guidelines:  · Decision Maker  Health Care Proxy  · Treatment Guidelines  Antibiotic trial; IV fluid trial    Location of Discussion:   Duration of Advanced Care Planning Meeting:  · Time spent (in minutes)  16    Location of Discussion:  · Location of discussion  Telephone     GAP TEAM PALLIATIVE CARE UNIT PROGRESS NOTE:      [  ] Patient on hospice program.    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 Required IV dilaudid 0.2mg x2 and ativan 0.5mg x1 in last 24 hours.     DNR on chart: Yes    Allergies    No Known Allergies    Intolerances    MEDICATIONS  (STANDING):  acetaminophen   Tablet .. 650 milliGRAM(s) Oral every 6 hours  allopurinol 100 milliGRAM(s) Oral daily  amLODIPine   Tablet 2.5 milliGRAM(s) Oral daily  atorvastatin 80 milliGRAM(s) Oral at bedtime  cefepime   IVPB 2000 milliGRAM(s) IV Intermittent every 12 hours  dronabinol 2.5 milliGRAM(s) Oral two times a day before meals  enoxaparin Injectable 70 milliGRAM(s) SubCutaneous daily  fluconAZOLE   Tablet 200 milliGRAM(s) Oral daily  lactobacillus acidophilus 1 Tablet(s) Oral daily  latanoprost 0.005% Ophthalmic Solution 1 Drop(s) Both EYES at bedtime  mirtazapine 15 milliGRAM(s) Oral at bedtime  polyethylene glycol 3350 17 Gram(s) Oral daily  senna 2 Tablet(s) Oral at bedtime  sodium bicarbonate 650 milliGRAM(s) Oral two times a day  tamsulosin 0.4 milliGRAM(s) Oral at bedtime  timolol 0.5% Solution 1 Drop(s) Both EYES two times a day  vancomycin  IVPB 1000 milliGRAM(s) IV Intermittent every 24 hours    MEDICATIONS  (PRN):  HYDROmorphone  Injectable 0.2 milliGRAM(s) IV Push every 1 hour PRN Severe Pain (7 - 10)  traMADol 25 milliGRAM(s) Oral every 6 hours PRN Moderate Pain (4 - 6)      ITEMS UNCHECKED ARE NOT PRESENT    PRESENT SYMPTOMS: [x ]Unable to obtain due to poor mentation   Source if other than patient:  [ ]Family   [ ]Team     Pain:  [ x]yes [ ]no  QOL impact -   Location -  Left shoulder pain  Aggravating factors - unable to describe  Quality - unable to describe  Radiation - localized  Timing- constant  Severity (0-10 scale): unable to describe  Minimal acceptable level (0-10 scale):  unable to describe    Dyspnea:                           [ ]Mild [ ]Moderate [ ]Severe  Anxiety:                             [ ]Mild [ ]Moderate [ ]Severe  Fatigue:                             [ ]Mild [ ]Moderate [ ]Severe  Nausea:                             [ ]Mild [ ]Moderate [ ]Severe  Loss of appetite:              [ ]Mild [ ]Moderate [ ]Severe  Constipation:                    [ ]Mild [ ]Moderate [ ]Severe    PAIN AD Score:	  http://geriatrictoolkit.[place].[email or web address]/cog/painad.pdf (Ctrl + left click to view)    Other Symptoms: dry eyes  [ ]All other review of systems negative     Palliative Performance Status Version 2:    30     %      http://npcrc.org/files/news/palliative_performance_scale_ppsv2.pdf    PHYSICAL EXAM:  Vital Signs Last 24 Hrs  T(C): 36.1 (23 Nov 2020 08:31), Max: 36.1 (23 Nov 2020 08:31)  T(F): 97 (23 Nov 2020 08:31), Max: 97 (23 Nov 2020 08:31)  HR: 87 (23 Nov 2020 08:31) (87 - 87)  BP: 89/60 (23 Nov 2020 08:31) (89/60 - 89/60)  BP(mean): --  RR: 20 (23 Nov 2020 08:31) (20 - 20)  SpO2: 94% (23 Nov 2020 08:31) (94% - 94%)    GENERAL:  [ ]Alert  [ ]Oriented x   [x]Lethargic  [ ]Cachexia  [x]Unarousable  [ ]Verbal  [x]Non-Verbal  Behavioral:   [ ]Anxiety  [ ]Delirium [x] Agitation [ ]Other  HEENT:  [ X]Normal   [ ]Dry mouth   [ ]ET Tube/Trach  [ ]Oral lesions  PULMONARY:   [ X]Clear [ ]Tachypnea  [ ]Audible excessive secretions   [ ]Rhonchi        [ ]Right [ ]Left [ ]Bilateral  [ ]Crackles        [ ]Right [ ]Left [ ]Bilateral  [ ]Wheezing     [ ]Right [ ]Left [ ]Bilateral  [ ]Diminished BS [ ] Right [ ]Left [ ]Bilateral  CARDIOVASCULAR:    [X ]Regular [ ]Irregular [ ]Tachy  [ ]Noble [ ]Murmur [ ]Other  GASTROINTESTINAL:  [X ]Soft  [ ]Distended   [ x]+BS  [ ]Non tender [ ]Tender  [ ]PEG [ ]OGT/ NGT   Last BM: 11/19  GENITOURINARY:  [ ]Normal [x ]Incontinent   [ ]Oliguria/Anuria   [X ]Gibson   MUSCULOSKELETAL:   [ X]Normal   [X ]Weakness  left shoulder hyperpigmented, large mass, edematous, non tender to touch with no warmth to palpation . LLE with dependent edema   NEUROLOGIC:   [X ]No focal deficits  [ ] Cognitive impairment  [ ] Dysphagia [ ]Dysarthria [ ] Paresis [ ]Other   SKIN:   [x ] left shoulder hyperpigmented violaceous in hue -mass    CRITICAL CARE:  [ ]Shock Present  [ ]Septic [ ]Cardiogenic [ ]Neurologic [ ]Hypovolemic  [ ]Vasopressors [ ]Inotropes  [ ]Respiratory failure present [ ]Mechanical Ventilation [ ]Non-invasive ventilatory support [ ]High-Flow  [ ]Acute  [ ]Chronic [ ]Hypoxic  [ ]Hypercarbic [ ]Other  [x ]Other organ failure kidney, lymph/bone marrow  LABS:         RADIOLOGY & ADDITIONAL STUDIES: no new imaging    Protein Calorie Malnutrition Present: [ ]mild [x ]moderate [ ]severe [ ]underweight [ ]morbid obesity  https://www.andeal.org/vault/2440/web/files/ONC/Table_Clinical%20Characteristics%20to%20Document%20Malnutrition-White%20JV%20et%20al%731174.pdf    Height (cm): 182.9 (11-17-20 @ 22:54), 175.3 (10-19-20 @ 09:24)  Weight (kg): 69.3 (11-17-20 @ 22:54), 63.5 (10-31-20 @ 21:11), 77.1 (04-16-20 @ 14:06)  BMI (kg/m2): 20.7 (11-17-20 @ 22:54), 20.7 (10-31-20 @ 21:11), 25.1 (10-19-20 @ 09:24)    [ x]PPSV2 < or = 30%  [ ]significant weight loss [ ]poor nutritional intake [ ]anasarca   Albumin, Serum: 1.9 g/dL (11-18-20 @ 07:20)   [ ]Artificial Nutrition    REFERRALS:   [ ]Chaplaincy  [ x]Hospice  [ ]Child Life  [x ]Social Work  [ ]Case management [ ]Holistic Therapy     Goals of Care Document:  MICA Smith (10-17-20 @ 23:00)  Goals of Care Conversation:   Participants:  · Participants  Family  · Spouse  Wife, Mayda Hernandez    Advance Directives:  · Does patient have Advance Directive  Yes  · Indicate Type  Health Care Proxy (HCP)  · Agent's Name  Mayda Hernandez  · Are any of the items on the chart  No  · Caregiver:  no    Conversation Discussion:  · Conversation  Diagnosis; Prognosis; MOLST Discussed; Treatment Options  · Conversation Details  I had a 16 minute discussion with patient's wife Mayda (HCP) regarding advanced directives.  I had discussed with her the current plan of care.  I had also discussed all resuscitative measures and she verbalized understanding.  She confirmed that patient's wish is to have everything done as needed.  Currently patient is FULL CODE.    What Matters Most To Patient and Family:  · What matters most to patient and family  recovery    Personal Advance Directives Treatment Guidelines:   Treatment Guidelines:  · Decision Maker  Health Care Proxy  · Treatment Guidelines  Antibiotic trial; IV fluid trial    Location of Discussion:   Duration of Advanced Care Planning Meeting:  · Time spent (in minutes)  16    Location of Discussion:  · Location of discussion  Telephone

## 2020-11-23 NOTE — PROGRESS NOTE ADULT - ASSESSMENT
85 yo M PMH DLBCL (s/p 6 cycles of R-CHOP and in [facility] until 9/2019), L shoulder mass and pathologic fracture s/p left proximal humerus open biopsy, radical resection of tumor, and ORIF with IMN and cementation on 9/25/20),Left arm DVT, HTN, HLD, CKD, BPH, anemia, syncope, and anxiety/adjustment disorder/depression who presented to [facility] on 11/17/20 with worsening LUE and shoulder edema, pain and fever.  In the ED febrile to 101.6, relatively hypotensive to SBP in the 90's, tachycardic to > 120.     On presentation WBC of 1.49 with ANC of 540.   U/A with 8 WBC.   RVP (11/17) negative.   COVID19 PCR (11/17) negative.    CTA Chest (11/17) No pulmonary embolus  CT RUE (11/17) with extensive soft tissue/fluid density aggressive appearing lesion surrounding the left proximal humerus. Differential for this appearance includes tumor extension, and to a less likely degree infection.     Unclear if edema and fever is secondary to thrombus and soft tissue/joint invasion of L shoulder by tumor or if there is component of superinfection.  Pseudomonas on UCx (no pyuria but U/A obtained in context of neutropenia)  Urinary source theoretically possible as well.    Erythema extending from shoulder to proximal arm is stable (not changed) after Vancomycin and Cefepime  MRSA/MSSA Nasal PCR also negative  I believe the rash extending down from the shoulder may be consistent with changes of chronic venous stasis  Would have anticipated cellulitis to improve on Vancomycin and Cefepime    Tentative plan (if patient is to be discharged home) is to utilize Cipro (through 11/24) and Keflex (through 11/24)  Would continue Cefepime while admitted    Now with worsening mental status  Labwork has not been checked since 11/19 to maximize patient comfort  Etiology for worsening mental status likely secondary to underlying malignancy vs. metabolic dysfunction as opposed to infection    If GoC change can repeat US of L Shoulder to exclude fluid collection  Would continue Cefepime while admitted (would continue through 11/24)  Alternate if patient loses IV access (and can tolerate PO) is to utilize Cipro (through 11/24) and Keflex (through 11/24)    Overall, Neutropenic Fever, L Shoulder Swelling, Lactic Acidosis, DLBCL    --Continue Cefepime 2g IV Q12H (while admitted - alternate antibiotic plan as above - end date 11/24)  --Continue to follow CBC with diff  --Continue to follow renal function (Cr/BUN)  --Continue to follow temperature curve  --Follow up on preliminary blood cultures    I will follow the patient as needed. Please feel free to contact me with any further questions or concerns.    Luke Garcia M.D.  [facility] Division of Infectious Disease  8AM-5PM: Pager Number [phone]  After Hours (or if no response): Please contact the Infectious Diseases Office at [phone]     The above assessment and plan were discussed with PCU Team

## 2020-11-23 NOTE — PROGRESS NOTE ADULT - SUBJECTIVE AND OBJECTIVE BOX
Follow Up:  Neutropenic Fever, LUE Erythema - ?Cellulitis     Interval History:    REVIEW OF SYSTEMS  [  ] ROS unobtainable because:    [  ] All other systems negative except as noted below    Constitutional:  [ ] fever [ ] chills  [ ] weight loss  [ ] weakness  Skin:  [ ] rash [ ] phlebitis	  Eyes: [ ] icterus [ ] pain  [ ] discharge	  ENMT: [ ] sore throat  [ ] thrush [ ] ulcers [ ] exudates  Respiratory: [ ] dyspnea [ ] hemoptysis [ ] cough [ ] sputum	  Cardiovascular:  [ ] chest pain [ ] palpitations [ ] edema	  Gastrointestinal:  [ ] nausea [ ] vomiting [ ] diarrhea [ ] constipation [ ] pain	  Genitourinary:  [ ] dysuria [ ] frequency [ ] hematuria [ ] discharge [ ] flank pain  [ ] incontinence  Musculoskeletal:  [ ] myalgias [ ] arthralgias [ ] arthritis  [ ] back pain  Neurological:  [ ] headache [ ] seizures  [ ] confusion/altered mental status    Allergies  No Known Allergies        ANTIMICROBIALS:  cefepime   IVPB 2000 every 12 hours      OTHER MEDS:  MEDICATIONS  (STANDING):  acetaminophen  Suppository .. 650 every 6 hours PRN  bisacodyl Suppository 10 daily PRN  HYDROmorphone  Injectable 0.2 every 8 hours  HYDROmorphone  Injectable 0.2 every 2 hours PRN  LORazepam   Injectable 0.5 every 4 hours PRN  tamsulosin 0.4 at bedtime      Vital Signs Last 24 Hrs  T(C): 36.1 (23 Nov 2020 08:31), Max: 36.1 (23 Nov 2020 08:31)  T(F): 97 (23 Nov 2020 08:31), Max: 97 (23 Nov 2020 08:31)  HR: 87 (23 Nov 2020 08:31) (87 - 87)  BP: 89/60 (23 Nov 2020 08:31) (89/60 - 89/60)  BP(mean): --  RR: 20 (23 Nov 2020 08:31) (20 - 20)  SpO2: 94% (23 Nov 2020 08:31) (94% - 94%)    PHYSICAL EXAMINATION:  General: Alert and Awake, NAD  HEENT: PERRL, EOMI  Neck: Supple  Cardiac: RRR, No M/R/G  Resp: CTAB, No Wh/Rh/Ra  Abdomen: NBS, NT/ND, No HSM, No rigidity or guarding  MSK: No LE edema. No Calf tenderness  : No tobar  Skin: No rashes or lesions. Skin is warm and dry to the touch.   Neuro: Alert and Awake. CN 2-12 Grossly intact. Moves all four extremities spontaneously.  Psych: Calm, Pleasant, Cooperative                    MICROBIOLOGY:  v  .Blood Blood-Peripheral  11-19-20   No growth to date.  --  --      .Urine Clean Catch (Midstream)  11-17-20   >100,000 CFU/ml Pseudomonas aeruginosa  <10,000 CFU/ml Normal Urogenital sedrick present  --  Pseudomonas aeruginosa      .Blood Blood-Peripheral  11-17-20   No Growth Final  --  --          Rapid RVP Result: NotDetec (11-17 @ 12:41)        RADIOLOGY:    <The imaging below has been reviewed and visualized by me independently. Findings as detailed in report below> Follow Up:  Neutropenic Fever, LUE Erythema - ?Cellulitis     Interval History: afebrile. has become more lethargic over the weekend.     REVIEW OF SYSTEMS  [ x ] ROS unobtainable because:  encephalopathic   [  ] All other systems negative except as noted below    Constitutional:  [ ] fever [ ] chills  [ ] weight loss  [ ] weakness  Skin:  [ ] rash [ ] phlebitis	  Eyes: [ ] icterus [ ] pain  [ ] discharge	  ENMT: [ ] sore throat  [ ] thrush [ ] ulcers [ ] exudates  Respiratory: [ ] dyspnea [ ] hemoptysis [ ] cough [ ] sputum	  Cardiovascular:  [ ] chest pain [ ] palpitations [ ] edema	  Gastrointestinal:  [ ] nausea [ ] vomiting [ ] diarrhea [ ] constipation [ ] pain	  Genitourinary:  [ ] dysuria [ ] frequency [ ] hematuria [ ] discharge [ ] flank pain  [ ] incontinence  Musculoskeletal:  [ ] myalgias [ ] arthralgias [ ] arthritis  [ ] back pain  Neurological:  [ ] headache [ ] seizures  [ ] confusion/altered mental status    Allergies  No Known Allergies        ANTIMICROBIALS:  cefepime   IVPB 2000 every 12 hours      OTHER MEDS:  MEDICATIONS  (STANDING):  acetaminophen  Suppository .. 650 every 6 hours PRN  bisacodyl Suppository 10 daily PRN  HYDROmorphone  Injectable 0.2 every 8 hours  HYDROmorphone  Injectable 0.2 every 2 hours PRN  LORazepam   Injectable 0.5 every 4 hours PRN  tamsulosin 0.4 at bedtime      Vital Signs Last 24 Hrs  T(C): 36.1 (23 Nov 2020 08:31), Max: 36.1 (23 Nov 2020 08:31)  T(F): 97 (23 Nov 2020 08:31), Max: 97 (23 Nov 2020 08:31)  HR: 87 (23 Nov 2020 08:31) (87 - 87)  BP: 89/60 (23 Nov 2020 08:31) (89/60 - 89/60)  BP(mean): --  RR: 20 (23 Nov 2020 08:31) (20 - 20)  SpO2: 94% (23 Nov 2020 08:31) (94% - 94%)    PHYSICAL EXAMINATION:  General: Alert and Awake, NAD  HEENT: PERRL, EOMI  Neck: Supple, No MAURA  Cardiac: RRR, No M/R/G  Resp: CTAB, No Wh/Rh/Ra  Abdomen: NBS, NT/ND, No HSM, No rigidity or guarding  MSK: Erythema and swelling of the left shoulder - most of the overlying changes appear chronic. erythema of the proximal arm. 2-3+ edema of the LUE and the hand. No LE edema. No stigmata of IE. No evidence of phlebitis. No evidence of synovitis.  : + tobar  Skin: MSK findings as above. no other rashes or lesions. Skin is warm and dry to the touch.   Neuro: Alert and Awake. CN 2-12 Grossly intact. Moves all four extremities spontaneously.  Psych: Calm, Pleasant, Cooperative        MICROBIOLOGY:  v  .Blood Blood-Peripheral  11-19-20   No growth to date.  --  --      .Urine Clean Catch (Midstream)  11-17-20   >100,000 CFU/ml Pseudomonas aeruginosa  <10,000 CFU/ml Normal Urogenital sedrick present  --  Pseudomonas aeruginosa      .Blood Blood-Peripheral  11-17-20   No Growth Final  --  --          Rapid RVP Result: NotDetec (11-17 @ 12:41)        RADIOLOGY:    no new imaging

## 2020-11-24 NOTE — PROGRESS NOTE ADULT - PROBLEM SELECTOR PLAN 3
Continue IV cefepime abx for left shoulder/arm cellulitis until this evening to complete 7 day course per discussion with ID  Negative growth to date of blood cultures from 11/17  Repeat blood cultures drawn on 11/19- NGTD

## 2020-11-24 NOTE — PROGRESS NOTE ADULT - ASSESSMENT
87 yo M PMH DLBCL (s/p 6 cycles of R-CHOP and in [facility]Y until 9/2019), L shoulder mass and pathologic fracture s/p left proximal humerus open biopsy, radical resection of tumor, and ORIF with IMN and cementation on 9/25/20),Left arm DVT, HTN, HLD, CKD, BPH, anemia, syncope, and anxiety/adjustment disorder/depression who presented to [facility] on 11/17/20 with worsening LUE and shoulder edema, pain and fever.  In the ED febrile to 101.6, relatively hypotensive to SBP in the 90's, tachycardic to > 120.     On presentation WBC of 1.49 with ANC of 540.   U/A with 8 WBC.   RVP (11/17) negative.   COVID19 PCR (11/17) negative.    CTA Chest (11/17) No pulmonary embolus  CT RUE (11/17) with extensive soft tissue/fluid density aggressive appearing lesion surrounding the left proximal humerus. Differential for this appearance includes tumor extension, and to a less likely degree infection.     Unclear if edema and fever is secondary to thrombus and soft tissue/joint invasion of L shoulder by tumor or if there is component of superinfection.  Pseudomonas on UCx (no pyuria but U/A obtained in context of neutropenia)  Urinary source theoretically possible as well.    Erythema extending from shoulder to proximal arm is stable (not changed) after Vancomycin and Cefepime  MRSA/MSSA Nasal PCR also negative  I believe the rash extending down from the shoulder may be consistent with changes of chronic venous stasis  Would have anticipated cellulitis to improve on Vancomycin and Cefepime    Now with worsening mental status  Labwork has not been checked since 11/19 to maximize patient comfort  Etiology for worsening mental status likely secondary to underlying malignancy vs. metabolic dysfunction as opposed to infection    If GoC change can repeat US of L Shoulder to exclude fluid collection  Would continue Cefepime while admitted (would continue through 11/24)    Overall, Neutropenic Fever, L Shoulder Swelling, Lactic Acidosis, DLBCL    --Continue Cefepime 2g IV Q12H (complete with today's doses)     I will sign off at this time. Please feel free to contact me with any further questions or concerns.    Luke Garcia M.D.  [facility] Division of Infectious Disease  8AM-5PM: Pager Number [phone]  After Hours (or if no response): Please contact the Infectious Diseases Office at [phone]     The above assessment and plan were discussed with PCU Team

## 2020-11-24 NOTE — PROGRESS NOTE ADULT - PROBLEM SELECTOR PLAN 4
Unable to safely take PO medications for pain  Required IV dilaudid 0.2mg x2 and o.5mg x1 in last 24 hours  Continue IV dilaudid to 0.5mg q6h ATC and IV dilaudid 0.5mg q1h PRN

## 2020-11-24 NOTE — PROGRESS NOTE ADULT - SUBJECTIVE AND OBJECTIVE BOX
Follow Up:  Neutropenic Fever, LUE Erythema - ?Cellulitis     Interval History: afebrile. remains lethargic. seen on face tent O2.    REVIEW OF SYSTEMS  [ x ] ROS unobtainable because:  encephalopathic   [  ] All other systems negative except as noted below    Constitutional:  [ ] fever [ ] chills  [ ] weight loss  [ ] weakness  Skin:  [ ] rash [ ] phlebitis	  Eyes: [ ] icterus [ ] pain  [ ] discharge	  ENMT: [ ] sore throat  [ ] thrush [ ] ulcers [ ] exudates  Respiratory: [ ] dyspnea [ ] hemoptysis [ ] cough [ ] sputum	  Cardiovascular:  [ ] chest pain [ ] palpitations [ ] edema	  Gastrointestinal:  [ ] nausea [ ] vomiting [ ] diarrhea [ ] constipation [ ] pain	  Genitourinary:  [ ] dysuria [ ] frequency [ ] hematuria [ ] discharge [ ] flank pain  [ ] incontinence  Musculoskeletal:  [ ] myalgias [ ] arthralgias [ ] arthritis  [ ] back pain  Neurological:  [ ] headache [ ] seizures  [ ] confusion/altered mental status    Allergies  No Known Allergies        ANTIMICROBIALS:  cefepime   IVPB 2000 every 12 hours      OTHER MEDS:  MEDICATIONS  (STANDING):  acetaminophen  Suppository .. 650 every 6 hours PRN  bisacodyl Suppository 10 daily PRN  HYDROmorphone  Injectable 0.5 every 6 hours  HYDROmorphone  Injectable 0.5 every 1 hour PRN  LORazepam   Injectable 0.5 every 4 hours PRN  tamsulosin 0.4 at bedtime      Vital Signs Last 24 Hrs  T(C): 36.6 (24 Nov 2020 07:47), Max: 36.6 (24 Nov 2020 07:47)  T(F): 97.9 (24 Nov 2020 07:47), Max: 97.9 (24 Nov 2020 07:47)  HR: 81 (24 Nov 2020 07:47) (81 - 81)  BP: 94/60 (24 Nov 2020 07:47) (94/60 - 94/60)  BP(mean): --  RR: 24 (24 Nov 2020 07:47) (24 - 24)  SpO2: 91% (24 Nov 2020 07:47) (91% - 91%)    PHYSICAL EXAMINATION:  General: Alert and Awake, NAD  HEENT: PERRL, EOMI  Neck: Supple, No MAURA  Cardiac: RRR, No M/R/G  Resp: CTAB, No Wh/Rh/Ra  Abdomen: NBS, NT/ND, No HSM, No rigidity or guarding  MSK: Erythema and swelling of the left shoulder - most of the overlying changes appear chronic. erythema of the proximal arm. 2-3+ edema of the LUE and the hand. No LE edema. No stigmata of IE. No evidence of phlebitis. No evidence of synovitis.  : + tobar  Skin: MSK findings as above. no other rashes or lesions. Skin is warm and dry to the touch.   Neuro: Alert and Awake. CN 2-12 Grossly intact. Moves all four extremities spontaneously.  Psych: Calm, Pleasant, Cooperative        MICROBIOLOGY:  v  .Blood Blood-Peripheral  11-19-20   No Growth Final  --  --      .Urine Clean Catch (Midstream)  11-17-20   >100,000 CFU/ml Pseudomonas aeruginosa  <10,000 CFU/ml Normal Urogenital sedrick present  --  Pseudomonas aeruginosa      .Blood Blood-Peripheral  11-17-20   No Growth Final  --  --                RADIOLOGY:    no new imaging

## 2020-11-24 NOTE — PROGRESS NOTE ADULT - SUBJECTIVE AND OBJECTIVE BOX
GAP TEAM PALLIATIVE CARE UNIT PROGRESS NOTE:      [  ] Patient on hospice program.    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 Appears comfortable. Required IV dilaudid 0.2mg x2, dilaudid 0.5 x1 and ativan 0.5mg x2 in last 24 hours.     DNR on chart: Yes    Allergies    No Known Allergies    Intolerances    MEDICATIONS  (STANDING):  acetaminophen   Tablet .. 650 milliGRAM(s) Oral every 6 hours  allopurinol 100 milliGRAM(s) Oral daily  amLODIPine   Tablet 2.5 milliGRAM(s) Oral daily  atorvastatin 80 milliGRAM(s) Oral at bedtime  cefepime   IVPB 2000 milliGRAM(s) IV Intermittent every 12 hours  dronabinol 2.5 milliGRAM(s) Oral two times a day before meals  enoxaparin Injectable 70 milliGRAM(s) SubCutaneous daily  fluconAZOLE   Tablet 200 milliGRAM(s) Oral daily  lactobacillus acidophilus 1 Tablet(s) Oral daily  latanoprost 0.005% Ophthalmic Solution 1 Drop(s) Both EYES at bedtime  mirtazapine 15 milliGRAM(s) Oral at bedtime  polyethylene glycol 3350 17 Gram(s) Oral daily  senna 2 Tablet(s) Oral at bedtime  sodium bicarbonate 650 milliGRAM(s) Oral two times a day  tamsulosin 0.4 milliGRAM(s) Oral at bedtime  timolol 0.5% Solution 1 Drop(s) Both EYES two times a day  vancomycin  IVPB 1000 milliGRAM(s) IV Intermittent every 24 hours    MEDICATIONS  (PRN):  HYDROmorphone  Injectable 0.2 milliGRAM(s) IV Push every 1 hour PRN Severe Pain (7 - 10)  traMADol 25 milliGRAM(s) Oral every 6 hours PRN Moderate Pain (4 - 6)      ITEMS UNCHECKED ARE NOT PRESENT    PRESENT SYMPTOMS: [x ]Unable to obtain due to poor mentation   Source if other than patient:  [ ]Family   [ ]Team     Pain:  [ x]yes [ ]no  QOL impact -   Location -  Left shoulder pain  Aggravating factors - unable to describe  Quality - unable to describe  Radiation - localized  Timing- constant  Severity (0-10 scale): unable to describe  Minimal acceptable level (0-10 scale):  unable to describe    Dyspnea:                           [ ]Mild [ ]Moderate [ ]Severe  Anxiety:                             [ ]Mild [ ]Moderate [ ]Severe  Fatigue:                             [ ]Mild [ ]Moderate [ ]Severe  Nausea:                             [ ]Mild [ ]Moderate [ ]Severe  Loss of appetite:              [ ]Mild [ ]Moderate [ ]Severe  Constipation:                    [ ]Mild [ ]Moderate [ ]Severe    PAIN AD Score:	0-7  [email or web address] (Ctrl + left click to view)    Other Symptoms: dry eyes  [ ]All other review of systems negative     Palliative Performance Status Version 2:    10     %      http://npc.org/files/news/palliative_performance_scale_[email or web address].pdf    PHYSICAL EXAM:  Vital Signs Last 24 Hrs  T(C): 36.6 (24 Nov 2020 07:47), Max: 36.6 (24 Nov 2020 07:47)  T(F): 97.9 (24 Nov 2020 07:47), Max: 97.9 (24 Nov 2020 07:47)  HR: 81 (24 Nov 2020 07:47) (81 - 81)  BP: 94/60 (24 Nov 2020 07:47) (94/60 - 94/60)  BP(mean): --  RR: 24 (24 Nov 2020 07:47) (24 - 24)  SpO2: 91% (24 Nov 2020 07:47) (91% - 91%)    GENERAL:  [ ]Alert  [ ]Oriented x   [x]Lethargic  [ ]Cachexia  [x]Unarousable  [ ]Verbal  [x]Non-Verbal  Behavioral:   [ ]Anxiety  [ ]Delirium [ ] Agitation [ ]Other  HEENT:  [ X]Normal   [ ]Dry mouth   [ ]ET Tube/Trach  [ ]Oral lesions  PULMONARY:   [ X]Clear [ ]Tachypnea  [ ]Audible excessive secretions   [ ]Rhonchi        [ ]Right [ ]Left [ ]Bilateral  [ ]Crackles        [ ]Right [ ]Left [ ]Bilateral  [ ]Wheezing     [ ]Right [ ]Left [ ]Bilateral  [ ]Diminished BS [ ] Right [ ]Left [ ]Bilateral  CARDIOVASCULAR:    [X ]Regular [ ]Irregular [ ]Tachy  [ ]Noble [ ]Murmur [ ]Other  GASTROINTESTINAL:  [X ]Soft  [ ]Distended   [ x]+BS  [ ]Non tender [ ]Tender  [ ]PEG [ ]OGT/ NGT   Last BM: 11/19  GENITOURINARY:  [ ]Normal [x ]Incontinent   [ ]Oliguria/Anuria   [X ]Gibson   MUSCULOSKELETAL:   [ ]Normal   [ ]Weakness [x] bed bound    Left shoulder hyperpigmented, large mass, edematous, non tender to touch with no warmth to palpation . LLE with dependent edema   NEUROLOGIC:   [ ]No focal deficits  [x] Cognitive impairment  [ ] Dysphagia [ ]Dysarthria [ ] Paresis [ ]Other   SKIN:   [x ] left shoulder hyperpigmented violaceous in hue -mass    CRITICAL CARE:  [ ]Shock Present  [ ]Septic [ ]Cardiogenic [ ]Neurologic [ ]Hypovolemic  [ ]Vasopressors [ ]Inotropes  [ ]Respiratory failure present [ ]Mechanical Ventilation [ ]Non-invasive ventilatory support [ ]High-Flow  [ ]Acute  [ ]Chronic [ ]Hypoxic  [ ]Hypercarbic [ ]Other  [x ]Other organ failure kidney, lymph/bone marrow  LABS:         RADIOLOGY & ADDITIONAL STUDIES: no new imaging    Protein Calorie Malnutrition Present: [ ]mild [x ]moderate [ ]severe [ ]underweight [ ]morbid obesity  https://www.andeal.org/vault/2440/web/files/ONC/Table_Clinical%20Characteristics%20to%20Document%20Malnutrition-White%20JV%20et%20al%367318.pdf    Height (cm): 182.9 (11-17-20 @ 22:54), 175.3 (10-19-20 @ 09:24)  Weight (kg): 69.3 (11-17-20 @ 22:54), 63.5 (10-31-20 @ 21:11), 77.1 (04-16-20 @ 14:06)  BMI (kg/m2): 20.7 (11-17-20 @ 22:54), 20.7 (10-31-20 @ 21:11), 25.1 (10-19-20 @ 09:24)    [ x]PPSV2 < or = 30%  [ ]significant weight loss [ ]poor nutritional intake [ ]anasarca   Albumin, Serum: 1.9 g/dL (11-18-20 @ 07:20)   [ ]Artificial Nutrition    REFERRALS:   [ ]Chaplaincy  [ x]Hospice  [ ]Child Life  [x ]Social Work  [ ]Case management [ ]Holistic Therapy     Goals of Care Document:  MICA mSith (10-17-20 @ 23:00)  Goals of Care Conversation:   Participants:  · Participants  Family  · Spouse  Wife, Mayda Hernandez    Advance Directives:  · Does patient have Advance Directive  Yes  · Indicate Type  Health Care Proxy (HCP)  · Agent's Name  Mayda Hernandez  · Are any of the items on the chart  No  · Caregiver:  no    Conversation Discussion:  · Conversation  Diagnosis; Prognosis; MOLST Discussed; Treatment Options  · Conversation Details  I had a 16 minute discussion with patient's wife Mayda (HCP) regarding advanced directives.  I had discussed with her the current plan of care.  I had also discussed all resuscitative measures and she verbalized understanding.  She confirmed that patient's wish is to have everything done as needed.  Currently patient is FULL CODE.    What Matters Most To Patient and Family:  · What matters most to patient and family  recovery    Personal Advance Directives Treatment Guidelines:   Treatment Guidelines:  · Decision Maker  Health Care Proxy  · Treatment Guidelines  Antibiotic trial; IV fluid trial    Location of Discussion:   Duration of Advanced Care Planning Meeting:  · Time spent (in minutes)  16    Location of Discussion:  · Location of discussion  Telephone     GAP TEAM PALLIATIVE CARE UNIT PROGRESS NOTE:      [  ] Patient on hospice program.    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 Appears comfortable. Required IV dilaudid 0.2mg x2, dilaudid 0.5 x1 and ativan 0.5mg x2 in last 24 hours.     DNR on chart: Yes    Allergies    No Known Allergies    Intolerances    MEDICATIONS  (STANDING):  acetaminophen   Tablet .. 650 milliGRAM(s) Oral every 6 hours  allopurinol 100 milliGRAM(s) Oral daily  amLODIPine   Tablet 2.5 milliGRAM(s) Oral daily  atorvastatin 80 milliGRAM(s) Oral at bedtime  cefepime   IVPB 2000 milliGRAM(s) IV Intermittent every 12 hours  dronabinol 2.5 milliGRAM(s) Oral two times a day before meals  enoxaparin Injectable 70 milliGRAM(s) SubCutaneous daily  fluconAZOLE   Tablet 200 milliGRAM(s) Oral daily  lactobacillus acidophilus 1 Tablet(s) Oral daily  latanoprost 0.005% Ophthalmic Solution 1 Drop(s) Both EYES at bedtime  mirtazapine 15 milliGRAM(s) Oral at bedtime  polyethylene glycol 3350 17 Gram(s) Oral daily  senna 2 Tablet(s) Oral at bedtime  sodium bicarbonate 650 milliGRAM(s) Oral two times a day  tamsulosin 0.4 milliGRAM(s) Oral at bedtime  timolol 0.5% Solution 1 Drop(s) Both EYES two times a day  vancomycin  IVPB 1000 milliGRAM(s) IV Intermittent every 24 hours    MEDICATIONS  (PRN):  HYDROmorphone  Injectable 0.2 milliGRAM(s) IV Push every 1 hour PRN Severe Pain (7 - 10)  traMADol 25 milliGRAM(s) Oral every 6 hours PRN Moderate Pain (4 - 6)      ITEMS UNCHECKED ARE NOT PRESENT    PRESENT SYMPTOMS: [x ]Unable to obtain due to poor mentation   Source if other than patient:  [ ]Family   [ ]Team     Pain:  [ x]yes [ ]no  QOL impact -   Location -  Left shoulder pain  Aggravating factors - unable to describe  Quality - unable to describe  Radiation - localized  Timing- constant  Severity (0-10 scale): unable to describe  Minimal acceptable level (0-10 scale):  unable to describe    Dyspnea:                           [ ]Mild [ ]Moderate [ ]Severe  Anxiety:                             [ ]Mild [ ]Moderate [ ]Severe  Fatigue:                             [ ]Mild [ ]Moderate [ ]Severe  Nausea:                             [ ]Mild [ ]Moderate [ ]Severe  Loss of appetite:              [ ]Mild [ ]Moderate [ ]Severe  Constipation:                    [ ]Mild [ ]Moderate [ ]Severe    PAIN AD Score:	0-7  [email or web address] (Ctrl + left click to view)    Other Symptoms: dry eyes  [ ]All other review of systems negative     Palliative Performance Status Version 2:    10     %      http://npc.org/files/news/palliative_performance_scale_ppsv2.pdf    PHYSICAL EXAM:  Vital Signs Last 24 Hrs  T(C): 36.6 (24 Nov 2020 07:47), Max: 36.6 (24 Nov 2020 07:47)  T(F): 97.9 (24 Nov 2020 07:47), Max: 97.9 (24 Nov 2020 07:47)  HR: 81 (24 Nov 2020 07:47) (81 - 81)  BP: 94/60 (24 Nov 2020 07:47) (94/60 - 94/60)  BP(mean): --  RR: 24 (24 Nov 2020 07:47) (24 - 24)  SpO2: 91% (24 Nov 2020 07:47) (91% - 91%)    GENERAL:  [ ]Alert  [ ]Oriented x   [x]Lethargic  [ ]Cachexia  [x]Unarousable  [ ]Verbal  [x]Non-Verbal  Behavioral:   [ ]Anxiety  [ ]Delirium [ ] Agitation [ ]Other  HEENT:  [ X]Normal   [ ]Dry mouth   [ ]ET Tube/Trach  [ ]Oral lesions  PULMONARY:   [ X]Clear [ ]Tachypnea  [ ]Audible excessive secretions   [ ]Rhonchi        [ ]Right [ ]Left [ ]Bilateral  [ ]Crackles        [ ]Right [ ]Left [ ]Bilateral  [ ]Wheezing     [ ]Right [ ]Left [ ]Bilateral  [ ]Diminished BS [ ] Right [ ]Left [ ]Bilateral  CARDIOVASCULAR:    [X ]Regular [ ]Irregular [ ]Tachy  [ ]Noble [ ]Murmur [ ]Other  GASTROINTESTINAL:  [X ]Soft  [ ]Distended   [ x]+BS  [ ]Non tender [ ]Tender  [ ]PEG [ ]OGT/ NGT   Last BM: 11/19  GENITOURINARY:  [ ]Normal [x ]Incontinent   [ ]Oliguria/Anuria   [X ]Gibson   MUSCULOSKELETAL:   [ ]Normal   [ ]Weakness [x] bed bound    Left shoulder hyperpigmented, large mass, edematous, non tender to touch with no warmth to palpation . LLE with dependent edema   NEUROLOGIC:   [ ]No focal deficits  [x] Cognitive impairment  [ ] Dysphagia [ ]Dysarthria [ ] Paresis [ ]Other   SKIN:   [x ] left shoulder hyperpigmented violaceous in hue -mass    CRITICAL CARE:  [ ]Shock Present  [ ]Septic [ ]Cardiogenic [ ]Neurologic [ ]Hypovolemic  [ ]Vasopressors [ ]Inotropes  [ ]Respiratory failure present [ ]Mechanical Ventilation [ ]Non-invasive ventilatory support [ ]High-Flow  [ ]Acute  [ ]Chronic [ ]Hypoxic  [ ]Hypercarbic [ ]Other  [x ]Other organ failure kidney, lymph/bone marrow    LABS:    RADIOLOGY & ADDITIONAL STUDIES: no new imaging    Protein Calorie Malnutrition Present: [ ]mild [x ]moderate [ ]severe [ ]underweight [ ]morbid obesity  https://www.andeal.org/vault/2440/web/files/ONC/Table_Clinical%20Characteristics%20to%20Document%20Malnutrition-White%20JV%20et%20al%445493.pdf    Height (cm): 182.9 (11-17-20 @ 22:54), 175.3 (10-19-20 @ 09:24)  Weight (kg): 69.3 (11-17-20 @ 22:54), 63.5 (10-31-20 @ 21:11), 77.1 (04-16-20 @ 14:06)  BMI (kg/m2): 20.7 (11-17-20 @ 22:54), 20.7 (10-31-20 @ 21:11), 25.1 (10-19-20 @ 09:24)    [ x]PPSV2 < or = 30%  [ ]significant weight loss [ ]poor nutritional intake [ ]anasarca   Albumin, Serum: 1.9 g/dL (11-18-20 @ 07:20)   [ ]Artificial Nutrition    REFERRALS:   [ ]Chaplaincy  [ x]Hospice  [ ]Child Life  [x ]Social Work  [ ]Case management [ ]Holistic Therapy     Goals of Care Document:  MICA Smith (10-17-20 @ 23:00)  Goals of Care Conversation:   Participants:  · Participants  Family  · Spouse  Wife, Mayda Hernandez    Advance Directives:  · Does patient have Advance Directive  Yes  · Indicate Type  Health Care Proxy (HCP)  · Agent's Name  Mayda Hernandez  · Are any of the items on the chart  No  · Caregiver:  no    Conversation Discussion:  · Conversation  Diagnosis; Prognosis; MOLST Discussed; Treatment Options  · Conversation Details  I had a 16 minute discussion with patient's wife Mayda (HCP) regarding advanced directives.  I had discussed with her the current plan of care.  I had also discussed all resuscitative measures and she verbalized understanding.  She confirmed that patient's wish is to have everything done as needed.  Currently patient is FULL CODE.    What Matters Most To Patient and Family:  · What matters most to patient and family  recovery    Personal Advance Directives Treatment Guidelines:   Treatment Guidelines:  · Decision Maker  Health Care Proxy  · Treatment Guidelines  Antibiotic trial; IV fluid trial    Location of Discussion:   Duration of Advanced Care Planning Meeting:  · Time spent (in minutes)  16    Location of Discussion:  · Location of discussion  Telephone

## 2020-11-25 NOTE — PROGRESS NOTE ADULT - PROBLEM SELECTOR PLAN 3
Completed 7 day course of cefepime, ID recommendations appreciated.  Negative growth to date of blood cultures from 11/17  Repeat blood cultures drawn on 11/19 negative

## 2020-11-25 NOTE — PROGRESS NOTE ADULT - PROBLEM SELECTOR PLAN 4
Unable to safely take PO medications for pain  Required IV dilaudid 0.5mg x4 in last 24 hours  Start IV dilaudid infusion 0.2mg/hr and continue IV dilaudid 0.5mg q1h PRN

## 2020-11-25 NOTE — PROGRESS NOTE ADULT - SUBJECTIVE AND OBJECTIVE BOX
GAP TEAM PALLIATIVE CARE UNIT PROGRESS NOTE:      [  ] Patient on hospice program.    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 Appears comfortable. Required IV dilaudid 0.5mg x4 n last 24 hours.     DNR on chart: Yes    Allergies    No Known Allergies    Intolerances    MEDICATIONS  (STANDING):  acetaminophen   Tablet .. 650 milliGRAM(s) Oral every 6 hours  allopurinol 100 milliGRAM(s) Oral daily  amLODIPine   Tablet 2.5 milliGRAM(s) Oral daily  atorvastatin 80 milliGRAM(s) Oral at bedtime  cefepime   IVPB 2000 milliGRAM(s) IV Intermittent every 12 hours  dronabinol 2.5 milliGRAM(s) Oral two times a day before meals  enoxaparin Injectable 70 milliGRAM(s) SubCutaneous daily  fluconAZOLE   Tablet 200 milliGRAM(s) Oral daily  lactobacillus acidophilus 1 Tablet(s) Oral daily  latanoprost 0.005% Ophthalmic Solution 1 Drop(s) Both EYES at bedtime  mirtazapine 15 milliGRAM(s) Oral at bedtime  polyethylene glycol 3350 17 Gram(s) Oral daily  senna 2 Tablet(s) Oral at bedtime  sodium bicarbonate 650 milliGRAM(s) Oral two times a day  tamsulosin 0.4 milliGRAM(s) Oral at bedtime  timolol 0.5% Solution 1 Drop(s) Both EYES two times a day  vancomycin  IVPB 1000 milliGRAM(s) IV Intermittent every 24 hours    MEDICATIONS  (PRN):  HYDROmorphone  Injectable 0.2 milliGRAM(s) IV Push every 1 hour PRN Severe Pain (7 - 10)  traMADol 25 milliGRAM(s) Oral every 6 hours PRN Moderate Pain (4 - 6)      ITEMS UNCHECKED ARE NOT PRESENT    PRESENT SYMPTOMS: [x ]Unable to obtain due to poor mentation   Source if other than patient:  [ ]Family   [ ]Team     Pain:  [ x]yes [ ]no  QOL impact -   Location -  Left shoulder pain  Aggravating factors - unable to describe  Quality - unable to describe  Radiation - localized  Timing- constant  Severity (0-10 scale): unable to describe  Minimal acceptable level (0-10 scale):  unable to describe    Dyspnea:                           [ ]Mild [ ]Moderate [ ]Severe  Anxiety:                             [ ]Mild [ ]Moderate [ ]Severe  Fatigue:                             [ ]Mild [ ]Moderate [ ]Severe  Nausea:                             [ ]Mild [ ]Moderate [ ]Severe  Loss of appetite:              [ ]Mild [ ]Moderate [ ]Severe  Constipation:                    [ ]Mild [ ]Moderate [ ]Severe    PAIN AD Score:	0-7  [email or web address] (Ctrl + left click to view)    Other Symptoms: dry eyes  [ ]All other review of systems negative     Palliative Performance Status Version 2:    10     %      http://npcrc.org/files/news/palliative_performance_scale_ppsv2.pdf    PHYSICAL EXAM:  Vital Signs Last 24 Hrs  T(C): 36.3 (25 Nov 2020 07:43), Max: 36.3 (25 Nov 2020 07:43)  T(F): 97.4 (25 Nov 2020 07:43), Max: 97.4 (25 Nov 2020 07:43)  HR: 87 (25 Nov 2020 07:43) (87 - 87)  BP: 78/57 (25 Nov 2020 07:43) (78/57 - 78/57)  BP(mean): --  RR: 22 (25 Nov 2020 07:43) (22 - 22)  SpO2: 92% (25 Nov 2020 07:43) (92% - 92%)    GENERAL:  [ ]Alert  [ ]Oriented x   [x]Lethargic  [ ]Cachexia  [x]Unarousable  [ ]Verbal  [x]Non-Verbal  Behavioral:   [ ]Anxiety  [ ]Delirium [ ] Agitation [ ]Other  HEENT:  [ X]Normal   [ ]Dry mouth   [ ]ET Tube/Trach  [ ]Oral lesions  PULMONARY:   [ X]Clear [ ]Tachypnea  [ ]Audible excessive secretions   [ ]Rhonchi        [ ]Right [ ]Left [ ]Bilateral  [ ]Crackles        [ ]Right [ ]Left [ ]Bilateral  [ ]Wheezing     [ ]Right [ ]Left [ ]Bilateral  [ ]Diminished BS [ ] Right [ ]Left [ ]Bilateral  CARDIOVASCULAR:    [X ]Regular [ ]Irregular [ ]Tachy  [ ]Noble [ ]Murmur [ ]Other  GASTROINTESTINAL:  [X ]Soft  [ ]Distended   [ x]+BS  [ ]Non tender [ ]Tender  [ ]PEG [ ]OGT/ NGT   Last BM: 11/19  GENITOURINARY:  [ ]Normal [x ]Incontinent   [ ]Oliguria/Anuria   [X ]Gibson   MUSCULOSKELETAL:   [ ]Normal   [ ]Weakness [x] bed bound    Left shoulder hyperpigmented, large mass, edematous, non tender to touch with no warmth to palpation . LLE with dependent edema   NEUROLOGIC:   [ ]No focal deficits  [x] Cognitive impairment  [ ] Dysphagia [ ]Dysarthria [ ] Paresis [ ]Other   SKIN:   [x ] left shoulder hyperpigmented violaceous in hue -mass    CRITICAL CARE:  [ ]Shock Present  [ ]Septic [ ]Cardiogenic [ ]Neurologic [ ]Hypovolemic  [ ]Vasopressors [ ]Inotropes  [ ]Respiratory failure present [ ]Mechanical Ventilation [ ]Non-invasive ventilatory support [ ]High-Flow  [ ]Acute  [ ]Chronic [ ]Hypoxic  [ ]Hypercarbic [ ]Other  [x ]Other organ failure kidney, lymph/bone marrow    LABS:    RADIOLOGY & ADDITIONAL STUDIES: no new imaging    Protein Calorie Malnutrition Present: [ ]mild [x ]moderate [ ]severe [ ]underweight [ ]morbid obesity  https://www.andeal.org/vault/2440/web/files/ONC/Table_Clinical%20Characteristics%20to%20Document%20Malnutrition-White%20JV%20et%20al%899721.pdf    Height (cm): 182.9 (11-17-20 @ 22:54), 175.3 (10-19-20 @ 09:24)  Weight (kg): 69.3 (11-17-20 @ 22:54), 63.5 (10-31-20 @ 21:11), 77.1 (04-16-20 @ 14:06)  BMI (kg/m2): 20.7 (11-17-20 @ 22:54), 20.7 (10-31-20 @ 21:11), 25.1 (10-19-20 @ 09:24)    [ x]PPSV2 < or = 30%  [ ]significant weight loss [ ]poor nutritional intake [ ]anasarca   Albumin, Serum: 1.9 g/dL (11-18-20 @ 07:20)   [ ]Artificial Nutrition    REFERRALS:   [ ]Chaplaincy  [ x]Hospice  [ ]Child Life  [x ]Social Work  [ ]Case management [ ]Holistic Therapy     Goals of Care Document:  MICA Smith (10-17-20 @ 23:00)  Goals of Care Conversation:   Participants:  · Participants  Family  · Spouse  Wife, Mayda Hernandez    Advance Directives:  · Does patient have Advance Directive  Yes  · Indicate Type  Health Care Proxy (HCP)  · Agent's Name  Mayda Hernandez  · Are any of the items on the chart  No  · Caregiver:  no    Conversation Discussion:  · Conversation  Diagnosis; Prognosis; MOLST Discussed; Treatment Options  · Conversation Details  I had a 16 minute discussion with patient's wife Mayda (HCP) regarding advanced directives.  I had discussed with her the current plan of care.  I had also discussed all resuscitative measures and she verbalized understanding.  She confirmed that patient's wish is to have everything done as needed.  Currently patient is FULL CODE.    What Matters Most To Patient and Family:  · What matters most to patient and family  recovery    Personal Advance Directives Treatment Guidelines:   Treatment Guidelines:  · Decision Maker  Health Care Proxy  · Treatment Guidelines  Antibiotic trial; IV fluid trial    Location of Discussion:   Duration of Advanced Care Planning Meeting:  · Time spent (in minutes)  16    Location of Discussion:  · Location of discussion  Telephone     GAP TEAM PALLIATIVE CARE UNIT PROGRESS NOTE:      [  ] Patient on hospice program.    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 Appears comfortable. Required IV dilaudid 0.5mg x4 n last 24 hours.     DNR on chart: Yes    Allergies    No Known Allergies    Intolerances    MEDICATIONS  (STANDING):  acetaminophen   Tablet .. 650 milliGRAM(s) Oral every 6 hours  allopurinol 100 milliGRAM(s) Oral daily  amLODIPine   Tablet 2.5 milliGRAM(s) Oral daily  atorvastatin 80 milliGRAM(s) Oral at bedtime  cefepime   IVPB 2000 milliGRAM(s) IV Intermittent every 12 hours  dronabinol 2.5 milliGRAM(s) Oral two times a day before meals  enoxaparin Injectable 70 milliGRAM(s) SubCutaneous daily  fluconAZOLE   Tablet 200 milliGRAM(s) Oral daily  lactobacillus acidophilus 1 Tablet(s) Oral daily  latanoprost 0.005% Ophthalmic Solution 1 Drop(s) Both EYES at bedtime  mirtazapine 15 milliGRAM(s) Oral at bedtime  polyethylene glycol 3350 17 Gram(s) Oral daily  senna 2 Tablet(s) Oral at bedtime  sodium bicarbonate 650 milliGRAM(s) Oral two times a day  tamsulosin 0.4 milliGRAM(s) Oral at bedtime  timolol 0.5% Solution 1 Drop(s) Both EYES two times a day  vancomycin  IVPB 1000 milliGRAM(s) IV Intermittent every 24 hours    MEDICATIONS  (PRN):  HYDROmorphone  Injectable 0.2 milliGRAM(s) IV Push every 1 hour PRN Severe Pain (7 - 10)  traMADol 25 milliGRAM(s) Oral every 6 hours PRN Moderate Pain (4 - 6)      ITEMS UNCHECKED ARE NOT PRESENT    PRESENT SYMPTOMS: [x ]Unable to obtain due to poor mentation   Source if other than patient:  [ ]Family   [ ]Team     Pain:  [ x]yes [ ]no  QOL impact -   Location -  Left shoulder pain  Aggravating factors - unable to describe  Quality - unable to describe  Radiation - localized  Timing- constant  Severity (0-10 scale): unable to describe  Minimal acceptable level (0-10 scale):  unable to describe    Dyspnea:                           [ ]Mild [ ]Moderate [ ]Severe  Anxiety:                             [ ]Mild [ ]Moderate [ ]Severe  Fatigue:                             [ ]Mild [ ]Moderate [ ]Severe  Nausea:                             [ ]Mild [ ]Moderate [ ]Severe  Loss of appetite:              [ ]Mild [ ]Moderate [ ]Severe  Constipation:                    [ ]Mild [ ]Moderate [ ]Severe    PAIN AD Score:	0-7  [email or web address] (Ctrl + left click to view)    Other Symptoms: dry eyes  [ ]All other review of systems negative     Palliative Performance Status Version 2:    10     %      http://npcrc.org/files/news/palliative_performance_scale_ppsv2.pdf    PHYSICAL EXAM:  Vital Signs Last 24 Hrs  T(C): 36.3 (25 Nov 2020 07:43), Max: 36.3 (25 Nov 2020 07:43)  T(F): 97.4 (25 Nov 2020 07:43), Max: 97.4 (25 Nov 2020 07:43)  HR: 87 (25 Nov 2020 07:43) (87 - 87)  BP: 78/57 (25 Nov 2020 07:43) (78/57 - 78/57)  BP(mean): --  RR: 22 (25 Nov 2020 07:43) (22 - 22)  SpO2: 92% (25 Nov 2020 07:43) (92% - 92%)    GENERAL:  [ ]Alert  [ ]Oriented x   [x]Lethargic  [ ]Cachexia  [x]Unarousable  [ ]Verbal  [x]Non-Verbal  Behavioral:   [ ]Anxiety  [ ]Delirium [ ] Agitation [ ]Other  HEENT:  [ X]Normal   [ ]Dry mouth   [ ]ET Tube/Trach  [ ]Oral lesions  PULMONARY:   [ X]Clear [ ]Tachypnea  [ ]Audible excessive secretions   [ ]Rhonchi        [ ]Right [ ]Left [ ]Bilateral  [ ]Crackles        [ ]Right [ ]Left [ ]Bilateral  [ ]Wheezing     [ ]Right [ ]Left [ ]Bilateral  [ ]Diminished BS [ ] Right [ ]Left [ ]Bilateral  CARDIOVASCULAR:    [X ]Regular [ ]Irregular [ ]Tachy  [ ]Noble [ ]Murmur [ ]Other  GASTROINTESTINAL:  [X ]Soft  [ ]Distended   [ x]+BS  [ ]Non tender [ ]Tender  [ ]PEG [ ]OGT/ NGT   Last BM: 11/19  GENITOURINARY:  [ ]Normal [x ]Incontinent   [ ]Oliguria/Anuria   [X ]Gibson   MUSCULOSKELETAL:   [ ]Normal   [ ]Weakness [x] bed bound    Left shoulder hyperpigmented, large mass, edematous, non tender to touch with no warmth to palpation . LLE with dependent edema   NEUROLOGIC:   [ ]No focal deficits  [x] Cognitive impairment  [ ] Dysphagia [ ]Dysarthria [ ] Paresis [ ]Other   SKIN:   [x ] left shoulder hyperpigmented violaceous in hue -mass    CRITICAL CARE:  [ ]Shock Present  [ ]Septic [ ]Cardiogenic [ ]Neurologic [ ]Hypovolemic  [ ]Vasopressors [ ]Inotropes  [ ]Respiratory failure present [ ]Mechanical Ventilation [ ]Non-invasive ventilatory support [ ]High-Flow  [ ]Acute  [ ]Chronic [ ]Hypoxic  [ ]Hypercarbic [ ]Other  [x ]Other organ failure kidney, lymph/bone marrow    LABS:    RADIOLOGY & ADDITIONAL STUDIES: no new imaging    Protein Calorie Malnutrition Present: [ ]mild [x ]moderate [ ]severe [ ]underweight [ ]morbid obesity  https://www.andeal.org/vault/2440/web/files/ONC/Table_Clinical%20Characteristics%20to%20Document%20Malnutrition-White%20JV%20et%20al%366209.pdf    Height (cm): 182.9 (11-17-20 @ 22:54), 175.3 (10-19-20 @ 09:24)  Weight (kg): 69.3 (11-17-20 @ 22:54), 63.5 (10-31-20 @ 21:11), 77.1 (04-16-20 @ 14:06)  BMI (kg/m2): 20.7 (11-17-20 @ 22:54), 20.7 (10-31-20 @ 21:11), 25.1 (10-19-20 @ 09:24)    [ x]PPSV2 < or = 30%  [ ]significant weight loss [ ]poor nutritional intake [ ]anasarca   Albumin, Serum: 1.9 g/dL (11-18-20 @ 07:20)   [ ]Artificial Nutrition    REFERRALS:   [ ]Chaplaincy  [ x]Hospice  [ ]Child Life  [x ]Social Work  [ ]Case management [ ]Holistic Therapy     Goals of Care Document:  MICA Smith (10-17-20 @ 23:00)  Goals of Care Conversation:   Participants:  · Participants  Family  · Spouse  Wife, Mayda Hernandez    Advance Directives:  · Does patient have Advance Directive  Yes  · Indicate Type  Health Care Proxy (HCP)  · Agent's Name  Mayda Hernandez  · Are any of the items on the chart  No  · Caregiver:  no    Conversation Discussion:  · Conversation  Diagnosis; Prognosis; MOLST Discussed; Treatment Options  · Conversation Details  I had a 16 minute discussion with patient's wife Mayda (HCP) regarding advanced directives.  I had discussed with her the current plan of care.  I had also discussed all resuscitative measures and she verbalized understanding.  She confirmed that patient's wish is to have everything done as needed.  Currently patient is FULL CODE.    What Matters Most To Patient and Family:  · What matters most to patient and family  recovery    Personal Advance Directives Treatment Guidelines:   Treatment Guidelines:  · Decision Maker  Health Care Proxy  · Treatment Guidelines  Antibiotic trial; IV fluid trial    Location of Discussion:   Duration of Advanced Care Planning Meeting:  · Time spent (in minutes)  16    Location of Discussion:  · Location of discussion  Telephone

## 2020-11-25 NOTE — CHART NOTE - NSCHARTNOTEFT_GEN_A_CORE
Came to see the patient, asked by staff to defer consultation at this time, family at the [facility]

## 2020-11-26 NOTE — PROGRESS NOTE ADULT - PROBLEM SELECTOR PROBLEM 7
Depression
Palliative care encounter

## 2020-11-26 NOTE — PROGRESS NOTE ADULT - PROBLEM SELECTOR PROBLEM 6
Advanced care planning/counseling discussion
HLD (hyperlipidemia)
Advanced care planning/counseling discussion

## 2020-11-26 NOTE — PROGRESS NOTE ADULT - PROBLEM SELECTOR PLAN 7
Home hospice referral sent out  Will continue to monitor, pt does not have oral route at this time, more appropriate for [facility] for symptom management. Pts wife Viktoria updated.  Will continue dispo planning with SW and family tomorrow.
Will continue symptom management in PCU.   Family at bedside updated on plan of care and support provided.
Will continue symptom management in PCU.   Patient appropriate for inpatient hospice based on symptoms. Will discuss with patient's wife Viktoria and CARMEL
c/w home mirtazapine 15mg
Home hospice referral sent out  Will continue to monitor, pt may require inpatient hospice based on symptoms and unreliable oral route. Wife Viktoria update by phone. She understands situation and is planning to visit patient this afternoon.

## 2020-11-26 NOTE — PROGRESS NOTE ADULT - PROBLEM SELECTOR PROBLEM 3
BPH (benign prostatic hyperplasia)
Cellulitis

## 2020-11-26 NOTE — PROGRESS NOTE ADULT - ATTENDING COMMENTS
I have personally seen and examined this patient and agree with the above assessment and plan, which I have reviewed and edited where appropriate.   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  Now in PCU for symptomatic management.  Goal for transfusions is to check based on sx as per our discussion with wife and complete course antibiotic therapy as well as home hospice evaluation.  Time  for discussion 25 minutes
Pt seen with fellow  Agree with above plan
I was physically present for the key portions of the evaluation and management (E/M) service provided.  I agree with the above history, physical, and plan which I have reviewed and edited where appropriate. Plan discussed with team.    ______________  Sal Murray MD   of Geriatric and Palliative Medicine  [facility]     Please page the following number for clinical matters between the hours of 9AM and 5PM   from Monday through Friday : [phone]    After 5PM and on weekends, please page: [phone]. The Geriatric and Palliative Medicine consult service has 24/7 coverage for medical recommendations, including for symptom management needs.
I was physically present for the key portions of the evaluation and management (E/M) service provided.  I agree with the above history, physical, and plan which I have reviewed and edited where appropriate. Plan discussed with team.    ______________  Sal Murray MD   of Geriatric and Palliative Medicine  [facility]     Please page the following number for clinical matters between the hours of 9AM and 5PM   from Monday through Friday : [phone]    After 5PM and on weekends, please page: [phone]. The Geriatric and Palliative Medicine consult service has 24/7 coverage for medical recommendations, including for symptom management needs.
I was physically present for the key portions of the evaluation and management (E/M) service provided.  I agree with the above history, physical, and plan which I have reviewed and edited where appropriate. Plan discussed with team.    ______________  Sal Murray MD   of Geriatric and Palliative Medicine  [facility]     Please page the following number for clinical matters between the hours of 9AM and 5PM   from Monday through Friday : [phone]    After 5PM and on weekends, please page: [phone]. The Geriatric and Palliative Medicine consult service has 24/7 coverage for medical recommendations, including for symptom management needs.
overall poor prognosis  family amenable to hopsice  [facility] care eval PCU?
I have personally seen and examined this patient and agree with the above assessment and plan, which I have reviewed and edited where appropriate.   Patient with DLBCL, and large mass left shoulder thought to be due to cancer.  Treating empirically for cellulitis of the area.  Pending second set of BC prior to transitioning to oral antibiotics.  Patient with pain.  Tramadol now RTC with oral oxycodone elixir for breakthrough pain.  Hospice for home once off IV antibiotics.  Plan discussed with family.

## 2020-11-26 NOTE — PROGRESS NOTE ADULT - REASON FOR ADMISSION
Left shoulder pain
L shoulder pain
Left shoulder pain

## 2020-11-26 NOTE — PROGRESS NOTE ADULT - SUBJECTIVE AND OBJECTIVE BOX
GAP TEAM PALLIATIVE CARE UNIT PROGRESS NOTE:      [  ] Patient on hospice program.    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 Appears comfortable. Required IV dilaudid 0.5mg x1 in last 24 hours. Family present at bedside.    DNR on chart: Yes    Allergies    No Known Allergies    Intolerances    MEDICATIONS  (STANDING):  acetaminophen   Tablet .. 650 milliGRAM(s) Oral every 6 hours  allopurinol 100 milliGRAM(s) Oral daily  amLODIPine   Tablet 2.5 milliGRAM(s) Oral daily  atorvastatin 80 milliGRAM(s) Oral at bedtime  cefepime   IVPB 2000 milliGRAM(s) IV Intermittent every 12 hours  dronabinol 2.5 milliGRAM(s) Oral two times a day before meals  enoxaparin Injectable 70 milliGRAM(s) SubCutaneous daily  fluconAZOLE   Tablet 200 milliGRAM(s) Oral daily  lactobacillus acidophilus 1 Tablet(s) Oral daily  latanoprost 0.005% Ophthalmic Solution 1 Drop(s) Both EYES at bedtime  mirtazapine 15 milliGRAM(s) Oral at bedtime  polyethylene glycol 3350 17 Gram(s) Oral daily  senna 2 Tablet(s) Oral at bedtime  sodium bicarbonate 650 milliGRAM(s) Oral two times a day  tamsulosin 0.4 milliGRAM(s) Oral at bedtime  timolol 0.5% Solution 1 Drop(s) Both EYES two times a day  vancomycin  IVPB 1000 milliGRAM(s) IV Intermittent every 24 hours    MEDICATIONS  (PRN):  HYDROmorphone  Injectable 0.2 milliGRAM(s) IV Push every 1 hour PRN Severe Pain (7 - 10)  traMADol 25 milliGRAM(s) Oral every 6 hours PRN Moderate Pain (4 - 6)      ITEMS UNCHECKED ARE NOT PRESENT    PRESENT SYMPTOMS: [x ]Unable to obtain due to poor mentation   Source if other than patient:  [ ]Family   [ ]Team     Pain:  [ x]yes [ ]no  QOL impact -   Location -  Left shoulder pain  Aggravating factors - unable to describe  Quality - unable to describe  Radiation - localized  Timing- constant  Severity (0-10 scale): unable to describe  Minimal acceptable level (0-10 scale):  unable to describe    Dyspnea:                           [ ]Mild [ ]Moderate [ ]Severe  Anxiety:                             [ ]Mild [ ]Moderate [ ]Severe  Fatigue:                             [ ]Mild [ ]Moderate [ ]Severe  Nausea:                             [ ]Mild [ ]Moderate [ ]Severe  Loss of appetite:              [ ]Mild [ ]Moderate [ ]Severe  Constipation:                    [ ]Mild [ ]Moderate [ ]Severe    PAIN AD Score:	0  [email or web address] (Ctrl + left click to view)    Other Symptoms: dry eyes  [ ]All other review of systems negative     Palliative Performance Status Version 2:    10     %      http://npcrc.org/files/news/palliative_performance_scale_ppsv2.pdf    PHYSICAL EXAM:  Vital Signs Last 24 Hrs  T(F): --  HR: 87 (26 Nov 2020 07:58) (87 - 87)  BP: 81/57 (26 Nov 2020 07:58) (81/57 - 81/57)  RR: 20 (26 Nov 2020 07:58) (20 - 20)  SpO2: --  GENERAL:    [ ]Alert  [ ]Oriented x   []Lethargic  [ ]Cachexia  [x]Unarousable  [ ]Verbal  [x]Non-Verbal  Behavioral:   [ ]Anxiety  [ ]Delirium [ ] Agitation [ ]Other  HEENT:  [ X]Normal   [ ]Dry mouth   [ ]ET Tube/Trach  [ ]Oral lesions  PULMONARY:   [ X]Clear [ ]Tachypnea  [ ]Audible excessive secretions   [ ]Rhonchi        [ ]Right [ ]Left [ ]Bilateral  [ ]Crackles        [ ]Right [ ]Left [ ]Bilateral  [ ]Wheezing     [ ]Right [ ]Left [ ]Bilateral  [ ]Diminished BS [ ] Right [ ]Left [ ]Bilateral  CARDIOVASCULAR:    [X ]Regular [ ]Irregular [ ]Tachy  [ ]Noble [ ]Murmur [ ]Other  GASTROINTESTINAL:  [X ]Soft  [ ]Distended   [ x]+BS  [ ]Non tender [ ]Tender  [ ]PEG [ ]OGT/ NGT   Last BM: 11/19  GENITOURINARY:  [ ]Normal [ ]Incontinent   [ ]Oliguria/Anuria   [X ]Gibson   MUSCULOSKELETAL:   [ ]Normal   [ ]Weakness [x] bed bound    Left shoulder hyperpigmented, large mass, edematous, non tender to touch with no warmth to palpation . LLE with dependent edema   NEUROLOGIC:   [ ]No focal deficits  [x] Cognitive impairment  [ ] Dysphagia [ ]Dysarthria [ ] Paresis [ ]Other   SKIN:   [x ] left shoulder hyperpigmented violaceous in hue -mass    CRITICAL CARE:  [ ]Shock Present  [ ]Septic [ ]Cardiogenic [ ]Neurologic [ ]Hypovolemic  [ ]Vasopressors [ ]Inotropes  [ ]Respiratory failure present [ ]Mechanical Ventilation [ ]Non-invasive ventilatory support [ ]High-Flow  [ ]Acute  [ ]Chronic [ ]Hypoxic  [ ]Hypercarbic [ ]Other  [x ]Other organ failure kidney, lymph/bone marrow    LABS:    RADIOLOGY & ADDITIONAL STUDIES: no new imaging    Protein Calorie Malnutrition Present: [ ]mild [x ]moderate [ ]severe [ ]underweight [ ]morbid obesity  https://www.andeal.org/vault/2440/web/files/ONC/Table_Clinical%20Characteristics%20to%20Document%20Malnutrition-White%20JV%20et%20al%430323.pdf    Height (cm): 182.9 (11-17-20 @ 22:54), 175.3 (10-19-20 @ 09:24)  Weight (kg): 69.3 (11-17-20 @ 22:54), 63.5 (10-31-20 @ 21:11), 77.1 (04-16-20 @ 14:06)  BMI (kg/m2): 20.7 (11-17-20 @ 22:54), 20.7 (10-31-20 @ 21:11), 25.1 (10-19-20 @ 09:24)    [ x]PPSV2 < or = 30%  [ ]significant weight loss [ ]poor nutritional intake [ ]anasarca   Albumin, Serum: 1.9 g/dL (11-18-20 @ 07:20)   [ ]Artificial Nutrition    REFERRALS:   [ ]Chaplaincy  [ x]Hospice  [ ]Child Life  [x ]Social Work  [ ]Case management [ ]Holistic Therapy     Goals of Care Document:  MICA Smith (10-17-20 @ 23:00)  Goals of Care Conversation:   Participants:  · Participants  Family  · Spouse  Wife, Mayda Hernandez    Advance Directives:  · Does patient have Advance Directive  Yes  · Indicate Type  Health Care Proxy (HCP)  · Agent's Name  Maydameño Goinssussy  · Are any of the items on the chart  No  · Caregiver:  no    Conversation Discussion:  · Conversation  Diagnosis; Prognosis; MOLST Discussed; Treatment Options  · Conversation Details  I had a 16 minute discussion with patient's wife Mayda (HCP) regarding advanced directives.  I had discussed with her the current plan of care.  I had also discussed all resuscitative measures and she verbalized understanding.  She confirmed that patient's wish is to have everything done as needed.  Currently patient is FULL CODE.    What Matters Most To Patient and Family:  · What matters most to patient and family  recovery    Personal Advance Directives Treatment Guidelines:   Treatment Guidelines:  · Decision Maker  Health Care Proxy  · Treatment Guidelines  Antibiotic trial; IV fluid trial    Location of Discussion:   Duration of Advanced Care Planning Meeting:  · Time spent (in minutes)  16    Location of Discussion:  · Location of discussion  Telephone

## 2020-11-26 NOTE — PROGRESS NOTE ADULT - ASSESSMENT
Patient is a 86 year old gentlemen with a pmh of DLBCL (s/p 6 cycles of R-CHOP and in [facility] until 9/2019), L shoulder mass and pathologic fracture ( s/p left proximal humerus open biopsy, radical resection of tumor, and ORIF with IMN and cementation on 9/25/20),Left arm DVT, HTN, HLD, and CKD who is presenting from rehabilitation facility for tachycardia and fever now being treated empirically with  Cefepime and vancomycin for left upper extremity cellulitis.  Pt remains in PCU for symptom management and end of life care.

## 2020-11-26 NOTE — PROGRESS NOTE ADULT - PROBLEM SELECTOR PLAN 4
Unable to take PO medications for pain  Required IV dilaudid 0.5mg x1 in last 24 hours  Contineu IV dilaudid infusion 0.2mg/hr and IV dilaudid 0.5mg q1h PRN

## 2020-11-26 NOTE — PROGRESS NOTE ADULT - PROBLEM SELECTOR PLAN 5
Artificial tears
The patient has known CKD w/ a recent DANIELITO at last admission in October secondary to infection.
Hospice referral.  Discussion with wife - symptomatic management.
Artificial tears

## 2020-11-26 NOTE — PROGRESS NOTE ADULT - PROBLEM SELECTOR PROBLEM 1
Diffuse large B-cell lymphoma, unspecified body region
Sepsis due to cellulitis
Diffuse large B-cell lymphoma, unspecified body region

## 2020-11-26 NOTE — PROGRESS NOTE ADULT - PROBLEM SELECTOR PROBLEM 2
Anemia, unspecified type
Diffuse large B cell lymphoma
Anemia, unspecified type

## 2020-11-26 NOTE — PROGRESS NOTE ADULT - PROBLEM SELECTOR PROBLEM 5
Chronic kidney disease (CKD)
Dry eyes
Encounter for palliative care
Dry eyes

## 2020-11-27 NOTE — DISCHARGE NOTE FOR THE EXPIRED PATIENT - HOSPITAL COURSE
Patient is a 86 year old gentlemen with a pmh of DLBCL (s/p 6 cycles of R-CHOP and in [facility] until 2019), L shoulder mass and pathologic fracture ( s/p left proximal humerus open biopsy, radical resection of tumor, and ORIF with IMN and cementation on 20),Left arm DVT, HTN, HLD, and CKD who is presented  from rehabilitation facility for tachycardia and fever, treated empirically with  Cefepime and vancomycin for left upper extremity cellulitis. Pt transferred to PCU for symptom management and end of life care. Pt became more lethargic and unable to tolerate PO, required IV medications symptom management. Pt  on  with family at bedside.

## 2020-11-27 NOTE — PROVIDER CONTACT NOTE (OTHER) - ASSESSMENT
No response to external stimuli.  No spontaneous respirations or pulse.  No apical heart rate.  Negative pupillary response to light.

## 2022-01-27 NOTE — DIETITIAN INITIAL EVALUATION ADULT. - REASON INDICATOR FOR ASSESSMENT
Continued Stay Review    Date: 01/27/22                         Current Patient Class: IP  Current Level of Care: Med/Surg    HPI:45 y o  male initially admitted on 01/24/22 for osteomyelitis of L foot  Blood cultures drawn on admission revealed staphylococcus aureus bacteremia     01/26/22 Infectious Disease Consult: Pt w/ staph aureus bacteremia s/t L foot cellulitis w/ acute osteomyelitis of L hallux  Plan: IV ABX at least 4 week course, follow cultures for susceptibilities, echo  01/26/22 Surgery  Procedure: PARTIAL LEFT HALLUX AMPUTATION (Left)  Indication: Acute osteomyelitis of left foot (HCC) [O62 842]  Anesthesia: IV Sedation with Anesthesia with 10 ml of 1% Lidocaine and 0 5% Bupivacaine in a 1:1 mixture  Findings: Consistent with diagnosis      Assessment/Plan:  01/27 Podiatry: POD#1 partial L hallux amputation  Proximal phalanx appeared healthy and noninfected during surgical intervention  On exam, sutures intact, erythema of surrounding soft tissue, no signs of dehiscence  Plan: betadine/DSD/coban applied to remain intact and untouched until next seen by podiatry, continue forefoot offloading shoe for partial weight bearing to L heel, continue IV ABX per ID      01/27 Internal Medicine: Pt w/ bacteremia s/p L hallux amputation  On exam, L foot wrapped, obese  Plan: IV ABX per ID, repeat blood cultures drawn today will follow, once repeat cultures negative x48 hours, pt will have PICC placed for long-term IV ABX, PT/OT evals  Trend labs, replete electrolytes as needed  SSI w/ meals, accuchecks ACHS, Lantus  Continue home meds              Vital Signs:   Date/Time Temp Pulse Resp BP MAP (mmHg) SpO2 O2 Device   01/27/22 0948 -- 74 -- 152/74 -- -- --   01/27/22 0759 97 °F (36 1 °C)  74 18 152/74 -- 96 % None (Room air)   01/26/22 2253 97 °F (36 1 °C) 71 18 154/82 -- 99 % None (Room air)   01/26/22 2145 97 1 °F (36 2 °C) 71 18 177/85 Abnormal  88 99 % None (Room air)   01/26/22 2045 97 6 °F (36 4 °C) 66 18 156/88 89 99 % None (Room air)   01/26/22 1945 97 8 °F (36 6 °C) 64 18 144/75 88 98 % None (Room air)   01/26/22 1845 97 6 °F (36 4 °C) 63 18 139/77 78 99 % None (Room air)   01/26/22 1815 97 6 °F (36 4 °C) 63 18 140/77 77 99 % None (Room air)   01/26/22 1745 97 8 °F (36 6 °C) 59 18 142/78 89 100 % None (Room air)   01/26/22 1730 97 6 °F (36 4 °C) 64 19 144/89 88 99 % None (Room air)   01/26/22 1715 97 8 °F (36 6 °C) 59 18 142/88 98 99 % None (Room air)   01/26/22 1700 96 9 °F (36 1 °C) 68 18 161/83 111 100 % None (Room air)   01/26/22 1657 -- 60 15 145/72 100 97 % --   01/26/22 1646 -- 62 13 148/72 101 97 % --   01/26/22 1631 97 °F (36 1 °C) 62 16 141/67 96 97 % None (Room air)   01/26/22 0750 97 °F (36 1 °C) 71 18 166/89 -- 99 % None (Room air)   01/25/22 2237 96 6 °F (35 9 °C) 70 18 156/77 100 98 % None (Room air)       Pertinent Labs/Diagnostic Results:   1/26 - XR L foot  Interval hallux amputation at the level of the distal aspect of the proximal phalanx  No radiographic evidence of osteomyelitis  1/27 - Echo    Technically difficult study    Left Ventricle: Left ventricular cavity size is normal  The left ventricular ejection fraction is 62% by visual estimation  Systolic function is normal  Wall motion is normal  There is mild concentric hypertrophy  There is mild asymmetric hypertrophy  Diastolic function is normal     Right Ventricle: Right ventricular cavity size is normal  Systolic function is normal     Left Atrium: The atrium is mildly dilated    Mitral Valve: There is trace regurgitation    Tricuspid Valve: There is trace regurgitation  Pulmonary artery systolic pressure is estimated at 30 mm Hg    Pulmonic Valve: There is trace regurgitation    Aorta: The aortic root is ectatic at 3 5 cm  The ascending aorta is normal in size      In limited views, there was no obvious evidence of vegetation noted        Results from last 7 days   Lab Units 01/24/22  0908   SARS-COV-2  Negative Results from last 7 days   Lab Units 01/26/22  0446 01/25/22  0456 01/24/22  0930   WBC Thousand/uL 9 80 10 20 14 50*   HEMOGLOBIN g/dL 10 3* 10 7* 11 5*   HEMATOCRIT % 30 5* 30 8* 34 7*   PLATELETS Thousands/uL 379 275 456*   NEUTROS ABS Thousands/µL  --  5 60 10 60*         Results from last 7 days   Lab Units 01/26/22  0446 01/25/22  0456 01/24/22  0930   SODIUM mmol/L 136* 134* 134*   POTASSIUM mmol/L 5 0 4 3 5 1*   CHLORIDE mmol/L 104 106 103   CO2 mmol/L 28 23 24   ANION GAP mmol/L 4* 5 7   BUN mg/dL 21 28* 39*   CREATININE mg/dL 1 16 1 13 1 50   EGFR ml/min/1 73sq m 75 78 55   CALCIUM mg/dL 8 8 8 5 9 1     Results from last 7 days   Lab Units 01/25/22  0456 01/24/22  0930   AST U/L 22 17   ALT U/L 9 13   ALK PHOS U/L 77 98   TOTAL PROTEIN g/dL 7 6 8 4   ALBUMIN g/dL 3 3 4 0   TOTAL BILIRUBIN mg/dL 0 52 0 47     Results from last 7 days   Lab Units 01/27/22  1118 01/27/22  0634 01/26/22  2023 01/26/22  1716 01/26/22  1112 01/26/22  0532 01/25/22 2001 01/25/22  1540 01/25/22  1121 01/25/22  0528 01/24/22  2035 01/24/22  1620   POC GLUCOSE mg/dl 184* 151* 201* 137 156* 128 241* 187* 205* 128 166* 207*     Results from last 7 days   Lab Units 01/26/22  0446 01/25/22  0456 01/24/22  0930   GLUCOSE RANDOM mg/dL 170* 120* 189*     Results from last 7 days   Lab Units 01/24/22  0930   SED RATE mm/hour 66*     Results from last 7 days   Lab Units 01/24/22  0939   INFLUENZA A PCR  Negative   INFLUENZA B PCR  Negative   RSV PCR  Negative     Results from last 7 days   Lab Units 01/26/22  1550 01/24/22  0934 01/24/22  0930   BLOOD CULTURE   --  Staphylococcus aureus* Staphylococcus aureus*   GRAM STAIN RESULT  No Polys or Bacteria seen Gram positive cocci in clusters* Gram positive cocci in clusters*       Medications:   Scheduled Medications:  atenolol, 100 mg, Oral, Daily  cefazolin, 2,000 mg, Intravenous, Q8H  heparin (porcine), 5,000 Units, Subcutaneous, Q8H STEFFI  hydrochlorothiazide, 25 mg, Oral, Daily  insulin glargine, 35 Units, Subcutaneous, HS  insulin lispro, 2-12 Units, Subcutaneous, TID AC    Continuous IV Infusions:    dextrose 5% and sodium chloride 0 45%, 100 mL/hr, Intravenous, Continuous; Start: 01/26/22 0600 End: 01/26/22 1710    PRN Meds:  acetaminophen, 650 mg, Oral, Q6H PRN  oxyCODONE, 5 mg, Oral, Q4H PRN        Discharge Plan: D    Network Utilization Review Department  ATTENTION: Please call with any questions or concerns to [phone] and carefully listen to the prompts so that you are directed to the right person  All voicemails are confidential   Eliseo Haywood all requests for admission clinical reviews, approved or denied determinations and any other requests to dedicated fax number below belonging to the campus where the patient is receiving treatment   List of dedicated fax numbers for the Facilities:  [address] DENIALS (Administrative/Medical Necessity) [phone]   [address] (Maternity/NICU/Pediatrics) [phone]   [address]  [address] [facility] [place] [address] [address] [place] [address] [address]ia Patel [address] [place], [place] [address] [phone] Pt seen for consult for stage 2 or greater pressure injury.   Source: pt, son at bedside

## 2023-01-10 NOTE — OCCUPATIONAL THERAPY INITIAL EVALUATION ADULT - GROOMING, PREVIOUS LEVEL OF FUNCTION, OT EVAL
Quality 110: Preventive Care And Screening: Influenza Immunization: Influenza Immunization previously received during influenza season Quality 431: Preventive Care And Screening: Unhealthy Alcohol Use - Screening: Patient not identified as an unhealthy alcohol user when screened for unhealthy alcohol use using a systematic screening method Detail Level: Detailed needed assist

## 2023-03-23 NOTE — DIETITIAN INITIAL EVALUATION ADULT. - BUCCAL DEPLETION IS
1.) Do you have an Advance directive, living will, or power of  for health care document that contains your wishes for end of life care?: No     3.) During the past 4 weeks, how would you rate your health?: Fair     4.) During the past 4 weeks, what was the hardest physical activity you could do for at least 2 minutes?: Very Light     6 a.) How many servings of Fruits and Vegetables do you have each day ( 1 serving = 1 piece of fruit, 1/2 cup fruits or vegetables): 1 per day     6 b.) How many servings of High Fiber / Whole Grain Foods to you have each day ( 1 serving = 1 cup cold cereal, 1/2 cup cooked cereal, 1 slice bread): 1 per day     11d.) Bodily pain: Often       Recent PHQ 2/9 Score    PHQ 2:  Date Adult PHQ 2 Score Adult PHQ 2 Interpretation   3/23/2023 2 No further screening needed       PHQ 9:     PHQ-2/9 Depression Screening  Little interest or pleasure in activity?: Several days  Feeling down, depressed or hopeless?: Several days  Initial depression screening score:: 2  PHQ2 Interpretation: No further screening needed      mild

## 2023-10-17 NOTE — ED ADULT NURSE NOTE - NS PRO AD NO ADVANCE DIRECTIVE
Elective cath via [address] for chest pain / abnormal troponin   Minimal CAD  TR band placed
[facility]    PATIENT'S NAME: Kristopher Damon   ATTENDING PHYSICIAN: Herbert Swartz M.D. OPERATING PHYSICIAN: Tevin Long MD   PATIENT ACCOUNT#:   [de-identified]    LOCATION:  [address] [place] [address]  MEDICAL RECORD #:   [id number]       YOB: 1973  ADMISSION DATE:       10/14/2023      OPERATION DATE:  10/16/2023    CARDIAC PROCEDURE TRANSCRIPTION      CARDIAC CATHETERIZATION    PREOPERATIVE DIAGNOSIS:    1. Chest pain. 2.   Abnormal troponin. POSTOPERATIVE DIAGNOSIS:    1. Chest pain. 2.   Abnormal troponin. 3.   Coronary artery disease. PROCEDURE PERFORMED:  Coronary angiography. COMPLICATIONS:  None. ESTIMATED BLOOD LOSS:  1 mL. PROCEDURAL DETAILS:  After obtaining informed consent, we obtained access in the right radial artery. Chastity Riedel catheter was advanced over a wire and used to engage the right coronary. The right coronary artery was dominant. It had minimal nonobstructive coronary artery disease. JR4 catheter was removed over a wire and replaced with a JL3. 5.  JL3.5 catheter was used to engage the left main. Angiography showed minimal nonobstructive coronary artery disease in the LAD and circumflex. The left main was angiographically normal.  JL3.5 catheter was removed over a wire. TR band was placed and the patient was transferred to Recovery in stable condition. CONCLUSIONS:  Minimal nonobstructive coronary artery disease in a right dominant system.      Dictated By Tevin Long MD  d: 10/16/2023 16:33:34  t: 10/17/2023 07:11:51  Job [id number]  DS/
No

## 2023-11-11 NOTE — PATIENT PROFILE ADULT - NSPROPTRIGHTREPPHONE_GEN_A_NUR
Dear Maria Esther Lowe,    We are writing to inform you of your test results that show an elevated Creatining and you need to see your primary care as this is trending up.     Thank you for taking the time to be seen in our dermatology clinic. If you have further questions or concerns, please contact the clinic(see phone number listed below).      Sincerely,    Carley Serrano MD    Department of Dermatology  [facility]: Phone: [phone], Fax:[phone]  [facility]: Phone: [phone], Fax: [phone]    
[phone]

## 2024-12-22 NOTE — PROGRESS NOTE ADULT - PROBLEM SELECTOR PLAN 4
room air Unable to safely take PO medications for pain  Will start IV dilaudid 0.2mg q8h ATC for pain control

## 2025-01-22 NOTE — DISCHARGE NOTE PROVIDER - NSDCHOSPICE_GEN_A_CORE
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  Patient has symptoms including pain, swelling, and/or decreased mobility in the joint. The risks, benefits, and alternatives to corticosteroid injection were explained in full to the patient, including but not limited to infection, sepsis, bleeding, scarring, skin discoloration, temporary increase in pain, syncopal episode, failure to resolve symptoms, allergic reaction, symptom recurrence, and elevation of blood sugar in diabetics. Patient understood the risks. All questions were answered. After discussion of options, patient requested an injection.   Oral informed consent was obtained and sterile technique was utilized for the procedure including the preparation of the solutions used for the injection and betadine followed by alcohol prep to the injection site. Anesthesia was given with ethyl chloride sprayed topically. The injection was delivered. Patient tolerated the procedure well.   Post Procedure Instructions: Patient was advised to call if redness, pain, or fever occur and apply ice for 15 min on and 15 min off later today  Medications delivered: Kenalog 10 mg, Lidocaine: 4 cc    ----------------------------------------------------------------------------   All relevant imaging studies pertinent to today's visit, including x-rays, MRI's and/or other advanced imaging studies (CT/etc) were independently interpreted and reviewed with the patient as needed. Implications of the studies together with the patient's clinical picture were discussed to formulate a working diagnosis and management options were detailed.   The patient and/or guardian was advised of the diagnosis.  The natural history of the pathology was explained in full. All questions were answered.  The risks and benefits of conservative and interventional treatment alternatives were explained to the patient  The patient and/or guardian was advised if any advanced diagnostic/imaging study (MRI/CT/etc) is ordered to evaluate potential pathology in the affected area(s), they should follow up in the office to review the results of the study and determine further management that may be indicated. 
No